# Patient Record
Sex: FEMALE | Race: OTHER | HISPANIC OR LATINO | Employment: OTHER | URBAN - METROPOLITAN AREA
[De-identification: names, ages, dates, MRNs, and addresses within clinical notes are randomized per-mention and may not be internally consistent; named-entity substitution may affect disease eponyms.]

---

## 2018-01-17 NOTE — MISCELLANEOUS
Provider Comments  Provider Comments:   PT NO SHOWED TODAY      Signatures   Electronically signed by : Nettie Jean, ; Jun 9 2016  3:08PM EST                       (Author)    Electronically signed by : Darell Riedel, MD; Jun 24 2016  3:40PM EST                       (Author)    Electronically signed by : PAULETTE Osborne ; Jun 28 2016 10:09AM EST                       (Review)

## 2018-10-29 ENCOUNTER — TRANSCRIBE ORDERS (OUTPATIENT)
Dept: LAB | Facility: CLINIC | Age: 58
End: 2018-10-29

## 2018-10-29 ENCOUNTER — APPOINTMENT (OUTPATIENT)
Dept: LAB | Facility: CLINIC | Age: 58
End: 2018-10-29
Payer: COMMERCIAL

## 2018-10-29 DIAGNOSIS — F43.12 PROLONGED POSTTRAUMATIC STRESS DISORDER: ICD-10-CM

## 2018-10-29 DIAGNOSIS — E13.8 DIABETES MELLITUS OF OTHER TYPE WITH COMPLICATION, UNSPECIFIED WHETHER LONG TERM INSULIN USE: ICD-10-CM

## 2018-10-29 DIAGNOSIS — F25.1 SCHIZOAFFECTIVE DISORDER, DEPRESSIVE TYPE (HCC): ICD-10-CM

## 2018-10-29 DIAGNOSIS — F19.20 COMBINATIONS OF DRUG DEPENDENCE EXCLUDING OPIOID TYPE DRUG, CONTINUOUS (HCC): ICD-10-CM

## 2018-10-29 DIAGNOSIS — E03.2 IATROGENIC HYPOTHYROIDISM: ICD-10-CM

## 2018-10-29 DIAGNOSIS — Z79.899 NEED FOR PROPHYLACTIC CHEMOTHERAPY: Primary | ICD-10-CM

## 2018-10-29 LAB
ALBUMIN SERPL BCP-MCNC: 3.8 G/DL (ref 3.5–5)
ALP SERPL-CCNC: 65 U/L (ref 46–116)
ALT SERPL W P-5'-P-CCNC: 12 U/L (ref 12–78)
ANION GAP SERPL CALCULATED.3IONS-SCNC: 9 MMOL/L (ref 4–13)
AST SERPL W P-5'-P-CCNC: 12 U/L (ref 5–45)
BASOPHILS # BLD AUTO: 0.03 THOUSANDS/ΜL (ref 0–0.1)
BASOPHILS NFR BLD AUTO: 1 % (ref 0–1)
BILIRUB SERPL-MCNC: 0.39 MG/DL (ref 0.2–1)
BUN SERPL-MCNC: 21 MG/DL (ref 5–25)
CALCIUM SERPL-MCNC: 8.9 MG/DL (ref 8.3–10.1)
CHLORIDE SERPL-SCNC: 113 MMOL/L (ref 100–108)
CHOLEST SERPL-MCNC: 140 MG/DL (ref 50–200)
CO2 SERPL-SCNC: 20 MMOL/L (ref 21–32)
CREAT SERPL-MCNC: 1.07 MG/DL (ref 0.6–1.3)
EOSINOPHIL # BLD AUTO: 0.09 THOUSAND/ΜL (ref 0–0.61)
EOSINOPHIL NFR BLD AUTO: 2 % (ref 0–6)
ERYTHROCYTE [DISTWIDTH] IN BLOOD BY AUTOMATED COUNT: 13.9 % (ref 11.6–15.1)
EST. AVERAGE GLUCOSE BLD GHB EST-MCNC: 94 MG/DL
GFR SERPL CREATININE-BSD FRML MDRD: 58 ML/MIN/1.73SQ M
GLUCOSE P FAST SERPL-MCNC: 83 MG/DL (ref 65–99)
HBA1C MFR BLD: 4.9 % (ref 4.2–6.3)
HCT VFR BLD AUTO: 35.9 % (ref 34.8–46.1)
HDLC SERPL-MCNC: 47 MG/DL (ref 40–60)
HGB BLD-MCNC: 11.5 G/DL (ref 11.5–15.4)
IMM GRANULOCYTES # BLD AUTO: 0.01 THOUSAND/UL (ref 0–0.2)
IMM GRANULOCYTES NFR BLD AUTO: 0 % (ref 0–2)
LDLC SERPL CALC-MCNC: 68 MG/DL (ref 0–100)
LYMPHOCYTES # BLD AUTO: 2.5 THOUSANDS/ΜL (ref 0.6–4.47)
LYMPHOCYTES NFR BLD AUTO: 50 % (ref 14–44)
MCH RBC QN AUTO: 37.1 PG (ref 26.8–34.3)
MCHC RBC AUTO-ENTMCNC: 32 G/DL (ref 31.4–37.4)
MCV RBC AUTO: 116 FL (ref 82–98)
MONOCYTES # BLD AUTO: 0.26 THOUSAND/ΜL (ref 0.17–1.22)
MONOCYTES NFR BLD AUTO: 5 % (ref 4–12)
NEUTROPHILS # BLD AUTO: 2.1 THOUSANDS/ΜL (ref 1.85–7.62)
NEUTS SEG NFR BLD AUTO: 42 % (ref 43–75)
NONHDLC SERPL-MCNC: 93 MG/DL
NRBC BLD AUTO-RTO: 0 /100 WBCS
PLATELET # BLD AUTO: 186 THOUSANDS/UL (ref 149–390)
PMV BLD AUTO: 11.2 FL (ref 8.9–12.7)
POTASSIUM SERPL-SCNC: 4 MMOL/L (ref 3.5–5.3)
PROLACTIN SERPL-MCNC: 110.5 NG/ML
PROT SERPL-MCNC: 7.2 G/DL (ref 6.4–8.2)
RBC # BLD AUTO: 3.1 MILLION/UL (ref 3.81–5.12)
SODIUM SERPL-SCNC: 142 MMOL/L (ref 136–145)
TRIGL SERPL-MCNC: 124 MG/DL
TSH SERPL DL<=0.05 MIU/L-ACNC: 5.71 UIU/ML (ref 0.36–3.74)
WBC # BLD AUTO: 4.99 THOUSAND/UL (ref 4.31–10.16)

## 2018-10-29 PROCEDURE — 84443 ASSAY THYROID STIM HORMONE: CPT

## 2018-10-29 PROCEDURE — 80307 DRUG TEST PRSMV CHEM ANLYZR: CPT

## 2018-10-29 PROCEDURE — 83036 HEMOGLOBIN GLYCOSYLATED A1C: CPT

## 2018-10-29 PROCEDURE — 36415 COLL VENOUS BLD VENIPUNCTURE: CPT

## 2018-10-29 PROCEDURE — 80061 LIPID PANEL: CPT

## 2018-10-29 PROCEDURE — 85025 COMPLETE CBC W/AUTO DIFF WBC: CPT

## 2018-10-29 PROCEDURE — 80053 COMPREHEN METABOLIC PANEL: CPT

## 2018-10-29 PROCEDURE — 84146 ASSAY OF PROLACTIN: CPT

## 2018-10-30 LAB
AMPHETAMINES UR QL SCN: NEGATIVE NG/ML
BARBITURATES UR QL SCN: NEGATIVE NG/ML
BENZODIAZ UR QL SCN: NEGATIVE NG/ML
BZE UR QL: NEGATIVE NG/ML
CANNABINOIDS UR QL SCN: NEGATIVE NG/ML
METHADONE UR QL SCN: NEGATIVE NG/ML
OPIATES UR QL: NEGATIVE NG/ML
PCP UR QL: NEGATIVE NG/ML
PROPOXYPH UR QL: NEGATIVE NG/ML

## 2018-12-19 ENCOUNTER — OFFICE VISIT (OUTPATIENT)
Dept: FAMILY MEDICINE CLINIC | Facility: CLINIC | Age: 58
End: 2018-12-19
Payer: COMMERCIAL

## 2018-12-19 VITALS
TEMPERATURE: 96.8 F | WEIGHT: 146.2 LBS | DIASTOLIC BLOOD PRESSURE: 80 MMHG | BODY MASS INDEX: 23.5 KG/M2 | HEART RATE: 68 BPM | RESPIRATION RATE: 12 BRPM | HEIGHT: 66 IN | SYSTOLIC BLOOD PRESSURE: 120 MMHG

## 2018-12-19 DIAGNOSIS — Z76.89 ENCOUNTER TO ESTABLISH CARE: ICD-10-CM

## 2018-12-19 DIAGNOSIS — F32.A DEPRESSION, UNSPECIFIED DEPRESSION TYPE: ICD-10-CM

## 2018-12-19 DIAGNOSIS — E03.9 HYPOTHYROIDISM, UNSPECIFIED TYPE: Primary | ICD-10-CM

## 2018-12-19 DIAGNOSIS — F41.9 ANXIETY: ICD-10-CM

## 2018-12-19 PROBLEM — R07.89 OTHER CHEST PAIN: Status: ACTIVE | Noted: 2018-12-19

## 2018-12-19 PROBLEM — F33.9 RECURRENT MAJOR DEPRESSIVE DISORDER (HCC): Chronic | Status: ACTIVE | Noted: 2018-12-19

## 2018-12-19 PROBLEM — Z00.00 HEALTH CARE MAINTENANCE: Status: ACTIVE | Noted: 2018-12-19

## 2018-12-19 PROCEDURE — 99214 OFFICE O/P EST MOD 30 MIN: CPT | Performed by: FAMILY MEDICINE

## 2018-12-19 PROCEDURE — 3008F BODY MASS INDEX DOCD: CPT | Performed by: FAMILY MEDICINE

## 2018-12-19 PROCEDURE — 1036F TOBACCO NON-USER: CPT | Performed by: FAMILY MEDICINE

## 2018-12-19 RX ORDER — BENZTROPINE MESYLATE 0.5 MG/1
0.5 TABLET ORAL
Refills: 0 | Status: ON HOLD | COMMUNITY
Start: 2018-12-09 | End: 2020-01-22 | Stop reason: SDUPTHER

## 2018-12-19 RX ORDER — PRAZOSIN HYDROCHLORIDE 2 MG/1
4 CAPSULE ORAL
Refills: 0 | COMMUNITY
Start: 2018-12-09

## 2018-12-19 RX ORDER — HYDROCHLOROTHIAZIDE 25 MG/1
1 TABLET ORAL DAILY
COMMUNITY
Start: 2015-08-11 | End: 2020-01-22 | Stop reason: HOSPADM

## 2018-12-19 RX ORDER — MIRTAZAPINE 45 MG/1
45 TABLET, FILM COATED ORAL
Refills: 0 | COMMUNITY
Start: 2018-12-09

## 2018-12-19 RX ORDER — GABAPENTIN 300 MG/1
300 CAPSULE ORAL 2 TIMES DAILY
Refills: 0 | COMMUNITY
Start: 2018-12-09

## 2018-12-19 RX ORDER — RISPERIDONE 3 MG/1
3 TABLET, FILM COATED ORAL
Refills: 0 | Status: ON HOLD | COMMUNITY
Start: 2018-12-09 | End: 2020-01-22 | Stop reason: SDUPTHER

## 2018-12-19 NOTE — ASSESSMENT & PLAN NOTE
New murmur documented in previous note from 3 years ago  No murmur was heard on auscultation today in the office  Echo from 3 years ago showed good ejection fraction of 65% and no valvular disease  No need to repeat echo  Will continue to monitor

## 2018-12-19 NOTE — PROGRESS NOTES
Assessment/Plan:    Recurrent major depressive disorder ([facility] [address] )  Treatment per psychiatrist     Adelina Bazzi recognized heart murmur  New murmur documented in previous note from 3 years ago  No murmur was heard on auscultation today in the office  Echo from 3 years ago showed good ejection fraction of 65% and no valvular disease  No need to repeat echo  Will continue to monitor  Health care maintenance  Patient denied flu shot  A1c from October 2018 is 4 2  Cholesterol 140 and LDL 68 from lipid panel in October 2018  Upon next visit will order mammogram, Pap smear and colonoscopy  Other chest pain  Patient admits intermittent chest pain and shortness of breath  Echo from 2015 shows ejection fraction of 65 and no valvular disease  The symptoms most likely from patient's anxiety  Problem List Items Addressed This Visit     Anxiety    Hypothyroidism - Primary    Relevant Orders    TSH, 3rd generation with Free T4 reflex      Other Visit Diagnoses     Encounter to establish care        Depression, unspecified depression type        Relevant Medications    risperiDONE (RisperDAL) 3 mg tablet    mirtazapine (REMERON) 45 MG tablet            Subjective:      Patient ID: Tyrone Kay is a 66 y o  female  Cher Becerril is a 80-year-old presents to the office for medication refills and to establish care  She has had a headache for the last month  She has tried Tylenol not able with mild relief  She denies any aura, facial numbness or weakness, nausea or vomiting  She also admits occasional shortness of breath and chest pain  She denies fever, chills, appetite change ,  weight loss or  blood in the stool  She has a past medical history of hypothyroidism , hypertension, anxiety and depression  She has not seen a PCP for 3 years  She currently sees a psychiatrist who takes care of her anxiety and depression and prescribes her medications    According to the patient the psychiatrist has also been prescribed during her thyroid and  antihypertensive medications  She has not been on her thyroid medication for last 8 months because she ran out  She has also not taken her hydrochlorothiazide for over a year because she ran out  Today in the office her blood pressure is 120/80  She has not had her flu shot this year  The patient does not remember the last time she had a mammogram, Pap smear, or colonoscopy  She is a poor historian and unreliable  She lived in  [place] and moved to the area about 3 years ago  Previous no in all scripts mentions I murmur  Echo was done in 2015 ejection fraction was good and showed no valvular disease  The following portions of the patient's history were reviewed and updated as appropriate: allergies, current medications, past family history, past medical history, past social history, past surgical history and problem list     Review of Systems   Constitutional: Negative  Negative for activity change, appetite change, chills, fatigue, fever and unexpected weight change  Eyes: Negative  Respiratory: Positive for shortness of breath  Cardiovascular: Positive for chest pain  Negative for palpitations and leg swelling  Gastrointestinal: Negative for abdominal pain, anal bleeding, blood in stool, constipation, diarrhea, nausea and vomiting  Endocrine: Negative for cold intolerance, heat intolerance, polydipsia, polyphagia and polyuria  Genitourinary: Negative for decreased urine volume, difficulty urinating, dysuria, frequency, hematuria, pelvic pain, vaginal bleeding, vaginal discharge and vaginal pain  Musculoskeletal: Negative for arthralgias and joint swelling  Skin: Negative for color change  Neurological: Positive for headaches  Negative for dizziness, weakness and light-headedness  Psychiatric/Behavioral: Negative for decreased concentration, self-injury and suicidal ideas  All other systems reviewed and are negative          Objective:      /80 (BP Location: Left arm, Patient Position: Sitting, Cuff Size: Standard)   Pulse 68   Temp (!) 96 8 °F (36 °C) (Tympanic)   Resp 12   Ht 5' 5 6" (1 666 m)   Wt 66 3 kg (146 lb 3 2 oz)   BMI 23 89 kg/m²          Physical Exam   Constitutional: She is oriented to person, place, and time  She appears well-nourished  No distress  HENT:   Head: Normocephalic and atraumatic  Mouth/Throat: Oropharynx is clear and moist    Eyes: Pupils are equal, round, and reactive to light  Conjunctivae and EOM are normal    Neck: Normal range of motion  Neck supple  No thyromegaly present  Cardiovascular: Normal rate, regular rhythm and normal heart sounds  No murmur heard  No murmur heard   Pulmonary/Chest: Effort normal and breath sounds normal  No respiratory distress  Abdominal: Soft  Bowel sounds are normal  She exhibits no distension  There is no tenderness  Musculoskeletal: Normal range of motion  She exhibits no edema  Lymphadenopathy:     She has no cervical adenopathy  Neurological: She is alert and oriented to person, place, and time  No cranial nerve deficit  Skin: Skin is warm and dry  She is not diaphoretic  Psychiatric: She has a normal mood and affect  Nursing note and vitals reviewed

## 2018-12-19 NOTE — ASSESSMENT & PLAN NOTE
Patient denied flu shot  A1c from October 2018 is 4 2  Cholesterol 140 and LDL 68 from lipid panel in October 2018  Upon next visit will order mammogram, Pap smear and colonoscopy

## 2018-12-19 NOTE — ASSESSMENT & PLAN NOTE
Patient admits intermittent chest pain and shortness of breath  Echo from 2015 shows ejection fraction of 65 and no valvular disease  The symptoms most likely from patient's anxiety

## 2018-12-28 ENCOUNTER — APPOINTMENT (OUTPATIENT)
Dept: LAB | Age: 58
End: 2018-12-28
Payer: COMMERCIAL

## 2018-12-28 DIAGNOSIS — E03.9 HYPOTHYROIDISM, UNSPECIFIED TYPE: ICD-10-CM

## 2018-12-28 LAB — TSH SERPL DL<=0.05 MIU/L-ACNC: 2.24 UIU/ML (ref 0.36–3.74)

## 2018-12-28 PROCEDURE — 36415 COLL VENOUS BLD VENIPUNCTURE: CPT

## 2018-12-28 PROCEDURE — 84443 ASSAY THYROID STIM HORMONE: CPT

## 2019-01-02 ENCOUNTER — OFFICE VISIT (OUTPATIENT)
Dept: FAMILY MEDICINE CLINIC | Facility: CLINIC | Age: 59
End: 2019-01-02

## 2019-01-02 VITALS
SYSTOLIC BLOOD PRESSURE: 120 MMHG | DIASTOLIC BLOOD PRESSURE: 80 MMHG | TEMPERATURE: 97 F | BODY MASS INDEX: 25.33 KG/M2 | HEIGHT: 65 IN | RESPIRATION RATE: 18 BRPM | HEART RATE: 70 BPM | WEIGHT: 152 LBS

## 2019-01-02 DIAGNOSIS — G44.89 OTHER HEADACHE SYNDROME: ICD-10-CM

## 2019-01-02 DIAGNOSIS — Z09 FOLLOW UP: Primary | ICD-10-CM

## 2019-01-02 DIAGNOSIS — Z12.39 SCREENING FOR BREAST CANCER: ICD-10-CM

## 2019-01-02 DIAGNOSIS — Z12.11 SCREENING FOR COLON CANCER: ICD-10-CM

## 2019-01-02 PROCEDURE — 1036F TOBACCO NON-USER: CPT | Performed by: FAMILY MEDICINE

## 2019-01-02 PROCEDURE — 99213 OFFICE O/P EST LOW 20 MIN: CPT | Performed by: FAMILY MEDICINE

## 2019-01-02 PROCEDURE — 3008F BODY MASS INDEX DOCD: CPT | Performed by: FAMILY MEDICINE

## 2019-01-03 PROBLEM — R51.9 HEADACHE: Status: ACTIVE | Noted: 2019-01-03

## 2019-01-03 NOTE — ASSESSMENT & PLAN NOTE
-Patient admits never having a mammogram, colonoscopy or Pap smear  - Referral for mammogram and colonoscopy  -Bring patient back for Pap smear

## 2019-01-03 NOTE — ASSESSMENT & PLAN NOTE
History of hypothyroidism  Patient currently asymptomatic  Recent TSH 2 24  Does not require levothyroxine

## 2019-01-03 NOTE — PROGRESS NOTES
Assessment/Plan:    Health care maintenance  -Patient admits never having a mammogram, colonoscopy or Pap smear  - Referral for mammogram and colonoscopy  -Bring patient back for Pap smear  Hypothyroidism  History of hypothyroidism  Patient currently asymptomatic  Recent TSH 2 24  Does not require levothyroxine  Headache  -Recommended patient start headache diary, drink more water and decrease caffeine and chocolate   -continue supportive treatment  Problem List Items Addressed This Visit     None      Visit Diagnoses     Follow up    -  Primary    Screening for breast cancer        Relevant Orders    Mammo screening bilateral w cad    Screening for colon cancer        Relevant Orders    Ambulatory referral to General Surgery            Subjective:      Patient ID: Gale Pang is a 66 y o  female  Presents to the clinic for follow-up visit and to review lab work  Her TSH level is normal   During the last visit did not resume patient's hypertensive medications since blood pressure was 120/80  Today patient's blood pressure is 120/80 which is the same as last visit  She complains of a headache  She does not know anything specifically that brings about her headache  She finds mild relief with Excedrin  She denies any visual changes, nausea or vomiting, numbness, weakness, tingling or aura  She eats a lot of chocolate and caffeine  The following portions of the patient's history were reviewed and updated as appropriate: allergies, current medications, past family history, past medical history, past social history, past surgical history and problem list     Review of Systems   Constitutional: Negative for chills and fever  Respiratory: Negative for cough and shortness of breath  Cardiovascular: Negative for chest pain and palpitations  Gastrointestinal: Negative for abdominal pain, constipation, diarrhea, nausea and vomiting  Skin: Negative for color change     Neurological: Positive for headaches  Negative for dizziness, syncope, weakness, light-headedness and numbness  Psychiatric/Behavioral: Negative for dysphoric mood  The patient is not nervous/anxious  All other systems reviewed and are negative  Objective:      /80   Pulse 70   Temp (!) 97 °F (36 1 °C)   Resp 18   Ht 5' 5" (1 651 m)   Wt 68 9 kg (152 lb)   BMI 25 29 kg/m²          Physical Exam   Constitutional: She is oriented to person, place, and time  She appears well-nourished  No distress  Cardiovascular: Normal rate, regular rhythm and normal heart sounds  Pulmonary/Chest: Effort normal and breath sounds normal  No respiratory distress  Abdominal: Soft  There is no tenderness  Neurological: She is alert and oriented to person, place, and time  Skin: She is not diaphoretic

## 2019-01-03 NOTE — ASSESSMENT & PLAN NOTE
-Recommended patient start headache diary, drink more water and decrease caffeine and chocolate   -continue supportive treatment

## 2020-01-09 ENCOUNTER — APPOINTMENT (INPATIENT)
Dept: ULTRASOUND IMAGING | Facility: HOSPITAL | Age: 60
DRG: 282 | End: 2020-01-09
Payer: COMMERCIAL

## 2020-01-09 ENCOUNTER — HOSPITAL ENCOUNTER (INPATIENT)
Facility: HOSPITAL | Age: 60
LOS: 5 days | Discharge: HOME | DRG: 282 | End: 2020-01-14
Attending: EMERGENCY MEDICINE | Admitting: INTERNAL MEDICINE
Payer: COMMERCIAL

## 2020-01-09 ENCOUNTER — APPOINTMENT (EMERGENCY)
Dept: RADIOLOGY | Facility: HOSPITAL | Age: 60
DRG: 282 | End: 2020-01-09
Payer: COMMERCIAL

## 2020-01-09 DIAGNOSIS — K80.20 CHOLELITHIASIS: ICD-10-CM

## 2020-01-09 DIAGNOSIS — D64.9 ANEMIA: ICD-10-CM

## 2020-01-09 DIAGNOSIS — R74.01 TRANSAMINITIS: ICD-10-CM

## 2020-01-09 DIAGNOSIS — K85.90 PANCREATITIS: ICD-10-CM

## 2020-01-09 DIAGNOSIS — E87.2 METABOLIC ACIDOSIS: ICD-10-CM

## 2020-01-09 DIAGNOSIS — K59.00 CONSTIPATION: ICD-10-CM

## 2020-01-09 DIAGNOSIS — N17.9 AKI (ACUTE KIDNEY INJURY) (HCC): ICD-10-CM

## 2020-01-09 DIAGNOSIS — E83.42 HYPOMAGNESEMIA: Primary | ICD-10-CM

## 2020-01-09 PROBLEM — E87.6 HYPOKALEMIA: Status: ACTIVE | Noted: 2020-01-09

## 2020-01-09 PROBLEM — E87.29 INCREASED ANION GAP METABOLIC ACIDOSIS: Status: ACTIVE | Noted: 2020-01-09

## 2020-01-09 PROBLEM — E87.0 HYPERNATREMIA: Status: ACTIVE | Noted: 2020-01-09

## 2020-01-09 PROBLEM — D72.829 LEUKOCYTOSIS: Status: ACTIVE | Noted: 2020-01-09

## 2020-01-09 PROBLEM — R07.9 CHEST PAIN: Status: ACTIVE | Noted: 2018-12-19

## 2020-01-09 PROBLEM — F32.A DEPRESSION: Status: ACTIVE | Noted: 2020-01-09

## 2020-01-09 LAB
ALBUMIN SERPL BCP-MCNC: 3.4 G/DL (ref 3.5–5)
ALP SERPL-CCNC: 97 U/L (ref 46–116)
ALT SERPL W P-5'-P-CCNC: 215 U/L (ref 12–78)
ANION GAP SERPL CALCULATED.3IONS-SCNC: 15 MMOL/L (ref 4–13)
ANION GAP SERPL CALCULATED.3IONS-SCNC: 18 MMOL/L (ref 4–13)
AST SERPL W P-5'-P-CCNC: 337 U/L (ref 5–45)
ATRIAL RATE: 77 BPM
BASE EX.OXY STD BLDV CALC-SCNC: 75.7 % (ref 60–80)
BASE EXCESS BLDV CALC-SCNC: -6.4 MMOL/L
BASOPHILS # BLD AUTO: 0.05 THOUSANDS/ΜL (ref 0–0.1)
BASOPHILS NFR BLD AUTO: 0 % (ref 0–1)
BILIRUB SERPL-MCNC: 0.38 MG/DL (ref 0.2–1)
BUN SERPL-MCNC: 32 MG/DL (ref 5–25)
BUN SERPL-MCNC: 36 MG/DL (ref 5–25)
CALCIUM SERPL-MCNC: 7.9 MG/DL (ref 8.3–10.1)
CALCIUM SERPL-MCNC: 8.7 MG/DL (ref 8.3–10.1)
CHLORIDE SERPL-SCNC: 112 MMOL/L (ref 100–108)
CHLORIDE SERPL-SCNC: 113 MMOL/L (ref 100–108)
CO2 SERPL-SCNC: 17 MMOL/L (ref 21–32)
CO2 SERPL-SCNC: 20 MMOL/L (ref 21–32)
CREAT SERPL-MCNC: 1.42 MG/DL (ref 0.6–1.3)
CREAT SERPL-MCNC: 1.81 MG/DL (ref 0.6–1.3)
D DIMER PPP FEU-MCNC: 3.6 UG/ML FEU
EOSINOPHIL # BLD AUTO: 0.04 THOUSAND/ΜL (ref 0–0.61)
EOSINOPHIL NFR BLD AUTO: 0 % (ref 0–6)
ERYTHROCYTE [DISTWIDTH] IN BLOOD BY AUTOMATED COUNT: 19.7 % (ref 11.6–15.1)
GFR SERPL CREATININE-BSD FRML MDRD: 31 ML/MIN/1.73SQ M
GFR SERPL CREATININE-BSD FRML MDRD: 41 ML/MIN/1.73SQ M
GLUCOSE SERPL-MCNC: 101 MG/DL (ref 65–140)
GLUCOSE SERPL-MCNC: 80 MG/DL (ref 65–140)
HCO3 BLDV-SCNC: 18.5 MMOL/L (ref 24–30)
HCT VFR BLD AUTO: 28.4 % (ref 34.8–46.1)
HGB BLD-MCNC: 9.8 G/DL (ref 11.5–15.4)
IMM GRANULOCYTES # BLD AUTO: 0.11 THOUSAND/UL (ref 0–0.2)
IMM GRANULOCYTES NFR BLD AUTO: 1 % (ref 0–2)
LACTATE SERPL-SCNC: 1.2 MMOL/L (ref 0.5–2)
LIPASE SERPL-CCNC: 1174 U/L (ref 73–393)
LYMPHOCYTES # BLD AUTO: 0.98 THOUSANDS/ΜL (ref 0.6–4.47)
LYMPHOCYTES NFR BLD AUTO: 8 % (ref 14–44)
MAGNESIUM SERPL-MCNC: 1.3 MG/DL (ref 1.6–2.6)
MAGNESIUM SERPL-MCNC: 1.7 MG/DL (ref 1.6–2.6)
MCH RBC QN AUTO: 33.3 PG (ref 26.8–34.3)
MCHC RBC AUTO-ENTMCNC: 34.5 G/DL (ref 31.4–37.4)
MCV RBC AUTO: 97 FL (ref 82–98)
MONOCYTES # BLD AUTO: 0.27 THOUSAND/ΜL (ref 0.17–1.22)
MONOCYTES NFR BLD AUTO: 2 % (ref 4–12)
NEUTROPHILS # BLD AUTO: 10.8 THOUSANDS/ΜL (ref 1.85–7.62)
NEUTS SEG NFR BLD AUTO: 89 % (ref 43–75)
NRBC BLD AUTO-RTO: 0 /100 WBCS
O2 CT BLDV-SCNC: 10.2 ML/DL
P AXIS: 66 DEGREES
PCO2 BLDV: 34.2 MM HG (ref 42–50)
PH BLDV: 7.35 [PH] (ref 7.3–7.4)
PLATELET # BLD AUTO: 323 THOUSANDS/UL (ref 149–390)
PMV BLD AUTO: 10.1 FL (ref 8.9–12.7)
PO2 BLDV: 45 MM HG (ref 35–45)
POTASSIUM SERPL-SCNC: 2.3 MMOL/L (ref 3.5–5.3)
POTASSIUM SERPL-SCNC: 2.3 MMOL/L (ref 3.5–5.3)
PR INTERVAL: 136 MS
PROT SERPL-MCNC: 6.4 G/DL (ref 6.4–8.2)
QRS AXIS: 55 DEGREES
QRSD INTERVAL: 76 MS
QT INTERVAL: 362 MS
QTC INTERVAL: 409 MS
RBC # BLD AUTO: 2.94 MILLION/UL (ref 3.81–5.12)
SODIUM SERPL-SCNC: 147 MMOL/L (ref 136–145)
SODIUM SERPL-SCNC: 148 MMOL/L (ref 136–145)
T WAVE AXIS: 84 DEGREES
TRIGL SERPL-MCNC: 51 MG/DL
TROPONIN I SERPL-MCNC: 0.04 NG/ML
TROPONIN I SERPL-MCNC: 0.05 NG/ML
TROPONIN I SERPL-MCNC: 0.05 NG/ML
TROPONIN I SERPL-MCNC: 0.07 NG/ML
VENTRICULAR RATE: 77 BPM
WBC # BLD AUTO: 12.25 THOUSAND/UL (ref 4.31–10.16)

## 2020-01-09 PROCEDURE — 84478 ASSAY OF TRIGLYCERIDES: CPT | Performed by: INTERNAL MEDICINE

## 2020-01-09 PROCEDURE — 84484 ASSAY OF TROPONIN QUANT: CPT | Performed by: EMERGENCY MEDICINE

## 2020-01-09 PROCEDURE — 93005 ELECTROCARDIOGRAM TRACING: CPT

## 2020-01-09 PROCEDURE — 83550 IRON BINDING TEST: CPT | Performed by: INTERNAL MEDICINE

## 2020-01-09 PROCEDURE — 83735 ASSAY OF MAGNESIUM: CPT | Performed by: EMERGENCY MEDICINE

## 2020-01-09 PROCEDURE — 84484 ASSAY OF TROPONIN QUANT: CPT | Performed by: INTERNAL MEDICINE

## 2020-01-09 PROCEDURE — 36415 COLL VENOUS BLD VENIPUNCTURE: CPT | Performed by: EMERGENCY MEDICINE

## 2020-01-09 PROCEDURE — 93010 ELECTROCARDIOGRAM REPORT: CPT | Performed by: INTERNAL MEDICINE

## 2020-01-09 PROCEDURE — 82728 ASSAY OF FERRITIN: CPT | Performed by: INTERNAL MEDICINE

## 2020-01-09 PROCEDURE — 85379 FIBRIN DEGRADATION QUANT: CPT | Performed by: EMERGENCY MEDICINE

## 2020-01-09 PROCEDURE — 80048 BASIC METABOLIC PNL TOTAL CA: CPT | Performed by: INTERNAL MEDICINE

## 2020-01-09 PROCEDURE — 83540 ASSAY OF IRON: CPT | Performed by: INTERNAL MEDICINE

## 2020-01-09 PROCEDURE — 83605 ASSAY OF LACTIC ACID: CPT | Performed by: EMERGENCY MEDICINE

## 2020-01-09 PROCEDURE — 83735 ASSAY OF MAGNESIUM: CPT | Performed by: INTERNAL MEDICINE

## 2020-01-09 PROCEDURE — 82805 BLOOD GASES W/O2 SATURATION: CPT | Performed by: EMERGENCY MEDICINE

## 2020-01-09 PROCEDURE — 83690 ASSAY OF LIPASE: CPT | Performed by: EMERGENCY MEDICINE

## 2020-01-09 PROCEDURE — 99223 1ST HOSP IP/OBS HIGH 75: CPT | Performed by: INTERNAL MEDICINE

## 2020-01-09 PROCEDURE — 3066F NEPHROPATHY DOC TX: CPT | Performed by: NURSE PRACTITIONER

## 2020-01-09 PROCEDURE — 71046 X-RAY EXAM CHEST 2 VIEWS: CPT

## 2020-01-09 PROCEDURE — 96365 THER/PROPH/DIAG IV INF INIT: CPT

## 2020-01-09 PROCEDURE — 76700 US EXAM ABDOM COMPLETE: CPT

## 2020-01-09 PROCEDURE — 80053 COMPREHEN METABOLIC PANEL: CPT | Performed by: EMERGENCY MEDICINE

## 2020-01-09 PROCEDURE — 99284 EMERGENCY DEPT VISIT MOD MDM: CPT | Performed by: EMERGENCY MEDICINE

## 2020-01-09 PROCEDURE — 99285 EMERGENCY DEPT VISIT HI MDM: CPT

## 2020-01-09 PROCEDURE — 85025 COMPLETE CBC W/AUTO DIFF WBC: CPT | Performed by: EMERGENCY MEDICINE

## 2020-01-09 PROCEDURE — 3066F NEPHROPATHY DOC TX: CPT | Performed by: INTERNAL MEDICINE

## 2020-01-09 RX ORDER — MAGNESIUM SULFATE HEPTAHYDRATE 40 MG/ML
2 INJECTION, SOLUTION INTRAVENOUS ONCE
Status: COMPLETED | OUTPATIENT
Start: 2020-01-09 | End: 2020-01-09

## 2020-01-09 RX ORDER — POTASSIUM CHLORIDE 14.9 MG/ML
20 INJECTION INTRAVENOUS
Status: DISPENSED | OUTPATIENT
Start: 2020-01-09 | End: 2020-01-09

## 2020-01-09 RX ORDER — ASPIRIN 81 MG/1
324 TABLET, CHEWABLE ORAL ONCE
Status: COMPLETED | OUTPATIENT
Start: 2020-01-09 | End: 2020-01-09

## 2020-01-09 RX ORDER — POTASSIUM CHLORIDE 20 MEQ/1
40 TABLET, EXTENDED RELEASE ORAL ONCE
Status: COMPLETED | OUTPATIENT
Start: 2020-01-09 | End: 2020-01-09

## 2020-01-09 RX ORDER — RISPERIDONE 1 MG/1
3 TABLET, FILM COATED ORAL
Status: DISCONTINUED | OUTPATIENT
Start: 2020-01-09 | End: 2020-01-14 | Stop reason: HOSPADM

## 2020-01-09 RX ORDER — PRAZOSIN HYDROCHLORIDE 1 MG/1
4 CAPSULE ORAL
Status: DISCONTINUED | OUTPATIENT
Start: 2020-01-09 | End: 2020-01-14 | Stop reason: HOSPADM

## 2020-01-09 RX ORDER — MIRTAZAPINE 15 MG/1
45 TABLET, FILM COATED ORAL
Status: DISCONTINUED | OUTPATIENT
Start: 2020-01-09 | End: 2020-01-14 | Stop reason: HOSPADM

## 2020-01-09 RX ORDER — HYDROXYZINE HYDROCHLORIDE 25 MG/1
50 TABLET, FILM COATED ORAL EVERY 6 HOURS PRN
Status: DISCONTINUED | OUTPATIENT
Start: 2020-01-09 | End: 2020-01-14 | Stop reason: HOSPADM

## 2020-01-09 RX ORDER — ONDANSETRON 2 MG/ML
4 INJECTION INTRAMUSCULAR; INTRAVENOUS EVERY 6 HOURS PRN
Status: DISCONTINUED | OUTPATIENT
Start: 2020-01-09 | End: 2020-01-14 | Stop reason: HOSPADM

## 2020-01-09 RX ORDER — SODIUM CHLORIDE, SODIUM LACTATE, POTASSIUM CHLORIDE, CALCIUM CHLORIDE 600; 310; 30; 20 MG/100ML; MG/100ML; MG/100ML; MG/100ML
1000 INJECTION, SOLUTION INTRAVENOUS ONCE
Status: COMPLETED | OUTPATIENT
Start: 2020-01-09 | End: 2020-01-09

## 2020-01-09 RX ORDER — GABAPENTIN 300 MG/1
300 CAPSULE ORAL 2 TIMES DAILY
Status: DISCONTINUED | OUTPATIENT
Start: 2020-01-09 | End: 2020-01-14 | Stop reason: HOSPADM

## 2020-01-09 RX ORDER — DOCUSATE SODIUM 100 MG/1
100 CAPSULE, LIQUID FILLED ORAL 2 TIMES DAILY
Status: DISCONTINUED | OUTPATIENT
Start: 2020-01-09 | End: 2020-01-11

## 2020-01-09 RX ORDER — HYDRALAZINE HYDROCHLORIDE 20 MG/ML
5 INJECTION INTRAMUSCULAR; INTRAVENOUS EVERY 6 HOURS PRN
Status: DISCONTINUED | OUTPATIENT
Start: 2020-01-09 | End: 2020-01-14 | Stop reason: HOSPADM

## 2020-01-09 RX ORDER — POTASSIUM CHLORIDE 29.8 MG/ML
40 INJECTION INTRAVENOUS ONCE
Status: DISCONTINUED | OUTPATIENT
Start: 2020-01-09 | End: 2020-01-09 | Stop reason: SDUPTHER

## 2020-01-09 RX ORDER — SODIUM CHLORIDE 9 MG/ML
3 INJECTION INTRAVENOUS AS NEEDED
Status: DISCONTINUED | OUTPATIENT
Start: 2020-01-09 | End: 2020-01-14 | Stop reason: HOSPADM

## 2020-01-09 RX ORDER — MAGNESIUM HYDROXIDE/ALUMINUM HYDROXICE/SIMETHICONE 120; 1200; 1200 MG/30ML; MG/30ML; MG/30ML
30 SUSPENSION ORAL ONCE
Status: COMPLETED | OUTPATIENT
Start: 2020-01-09 | End: 2020-01-09

## 2020-01-09 RX ORDER — FAMOTIDINE 20 MG/1
20 TABLET, FILM COATED ORAL ONCE
Status: COMPLETED | OUTPATIENT
Start: 2020-01-09 | End: 2020-01-09

## 2020-01-09 RX ORDER — POTASSIUM CHLORIDE 14.9 MG/ML
20 INJECTION INTRAVENOUS ONCE
Status: COMPLETED | OUTPATIENT
Start: 2020-01-09 | End: 2020-01-09

## 2020-01-09 RX ORDER — SODIUM CHLORIDE, SODIUM LACTATE, POTASSIUM CHLORIDE, CALCIUM CHLORIDE 600; 310; 30; 20 MG/100ML; MG/100ML; MG/100ML; MG/100ML
100 INJECTION, SOLUTION INTRAVENOUS CONTINUOUS
Status: DISCONTINUED | OUTPATIENT
Start: 2020-01-09 | End: 2020-01-10

## 2020-01-09 RX ORDER — HALOPERIDOL 5 MG/ML
5 INJECTION INTRAMUSCULAR DAILY PRN
Status: DISCONTINUED | OUTPATIENT
Start: 2020-01-09 | End: 2020-01-10

## 2020-01-09 RX ORDER — ACETAMINOPHEN 325 MG/1
650 TABLET ORAL EVERY 6 HOURS PRN
Status: DISCONTINUED | OUTPATIENT
Start: 2020-01-09 | End: 2020-01-14 | Stop reason: HOSPADM

## 2020-01-09 RX ADMIN — SODIUM CHLORIDE, SODIUM LACTATE, POTASSIUM CHLORIDE, AND CALCIUM CHLORIDE 150 ML/HR: .6; .31; .03; .02 INJECTION, SOLUTION INTRAVENOUS at 18:18

## 2020-01-09 RX ADMIN — RISPERIDONE 3 MG: 1 TABLET ORAL at 21:49

## 2020-01-09 RX ADMIN — SODIUM CHLORIDE, SODIUM LACTATE, POTASSIUM CHLORIDE, AND CALCIUM CHLORIDE 1000 ML/HR: .6; .31; .03; .02 INJECTION, SOLUTION INTRAVENOUS at 14:44

## 2020-01-09 RX ADMIN — ACETAMINOPHEN 650 MG: 325 TABLET, FILM COATED ORAL at 22:33

## 2020-01-09 RX ADMIN — FAMOTIDINE 20 MG: 20 TABLET, FILM COATED ORAL at 14:06

## 2020-01-09 RX ADMIN — POTASSIUM CHLORIDE 20 MEQ: 14.9 INJECTION, SOLUTION INTRAVENOUS at 19:34

## 2020-01-09 RX ADMIN — DOCUSATE SODIUM 100 MG: 100 CAPSULE, LIQUID FILLED ORAL at 18:18

## 2020-01-09 RX ADMIN — MAGNESIUM SULFATE HEPTAHYDRATE 2 G: 40 INJECTION, SOLUTION INTRAVENOUS at 21:49

## 2020-01-09 RX ADMIN — ASPIRIN 81 MG 324 MG: 81 TABLET ORAL at 13:11

## 2020-01-09 RX ADMIN — GABAPENTIN 300 MG: 300 CAPSULE ORAL at 18:18

## 2020-01-09 RX ADMIN — MIRTAZAPINE 45 MG: 15 TABLET, FILM COATED ORAL at 21:48

## 2020-01-09 RX ADMIN — POTASSIUM CHLORIDE 40 MEQ: 1500 TABLET, EXTENDED RELEASE ORAL at 20:22

## 2020-01-09 RX ADMIN — ALUMINUM HYDROXIDE, MAGNESIUM HYDROXIDE, AND SIMETHICONE 30 ML: 200; 200; 20 SUSPENSION ORAL at 14:07

## 2020-01-09 RX ADMIN — POTASSIUM CHLORIDE 20 MEQ: 200 INJECTION, SOLUTION INTRAVENOUS at 16:56

## 2020-01-09 RX ADMIN — MAGNESIUM SULFATE HEPTAHYDRATE 2 G: 40 INJECTION, SOLUTION INTRAVENOUS at 14:13

## 2020-01-09 RX ADMIN — ONDANSETRON 4 MG: 2 INJECTION INTRAMUSCULAR; INTRAVENOUS at 20:30

## 2020-01-09 RX ADMIN — PRAZOSIN HYDROCHLORIDE 4 MG: 1 CAPSULE ORAL at 21:50

## 2020-01-09 NOTE — ASSESSMENT & PLAN NOTE
· Sodium level noted at 147  · Likely hypertonic hypovolemia due to nausea and vomiting  · Patient received 1 L of Ringer's lactate done in the ED  · Should improve with IV fluid hydration    Plan  · Continue with fluid hydration  · Monitor BMP

## 2020-01-09 NOTE — ED PROVIDER NOTES
History  Chief Complaint   Patient presents with    Chest Pain     pt states started with CP yesterday     HPI  22-year-old female with past medical history significant for hypertension, psychiatric disorder presenting to the emergency department with acute onset of sharp midsternal chest pain radiating from the epigastric region, 10/10, associated with nausea and vomiting since yesterday  Patient states that she has been unable to hold down anything and has had a loss of appetite over past week  Otherwise, denies fever, chills, shortness of breath, urinary symptoms, changes in stool, leg pain or leg swelling  Patient has not tried to take anything for the pain  Has never had symptoms like this before  No sick contacts, no recent travel, no recent changes in medications  Prior to Admission Medications   Prescriptions Last Dose Informant Patient Reported? Taking?   benztropine (COGENTIN) 0 5 mg tablet  Self Yes No   Sig: Take 0 5 mg by mouth daily at bedtime   gabapentin (NEURONTIN) 300 mg capsule  Self Yes No   Sig: Take 300 mg by mouth 2 (two) times a day   hydrochlorothiazide (HYDRODIURIL) 25 mg tablet  Self Yes No   Sig: Take 1 tablet by mouth daily   mirtazapine (REMERON) 45 MG tablet  Self Yes No   Sig: Take 45 mg by mouth daily at bedtime   prazosin (MINIPRESS) 2 mg capsule  Self Yes No   Sig: Take 4 mg by mouth daily at bedtime   risperiDONE (RisperDAL) 3 mg tablet  Self Yes No   Sig: Take 3 mg by mouth daily at bedtime      Facility-Administered Medications: None       Past Medical History:   Diagnosis Date    Disease of thyroid gland     Hypertension     Psychiatric disorder        Past Surgical History:   Procedure Laterality Date    BRAIN SURGERY      patient can not tell any other details  History reviewed  No pertinent family history  I have reviewed and agree with the history as documented      Social History     Tobacco Use    Smoking status: Never Smoker    Smokeless tobacco: Never Used   Substance Use Topics    Alcohol use: No    Drug use: No        Review of Systems   Constitutional: Negative for chills and fever  Respiratory: Negative for apnea, cough, choking, chest tightness, shortness of breath, wheezing and stridor  Cardiovascular: Positive for chest pain  Negative for leg swelling  Gastrointestinal: Positive for abdominal pain, nausea and vomiting  Negative for abdominal distention, constipation, diarrhea and rectal pain  Genitourinary: Negative for dysuria and hematuria  Musculoskeletal: Negative for back pain, gait problem and neck pain  Skin: Negative for color change, pallor, rash and wound  Neurological: Negative for dizziness, tremors, seizures, syncope, facial asymmetry, speech difficulty, weakness, light-headedness, numbness and headaches  Physical Exam  Physical Exam   Constitutional: She is oriented to person, place, and time  She appears well-developed and well-nourished  Non-toxic appearance  She does not appear ill  No distress  HENT:   Head: Normocephalic and atraumatic  Right Ear: External ear normal    Left Ear: External ear normal    Nose: Nose normal    Mouth/Throat: Oropharynx is clear and moist    Eyes: Pupils are equal, round, and reactive to light  Conjunctivae and EOM are normal  Right eye exhibits no discharge  Left eye exhibits no discharge  Neck: Normal range of motion  Neck supple  Cardiovascular: Normal rate, regular rhythm, normal heart sounds and intact distal pulses  Exam reveals no gallop and no friction rub  No murmur heard  Pulmonary/Chest: Effort normal and breath sounds normal  No stridor  No respiratory distress  She has no wheezes  She has no rales  She exhibits no tenderness  Abdominal: Soft  Bowel sounds are normal  She exhibits no distension and no mass  There is tenderness (Epigastric region)  There is no rebound and no guarding  No hernia  Musculoskeletal: Normal range of motion   She exhibits no edema, tenderness or deformity  Neurological: She is alert and oriented to person, place, and time  Skin: Skin is warm and dry  Capillary refill takes less than 2 seconds  She is not diaphoretic  There is pallor  Psychiatric: She has a normal mood and affect  Nursing note and vitals reviewed        Vital Signs  ED Triage Vitals   Temperature Pulse Respirations Blood Pressure SpO2   01/09/20 1217 01/09/20 1217 01/09/20 1217 01/09/20 1217 01/09/20 1217   97 8 °F (36 6 °C) 79 16 120/80 100 %      Temp Source Heart Rate Source Patient Position - Orthostatic VS BP Location FiO2 (%)   01/09/20 1217 01/09/20 1414 01/09/20 1414 01/09/20 1414 --   Oral Monitor Sitting Left arm       Pain Score       01/09/20 1217       7           Vitals:    01/09/20 1217 01/09/20 1414   BP: 120/80 170/77   Pulse: 79 76   Patient Position - Orthostatic VS:  Sitting         Visual Acuity      ED Medications  Medications   sodium chloride (PF) 0 9 % injection 3 mL (has no administration in time range)   magnesium sulfate 2 g/50 mL IVPB (premix) 2 g (2 g Intravenous New Bag 1/9/20 1413)   potassium chloride 20 mEq IVPB (premix) (0 mEq Intravenous Hold 1/9/20 1413)   aspirin chewable tablet 324 mg (324 mg Oral Given 1/9/20 1311)   famotidine (PEPCID) tablet 20 mg (20 mg Oral Given 1/9/20 1406)   aluminum-magnesium hydroxide-simethicone (MYLANTA) 200-200-20 mg/5 mL oral suspension 30 mL (30 mL Oral Given 1/9/20 1407)       Diagnostic Studies  Results Reviewed     Procedure Component Value Units Date/Time    Comprehensive metabolic panel [[id number]]  (Abnormal) Collected:  01/09/20 1311    Lab Status:  Final result Specimen:  Blood from Arm, Right Updated:  01/09/20 1402     Sodium 147 mmol/L      Potassium 2 3 mmol/L      Chloride 112 mmol/L      CO2 17 mmol/L      ANION GAP 18 mmol/L      BUN 36 mg/dL      Creatinine 1 81 mg/dL      Glucose 101 mg/dL      Calcium 8 7 mg/dL       U/L       U/L      Alkaline Phosphatase 97 U/L Total Protein 6 4 g/dL      Albumin 3 4 g/dL      Total Bilirubin 0 38 mg/dL      eGFR 31 ml/min/1 73sq m     Narrative:       National Kidney Disease Foundation guidelines for Chronic Kidney Disease (CKD):     Stage 1 with normal or high GFR (GFR > 90 mL/min/1 73 square meters)    Stage 2 Mild CKD (GFR = 60-89 mL/min/1 73 square meters)    Stage 3A Moderate CKD (GFR = 45-59 mL/min/1 73 square meters)    Stage 3B Moderate CKD (GFR = 30-44 mL/min/1 73 square meters)    Stage 4 Severe CKD (GFR = 15-29 mL/min/1 73 square meters)    Stage 5 End Stage CKD (GFR <15 mL/min/1 73 square meters)  Note: GFR calculation is accurate only with a steady state creatinine    Troponin I [[id number]]  (Normal) Collected:  01/09/20 1311    Lab Status:  Final result Specimen:  Blood from Arm, Right Updated:  01/09/20 1343     Troponin I 0 04 ng/mL     Lipase [[id number]]  (Abnormal) Collected:  01/09/20 1311    Lab Status:  Final result Specimen:  Blood from Arm, Right Updated:  01/09/20 1340     Lipase 1,174 u/L     Magnesium [[id number]]  (Abnormal) Collected:  01/09/20 1311    Lab Status:  Final result Specimen:  Blood from Arm, Right Updated:  01/09/20 1340     Magnesium 1 3 mg/dL     CBC and differential [[id number]]  (Abnormal) Collected:  01/09/20 1311    Lab Status:  Final result Specimen:  Blood from Arm, Right Updated:  01/09/20 1322     WBC 12 25 Thousand/uL      RBC 2 94 Million/uL      Hemoglobin 9 8 g/dL      Hematocrit 28 4 %      MCV 97 fL      MCH 33 3 pg      MCHC 34 5 g/dL      RDW 19 7 %      MPV 10 1 fL      Platelets 048 Thousands/uL      nRBC 0 /100 WBCs      Neutrophils Relative 89 %      Immat GRANS % 1 %      Lymphocytes Relative 8 %      Monocytes Relative 2 %      Eosinophils Relative 0 %      Basophils Relative 0 %      Neutrophils Absolute 10 80 Thousands/µL      Immature Grans Absolute 0 11 Thousand/uL      Lymphocytes Absolute 0 98 Thousands/µL      Monocytes Absolute 0 27 Thousand/µL Eosinophils Absolute 0 04 Thousand/µL      Basophils Absolute 0 05 Thousands/µL     D-dimer, quantitative [[id number]] Collected:  01/09/20 1311    Lab Status: In process Specimen:  Blood from Arm, Right Updated:  01/09/20 1320    Troponin I repeat in 3 hrs [[id number]]     Lab Status:  No result Specimen:  Blood                  X-ray chest 2 views   Final Result by Jimmy Campos MD (01/09 1310)      No acute cardiopulmonary disease  Workstation performed: [id number]         US abdomen complete    (Results Pending)              Procedures  Procedures         ED Course  ED Course as of Jan 09 1438   Thu Jan 09, 2020   1240 Vital signs within normal limits  Physical exam is remarkable for patient who appears mildly distressed and seems to have some element of extrapyramidal symptoms  Blood Pressure: 120/80   1327 Chest x-ray without acute findings  X-ray chest 2 views   1328 EKG shows normal sinus rhythm 77, QRS 76, , no ST elevation, some ST depression noted in leads 2, 3, V4, V5, V6  No ectopy, no terminal R  Minor changes since last EKG taken June 16, 2015, but no evidence of acute STEMI  ECG 12 lead   1329 Patient given 325 mg aspirin with Pepcid and Maalox for the pain  1351 Lipase(!): 1,174   1351 Magnesium repleted  Magnesium(!): 1 3   1429 WBC(!): 12 25   1429 Neutrophils %(!): 89   1429 AST(!): 337   1429 Abdominal ultrasound ordered for abnormal LFTs    ALT(!): 215   1429 Patient noted to have a metabolic acidosis with anion gap  As well as a KI  Additional fluids ordered for patient    CO2(!): 17   1429 Anion Gap(!): 18   1429 Creatinine(!): 1 81   1430 Case discussed with General Medicine for admission  [phone] Upon reassessment, patient is resting comfortably at this time                                    MDM      Disposition  Final diagnoses:   None     ED Disposition     None      Follow-up Information    None         Patient's Medications   Discharge Prescriptions No medications on file     No discharge procedures on file      ED Provider  Electronically Signed by           Mariia Elizondo MD  01/09/20 5331

## 2020-01-09 NOTE — ASSESSMENT & PLAN NOTE
· Hemoglobin noted at 9 8  · Baseline unclear  · On chart review last hemoglobin noted at 11 5 in 10/2018  · Patient has never had a colonoscopy  Plan  · Iron panel  · Will consider FOBT if iron panel reveals iron deficiency anemia    · Transfuse for hemoglobin less than 7   · Monitor CBC

## 2020-01-09 NOTE — ASSESSMENT & PLAN NOTE
· Patient is maintained on mirtazapine and risperidone  · On examination, patient is exhibiting signs of tardive dyskinesia

## 2020-01-09 NOTE — ASSESSMENT & PLAN NOTE
· Anioin gap noted at 18 on admission  · Bicarb reduced at 17  · Metabolic acidosis likely due to starvation ketoacidosis secondary to nausea and vomiting  · Lactic acid noted at 1 2  · Should improve with fluid hydration    · Continue to monitor

## 2020-01-09 NOTE — ASSESSMENT & PLAN NOTE
· Patient presented with the complaints of what she thought was chest pain radiating to the epigastric region with nausea and vomiting  · Reported mild right upper quadrant tenderness on exam   · Patient noted to have elevated AST and ALT  · AST elevated at 337  ALT at 215  · T bili normal at 0 38  · Lipase elevated at 1174  · Right upper upper quadrant ultrasound notable for cholelithiasis  · Pancreatitis likely due to gallstone  Plan  · NPO  · Triglycerides  · LR at 150 mL/hour  · Repeat CMP in the morning  · Will consult general surgery for an elective cholecystectomy

## 2020-01-09 NOTE — H&P
H&P- Darrel aYmil 1960, 64 y o  female MRN: [id number]    Unit/Bed#: S -01 Encounter: [id number]    Primary Care Provider: Kyung Walsh MD   Date and time admitted to hospital: 1/9/2020 12:16 PM        * Acute pancreatitis  Assessment & Plan  · Patient presented with the complaints of what she thought was chest pain radiating to the epigastric region with nausea and vomiting  · Reported mild right upper quadrant tenderness on exam   · Patient noted to have elevated AST and ALT  · AST elevated at 337  ALT at 215  · T bili normal at 0 38  · Lipase elevated at 1174  · Right upper upper quadrant ultrasound notable for cholelithiasis  · Pancreatitis likely due to gallstone  Plan  · NPO  · Triglycerides  · LR at 150 mL/hour  · Repeat CMP in the morning  · Will consult general surgery for an elective cholecystectomy  IRINEO (acute kidney injury) [facility])  Assessment & Plan  Workup:   Present on admission (POA) ; admission creatinine: 1 81(baseline unclear )   On chart review, creatinine from 10/2018 noted at 1 08   Etiology: Likely prerenal secondary to dehydration no setting of nausea and vomiting for 3 days and reduced appetite  Plan:   Continue with fluid hydration   Monitor BMP daily and observe for downward trend of creatinine   Avoid hypoperfusion of the kidneys, minimize nephrotoxins      Depression  Assessment & Plan  · Patient is maintained on mirtazapine and risperidone  · On examination, patient is exhibiting signs of tardive dyskinesia  Leukocytosis  Assessment & Plan  · Likely inflammatory response to pancreatitis  · Patient is afebrile  · Continue to monitor CBC  Cholelithiasis  Assessment & Plan  · Will consult surgery for an elective cholecystectomy  Anemia  Assessment & Plan  · Hemoglobin noted at 9 8  · Baseline unclear  · On chart review last hemoglobin noted at 11 5 in 10/2018  · Patient has never had a colonoscopy  Plan  · Iron panel    · Will consider FOBT if iron panel reveals iron deficiency anemia  · Transfuse for hemoglobin less than 7   · Monitor CBC      Hypomagnesemia  Assessment & Plan  · Magnesium on admission noted at 1 3  · Patient for received 2 g of magnesium sulfate in the ED  · Monitor  Increased anion gap metabolic acidosis  Assessment & Plan  · Anioin gap noted at 18 on admission  · Bicarb reduced at 17  · Metabolic acidosis likely due to starvation ketoacidosis secondary to nausea and vomiting  · Lactic acid noted at 1 2  · Should improve with fluid hydration  · Continue to monitor    Hypernatremia  Assessment & Plan  · Sodium level noted at 147  · Likely hypertonic hypovolemia due to nausea and vomiting  · Patient received 1 L of Ringer's lactate done in the ED  · Should improve with IV fluid hydration  Plan  · Continue with fluid hydration  · Monitor BMP    Hypokalemia  Assessment & Plan  · Potassium noted at 2 3 on admission  · EKG noted sinus rhythm with sinus arrhythmia  · Hypokalemia likely due to nausea vomiting  · Repleted with a total of 40meq KCL  Plan  · Maintain potassium more than 4  · Check potassium at 7pm  · BMP in the a m  Chest pain  Assessment & Plan  · Patient presented to the ED with substernal 10/10 chest pain with radiation to the epigastric region  · Associated nausea and vomiting  · Troponin noted at 0 04 and 0 05   · Aspirin 325 given done in the ED  · EKG demonstrates normal sinus rhythm with sinus arrhythmia  Non specific ST changes  · Chest x-ray normal   · Unlikely to be cardiac  Plan  · Continue to trend troponin  · Telemetry  Hypertension  Assessment & Plan  · Blood pressure noted to be elevated in the 579 systolic  · Continue with home antihypertensive-prazosin  · IV hydralazine for blood pressure greater than 560 systolic        VTE Prophylaxis: Low risk  / sequential compression device   Code Status:  Full code  POLST: POLST form is not discussed and not completed at this time     Anticipated Length of Stay:  Patient will be admitted on an Inpatient basis with an anticipated length of stay of  more than 2 midnights  Justification for Hospital Stay:  Acute pancreatitis    Chief Complaint:   Chest pain    History of Present Illness:    Cece Rizzo is a 64 y o  female past medical history of depression and hypertension who presents with chest pain that started yesterday  Patient states that the pain is sharp, 10/10, substernal in location with radiation to the epigastrium  There was associated nausea and vomiting for 3 days  Patient reports poor appetite  Syncopal episode reported 2 days prior to admission  No head trauma noted  At present, she complains of dizziness  She denies any fever or sweats but endorses chills  She denies having any palpitation, shortness of breath or abdominal pain  Denies diarrhea  Patient is a poor historian       Review of Systems:    Review of Systems   Constitutional: Positive for appetite change and chills  Negative for diaphoresis and fever  Respiratory: Negative for cough, chest tightness, shortness of breath and wheezing  Cardiovascular: Positive for chest pain  Negative for palpitations and leg swelling  Gastrointestinal: Positive for nausea and vomiting  Negative for abdominal distention, abdominal pain and diarrhea  Genitourinary: Negative for difficulty urinating and dysuria  Neurological: Positive for dizziness and weakness  Negative for headaches  All other systems reviewed and are negative  Past Medical and Surgical History:     Past Medical History:   Diagnosis Date    IRINEO (acute kidney injury) ([facility] [address] ) 1/9/2020    Anemia 1/9/2020    Disease of thyroid gland     Hypertension     Psychiatric disorder        Past Surgical History:   Procedure Laterality Date    BRAIN SURGERY      patient can not tell any other details          Meds/Allergies:    Prior to Admission medications    Medication Sig Start Date End Date Taking? Authorizing Provider   benztropine (COGENTIN) 0 5 mg tablet Take 0 5 mg by mouth daily at bedtime 12/9/18   Historical Provider, MD   gabapentin (NEURONTIN) 300 mg capsule Take 300 mg by mouth 2 (two) times a day 12/9/18   Historical Provider, MD   hydrochlorothiazide (HYDRODIURIL) 25 mg tablet Take 1 tablet by mouth daily 8/11/15   Historical Provider, MD   mirtazapine (REMERON) 45 MG tablet Take 45 mg by mouth daily at bedtime 12/9/18   Historical Provider, MD   prazosin (MINIPRESS) 2 mg capsule Take 4 mg by mouth daily at bedtime 12/9/18   Historical Provider, MD   risperiDONE (RisperDAL) 3 mg tablet Take 3 mg by mouth daily at bedtime 12/9/18   Historical Provider, MD     I have reviewed home medications with patient personally  Allergies: No Known Allergies    Social History:     Marital Status: Single   Occupation:  Retired  Patient Pre-hospital Living Situation:  Lives with son and daughter-in-law  Patient Pre-hospital Level of Mobility:  Unrestricted  Patient Pre-hospital Diet Restrictions:   Substance Use History:   Social History     Substance and Sexual Activity   Alcohol Use No     Social History     Tobacco Use   Smoking Status Never Smoker   Smokeless Tobacco Never Used     Social History     Substance and Sexual Activity   Drug Use No       Family History:    non-contributory    Physical Exam:     Vitals:   Blood Pressure: (!) 182/88 (01/09/20 1653)  Pulse: 79 (01/09/20 1653)  Temperature: 97 8 °F (36 6 °C) (01/09/20 1653)  Temp Source: Oral (01/09/20 1653)  Respirations: 18 (01/09/20 1653)  Height: 5' 6" (167 6 cm) (01/09/20 1653)  Weight - Scale: 68 kg (149 lb 14 6 oz) (01/09/20 1653)  SpO2: 100 % (01/09/20 1653)    Physical Exam   Constitutional: She is oriented to person, place, and time  She appears well-developed and well-nourished  She appears distressed  HENT:   Head: Normocephalic and atraumatic  Cardiovascular: Normal rate, regular rhythm and normal heart sounds     No murmur heard  Pulmonary/Chest: Effort normal and breath sounds normal  She has no wheezes  She has no rales  Abdominal: Soft  Bowel sounds are normal  She exhibits no distension  There is tenderness  Musculoskeletal: She exhibits no edema  Neurological: She is alert and oriented to person, place, and time  Skin: Skin is warm and dry  She is not diaphoretic  Psychiatric:   Abnormal body movements  Nursing note and vitals reviewed  Additional Data:     Lab Results: I have personally reviewed pertinent reports  Results from last 7 days   Lab Units 01/09/20  1311   WBC Thousand/uL 12 25*   HEMOGLOBIN g/dL 9 8*   HEMATOCRIT % 28 4*   PLATELETS Thousands/uL 323   NEUTROS PCT % 89*   LYMPHS PCT % 8*   MONOS PCT % 2*   EOS PCT % 0     Results from last 7 days   Lab Units 01/09/20  1311   POTASSIUM mmol/L 2 3*   CHLORIDE mmol/L 112*   CO2 mmol/L 17*   BUN mg/dL 36*   CREATININE mg/dL 1 81*   CALCIUM mg/dL 8 7   ALK PHOS U/L 97   ALT U/L 215*   AST U/L 337*           Imaging: I have personally reviewed pertinent reports  X-ray Chest 2 Views    Result Date: 1/9/2020  Narrative: CHEST INDICATION:   chest pain COMPARISON: Chest radiograph from 6/16/2015  EXAM PERFORMED/VIEWS:  XR CHEST PA & LATERAL WITH DUAL ENERGY SUBTRACTION  FINDINGS: Cardiomediastinal silhouette is normal  Lungs are clear  Benign calcified granuloma in the left lower lobe  No effusion or pneumothorax  Osseous structures are within normal limits for age  Impression: No acute cardiopulmonary disease  Workstation performed: [id number]     Us Abdomen Complete    Result Date: 1/9/2020  Narrative: ABDOMEN ULTRASOUND, COMPLETE INDICATION:   RUQ PAIN, NO FEVER, NO ELEVATED WBC elevated LFTs, epigastric pain  COMPARISON: None TECHNIQUE:   Real-time ultrasound of the abdomen was performed with a curvilinear transducer with both volumetric sweeps and still imaging techniques   FINDINGS: PANCREAS:  Visualized portions of the pancreas are within normal limits  AORTA AND IVC:  Visualized portions are normal for patient age  LIVER: Size:  Within normal range  The liver measures 14 1 cm in the midclavicular line  Contour:  Surface contour is smooth  Parenchyma:  Echogenicity and echotexture are within normal limits  No evidence of suspicious mass  Limited imaging of the main portal vein shows it to be patent and hepatopetal  BILIARY: The gallbladder is normal in caliber  No wall thickening or pericholecystic fluid  Shadowing gallstone(s) identified  No sonographic Starks's sign  No intrahepatic biliary dilatation  CBD measures 4 mm  No choledocholithiasis  KIDNEY: Right kidney measures 12 7 x 5 cm  5 7 x 3 9 x 4 5 cm simple cyst is present in the midpole  Left kidney measures 12 2 x 5 3 cm  Within normal limits  SPLEEN: Measures 11 5 cm  Within normal limits  ASCITES:  None  Impression: 1  Cholelithiasis  2   Simple cyst within the midpole of the right kidney  Workstation performed: [id number]       EKG, Pathology, and Other Studies Reviewed on Admission:   · EKG:  Normal sinus rhythm with sinus arrhythmia  Nonspecific ST changes  Epic / Care Everywhere Records Reviewed: Yes     ** Please Note: This note has been constructed using a voice recognition system   **

## 2020-01-09 NOTE — ASSESSMENT & PLAN NOTE
· Potassium noted at 2 3 on admission  · EKG noted sinus rhythm with sinus arrhythmia  · Hypokalemia likely due to nausea vomiting  · Repleted with a total of 40meq KCL  Plan  · Maintain potassium more than 4  · Check potassium at 7pm  · BMP in the a m

## 2020-01-09 NOTE — ASSESSMENT & PLAN NOTE
Workup:   Present on admission (POA) ; admission creatinine: 1 81(baseline unclear )   On chart review, creatinine from 10/2018 noted at 1 08   Etiology: Likely prerenal secondary to dehydration no setting of nausea and vomiting for 3 days and reduced appetite  Plan:   Continue with fluid hydration     Monitor BMP daily and observe for downward trend of creatinine   Avoid hypoperfusion of the kidneys, minimize nephrotoxins

## 2020-01-09 NOTE — ASSESSMENT & PLAN NOTE
· Magnesium on admission noted at 1 3  · Patient for received 2 g of magnesium sulfate in the ED  · Monitor

## 2020-01-09 NOTE — ASSESSMENT & PLAN NOTE
· Blood pressure noted to be elevated in the 250 systolic  · Continue with home antihypertensive-prazosin  · IV hydralazine for blood pressure greater than 255 systolic

## 2020-01-09 NOTE — ASSESSMENT & PLAN NOTE
· Patient presented to the ED with substernal 10/10 chest pain with radiation to the epigastric region  · Associated nausea and vomiting  · Troponin noted at 0 04 and 0 05   · Aspirin 325 given done in the ED  · EKG demonstrates normal sinus rhythm with sinus arrhythmia  Non specific ST changes  · Chest x-ray normal   · Unlikely to be cardiac  Plan  · Continue to trend troponin  · Telemetry

## 2020-01-10 ENCOUNTER — APPOINTMENT (INPATIENT)
Dept: CT IMAGING | Facility: HOSPITAL | Age: 60
DRG: 282 | End: 2020-01-10
Payer: COMMERCIAL

## 2020-01-10 PROBLEM — N17.9 AKI (ACUTE KIDNEY INJURY) (HCC): Status: RESOLVED | Noted: 2020-01-09 | Resolved: 2020-01-10

## 2020-01-10 PROBLEM — E87.6 HYPOKALEMIA: Status: RESOLVED | Noted: 2020-01-09 | Resolved: 2020-01-10

## 2020-01-10 PROBLEM — R79.89 ELEVATED TROPONIN: Status: ACTIVE | Noted: 2020-01-10

## 2020-01-10 PROBLEM — D72.829 LEUKOCYTOSIS: Status: RESOLVED | Noted: 2020-01-09 | Resolved: 2020-01-10

## 2020-01-10 PROBLEM — R77.8 ELEVATED TROPONIN: Status: ACTIVE | Noted: 2020-01-10

## 2020-01-10 PROBLEM — E83.42 HYPOMAGNESEMIA: Status: RESOLVED | Noted: 2020-01-09 | Resolved: 2020-01-10

## 2020-01-10 LAB
ALBUMIN SERPL BCP-MCNC: 2.9 G/DL (ref 3.5–5)
ALP SERPL-CCNC: 97 U/L (ref 46–116)
ALT SERPL W P-5'-P-CCNC: 205 U/L (ref 12–78)
ANION GAP SERPL CALCULATED.3IONS-SCNC: 13 MMOL/L (ref 4–13)
ANION GAP SERPL CALCULATED.3IONS-SCNC: 14 MMOL/L (ref 4–13)
AST SERPL W P-5'-P-CCNC: 256 U/L (ref 5–45)
BASOPHILS # BLD AUTO: 0.03 THOUSANDS/ΜL (ref 0–0.1)
BASOPHILS NFR BLD AUTO: 0 % (ref 0–1)
BILIRUB SERPL-MCNC: 0.39 MG/DL (ref 0.2–1)
BUN SERPL-MCNC: 22 MG/DL (ref 5–25)
BUN SERPL-MCNC: 26 MG/DL (ref 5–25)
CALCIUM SERPL-MCNC: 7.8 MG/DL (ref 8.3–10.1)
CALCIUM SERPL-MCNC: 7.9 MG/DL (ref 8.3–10.1)
CHLORIDE SERPL-SCNC: 114 MMOL/L (ref 100–108)
CHLORIDE SERPL-SCNC: 115 MMOL/L (ref 100–108)
CO2 SERPL-SCNC: 18 MMOL/L (ref 21–32)
CO2 SERPL-SCNC: 20 MMOL/L (ref 21–32)
CREAT SERPL-MCNC: 1.13 MG/DL (ref 0.6–1.3)
CREAT SERPL-MCNC: 1.3 MG/DL (ref 0.6–1.3)
EOSINOPHIL # BLD AUTO: 0.13 THOUSAND/ΜL (ref 0–0.61)
EOSINOPHIL NFR BLD AUTO: 2 % (ref 0–6)
ERYTHROCYTE [DISTWIDTH] IN BLOOD BY AUTOMATED COUNT: 20.2 % (ref 11.6–15.1)
FERRITIN SERPL-MCNC: 57 NG/ML (ref 8–388)
FOLATE SERPL-MCNC: 4.2 NG/ML (ref 3.1–17.5)
GFR SERPL CREATININE-BSD FRML MDRD: 46 ML/MIN/1.73SQ M
GFR SERPL CREATININE-BSD FRML MDRD: 54 ML/MIN/1.73SQ M
GLUCOSE SERPL-MCNC: 70 MG/DL (ref 65–140)
GLUCOSE SERPL-MCNC: 85 MG/DL (ref 65–140)
HCT VFR BLD AUTO: 27.4 % (ref 34.8–46.1)
HGB BLD-MCNC: 8.7 G/DL (ref 11.5–15.4)
IMM GRANULOCYTES # BLD AUTO: 0.05 THOUSAND/UL (ref 0–0.2)
IMM GRANULOCYTES NFR BLD AUTO: 1 % (ref 0–2)
IRON SATN MFR SERPL: 10 %
IRON SERPL-MCNC: 37 UG/DL (ref 50–170)
LIPASE SERPL-CCNC: 503 U/L (ref 73–393)
LYMPHOCYTES # BLD AUTO: 1.38 THOUSANDS/ΜL (ref 0.6–4.47)
LYMPHOCYTES NFR BLD AUTO: 19 % (ref 14–44)
MAGNESIUM SERPL-MCNC: 2.4 MG/DL (ref 1.6–2.6)
MCH RBC QN AUTO: 32.8 PG (ref 26.8–34.3)
MCHC RBC AUTO-ENTMCNC: 31.8 G/DL (ref 31.4–37.4)
MCV RBC AUTO: 103 FL (ref 82–98)
MONOCYTES # BLD AUTO: 0.21 THOUSAND/ΜL (ref 0.17–1.22)
MONOCYTES NFR BLD AUTO: 3 % (ref 4–12)
NEUTROPHILS # BLD AUTO: 5.42 THOUSANDS/ΜL (ref 1.85–7.62)
NEUTS SEG NFR BLD AUTO: 75 % (ref 43–75)
NRBC BLD AUTO-RTO: 1 /100 WBCS
PHOSPHATE SERPL-MCNC: 1.8 MG/DL (ref 2.7–4.5)
PHOSPHATE SERPL-MCNC: 3.1 MG/DL (ref 2.7–4.5)
PLATELET # BLD AUTO: 306 THOUSANDS/UL (ref 149–390)
PMV BLD AUTO: 9.6 FL (ref 8.9–12.7)
POTASSIUM SERPL-SCNC: 2.9 MMOL/L (ref 3.5–5.3)
POTASSIUM SERPL-SCNC: 3.4 MMOL/L (ref 3.5–5.3)
POTASSIUM SERPL-SCNC: 3.5 MMOL/L (ref 3.5–5.3)
PROT SERPL-MCNC: 5.7 G/DL (ref 6.4–8.2)
RBC # BLD AUTO: 2.65 MILLION/UL (ref 3.81–5.12)
SODIUM SERPL-SCNC: 147 MMOL/L (ref 136–145)
SODIUM SERPL-SCNC: 147 MMOL/L (ref 136–145)
TIBC SERPL-MCNC: 371 UG/DL (ref 250–450)
TROPONIN I SERPL-MCNC: 0.06 NG/ML
VIT B12 SERPL-MCNC: 1300 PG/ML (ref 100–900)
WBC # BLD AUTO: 7.22 THOUSAND/UL (ref 4.31–10.16)

## 2020-01-10 PROCEDURE — 84484 ASSAY OF TROPONIN QUANT: CPT | Performed by: INTERNAL MEDICINE

## 2020-01-10 PROCEDURE — 84100 ASSAY OF PHOSPHORUS: CPT | Performed by: INTERNAL MEDICINE

## 2020-01-10 PROCEDURE — 82607 VITAMIN B-12: CPT | Performed by: INTERNAL MEDICINE

## 2020-01-10 PROCEDURE — 85025 COMPLETE CBC W/AUTO DIFF WBC: CPT | Performed by: INTERNAL MEDICINE

## 2020-01-10 PROCEDURE — 99232 SBSQ HOSP IP/OBS MODERATE 35: CPT | Performed by: INTERNAL MEDICINE

## 2020-01-10 PROCEDURE — 80048 BASIC METABOLIC PNL TOTAL CA: CPT | Performed by: INTERNAL MEDICINE

## 2020-01-10 PROCEDURE — 84132 ASSAY OF SERUM POTASSIUM: CPT | Performed by: INTERNAL MEDICINE

## 2020-01-10 PROCEDURE — 83735 ASSAY OF MAGNESIUM: CPT | Performed by: INTERNAL MEDICINE

## 2020-01-10 PROCEDURE — 80053 COMPREHEN METABOLIC PANEL: CPT | Performed by: INTERNAL MEDICINE

## 2020-01-10 PROCEDURE — 74177 CT ABD & PELVIS W/CONTRAST: CPT

## 2020-01-10 PROCEDURE — 83690 ASSAY OF LIPASE: CPT | Performed by: INTERNAL MEDICINE

## 2020-01-10 PROCEDURE — 82746 ASSAY OF FOLIC ACID SERUM: CPT | Performed by: INTERNAL MEDICINE

## 2020-01-10 RX ORDER — POTASSIUM CHLORIDE 20 MEQ/1
40 TABLET, EXTENDED RELEASE ORAL ONCE
Status: COMPLETED | OUTPATIENT
Start: 2020-01-10 | End: 2020-01-10

## 2020-01-10 RX ORDER — POTASSIUM CHLORIDE 14.9 MG/ML
20 INJECTION INTRAVENOUS ONCE
Status: COMPLETED | OUTPATIENT
Start: 2020-01-10 | End: 2020-01-10

## 2020-01-10 RX ORDER — OXYCODONE HYDROCHLORIDE 5 MG/1
5 TABLET ORAL EVERY 6 HOURS PRN
Status: DISCONTINUED | OUTPATIENT
Start: 2020-01-10 | End: 2020-01-14 | Stop reason: HOSPADM

## 2020-01-10 RX ORDER — OXYCODONE HYDROCHLORIDE 10 MG/1
10 TABLET ORAL EVERY 4 HOURS PRN
Status: DISCONTINUED | OUTPATIENT
Start: 2020-01-10 | End: 2020-01-14 | Stop reason: HOSPADM

## 2020-01-10 RX ORDER — DEXTROSE MONOHYDRATE 50 MG/ML
125 INJECTION, SOLUTION INTRAVENOUS CONTINUOUS
Status: DISCONTINUED | OUTPATIENT
Start: 2020-01-10 | End: 2020-01-11

## 2020-01-10 RX ORDER — HYDROMORPHONE HCL/PF 1 MG/ML
1 SYRINGE (ML) INJECTION EVERY 4 HOURS PRN
Status: DISCONTINUED | OUTPATIENT
Start: 2020-01-10 | End: 2020-01-14 | Stop reason: HOSPADM

## 2020-01-10 RX ADMIN — MIRTAZAPINE 45 MG: 15 TABLET, FILM COATED ORAL at 21:09

## 2020-01-10 RX ADMIN — PRAZOSIN HYDROCHLORIDE 4 MG: 1 CAPSULE ORAL at 21:08

## 2020-01-10 RX ADMIN — POTASSIUM CHLORIDE 20 MEQ: 14.9 INJECTION, SOLUTION INTRAVENOUS at 02:22

## 2020-01-10 RX ADMIN — POTASSIUM PHOSPHATE, MONOBASIC AND POTASSIUM PHOSPHATE, DIBASIC 21 MMOL: 224; 236 INJECTION, SOLUTION, CONCENTRATE INTRAVENOUS at 17:50

## 2020-01-10 RX ADMIN — GABAPENTIN 300 MG: 300 CAPSULE ORAL at 17:52

## 2020-01-10 RX ADMIN — GABAPENTIN 300 MG: 300 CAPSULE ORAL at 08:57

## 2020-01-10 RX ADMIN — DOCUSATE SODIUM 100 MG: 100 CAPSULE, LIQUID FILLED ORAL at 17:52

## 2020-01-10 RX ADMIN — DEXTROSE 100 ML/HR: 5 SOLUTION INTRAVENOUS at 09:02

## 2020-01-10 RX ADMIN — DOCUSATE SODIUM 100 MG: 100 CAPSULE, LIQUID FILLED ORAL at 08:57

## 2020-01-10 RX ADMIN — SODIUM CHLORIDE, SODIUM LACTATE, POTASSIUM CHLORIDE, AND CALCIUM CHLORIDE 150 ML/HR: .6; .31; .03; .02 INJECTION, SOLUTION INTRAVENOUS at 02:22

## 2020-01-10 RX ADMIN — RISPERIDONE 3 MG: 1 TABLET ORAL at 21:09

## 2020-01-10 RX ADMIN — IOHEXOL 100 ML: 350 INJECTION, SOLUTION INTRAVENOUS at 17:22

## 2020-01-10 RX ADMIN — POTASSIUM CHLORIDE 40 MEQ: 1500 TABLET, EXTENDED RELEASE ORAL at 02:22

## 2020-01-10 RX ADMIN — OXYCODONE HYDROCHLORIDE 10 MG: 10 TABLET ORAL at 18:02

## 2020-01-10 NOTE — ASSESSMENT & PLAN NOTE
· Magnesium on admission noted at 1 3-resolved to 2 4 today  · Patient for received 2 g of magnesium sulfate in the ED  · Monitor

## 2020-01-10 NOTE — ASSESSMENT & PLAN NOTE
Patient presented to the ED with substernal 10/10 chest pain with radiation to the epigastric region  Associated nausea and vomiting  · Troponin noted at 0 04 and 0 05   · Aspirin 325 given done in the ED  · EKG demonstrates normal sinus rhythm with sinus arrhythmia  Non specific ST changes  · Chest x-ray normal   · Unlikely to be cardiac  · Troponins peaked at 0 07 and a down trending  Plan  · Continue telemetry?   · GI/surgery workup given that this is likely secondary to pancreatic etiology

## 2020-01-10 NOTE — ASSESSMENT & PLAN NOTE
Cholelithiasis seen on ultrasound without evidence of cholecystitis  · Surgery consulted for possibility of elective cholecystectomy; pending CT abdomen pelvis results  · Continue trending LFTs given transaminitis

## 2020-01-10 NOTE — ASSESSMENT & PLAN NOTE
Hemoglobin noted at 9 8 on admission; currently 11 3  · Baseline unclear    · On chart review last hemoglobin noted at 11 5 in 10/2018  · Patient has never had a colonoscopy    Plan  · Iron panel  · Will consider FOBT if iron panel reveals iron deficiency anemia  · Transfuse for hemoglobin less than 7  · Monitor CBC

## 2020-01-10 NOTE — CONSULTS
Consultation -General Surgery  Kole Singer 64 y o  female MRN: [id number]  Unit/Bed#: S -01 Encounter: [id number]        Inpatient consult to Acute Care Surgery  Consult performed by: Aicha Valerio PA-C  Consult ordered by: Santiago Arrington MD          ASSESSMENT/PLAN:  Problem List     * (Principal) Acute pancreatitis  IRINEO  Hypokalemia  Cholelithiasis    Anxiety    Hypertension    Hypothyroidism    Newly recognized heart murmur    Vitamin D deficiency    Recurrent major depressive disorder ([address] ) (Chronic)   63 yo F with history of HLD, HTN, depression, hypothyroidism, presents to the ED for evaluation of chest pain since yesterday  She c/o N/V for 3 days  Patient found to have acute pancreatitis with IRINEO, leukocytosis and hypokalemia  Lipase is 1174, but patient is having significant pain  US shows gall stones but no evidence of inflammation or choledocholithiasis  T bili is normal at 38 but she does have transaminitis  No acute surgical intervention at this time  She will need to improve medically prior to cholecystectomy  · CT A/P for better assessment  · IVF  · NPO  · Pain control  · Trend lipase  · Care per medical team       Reason for Consult / Principal Problem: pancreatitis in setting of cholelithiasis    HPI: Kole Singer is a 64 y o  F with history of HLD, depression, hypothyroidism, presents to the ED for evaluation of epigastric and RUQ abdominal pain, 10/10 today with N/V for 3 days  Patient found to have acute pancreatitis with IRINEO, leukocytosis and hypokalemia  Lipase 1174  US shows gall stones but no evidence of inflammation or choledocholithiasis  T bili is normal at 38 but she does have transaminitis  US shows, "The gallbladder is normal in caliber  No wall thickening or pericholecystic fluid  Shadowing gallstone(s) identified  No sonographic Starks's sign  No intrahepatic biliary dilatation  CBD measures 4 mm   No choledocholithiasis"    Review of Systems   Constitutional: Positive for activity change and appetite change  Negative for chills, diaphoresis, fever and unexpected weight change  HENT: Negative  Eyes: Negative  Respiratory: Negative  Cardiovascular: Positive for chest pain  Negative for palpitations  Gastrointestinal: Positive for abdominal pain, nausea and vomiting  Negative for abdominal distention, constipation and diarrhea  Endocrine: Negative  Genitourinary: Negative  Musculoskeletal: Negative  Skin: Negative  Neurological: Negative  Hematological: Negative  Historical Information   Past Medical History:   Diagnosis Date    IRINEO (acute kidney injury) ([facility] [address] ) 1/9/2020    Anemia 1/9/2020    Disease of thyroid gland     Hypertension     Psychiatric disorder      Past Surgical History:   Procedure Laterality Date    BRAIN SURGERY      patient can not tell any other details  Social History   Social History     Substance and Sexual Activity   Alcohol Use No     Social History     Substance and Sexual Activity   Drug Use No     Social History     Tobacco Use   Smoking Status Never Smoker   Smokeless Tobacco Never Used     History reviewed  No pertinent family history      Meds/Allergies     Medications Prior to Admission   Medication    benztropine (COGENTIN) 0 5 mg tablet    gabapentin (NEURONTIN) 300 mg capsule    hydrochlorothiazide (HYDRODIURIL) 25 mg tablet    mirtazapine (REMERON) 45 MG tablet    prazosin (MINIPRESS) 2 mg capsule    risperiDONE (RisperDAL) 3 mg tablet     Current Facility-Administered Medications   Medication Dose Route Frequency    acetaminophen (TYLENOL) tablet 650 mg  650 mg Oral Q6H PRN    docusate sodium (COLACE) capsule 100 mg  100 mg Oral BID    gabapentin (NEURONTIN) capsule 300 mg  300 mg Oral BID    haloperidol lactate (HALDOL) injection 5 mg  5 mg Intramuscular Daily PRN    hydrALAZINE (APRESOLINE) injection 5 mg  5 mg Intravenous Q6H PRN    hydrOXYzine HCL (ATARAX) tablet 50 mg  50 mg Oral Q6H PRN    lactated ringers infusion  150 mL/hr Intravenous Continuous    magnesium sulfate 2 g/50 mL IVPB (premix) 2 g  2 g Intravenous Once    mirtazapine (REMERON) tablet 45 mg  45 mg Oral HS    ondansetron (ZOFRAN) injection 4 mg  4 mg Intravenous Q6H PRN    potassium chloride (K-DUR,KLOR-CON) CR tablet 40 mEq  40 mEq Oral Once    potassium chloride 20 mEq IVPB (premix)  20 mEq Intravenous Once    prazosin (MINIPRESS) capsule 4 mg  4 mg Oral HS    risperiDONE (RisperDAL) tablet 3 mg  3 mg Oral HS    sodium chloride (PF) 0 9 % injection 3 mL  3 mL Intravenous PRN       No Known Allergies    Objective     Blood pressure 160/78, pulse 79, temperature 97 8 °F (36 6 °C), temperature source Oral, resp  rate 18, height 5' 6" (1 676 m), weight 68 9 kg (151 lb 14 4 oz), SpO2 100 %  Intake/Output Summary (Last 24 hours) at 1/9/2020 2003  Last data filed at 1/9/2020 1920  Gross per 24 hour   Intake 1050 ml   Output 150 ml   Net 900 ml       PHYSICAL EXAM  General appearance: alert and oriented, in no acute distress  Skin: Skin color, texture, turgor normal  No rashes or lesions  Head: Normocephalic, without obvious abnormality  Heart: regular rate and rhythm, S1, S2 normal, no murmur, click, rub or gallop  Lungs: clear to auscultation bilaterally  Abdomen: rounded and soft, nno BS, tender throughout but mostly RUQ  no rebound or guarding    Neurological: normal without focal findings    Lab Results:   Admission on 01/09/2020   Component Date Value    WBC 01/09/2020 12 25*    RBC 01/09/2020 2 94*    Hemoglobin 01/09/2020 9 8*    Hematocrit 01/09/2020 28 4*    MCV 01/09/2020 97     MCH 01/09/2020 33 3     MCHC 01/09/2020 34 5     RDW 01/09/2020 19 7*    MPV 01/09/2020 10 1     Platelets 56/32/4021 323     nRBC 01/09/2020 0     Neutrophils Relative 01/09/2020 89*    Immat GRANS % 01/09/2020 1     Lymphocytes Relative 01/09/2020 8*    Monocytes Relative 01/09/2020 2*    Eosinophils Relative 01/09/2020 0     Basophils Relative 01/09/2020 0     Neutrophils Absolute 01/09/2020 10 80*    Immature Grans Absolute 01/09/2020 0 11     Lymphocytes Absolute 01/09/2020 0 98     Monocytes Absolute 01/09/2020 0 27     Eosinophils Absolute 01/09/2020 0 04     Basophils Absolute 01/09/2020 0 05     Troponin I 01/09/2020 0 04     Lipase 01/09/2020 1,174*    Sodium 01/09/2020 147*    Potassium 01/09/2020 2 3*    Chloride 01/09/2020 112*    CO2 01/09/2020 17*    ANION GAP 01/09/2020 18*    BUN 01/09/2020 36*    Creatinine 01/09/2020 1 81*    Glucose 01/09/2020 101     Calcium 01/09/2020 8 7     AST 01/09/2020 337*    ALT 01/09/2020 215*    Alkaline Phosphatase 01/09/2020 97     Total Protein 01/09/2020 6 4     Albumin 01/09/2020 3 4*    Total Bilirubin 01/09/2020 0 38     eGFR 01/09/2020 31     D-Dimer, Quant 01/09/2020 3 60*    Magnesium 01/09/2020 1 3*    Troponin I 01/09/2020 0 05*    Ventricular Rate 01/09/2020 77     Atrial Rate 01/09/2020 77     WV Interval 01/09/2020 136     QRSD Interval 01/09/2020 76     QT Interval 01/09/2020 362     QTC Interval 01/09/2020 409     P Axis 01/09/2020 66     QRS Axis 01/09/2020 55     T Wave [place] 01/09/2020 84     LACTIC ACID 01/09/2020 1 2     pH, Sukhwinder 01/09/2020 7 350     pCO2, Sukhwinder 01/09/2020 34 2*    pO2, Sukhwinder 01/09/2020 45 0     HCO3, Sukhwinder 01/09/2020 18 5*    Base Excess, Sukhwinder 01/09/2020 -6 4     O2 Content, Sukhwinder 01/09/2020 10 2     O2 HGB, VENOUS 01/09/2020 75 7     Triglycerides 01/09/2020 51     Troponin I 01/09/2020 0 05*    Sodium 01/09/2020 148*    Potassium 01/09/2020 2 3*    Chloride 01/09/2020 113*    CO2 01/09/2020 20*    ANION GAP 01/09/2020 15*    BUN 01/09/2020 32*    Creatinine 01/09/2020 1 42*    Glucose 01/09/2020 80     Calcium 01/09/2020 7 9*    eGFR 01/09/2020 41     Magnesium 01/09/2020 1 7      Imaging Studies: I have personally reviewed pertinent reports        Counseling / Coordination of Care  Total time spent today  30 minutes  Greater than 50% of total time was spent with the patient and / or family counseling and / or coordination of care

## 2020-01-10 NOTE — ASSESSMENT & PLAN NOTE
· Blood pressure noted to be elevated in the 700 systolic  · Continue with home antihypertensive-prazosin  · IV hydralazine p r n  for blood pressure greater than 019 systolic

## 2020-01-10 NOTE — ASSESSMENT & PLAN NOTE
Patient presented to the ED with substernal 10/10 chest pain with radiation to the epigastric region  Associated nausea and vomiting  · Troponin noted at 0 04 and 0 05   · Aspirin 325 given done in the ED  · EKG demonstrates normal sinus rhythm with sinus arrhythmia    Non specific ST changes  · Chest x-ray normal  · Unlikely to be cardiac; likely secondary to pancreatitis  · Troponins peaked at 0 07   Plan  · Discontinue Tele  · Appreciate surgery input

## 2020-01-10 NOTE — ASSESSMENT & PLAN NOTE
· Sodium level noted at 147; a m  Labs today have not resulted  · Likely hypertonic hypovolemia due to nausea and vomiting    · Patient received 1 L of Ringer's lactate done in the ED  · Currently on D5W 100 mL/hr  · Free water deficit 1 7 L    Plan  · Adjust fluid hydration once a m  labs have resulted  · Monitor CMP

## 2020-01-10 NOTE — PLAN OF CARE
Problem: Potential for Falls  Goal: Patient will remain free of falls  Description  INTERVENTIONS:  - Assess patient frequently for physical needs  -  Identify cognitive and physical deficits and behaviors that affect risk of falls  -  Weir fall precautions as indicated by assessment   - Educate patient/family on patient safety including physical limitations  - Instruct patient to call for assistance with activity based on assessment  - Modify environment to reduce risk of injury  - Consider OT/PT consult to assist with strengthening/mobility  Outcome: Progressing     Problem: Prexisting or High Potential for Compromised Skin Integrity  Goal: Skin integrity is maintained or improved  Description  INTERVENTIONS:  - Identify patients at risk for skin breakdown  - Assess and monitor skin integrity  - Assess and monitor nutrition and hydration status  - Monitor labs   - Assess for incontinence   - Turn and reposition patient  - Assist with mobility/ambulation  - Relieve pressure over bony prominences  - Avoid friction and shearing  - Provide appropriate hygiene as needed including keeping skin clean and dry  - Evaluate need for skin moisturizer/barrier cream  - Collaborate with interdisciplinary team   - Patient/family teaching  - Consider wound care consult   Outcome: Progressing     Problem: Nutrition/Hydration-ADULT  Goal: Nutrient/Hydration intake appropriate for improving, restoring or maintaining nutritional needs  Description  Monitor and assess patient's nutrition/hydration status for malnutrition  Collaborate with interdisciplinary team and initiate plan and interventions as ordered  Monitor patient's weight and dietary intake as ordered or per policy  Utilize nutrition screening tool and intervene as necessary  Determine patient's food preferences and provide high-protein, high-caloric foods as appropriate       INTERVENTIONS:  - Monitor oral intake, urinary output, labs, and treatment plans  - Assess nutrition and hydration status and recommend course of action  - Evaluate amount of meals eaten  - Assist patient with eating if necessary   - Allow adequate time for meals  - Recommend/ encourage appropriate diets, oral nutritional supplements, and vitamin/mineral supplements  - Order, calculate, and assess calorie counts as needed  - Recommend, monitor, and adjust tube feedings and TPN/PPN based on assessed needs  - Assess need for intravenous fluids  - Provide specific nutrition/hydration education as appropriate  - Include patient/family/caregiver in decisions related to nutrition  Outcome: Progressing

## 2020-01-10 NOTE — ASSESSMENT & PLAN NOTE
Patient presented with the complaints of what she thought was chest pain radiating to the epigastric region with nausea and vomiting  Reported mild right upper quadrant tenderness on exam   · Patient noted to have elevated AST and ALT on admission  · AST elevated at 337, 256  · ALT at 215, 205  · T bili normal at 0 38, 0 39  · Lipase elevated at 1174, went down to 503  · Right upper upper quadrant ultrasound notable for cholelithiasis without signs of cholecystitis  · CT of the abdomen showing signs of acute interstitial pancreatitis in addition to signs of moderate colonic stool burden  · TG 51  · A m  labs not yet resulted    Plan  · Advance diet as tolerated; clear liquid diet today and monitor  · Continue fluids to D5W at 100 mL/hr and modify once a m  Labs have resulted  · Initiate bowel regimen with senna and Colace b i d  As well as MiraLax p r n    · Repeat CMP  · General surgery consult appreciated

## 2020-01-10 NOTE — NURSING NOTE
Pt refused to have blood work drawn (Troponin and BMP)  Pt refuses to have blood work drawn until the morning  Pt was educated on the need for the blood work and how often it would be taken  Pt continued to refuse  Will continue to monitor  After speaking with SLIM, patient is now agreeable to having blood work drawn

## 2020-01-10 NOTE — ASSESSMENT & PLAN NOTE
· Blood pressure 151/65  · Continue with home antihypertensive-prazosin and hydrouril  · IV hydralazine p r n  for blood pressure greater than 327 systolic

## 2020-01-10 NOTE — ASSESSMENT & PLAN NOTE
Workup:  · Present on admission (POA) ; admission creatinine: 1 81 (baseline unclear); creatinine 1 13  · On chart review, creatinine from 10/2018 noted at 1 08  · Etiology: Likely prerenal secondary to dehydration no setting of nausea and vomiting for 3 days and reduced appetite    Plan:  · Continue with fluid hydration  · Monitor BMP daily and observe for downward trend of creatinine  · Avoid hypoperfusion of the kidneys, minimize nephrotoxins

## 2020-01-10 NOTE — PROGRESS NOTES
Patient called and stated that this testosterone was supposed to be increased since his levels are still on the low end. The prescription that was sent today was with the same dose as last prescription.     Please advise Progress Note -Surgery PA  Rocky Kerr 64 y o  female MRN: [id number]  Unit/Bed#: S -01 Encounter: [id number]    ASSESSMENT/PLAN:  Problem List     * (Principal) Acute pancreatitis    Anxiety    Overview Signed 12/19/2018  4:35 PM by Cecille Dugan MD     Treatment per psychiatrist         Hypertension    Overview Signed 12/19/2018  4:34 PM by Cecille Dugan MD     Blood pressure in the office today 120/80  Patient has not taken hydrochlorothiazide in over a year  Blood pressure in target range no need to restart  We will continue to monitor  Patient currently on prazosin prescribed by psychiatrist which may be contributing to anti hypertension  Hypothyroidism    Overview Signed 12/19/2018  4:33 PM by Cecille Dugan MD     Patient currently asymptomatic  Last TSH in October was from 5 7  Patient has not been on levothyroxine for 8 months  Will reorder TSH and follow up with patient in 2 weeks  Newly recognized heart murmur    Vitamin D deficiency    Recurrent major depressive disorder (HCC) (Chronic)    Health care maintenance    Chest pain    Headache    Hypokalemia    Hypernatremia    Increased anion gap metabolic acidosis    IRINEO (acute kidney injury) ([facility] [address] )    Hypomagnesemia    Anemia    Cholelithiasis    Leukocytosis    Depression          65 y/o female admitted for acute appendicitis, incidental findings of cholelithiasis  Improved pain, denies RUQ pain at this time  Lipase trending down  No leukocytosis  Elevated LFTs, TBili WNL  - CT a/p pending  - Lipase down to 503 (1714 on admission), continue to trend  - NPO, IVF  - Pain control PRN  - Remainder care per medical team  - Will f/u results of CT scan, anticipating outpatient surgical planning when medically stable        Subjective/Objective     Subjective: 65 y/o female admitted for acute pancreatitis  Lying comfortably in bed at time of exam  Reports that her upper abdominal pain has improved significantly   Denies nausea or vomiting  Requesting diet orders  Denies fevers, chills, shortness of breath, calf pain, headaches, dizziness  Objective/Physical Exam: Blood pressure 140/94, pulse 90, temperature 98 4 °F (36 9 °C), temperature source Oral, resp  rate 20, height 5' 6" (1 676 m), weight 69 3 kg (152 lb 12 5 oz), SpO2 100 %  ,Body mass index is 24 66 kg/m²  General appearance: alert and oriented, in no acute distress  Heart: regular rate and rhythm, S1, S2 normal, no murmur, click, rub or gallop  Lungs: clear to auscultation bilaterally  Abdomen: Soft, nondistended abdomen  Scattered bowel sounds  Tenderness LUQ without rigidity or guarding     Neurological: normal without focal findings and mental status, speech normal, alert and oriented x3      Current Facility-Administered Medications:     acetaminophen (TYLENOL) tablet 650 mg, 650 mg, Oral, Q6H PRN, Sophia Fallon MD, 650 mg at 01/09/20 2233    dextrose 5 % infusion, 100 mL/hr, Intravenous, Continuous, Bin Aguirre DO, Last Rate: 100 mL/hr at 01/10/20 0902, 100 mL/hr at 01/10/20 0902    docusate sodium (COLACE) capsule 100 mg, 100 mg, Oral, BID, Sophia Fallon MD, 100 mg at 01/10/20 0857    gabapentin (NEURONTIN) capsule 300 mg, 300 mg, Oral, BID, Sophia Fallon MD, 300 mg at 01/10/20 0857    hydrALAZINE (APRESOLINE) injection 5 mg, 5 mg, Intravenous, Q6H PRN, Sophia Fallon MD    hydrOXYzine HCL (ATARAX) tablet 50 mg, 50 mg, Oral, Q6H PRN, Sophia Fallon MD    lactated ringers infusion, 100 mL/hr, Intravenous, Continuous, Bin Aguirre DO, Last Rate: 150 mL/hr at 01/10/20 0222, 150 mL/hr at 01/10/20 0222    mirtazapine (REMERON) tablet 45 mg, 45 mg, Oral, HS, Sophia Fallon MD, 45 mg at 01/09/20 2148    ondansetron [facility]) injection 4 mg, 4 mg, Intravenous, Q6H PRN, Sophia Fallon MD, 4 mg at 01/09/20 2030    prazosin (MINIPRESS) capsule 4 mg, 4 mg, Oral, HS, Sophia Fallon MD, 4 mg at 01/09/20 2150    risperiDONE (RisperDAL) tablet 3 mg, 3 mg, Oral, HS, Efren Cranker, MD, 3 mg at 01/09/20 9264    Insert peripheral IV, , , Once **AND** sodium chloride (PF) 0 9 % injection 3 mL, 3 mL, Intravenous, PRN, Efren Cranker, MD      Intake/Output Summary (Last 24 hours) at 1/10/2020 0906  Last data filed at 1/9/2020 1920  Gross per 24 hour   Intake 1050 ml   Output 150 ml   Net 900 ml       Invasive Devices     Peripheral Intravenous Line            Peripheral IV 01/09/20 Right Antecubital less than 1 day                          Lab, Imaging and other studies:  Admission on 01/09/2020   Component Date Value Ref Range Status    WBC 01/09/2020 12 25* 4 31 - 10 16 Thousand/uL Final    RBC 01/09/2020 2 94* 3 81 - 5 12 Million/uL Final    Hemoglobin 01/09/2020 9 8* 11 5 - 15 4 g/dL Final    Hematocrit 01/09/2020 28 4* 34 8 - 46 1 % Final    MCV 01/09/2020 97  82 - 98 fL Final    MCH 01/09/2020 33 3  26 8 - 34 3 pg Final    MCHC 01/09/2020 34 5  31 4 - 37 4 g/dL Final    RDW 01/09/2020 19 7* 11 6 - 15 1 % Final    MPV 01/09/2020 10 1  8 9 - 12 7 fL Final    Platelets 95/01/6108 323  149 - 390 Thousands/uL Final    nRBC 01/09/2020 0  /100 WBCs Final    Neutrophils Relative 01/09/2020 89* 43 - 75 % Final    Immat GRANS % 01/09/2020 1  0 - 2 % Final    Lymphocytes Relative 01/09/2020 8* 14 - 44 % Final    Monocytes Relative 01/09/2020 2* 4 - 12 % Final    Eosinophils Relative 01/09/2020 0  0 - 6 % Final    Basophils Relative 01/09/2020 0  0 - 1 % Final    Neutrophils Absolute 01/09/2020 10 80* 1 85 - 7 62 Thousands/µL Final    Immature Grans Absolute 01/09/2020 0 11  0 00 - 0 20 Thousand/uL Final    Lymphocytes Absolute 01/09/2020 0 98  0 60 - 4 47 Thousands/µL Final    Monocytes Absolute 01/09/2020 0 27  0 17 - 1 22 Thousand/µL Final    Eosinophils Absolute 01/09/2020 0 04  0 00 - 0 61 Thousand/µL Final    Basophils Absolute 01/09/2020 0 05  0 00 - 0 10 Thousands/µL Final    Troponin I 01/09/2020 0 04  <=0 04 ng/mL Final    Lipase 01/09/2020 1,174* 96 - 381 u/L Final    Sodium 01/09/2020 147* 136 - 145 mmol/L Final    Potassium 01/09/2020 2 3* 3 5 - 5 3 mmol/L Final    Chloride 01/09/2020 112* 100 - 108 mmol/L Final    CO2 01/09/2020 17* 21 - 32 mmol/L Final    ANION GAP 01/09/2020 18* 4 - 13 mmol/L Final    BUN 01/09/2020 36* 5 - 25 mg/dL Final    Creatinine 01/09/2020 1 81* 0 60 - 1 30 mg/dL Final    Glucose 01/09/2020 101  65 - 140 mg/dL Final    Calcium 01/09/2020 8 7  8 3 - 10 1 mg/dL Final    AST 01/09/2020 337* 5 - 45 U/L Final    ALT 01/09/2020 215* 12 - 78 U/L Final    Alkaline Phosphatase 01/09/2020 97  46 - 116 U/L Final    Total Protein 01/09/2020 6 4  6 4 - 8 2 g/dL Final    Albumin 01/09/2020 3 4* 3 5 - 5 0 g/dL Final    Total Bilirubin 01/09/2020 0 38  0 20 - 1 00 mg/dL Final    eGFR 01/09/2020 31  ml/min/1 73sq m Final    D-Dimer, Quant 01/09/2020 3 60* <0 50 ug/ml FEU Final    Magnesium 01/09/2020 1 3* 1 6 - 2 6 mg/dL Final    Troponin I 01/09/2020 0 05* <=0 04 ng/mL Final    Ventricular Rate 01/09/2020 77  BPM Final    Atrial Rate 01/09/2020 77  BPM Final    KS Interval 01/09/2020 136  ms Final    QRSD Interval 01/09/2020 76  ms Final    QT Interval 01/09/2020 362  ms Final    QTC Interval 01/09/2020 409  ms Final    P Axis 01/09/2020 66  degrees Final    QRS Axis 01/09/2020 55  degrees Final    T Wave [place] 01/09/2020 84  degrees Final    LACTIC ACID 01/09/2020 1 2  0 5 - 2 0 mmol/L Final    pH, Sukhwinder 01/09/2020 7 350  7 300 - 7 400 Final    pCO2, Sukhwinder 01/09/2020 34 2* 42 0 - 50 0 mm Hg Final    pO2, Sukhwinder 01/09/2020 45 0  35 0 - 45 0 mm Hg Final    HCO3, Sukhwinder 01/09/2020 18 5* 24 - 30 mmol/L Final    Base Excess, Sukhwinder 01/09/2020 -6 4  mmol/L Final    O2 Content, Sukhwinder 01/09/2020 10 2  ml/dL Final    O2 HGB, VENOUS 01/09/2020 75 7  60 0 - 80 0 % Final    Triglycerides 01/09/2020 51  <=150 mg/dL Final    Troponin I 01/09/2020 0 05* <=0 04 ng/mL Final    Sodium 01/09/2020 148* 136 - 145 mmol/L Final    Potassium 01/09/2020 2  3* 3 5 - 5 3 mmol/L Final    Chloride 01/09/2020 113* 100 - 108 mmol/L Final    CO2 01/09/2020 20* 21 - 32 mmol/L Final    ANION GAP 01/09/2020 15* 4 - 13 mmol/L Final    BUN 01/09/2020 32* 5 - 25 mg/dL Final    Creatinine 01/09/2020 1 42* 0 60 - 1 30 mg/dL Final    Glucose 01/09/2020 80  65 - 140 mg/dL Final    Calcium 01/09/2020 7 9* 8 3 - 10 1 mg/dL Final    eGFR 01/09/2020 41  ml/min/1 73sq m Final    Magnesium 01/09/2020 1 7  1 6 - 2 6 mg/dL Final    Iron Saturation 01/09/2020 10  % Final    TIBC 01/09/2020 371  250 - 450 ug/dL Final    Iron 01/09/2020 37* 50 - 170 ug/dL Final    Ferritin 01/09/2020 57  8 - 388 ng/mL Final    Troponin I 01/09/2020 0 07* <=0 04 ng/mL Final    Sodium 01/10/2020 147* 136 - 145 mmol/L Final    Potassium 01/10/2020 2 9* 3 5 - 5 3 mmol/L Final    Chloride 01/10/2020 114* 100 - 108 mmol/L Final    CO2 01/10/2020 20* 21 - 32 mmol/L Final    ANION GAP 01/10/2020 13  4 - 13 mmol/L Final    BUN 01/10/2020 26* 5 - 25 mg/dL Final    Creatinine 01/10/2020 1 30  0 60 - 1 30 mg/dL Final    Glucose 01/10/2020 85  65 - 140 mg/dL Final    Calcium 01/10/2020 7 9* 8 3 - 10 1 mg/dL Final    eGFR 01/10/2020 46  ml/min/1 73sq m Final    Troponin I 01/10/2020 0 06* <=0 04 ng/mL Final    Sodium 01/10/2020 147* 136 - 145 mmol/L Final    Potassium 01/10/2020 3 5  3 5 - 5 3 mmol/L Final    Chloride 01/10/2020 115* 100 - 108 mmol/L Final    CO2 01/10/2020 18* 21 - 32 mmol/L Final    ANION GAP 01/10/2020 14* 4 - 13 mmol/L Final    BUN 01/10/2020 22  5 - 25 mg/dL Final    Creatinine 01/10/2020 1 13  0 60 - 1 30 mg/dL Final    Glucose 01/10/2020 70  65 - 140 mg/dL Final    Calcium 01/10/2020 7 8* 8 3 - 10 1 mg/dL Final    AST 01/10/2020 256* 5 - 45 U/L Final    ALT 01/10/2020 205* 12 - 78 U/L Final    Alkaline Phosphatase 01/10/2020 97  46 - 116 U/L Final    Total Protein 01/10/2020 5 7* 6 4 - 8 2 g/dL Final    Albumin 01/10/2020 2 9* 3 5 - 5 0 g/dL Final    Total Bilirubin 01/10/2020 0 39  0 20 - 1 00 mg/dL Final    eGFR 01/10/2020 54  ml/min/1 73sq m Final    Magnesium 01/10/2020 2 4  1 6 - 2 6 mg/dL Final    WBC 01/10/2020 7 22  4 31 - 10 16 Thousand/uL Final    RBC 01/10/2020 2 65* 3 81 - 5 12 Million/uL Final    Hemoglobin 01/10/2020 8 7* 11 5 - 15 4 g/dL Final    Hematocrit 01/10/2020 27 4* 34 8 - 46 1 % Final    MCV 01/10/2020 103* 82 - 98 fL Final    MCH 01/10/2020 32 8  26 8 - 34 3 pg Final    MCHC 01/10/2020 31 8  31 4 - 37 4 g/dL Final    RDW 01/10/2020 20 2* 11 6 - 15 1 % Final    MPV 01/10/2020 9 6  8 9 - 12 7 fL Final    Platelets 29/82/4548 306  149 - 390 Thousands/uL Final    nRBC 01/10/2020 1  /100 WBCs Final    Neutrophils Relative 01/10/2020 75  43 - 75 % Final    Immat GRANS % 01/10/2020 1  0 - 2 % Final    Lymphocytes Relative 01/10/2020 19  14 - 44 % Final    Monocytes Relative 01/10/2020 3* 4 - 12 % Final    Eosinophils Relative 01/10/2020 2  0 - 6 % Final    Basophils Relative 01/10/2020 0  0 - 1 % Final    Neutrophils Absolute 01/10/2020 5 42  1 85 - 7 62 Thousands/µL Final    Immature Grans Absolute 01/10/2020 0 05  0 00 - 0 20 Thousand/uL Final    Lymphocytes Absolute 01/10/2020 1 38  0 60 - 4 47 Thousands/µL Final    Monocytes Absolute 01/10/2020 0 21  0 17 - 1 22 Thousand/µL Final    Eosinophils Absolute 01/10/2020 0 13  0 00 - 0 61 Thousand/µL Final    Basophils Absolute 01/10/2020 0 03  0 00 - 0 10 Thousands/µL Final    Phosphorus 01/10/2020 1 8* 2 7 - 4 5 mg/dL Final    Lipase 01/10/2020 503* 73 - 393 u/L Final

## 2020-01-10 NOTE — ASSESSMENT & PLAN NOTE
Patient is maintained on mirtazapine and risperidone    · Continue home medications  · Continue to monitor for further signs of tardive dyskinesia

## 2020-01-10 NOTE — ASSESSMENT & PLAN NOTE
· Sodium level noted at 147  · Likely hypertonic hypovolemia due to nausea and vomiting    · Patient received 1 L of Ringer's lactate done in the ED  · Currently received lactated Ringer's at 150 mL/hr  · Free water deficit 1 7 L    Plan  · Change fluid hydration to D5 W 100 mL/hr  · Monitor BMP

## 2020-01-10 NOTE — ASSESSMENT & PLAN NOTE
Hemoglobin noted at 9 8 on admission; 9 8 on 01/10/2020  · Baseline unclear    · On chart review last hemoglobin prior to admission noted at 11 5 in 10/2018  · Patient has never had a colonoscopy  · Iron panel  · Iron 37-low  · Ferritin 57  · Iron saturation 10  · TIBC 371  · Folate 4 2    Plan  · Transfuse for hemoglobin less than 7  · Monitor CBC

## 2020-01-10 NOTE — ASSESSMENT & PLAN NOTE
· Potassium noted at 2 3 on admission; found to be 3 4 yesterday and potassium was repleted  · EKG noted sinus rhythm with sinus arrhythmia  · Hypokalemia likely due to nausea vomiting  Plan  · Maintain potassium more than 4  · Give K-Phos as needed  · BMP in the a m

## 2020-01-10 NOTE — QUICK NOTE
Called by nurse for patient refusing blood draws  Went and spoke with patient and stressed importance of blood work  She was agreeable  We proceed with troponins  Addendum:  Patient's labs came back with potassium 2 9  Will replete with 20 mEq IV, 40 mEq orally  Patient's troponins have flattened out at 0 05--> 0 05-->0 07-->0  06  Will hold on further troponin draws

## 2020-01-10 NOTE — UTILIZATION REVIEW
Initial Clinical Review    Admission: Date/Time/Statement: Inpatient Admission Orders (From admission, onward)     Ordered        01/09/20 1432  Inpatient Admission  Once                   Orders Placed This Encounter   Procedures    Inpatient Admission     Standing Status:   Standing     Number of Occurrences:   1     Order Specific Question:   Admitting Physician     Answer:   Nesha Choudhary [[id number]]     Order Specific Question:   Level of Care     Answer:   Med Surg [[id number]]     Order Specific Question:   Estimated length of stay     Answer:   More than 2 Midnights     Order Specific Question:   Certification     Answer:   I certify that inpatient services are medically necessary for this patient for a duration of greater than two midnights  See H&P and MD Progress Notes for additional information about the patient's course of treatment  ED Arrival Information     Expected Arrival Acuity Means of Arrival Escorted By Service Admission Type    - 1/9/2020 12:05 Urgent Walk-In Family Member General Medicine Urgent    Arrival Complaint    chest pain        Chief Complaint   Patient presents with    Chest Pain     pt states started with CP yesterday     Assessment/Plan: 64year old female to the ED from home with complaints of chest pain radiating from from epigstric region, nausea and vomiting for 1 day  Unable to hold anything down due to vomiting  Admitted to inpatient for acute pancreatitis, cholelithiasis, IRINEO, hypernatremia  SHe is pale, has epigastric tenderness  Found to have elevated LFT, lipase 1174  US shows cholelithiasis normal CBD consider secondary to gallstone pancreatitis with passed stone  Keep NPO, gen surg consult  IV fluids  REpeat CMP  Avoid hypoperfusion of the kidneys  Magnesium on admit was 1 3  REcieved 2gm of mag  Continue to monitor  SHe also has increased anion gap  Gen surg consult 1/9: Patient found to have acute pancreatitis with IRINEO, leukocytosis and hypokalemia    Lipase is [phone], but patient is having significant pain  Pain control  Trend lipase  Check CT A/P     ED Triage Vitals   Temperature Pulse Respirations Blood Pressure SpO2   01/09/20 1217 01/09/20 1217 01/09/20 1217 01/09/20 1217 01/09/20 1217   97 8 °F (36 6 °C) 79 16 120/80 100 %      Temp Source Heart Rate Source Patient Position - Orthostatic VS BP Location FiO2 (%)   01/09/20 1217 01/09/20 1414 01/09/20 1414 01/09/20 1414 --   Oral Monitor Sitting Left arm       Pain Score       01/09/20 1217       7        Wt Readings from Last 1 Encounters:   01/10/20 69 3 kg (152 lb 12 5 oz)     Additional Vital Signs:   Date/Time Temp Pulse Resp BP SpO2 O2 Device   01/10/20 0900 97 8 °F (36 6 °C) 88 18 176/82Abnormal  100 % None (Room air)   01/10/20 0655    140/94     01/10/20 0236    182/88Abnormal      01/10/20 0234 98 4 °F (36 9 °C) 90 20 190/88Abnormal  100 % None (Room air)   01/10/20 0000      None (Room air)   01/09/20 2207 98 4 °F (36 9 °C) 79 18 182/92Abnormal  100 % None (Room air)   01/09/20 2150    142/80     01/09/20 1810    160/78     01/09/20 1653 97 8 °F (36 6 °C) 79 18 182/88Abnormal  100 % None (Room air)   01/09/20 1638  74 16 204/86Abnormal  100 % None (Room air)   01/09/20 1414  76 16 170/77       Pertinent Labs/Diagnostic Test Results:   US abdomen 1/9: Cholelithiasis  2   Simple cyst within the midpole of the right kidney  CXR 1/9:No acute cardiopulmonary disease    Results from last 7 days   Lab Units 01/10/20  0530 01/09/20  1311   WBC Thousand/uL 7 22 12 25*   HEMOGLOBIN g/dL 8 7* 9 8*   HEMATOCRIT % 27 4* 28 4*   PLATELETS Thousands/uL 306 323   NEUTROS ABS Thousands/µL 5 42 10 80*         Results from last 7 days   Lab Units 01/10/20  0530 01/10/20  0032 01/09/20  1744 01/09/20  1311   SODIUM mmol/L 147* 147* 148* 147*   POTASSIUM mmol/L 3 5 2 9* 2 3* 2 3*   CHLORIDE mmol/L 115* 114* 113* 112*   CO2 mmol/L 18* 20* 20* 17*   ANION GAP mmol/L 14* 13 15* 18*   BUN mg/dL 22 26* 32* 36*   CREATININE mg/dL 1 13 1 30 1 42* 1 81*   EGFR ml/min/1 73sq m 54 46 41 31   CALCIUM mg/dL 7 8* 7 9* 7 9* 8 7   MAGNESIUM mg/dL 2 4  --  1 7 1 3*   PHOSPHORUS mg/dL 1 8*  --   --   --      Results from last 7 days   Lab Units 01/10/20  0530 01/09/20  1311   AST U/L 256* 337*   ALT U/L 205* 215*   ALK PHOS U/L 97 97   TOTAL PROTEIN g/dL 5 7* 6 4   ALBUMIN g/dL 2 9* 3 4*   TOTAL BILIRUBIN mg/dL 0 39 0 38         Results from last 7 days   Lab Units 01/10/20  0530 01/10/20  0032 01/09/20  1744 01/09/20  1311   GLUCOSE RANDOM mg/dL 70 85 80 101     Results from last 7 days   Lab Units 01/09/20  1444   PH ROBERT  7 350   PCO2 ROBERT mm Hg 34 2*   PO2 ROBERT mm Hg 45 0   HCO3 ROBERT mmol/L 18 5*   BASE EXC ROBERT mmol/L -6 4   O2 CONTENT ROBERT ml/dL 10 2   O2 HGB, VENOUS % 75 7       Results from last 7 days   Lab Units 01/10/20  0032 01/09/20  2106 01/09/20  1744 01/09/20  1632 01/09/20  1311   TROPONIN I ng/mL 0 06* 0 07* 0 05* 0 05* 0 04     Results from last 7 days   Lab Units 01/09/20  1311   D-DIMER QUANTITATIVE ug/ml FEU 3 60*     Results from last 7 days   Lab Units 01/09/20  1444   LACTIC ACID mmol/L 1 2     Results from last 7 days   Lab Units 01/09/20  1311   FERRITIN ng/mL 57       Results from last 7 days   Lab Units 01/10/20  0530 01/09/20  1311   LIPASE u/L 503* 1,174*       ED Treatment:   Medication Administration from 01/09/2020 1205 to 01/09/2020 1652       Date/Time Order Dose Route Action     01/09/2020 1311 aspirin chewable tablet 324 mg 324 mg Oral Given     01/09/2020 1406 famotidine (PEPCID) tablet 20 mg 20 mg Oral Given     01/09/2020 1407 aluminum-magnesium hydroxide-simethicone (MYLANTA) 200-200-20 mg/5 mL oral suspension 30 mL 30 mL Oral Given     01/09/2020 1413 magnesium sulfate 2 g/50 mL IVPB (premix) 2 g 2 g Intravenous New Bag     01/09/2020 1444 lactated ringers infusion 1,000 mL/hr Intravenous New Bag        Past Medical History:   Diagnosis Date    IRINEO (acute kidney injury) ([facility] [address] ) 1/9/2020  Anemia 1/9/2020    Disease of thyroid gland     Hypertension     Psychiatric disorder      Admitting Diagnosis: Hypomagnesemia [E83 42]  Pancreatitis [Y48 35]  Metabolic acidosis [I25 0]  Chest pain [R07 9]  Transaminitis [R74 0]  IRINEO (acute kidney injury) ([facility] [address] ) [N17 9]  Age/Sex: 64 y o  female  Admission Orders:  Tele  NPO  Scheduled Medications:    Medications:  docusate sodium 100 mg Oral BID   gabapentin 300 mg Oral BID   mirtazapine 45 mg Oral HS   prazosin 4 mg Oral HS   risperiDONE 3 mg Oral HS     Continuous IV Infusions:    dextrose 100 mL/hr Intravenous Continuous   lactated ringers 100 mL/hr Intravenous Continuous     PRN Meds:    acetaminophen 650 mg Oral Q6H PRN   hydrALAZINE 5 mg Intravenous Q6H PRN   hydrOXYzine HCL 50 mg Oral Q6H PRN   ondansetron 4 mg Intravenous Q6H PRN   sodium chloride (PF) 3 mL Intravenous PRN       IP CONSULT TO ACUTE CARE SURGERY    Network Utilization Review Department  [email or web address]  ATTENTION: Please call with any questions or concerns to [phone] and carefully listen to the prompts so that you are directed to the right person  All voicemails are confidential   Ynaelice Crystal all requests for admission clinical reviews, approved or denied determinations and any other requests to dedicated fax number below belonging to the campus where the patient is receiving treatment   List of dedicated fax numbers for the Facilities:  FACILITY NAME UR FAX NUMBER   ADMISSION DENIALS (Administrative/Medical Necessity) [phone]   [address]  (Maternity/NICU/Pediatrics) [phone]   Ashvin Needs [phone]   Miroslava Coker [phone]   Santana Panda [phone]   [address] [phone]   [facility] [address] [phone]   [address] [phone]

## 2020-01-10 NOTE — ASSESSMENT & PLAN NOTE
Patient presented with the complaints of what she thought was chest pain radiating to the epigastric region with nausea and vomiting   Reported mild right upper quadrant tenderness on exam   · Patient noted to have elevated AST and ALT  · AST elevated at 337, now 256  · ALT at 215, now 205  · T bili normal at 0 38, now 0 39  · Lipase elevated at 1174, now 503  · Right upper upper quadrant ultrasound notable for cholelithiasis without signs of cholecystitis  · TG 51    Plan  · NPO with sips; progress diet as tolerated  · Changed fluids to D5W at 100 mL/hr  · Repeat CMP  · General surgery consult appreciated  · Await results of CT abdominal pelvis for better assessment

## 2020-01-10 NOTE — ASSESSMENT & PLAN NOTE
· Anioin gap noted at 18 on admission; now 17  · Bicarb 22  · Metabolic acidosis likely due to starvation ketoacidosis secondary to nausea and vomiting  · Lactic acid noted at 1 2  · Should improve with fluid hydration  · Continue to monitor    Plan:  · IV fluids with D5W

## 2020-01-10 NOTE — PLAN OF CARE
Problem: Potential for Falls  Goal: Patient will remain free of falls  Description  INTERVENTIONS:  - Assess patient frequently for physical needs  -  Identify cognitive and physical deficits and behaviors that affect risk of falls  -  Rochelle fall precautions as indicated by assessment   - Educate patient/family on patient safety including physical limitations  - Instruct patient to call for assistance with activity based on assessment  - Modify environment to reduce risk of injury  - Consider OT/PT consult to assist with strengthening/mobility  1/9/2020 2044 by Mendez Dueñas RN  Outcome: Progressing  1/9/2020 2041 by Mendez Dueñas RN  Outcome: Progressing     Problem: Prexisting or High Potential for Compromised Skin Integrity  Goal: Skin integrity is maintained or improved  Description  INTERVENTIONS:  - Identify patients at risk for skin breakdown  - Assess and monitor skin integrity  - Assess and monitor nutrition and hydration status  - Monitor labs   - Assess for incontinence   - Turn and reposition patient  - Assist with mobility/ambulation  - Relieve pressure over bony prominences  - Avoid friction and shearing  - Provide appropriate hygiene as needed including keeping skin clean and dry  - Evaluate need for skin moisturizer/barrier cream  - Collaborate with interdisciplinary team   - Patient/family teaching  - Consider wound care consult   1/9/2020 2044 by Mendez Dueñas RN  Outcome: Progressing  1/9/2020 2041 by Mendez Dueñas RN  Outcome: Progressing     Problem: Nutrition/Hydration-ADULT  Goal: Nutrient/Hydration intake appropriate for improving, restoring or maintaining nutritional needs  Description  Monitor and assess patient's nutrition/hydration status for malnutrition  Collaborate with interdisciplinary team and initiate plan and interventions as ordered  Monitor patient's weight and dietary intake as ordered or per policy  Utilize nutrition screening tool and intervene as necessary   Determine patient's food preferences and provide high-protein, high-caloric foods as appropriate       INTERVENTIONS:  - Monitor oral intake, urinary output, labs, and treatment plans  - Assess nutrition and hydration status and recommend course of action  - Evaluate amount of meals eaten  - Assist patient with eating if necessary   - Allow adequate time for meals  - Recommend/ encourage appropriate diets, oral nutritional supplements, and vitamin/mineral supplements  - Order, calculate, and assess calorie counts as needed  - Recommend, monitor, and adjust tube feedings and TPN/PPN based on assessed needs  - Assess need for intravenous fluids  - Provide specific nutrition/hydration education as appropriate  - Include patient/family/caregiver in decisions related to nutrition  1/9/2020 2044 by Talia Hoyt RN  Outcome: Progressing  1/9/2020 2041 by Talia Hoyt RN  Outcome: Progressing

## 2020-01-10 NOTE — PROGRESS NOTES
Progress Note - Adam Stein 1960, 64 y o  female MRN: [id number]    Unit/Bed#: S -01 Encounter: [id number]    Primary Care Provider: Russel Singer MD   Date and time admitted to hospital: 1/9/2020 12:16 PM      * Acute pancreatitis  Assessment & Plan  Patient presented with the complaints of what she thought was chest pain radiating to the epigastric region with nausea and vomiting  Reported mild right upper quadrant tenderness on exam   · Patient noted to have elevated AST and ALT  · AST elevated at 337, now 256  · ALT at 215, now 205  · T bili normal at 0 38, now 0 39  · Lipase elevated at 1174, now 503  · Right upper upper quadrant ultrasound notable for cholelithiasis without signs of cholecystitis  · TG 51    Plan  · NPO with sips; progress diet as tolerated  · Changed fluids to D5W at 100 mL/hr  · Repeat CMP  · General surgery consult appreciated  · Await results of CT abdominal pelvis for better assessment    Hypernatremia  Assessment & Plan  · Sodium level noted at 147  · Likely hypertonic hypovolemia due to nausea and vomiting  · Patient received 1 L of Ringer's lactate done in the ED  · Currently received lactated Ringer's at 150 mL/hr  · Free water deficit 1 7 L    Plan  · Change fluid hydration to D5 W 100 mL/hr  · Monitor BMP    Chest pain  Assessment & Plan  Patient presented to the ED with substernal 10/10 chest pain with radiation to the epigastric region  Associated nausea and vomiting  · Troponin noted at 0 04 and 0 05   · Aspirin 325 given done in the ED  · EKG demonstrates normal sinus rhythm with sinus arrhythmia    Non specific ST changes  · Chest x-ray normal   · Unlikely to be cardiac  · Troponins peaked at 0 07 and a down trending  Plan  · Discontinue Tele  · GI/surgery workup given that this is likely secondary to pancreatic etiology    Hypertension  Assessment & Plan  · Blood pressure noted to be elevated in the 748 systolic  · Continue with home antihypertensive-prazosin  · IV hydralazine p r n  for blood pressure greater than 677 systolic    Anemia  Assessment & Plan  Hemoglobin noted at 9 8 on admission; currently 11 3  · Baseline unclear  · On chart review last hemoglobin noted at 11 5 in 10/2018  · Patient has never had a colonoscopy    Plan  · Iron panel  · Will consider FOBT if iron panel reveals iron deficiency anemia  · Transfuse for hemoglobin less than 7  · Monitor CBC    Elevated troponin  Assessment & Plan  Patient presented complaining of chest pain that was rated as 10/10 and described as sharp located primarily in the epigastrium  Troponins were trended and peaked at 0 07 and began down trending    Depression  Assessment & Plan  Patient is maintained on mirtazapine and risperidone  · On examination, patient is exhibiting signs of tardive dyskinesia  · Continue home medications  · Continue to monitor for further signs of tardive dyskinesia    Cholelithiasis  Assessment & Plan  Cholelithiasis seen on ultrasound without evidence of cholecystitis  · Surgery consulted for possibility of elective cholecystectomy; pending CT abdomen pelvis results  · Continue trending LFTs given transaminitis    Increased anion gap metabolic acidosis  Assessment & Plan  · Anioin gap noted at 18 on admission; now 17  · Bicarb 22  · Metabolic acidosis likely due to starvation ketoacidosis secondary to nausea and vomiting  · Lactic acid noted at 1 2  · Should improve with fluid hydration  · Continue to monitor    Plan:  · IV fluids with D5W    Leukocytosisresolved as of 1/10/2020  Assessment & Plan  · Likely inflammatory response to pancreatitis  · Patient is afebrile  · Continue to monitor CBC    Hypomagnesemiaresolved as of 1/10/2020  Assessment & Plan  · Magnesium on admission noted at 1 3-resolved to 2 4 today  · Patient for received 2 g of magnesium sulfate in the ED  · Monitor      IRINEO (acute kidney injury) (HCC)resolved as of 1/10/2020  Assessment & Plan  Workup:  · Present on admission (POA) ; admission creatinine: 1 81 (baseline unclear); creatinine 1 13  · On chart review, creatinine from 10/2018 noted at 1 08  · Etiology: Likely prerenal secondary to dehydration no setting of nausea and vomiting for 3 days and reduced appetite    Plan:  · Continue with fluid hydration  · Monitor BMP daily and observe for downward trend of creatinine  · Avoid hypoperfusion of the kidneys, minimize nephrotoxins    Hypokalemiaresolved as of 1/10/2020  Assessment & Plan  · Potassium noted at 2 3 on admission  · EKG noted sinus rhythm with sinus arrhythmia  · Hypokalemia likely due to nausea vomiting  · Repleted with a total of 40meq KCL  Plan  · Maintain potassium more than 4  · Give K-Phos  · ReCheck potassium at 6pm  · BMP in the a m  VTE Pharmacologic Prophylaxis:   Pharmacologic: Low risk for VTE  Mechanical VTE Prophylaxis in Place: Yes    Discussions with Specialists or Other Care Team Provider:  Surgery    Education and Discussions with Family / Patient:  Patient    Current Length of Stay: 1 day(s)    Current Patient Status: Inpatient     Discharge Plan / Estimated Discharge Date:  24-48 hours pending evaluation by surgery    Code Status: Level 1 - Full Code      Subjective: Today, the patient states she has reduced abdominal pain/chest pain  She continues to endorse mild discomfort in the epigastric region but states that her nausea and vomiting has completely stopped  She denies any changes in her bowel habits, and states that she has not had a bowel movement during her stay in the hospital since yesterday when she was admitted  We discussed with the patient our plan to await the CT of the abdomen and pelvis results      Objective:     Vitals:   Temp (24hrs), Av 1 °F (36 7 °C), Min:97 8 °F (36 6 °C), Max:98 4 °F (36 9 °C)    Temp:  [97 8 °F (36 6 °C)-98 4 °F (36 9 °C)] 97 8 °F (36 6 °C)  HR:  [74-90] 88  Resp:  [16-20] 18  BP: (140-204)/(78-94) 174/90  SpO2:  [100 %] 100 %  Body mass index is 24 66 kg/m²  Input and Output Summary (last 24 hours): Intake/Output Summary (Last 24 hours) at 1/10/2020 1443  Last data filed at 1/10/2020 0902  Gross per 24 hour   Intake 1290 ml   Output 850 ml   Net 440 ml       Physical Exam:     Physical Exam   Constitutional: She is oriented to person, place, and time  She appears well-developed and well-nourished  Non-toxic appearance  No distress  She is not intubated  HENT:   Head: Normocephalic and atraumatic  Nose: Nose normal    Eyes: Pupils are equal, round, and reactive to light  Conjunctivae, EOM and lids are normal  No scleral icterus  Neck: Phonation normal  Neck supple  No thyroid mass and no thyromegaly present  Cardiovascular: Normal rate, regular rhythm, S1 normal, S2 normal, normal heart sounds, intact distal pulses and normal pulses  Exam reveals no gallop, no distant heart sounds, no friction rub and no decreased pulses  No murmur heard  No systolic murmur is present  No diastolic murmur is present  Pulmonary/Chest: Effort normal and breath sounds normal  No accessory muscle usage  She is not intubated  No respiratory distress  She has no decreased breath sounds  She has no wheezes  She has no rhonchi  She has no rales  She exhibits no mass and no tenderness  Abdominal: Normal appearance  She exhibits no distension and no mass  Bowel sounds are decreased  There is tenderness in the epigastric area  Lymphadenopathy:     She has no cervical adenopathy  Neurological: She is alert and oriented to person, place, and time  Skin: Skin is warm, dry and intact  She is not diaphoretic  Psychiatric: She has a normal mood and affect  Her speech is normal and behavior is normal  Judgment and thought content normal  Cognition and memory are normal    Nursing note and vitals reviewed      Additional Data:     Labs:    Results from last 7 days   Lab Units 01/10/20  0530   WBC Thousand/uL 7  22   HEMOGLOBIN g/dL 8 7*   HEMATOCRIT % 27 4*   PLATELETS Thousands/uL 306   NEUTROS PCT % 75   LYMPHS PCT % 19   MONOS PCT % 3*   EOS PCT % 2     Results from last 7 days   Lab Units 01/10/20  0530   POTASSIUM mmol/L 3 5   CHLORIDE mmol/L 115*   CO2 mmol/L 18*   BUN mg/dL 22   CREATININE mg/dL 1 13   CALCIUM mg/dL 7 8*   ALK PHOS U/L 97   ALT U/L 205*   AST U/L 256*           * I Have Reviewed All Lab Data Listed Above  * Additional Pertinent Lab Tests Reviewed: [address] Admission Reviewed    Imaging:    Imaging Reports Reviewed Today Include: US Abd, CXR, Awaiting CT abd pelvis  Imaging Personally Reviewed by Myself Includes:  None    Recent Cultures (last 7 days):           Last 24 Hours Medication List:     Current Facility-Administered Medications:  acetaminophen 650 mg Oral Q6H PRN Josselyn Langston MD    dextrose 125 mL/hr Intravenous Continuous Jigna Ziegler MD Last Rate: 100 mL/hr (01/10/20 0902)   docusate sodium 100 mg Oral BID Josselyn Langston MD    gabapentin 300 mg Oral BID Josselyn Langston MD    hydrALAZINE 5 mg Intravenous Q6H PRN Josselyn Langston MD    hydrOXYzine HCL 50 mg Oral Q6H PRN Josselyn Langston MD    iohexol 50 mL Oral 90 min pre-procedure Pérez Cervantes DO    mirtazapine 45 mg Oral HS Josselyn Langston MD    ondansetron 4 mg Intravenous Q6H PRN Josselyn Langston MD    potassium phosphate 21 mmol Intravenous Once Jigna Ziegler MD    prazosin 4 mg Oral HS Josselyn Langston MD    risperiDONE 3 mg Oral HS Josselyn Langston MD    sodium chloride (PF) 3 mL Intravenous PRN Josselyn Langston MD         Today, Patient Was Seen By: Ricardo Burgess DO    ** Please Note: This note has been constructed using a voice recognition system   **

## 2020-01-10 NOTE — ASSESSMENT & PLAN NOTE
Patient presented complaining of chest pain that was rated as 10/10 and described as sharp located primarily in the epigastrium      Troponins were trended and peaked at 0 07 and began down trending

## 2020-01-10 NOTE — ASSESSMENT & PLAN NOTE
Patient is maintained on mirtazapine and risperidone    · On examination, patient is exhibiting signs of tardive dyskinesia  · Continue home medications  · Continue to monitor for further signs of tardive dyskinesia

## 2020-01-11 PROBLEM — R77.8 ELEVATED TROPONIN: Status: RESOLVED | Noted: 2020-01-10 | Resolved: 2020-01-11

## 2020-01-11 PROBLEM — R79.89 ELEVATED TROPONIN: Status: RESOLVED | Noted: 2020-01-10 | Resolved: 2020-01-11

## 2020-01-11 LAB
ALBUMIN SERPL BCP-MCNC: 2.7 G/DL (ref 3.5–5)
ALP SERPL-CCNC: 102 U/L (ref 46–116)
ALT SERPL W P-5'-P-CCNC: 137 U/L (ref 12–78)
ANION GAP SERPL CALCULATED.3IONS-SCNC: 11 MMOL/L (ref 4–13)
AST SERPL W P-5'-P-CCNC: 95 U/L (ref 5–45)
BILIRUB SERPL-MCNC: 0.45 MG/DL (ref 0.2–1)
BUN SERPL-MCNC: 7 MG/DL (ref 5–25)
CALCIUM SERPL-MCNC: 7.3 MG/DL (ref 8.3–10.1)
CHLORIDE SERPL-SCNC: 108 MMOL/L (ref 100–108)
CO2 SERPL-SCNC: 20 MMOL/L (ref 21–32)
CREAT SERPL-MCNC: 0.87 MG/DL (ref 0.6–1.3)
GFR SERPL CREATININE-BSD FRML MDRD: 74 ML/MIN/1.73SQ M
GLUCOSE SERPL-MCNC: 99 MG/DL (ref 65–140)
MAGNESIUM SERPL-MCNC: 1.5 MG/DL (ref 1.6–2.6)
PHOSPHATE SERPL-MCNC: 1.3 MG/DL (ref 2.7–4.5)
PHOSPHATE SERPL-MCNC: 1.4 MG/DL (ref 2.7–4.5)
POTASSIUM SERPL-SCNC: 3.1 MMOL/L (ref 3.5–5.3)
PROT SERPL-MCNC: 5.8 G/DL (ref 6.4–8.2)
SODIUM SERPL-SCNC: 139 MMOL/L (ref 136–145)

## 2020-01-11 PROCEDURE — 99232 SBSQ HOSP IP/OBS MODERATE 35: CPT | Performed by: INTERNAL MEDICINE

## 2020-01-11 PROCEDURE — 83735 ASSAY OF MAGNESIUM: CPT | Performed by: INTERNAL MEDICINE

## 2020-01-11 PROCEDURE — 84100 ASSAY OF PHOSPHORUS: CPT | Performed by: INTERNAL MEDICINE

## 2020-01-11 PROCEDURE — 80053 COMPREHEN METABOLIC PANEL: CPT | Performed by: INTERNAL MEDICINE

## 2020-01-11 PROCEDURE — 84100 ASSAY OF PHOSPHORUS: CPT | Performed by: PSYCHIATRY & NEUROLOGY

## 2020-01-11 RX ORDER — DOCUSATE SODIUM 100 MG/1
100 CAPSULE, LIQUID FILLED ORAL 2 TIMES DAILY
Status: DISCONTINUED | OUTPATIENT
Start: 2020-01-11 | End: 2020-01-14 | Stop reason: HOSPADM

## 2020-01-11 RX ORDER — POLYETHYLENE GLYCOL 3350 17 G/17G
17 POWDER, FOR SOLUTION ORAL DAILY PRN
Status: DISCONTINUED | OUTPATIENT
Start: 2020-01-11 | End: 2020-01-14

## 2020-01-11 RX ORDER — POTASSIUM CHLORIDE 20 MEQ/1
40 TABLET, EXTENDED RELEASE ORAL ONCE
Status: COMPLETED | OUTPATIENT
Start: 2020-01-11 | End: 2020-01-11

## 2020-01-11 RX ORDER — SODIUM CHLORIDE, SODIUM LACTATE, POTASSIUM CHLORIDE, CALCIUM CHLORIDE 600; 310; 30; 20 MG/100ML; MG/100ML; MG/100ML; MG/100ML
100 INJECTION, SOLUTION INTRAVENOUS CONTINUOUS
Status: DISCONTINUED | OUTPATIENT
Start: 2020-01-11 | End: 2020-01-13

## 2020-01-11 RX ORDER — SENNOSIDES 8.8 MG/5ML
8.8 LIQUID ORAL 2 TIMES DAILY
Status: DISCONTINUED | OUTPATIENT
Start: 2020-01-11 | End: 2020-01-14 | Stop reason: HOSPADM

## 2020-01-11 RX ADMIN — DOCUSATE SODIUM 100 MG: 100 CAPSULE, LIQUID FILLED ORAL at 17:27

## 2020-01-11 RX ADMIN — POTASSIUM CHLORIDE 40 MEQ: 1500 TABLET, EXTENDED RELEASE ORAL at 11:59

## 2020-01-11 RX ADMIN — POTASSIUM PHOSPHATE, MONOBASIC AND POTASSIUM PHOSPHATE, DIBASIC 21 MMOL: 224; 236 INJECTION, SOLUTION, CONCENTRATE INTRAVENOUS at 13:19

## 2020-01-11 RX ADMIN — SODIUM CHLORIDE, SODIUM LACTATE, POTASSIUM CHLORIDE, AND CALCIUM CHLORIDE 150 ML/HR: .6; .31; .03; .02 INJECTION, SOLUTION INTRAVENOUS at 18:17

## 2020-01-11 RX ADMIN — DOCUSATE SODIUM 100 MG: 100 CAPSULE, LIQUID FILLED ORAL at 09:48

## 2020-01-11 RX ADMIN — GABAPENTIN 300 MG: 300 CAPSULE ORAL at 17:27

## 2020-01-11 RX ADMIN — SENNOSIDES A AND B 8.8 MG: 415.36 LIQUID ORAL at 17:27

## 2020-01-11 RX ADMIN — SENNOSIDES A AND B 8.8 MG: 415.36 LIQUID ORAL at 11:59

## 2020-01-11 RX ADMIN — DEXTROSE 125 ML/HR: 5 SOLUTION INTRAVENOUS at 06:04

## 2020-01-11 RX ADMIN — POTASSIUM CHLORIDE 40 MEQ: 1500 TABLET, EXTENDED RELEASE ORAL at 07:36

## 2020-01-11 RX ADMIN — RISPERIDONE 3 MG: 1 TABLET ORAL at 21:04

## 2020-01-11 RX ADMIN — OXYCODONE HYDROCHLORIDE 10 MG: 10 TABLET ORAL at 16:31

## 2020-01-11 RX ADMIN — PRAZOSIN HYDROCHLORIDE 4 MG: 1 CAPSULE ORAL at 21:08

## 2020-01-11 RX ADMIN — MIRTAZAPINE 45 MG: 15 TABLET, FILM COATED ORAL at 21:04

## 2020-01-11 RX ADMIN — GABAPENTIN 300 MG: 300 CAPSULE ORAL at 09:48
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
## 2020-01-11 NOTE — ASSESSMENT & PLAN NOTE
· Sodium level noted at 147; a m  Labs today have not resulted  · Likely hypertonic hypovolemia due to nausea and vomiting  · Resolved with IV fluid hydration

## 2020-01-11 NOTE — DISCHARGE SUMMARY
Discharge- Cece Rizzo 1960, 64 y o  female MRN: [id number]    Unit/Bed#: S -01 Encounter: [id number]    Primary Care Provider: Eduardo Burden MD   Date and time admitted to hospital: 1/9/2020 12:16 PM   * Acute pancreatitis  Assessment & Plan  Patient presented with the complaints of what she thought was chest pain radiating to the epigastric region with nausea and vomiting  Reported mild right upper quadrant tenderness on exam    · Patient noted to have elevated AST and ALT on admission  · AST elevated at 337, 256, 32, 11 1/13/2020    · ALT at 215, 205, 79, 52 1/13/2020- transaminitis resolved  · T bili normal at 0 38, 0 39, 0 42  · Lipase elevated at 1174 on admission, went down to 503  · Right upper upper quadrant ultrasound notable for cholelithiasis without signs of cholecystitis  · CT of the abdomen showing signs of acute interstitial pancreatitis in addition to signs of moderate colonic stool burden  · Based on history, physical exam findings, labs and imaging findings, patient has pancreatitis, most likely 2/2 gallstones  Further evidenced by significantly elevated LFTs  Seen by surgery who recommended outpatient evaluation for elective laparoscopic cholecystectomy  TG nml 51- hypertriglyceridemia not a cause  Plan  · Patient tolerating oral diet  Discharge with low fat diet  · Continue bowel regimen with senna and Colace b i d  As well as MiraLax p r n  Anemia  Assessment & Plan  Hemoglobin noted at 9 8 on admission; 9 8 on 01/10/2020; 7 on 1/13/2020, 7 3 on 1/14/2020  · On chart review last hemoglobin prior to admission noted at 11 5 in 10/2018  · Patient has never had a colonoscopy  · Etiology:  · Unlikely megaloblastic (folate and B12 normal)  · 2/2 GI loss (patient admits to bloody bowel movements, and has never had colonoscopy) and possibly MARCIA (iron panel shows low iron 27; however ferritin is normal) vs hemodilution since patient has been receiving IVF     · Patient is s/p 400mg venofer  · Iron panel  · Iron 37-low  · Ferritin 57  · Iron saturation 10  · TIBC 371    PLAN:  -recommend colonoscopy outpatient  -325 mg iron daily    Hypertension  Assessment & Plan  · Continue to monitor blood pressure  · Continue medications; at discharge, d/c with home antihypertensive-prazosin and hydrouril    Cholelithiasis  Assessment & Plan  Cholelithiasis seen on ultrasound without evidence of cholecystitis  · Plan on doing laparoscopic cholecystectomy as outpatient    Depression  Assessment & Plan  Patient is maintained on mirtazapine and risperidone  · Continue home medications      Discharging Resident Physician: Carmina Jackson DO  Attending: Jevon Harrell MD  PCP: Melissa Li MD  Admission Date: 1/9/2020  Discharge Date: 01/14/20    Disposition:     Home    Reason for Admission: pancreatitis    Consultations During Hospital Stay:  · General surgery    Procedures Performed:     · No procedures performed    Significant Findings / Test Results:     · Chest x-ray showed note acute cardiopulmonary disease  · Ultrasound of the abdomen showed presence of cholelithiasis without any presence of cholecystitis  · CT of the abdomen pelvis with contrast showed fat infiltration within pancreaticoduodenal groove consistent with acute interstitial pancreatitis  · Additional findings were moderate colonic stool burden    Incidental Findings:   · Stable finding of mild right renal malrotation and mild dilation of the extrarenal pelvis likely on the basis of UPJ obstruction      Test Results Pending at Discharge (will require follow up):   · none     Outpatient Tests Requested:  · colonoscopy    Complications:  none    Hospital Course:       Linnea Ramos is a 64 y o  female patient with a past medical history of depression and hypertension who originally presented to the hospital on 1/9/2020 due to chest pain  1 day h/o chest pain, sharp, substernal and 10/10, with associated nausea and vomiting   Patient had cardiac workup which was unremarkable  Found to have acute pancreatitis with elevated lipase, with transaminitis  Triglycerides were unremarkable  Patient found to have pancreatitis 2/2 cholelithiasis  Surgery was consulted, and they will follow her outpatient for elective cholecystectomy and they did not find surgical intervention appropriate at the time  Phosphorus levels was determined to be low as well as potassium levels, both of which were repleted appropriately  Patient was able to advance her diet slowly, and lipase in AST and nail ALT resolved to normal levels  She also developed mild anemia, which was 9 on admission, then went down to 7  This was suspected to be delusional as patient was receiving IV fluids, however iron panel was suggestive of mild iron deficiency anemia  Patient was given 400 mg total of Venofer, and hemoglobin on day of discharge was 7 3  Patient also admits that she does have some blood in her bowel movements and has never had a colonoscopy  Patient was recommended to have colonoscopy outpatient, and start taking oral iron supplements daily  Patient discharged in stable condition  Condition at Discharge: stable     Discharge Day Visit / Exam:     Subjective:  Patient feels well  Denies any complaints  Denies fever, chills, chest pain, shortness of breath, nausea, vomiting, diarrhea, constipation  Denies abdominal pain  Vitals: Blood Pressure: 146/67 (01/14/20 0700)  Pulse: 98 (01/14/20 0700)  Temperature: 99 4 °F (37 4 °C) (01/14/20 0700)  Temp Source: Oral (01/14/20 0700)  Respirations: 18 (01/14/20 0700)  Height: 5' 6" (167 6 cm) (01/09/20 1653)  Weight - Scale: 69 kg (152 lb 1 9 oz) (01/14/20 0600)  SpO2: 98 % (01/14/20 0700)  Exam:   Physical Exam   Constitutional: She appears well-developed and well-nourished  No distress  HENT:   Head: Normocephalic and atraumatic     Right Ear: External ear normal    Left Ear: External ear normal    Nose: Nose normal  Eyes: Conjunctivae and EOM are normal  Right eye exhibits no discharge  Left eye exhibits no discharge  No scleral icterus  Neck: Normal range of motion  Neck supple  No tracheal deviation present  Cardiovascular: Normal rate, regular rhythm, normal heart sounds and intact distal pulses  No murmur heard  Pulmonary/Chest: Effort normal and breath sounds normal  No stridor  No respiratory distress  She has no wheezes  She has no rales  Abdominal: Soft  Bowel sounds are normal  There is no tenderness  There is no rebound and no guarding  Musculoskeletal: She exhibits no edema  Neurological: She is alert  Skin: Skin is warm  Capillary refill takes less than 2 seconds  No rash noted  She is not diaphoretic  There is pallor  Psychiatric: She has a normal mood and affect  Her behavior is normal    Nursing note and vitals reviewed  Discussion with Family:  Discussed plan with patient and daughter    Discharge instructions/Information to patient and family:   See after visit summary for information provided to patient and family  Provisions for Follow-Up Care:  See after visit summary for information related to follow-up care and any pertinent home health orders  Planned Readmission:  None     Discharge Medications:  See after visit summary for reconciled discharge medications provided to patient and family        ** Please Note: This note has been constructed using a voice recognition system **

## 2020-01-11 NOTE — PROGRESS NOTES
Progress Note - Caesar Valenzuela 1960, 64 y o  female MRN: [id number]    Unit/Bed#: S -01 Encounter: [id number]    Primary Care Provider: Ad Parnell MD   Date and time admitted to hospital: 1/9/2020 12:16 PM    * Acute pancreatitis  Assessment & Plan  Patient presented with the complaints of what she thought was chest pain radiating to the epigastric region with nausea and vomiting  Reported mild right upper quadrant tenderness on exam   · Patient noted to have elevated AST and ALT on admission  · AST elevated at 337, 256  · ALT at 215, 205  · T bili normal at 0 38, 0 39  · Lipase elevated at 1174, went down to 503  · Right upper upper quadrant ultrasound notable for cholelithiasis without signs of cholecystitis  · CT of the abdomen showing signs of acute interstitial pancreatitis in addition to signs of moderate colonic stool burden  · TG 51  · A m  labs not yet resulted    Plan  · Advance diet as tolerated; clear liquid diet today and monitor  · Continue fluids to D5W at 100 mL/hr and modify once a m  Labs have resulted  · Initiate bowel regimen with senna and Colace b i d  As well as MiraLax p r n  · Repeat CMP  · General surgery consult appreciated    Hypernatremia  Assessment & Plan  · Sodium level noted at 147; a m  Labs today have not resulted  · Likely hypertonic hypovolemia due to nausea and vomiting  · Patient received 1 L of Ringer's lactate done in the ED  · Currently on D5W 100 mL/hr  · Free water deficit 1 7 L    Plan  · Adjust fluid hydration once a m  labs have resulted  · Monitor CMP    Chest pain  Assessment & Plan  Patient presented to the ED with substernal 10/10 chest pain with radiation to the epigastric region  Associated nausea and vomiting  · Troponin noted at 0 04 and 0 05   · Aspirin 325 given done in the ED  · EKG demonstrates normal sinus rhythm with sinus arrhythmia    Non specific ST changes  · Chest x-ray normal  · Unlikely to be cardiac; likely secondary to pancreatitis  · Troponins peaked at 0 07   Plan  · Discontinue Tele  · Appreciate surgery input    Hypertension  Assessment & Plan  · Blood pressure 151/65  · Continue with home antihypertensive-prazosin and hydrouril  · IV hydralazine p r n  for blood pressure greater than 592 systolic    Anemia  Assessment & Plan  Hemoglobin noted at 9 8 on admission; 9 8 on 01/10/2020  · Baseline unclear  · On chart review last hemoglobin prior to admission noted at 11 5 in 10/2018  · Patient has never had a colonoscopy  · Iron panel  · Iron 37-low  · Ferritin 57  · Iron saturation 10  · TIBC 371  · Folate 4 2    Plan  · Transfuse for hemoglobin less than 7  · Monitor CBC    Depression  Assessment & Plan  Patient is maintained on mirtazapine and risperidone  · Continue home medications  · Continue to monitor for further signs of tardive dyskinesia    Cholelithiasis  Assessment & Plan  Cholelithiasis seen on ultrasound without evidence of cholecystitis  · Surgery consulted for possibility of elective cholecystectomy; pending CT abdomen pelvis results  · Continue trending LFTs given transaminitis    Increased anion gap metabolic acidosis  Assessment & Plan  · Anioin gap noted at 18 on admission; now 17  · Bicarb 22  · Metabolic acidosis likely due to starvation ketoacidosis secondary to nausea and vomiting  · Lactic acid noted at 1 2  · Should improve with fluid hydration  · Continue to monitor  · A m  Labs for 1/11/2020 have not resulted    Plan:  · IV fluids with D5W    Hypokalemia  Assessment & Plan  · Potassium noted at 2 3 on admission; found to be 3 4 yesterday and potassium was repleted  · EKG noted sinus rhythm with sinus arrhythmia  · Hypokalemia likely due to nausea vomiting  Plan  · Maintain potassium more than 4  · Give K-Phos as needed  · BMP in the a m      Elevated troponinresolved as of 1/11/2020  Assessment & Plan  Patient presented complaining of chest pain that was rated as 10/10 and described as sharp located primarily in the epigastrium  Troponins were trended and peaked at 0 07 and began down trending    Leukocytosisresolved as of 1/10/2020  Assessment & Plan  · Likely inflammatory response to pancreatitis  · Patient is afebrile  · Continue to monitor CBC    Hypomagnesemiaresolved as of 1/10/2020  Assessment & Plan  · Magnesium on admission noted at 1 3-resolved to 2 4 today  · Patient for received 2 g of magnesium sulfate in the ED  · Monitor  IRINEO (acute kidney injury) (HCC)resolved as of 1/10/2020  Assessment & Plan  Workup:  · Present on admission (POA) ; admission creatinine: 1 81 (baseline unclear); creatinine 1 13  · On chart review, creatinine from 10/2018 noted at 1 08  · Etiology: Likely prerenal secondary to dehydration no setting of nausea and vomiting for 3 days and reduced appetite    Plan:  · Continue with fluid hydration  · Monitor BMP daily and observe for downward trend of creatinine  · Avoid hypoperfusion of the kidneys, minimize nephrotoxins        VTE Pharmacologic Prophylaxis:   Pharmacologic: Low risk  Mechanical VTE Prophylaxis in Place: Yes    Discussions with Specialists or Other Care Team Provider:  Surgery    Education and Discussions with Family / Patient:  Patient    Current Length of Stay: 2 day(s)    Current Patient Status: Inpatient     Discharge Plan / Estimated Discharge Date:  Likely in 24-48 hours once cleared by surgery    Code Status: Level 1 - Full Code      Subjective: Today the, the patient continues to complain of mild epigastric and mid abdominal pain that she states has improved since presentation  She denies any further episodes of nausea, vomiting, diarrhea  She also states that she is ready to advance her diet  We discussed with the patient the importance of gradually advancing the diet and not rushing into the normal diet after not eating for an extended period of time    We also shared with the patient our concern that given her low phosphate levels, there was a risk of refeeding syndrome if he were to restart her on a full diet without 1st rib plating her phosphates  Patient was understanding of our plan and was agreeable  Objective:     Vitals:   Temp (24hrs), Av 6 °F (37 °C), Min:97 8 °F (36 6 °C), Max:99 9 °F (37 7 °C)    Temp:  [97 8 °F (36 6 °C)-99 9 °F (37 7 °C)] 99 9 °F (37 7 °C)  HR:  [86-93] 86  Resp:  [18-20] 20  BP: (151-174)/(65-94) 151/65  SpO2:  [93 %-99 %] 98 %  Body mass index is 23 86 kg/m²  Input and Output Summary (last 24 hours): Intake/Output Summary (Last 24 hours) at 2020 1053  Last data filed at 2020 0604  Gross per 24 hour   Intake 1000 ml   Output    Net 1000 ml       Physical Exam:     Physical Exam   Constitutional: She is oriented to person, place, and time  She appears well-developed and well-nourished  Non-toxic appearance  No distress  She is not intubated  HENT:   Head: Normocephalic and atraumatic  Nose: Nose normal    Eyes: Pupils are equal, round, and reactive to light  Conjunctivae, EOM and lids are normal  No scleral icterus  Neck: Phonation normal  Neck supple  No thyroid mass and no thyromegaly present  Cardiovascular: Normal rate, regular rhythm, S1 normal, S2 normal, normal heart sounds, intact distal pulses and normal pulses  Exam reveals no gallop, no distant heart sounds, no friction rub and no decreased pulses  No murmur heard  No systolic murmur is present  No diastolic murmur is present  Pulmonary/Chest: Effort normal and breath sounds normal  No accessory muscle usage  She is not intubated  No respiratory distress  She has no decreased breath sounds  She has no wheezes  She has no rhonchi  She has no rales  She exhibits no mass and no tenderness  Abdominal: Soft  Normal appearance  She exhibits no distension and no mass  Bowel sounds are decreased  There is tenderness in the epigastric area and periumbilical area     Lymphadenopathy:     She has no cervical adenopathy  Neurological: She is alert and oriented to person, place, and time  Skin: Skin is warm, dry and intact  She is not diaphoretic  Psychiatric: She has a normal mood and affect  Her speech is normal and behavior is normal  Judgment and thought content normal  Cognition and memory are normal    Nursing note and vitals reviewed  Additional Data:     Labs:    Results from last 7 days   Lab Units 01/10/20  0530   WBC Thousand/uL 7 22   HEMOGLOBIN g/dL 8 7*   HEMATOCRIT % 27 4*   PLATELETS Thousands/uL 306   NEUTROS PCT % 75   LYMPHS PCT % 19   MONOS PCT % 3*   EOS PCT % 2     Results from last 7 days   Lab Units 01/10/20  2120 01/10/20  0530   POTASSIUM mmol/L 3 4* 3 5   CHLORIDE mmol/L  --  115*   CO2 mmol/L  --  18*   BUN mg/dL  --  22   CREATININE mg/dL  --  1 13   CALCIUM mg/dL  --  7 8*   ALK PHOS U/L  --  97   ALT U/L  --  205*   AST U/L  --  256*           * I Have Reviewed All Lab Data Listed Above    * Additional Pertinent Lab Tests Reviewed: No New Labs Available For Today    Imaging:    Imaging Reports Reviewed Today Include:  CT of the abdomen and pelvis  Imaging Personally Reviewed by Myself Includes:  None    Recent Cultures (last 7 days):           Last 24 Hours Medication List:     Current Facility-Administered Medications:  acetaminophen 650 mg Oral Q6H PRN Keven Cleveland MD    dextrose 125 mL/hr Intravenous Continuous Edwige Stone MD Last Rate: 125 mL/hr (01/11/20 0604)   docusate sodium 100 mg Oral BID Bin Aguirre DO    gabapentin 300 mg Oral BID Keven Cleveland MD    hydrALAZINE 5 mg Intravenous Q6H PRN Keven Cleveland MD    HYDROmorphone 1 mg Intravenous Q4H PRN Edwige Stone MD    hydrOXYzine HCL 50 mg Oral Q6H PRN Keven Cleveland MD    iohexol 50 mL Oral 90 min pre-procedure Pérez Cervantes DO    mirtazapine 45 mg Oral HS Keven Cleveland MD    ondansetron 4 mg Intravenous Q6H PRN Keven Cleveland MD    oxyCODONE 10 mg Oral Q4H PRN Edwige Stone MD    oxyCODONE 5 mg Oral Q6H PRN Bear Benson MD    polyethylene glycol 17 g Oral Daily PRN Bin Aguirre DO    prazosin 4 mg Oral HS Efren Cranker, MD    risperiDONE 3 mg Oral HS Efren Cranker, MD    senna 8 8 mg Oral BID Bin Aguirre DO    sodium chloride (PF) 3 mL Intravenous PRN Efren Cranker, MD         Today, Patient Was Seen By: Bisi Washington DO    ** Please Note: This note has been constructed using a voice recognition system   **

## 2020-01-11 NOTE — PROGRESS NOTES
Patient is resting comfortably  No signs of distress  Call bell is within reach  Will continue to monitor

## 2020-01-11 NOTE — ASSESSMENT & PLAN NOTE
Patient presented with the complaints of what she thought was chest pain radiating to the epigastric region with nausea and vomiting  Reported mild right upper quadrant tenderness on exam   · Patient noted to have elevated AST and ALT on admission  · AST elevated at 337, 256, 32   · ALT at 215, 205, 79  · T bili normal at 0 38, 0 39, 0 42  · Lipase elevated at 1174, went down to 503  · Right upper upper quadrant ultrasound notable for cholelithiasis without signs of cholecystitis  · CT of the abdomen showing signs of acute interstitial pancreatitis in addition to signs of moderate colonic stool burden  · TG 51    Plan  · Advance diet as tolerated; surgical diet, liquid toast and crackers  · Continue RL hydration  · Continue bowel regimen with senna and Colace b i d  As well as MiraLax p r n    · Repeat CMP  · General surgery consult appreciated
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
## 2020-01-11 NOTE — ASSESSMENT & PLAN NOTE
Patient presented to the ED with substernal 10/10 chest pain with radiation to the epigastric region  Associated nausea and vomiting  · Troponin noted at 0 04 and 0 05   · Aspirin 325 given done in the ED  · EKG demonstrates normal sinus rhythm with sinus arrhythmia    Non specific ST changes  · Chest x-ray normal  · Unlikely to be cardiac; likely secondary to pancreatitis  · Troponins peaked at 0 07

## 2020-01-11 NOTE — PROGRESS NOTES
Progress Note -Surgery YO Valencia Kingsley 64 y o  female MRN: [id number]  Unit/Bed#: S -01 Encounter: [id number]    ASSESSMENT/PLAN:  Problem List     * (Principal) Acute pancreatitis    Anxiety    Hypertension    Hypothyroidism    Newly recognized heart murmur    Vitamin D deficiency    Recurrent major depressive disorder (HCC) (Chronic)    Hypokalemia    Hypernatremia    Anemia    Cholelithiasis    Depression  Hypophosphatemia  Hypomagnesemia   63 yo F with gall stone pancreatitis  CT from yesterday shows "fat infiltration within pancreaticoduodenal groove consistent with acute pancreatitis  Cholelithiasis without acute cholecystitis " She is NPO and continues to have 9/10 pain  No N/V  Tmax 99 9  LFTs improving and T bili continues to be normal  · IVF  · Continue NPO  · Medical care per primary team  · Will plan on doing laparoscopic cholecystectomy as outpatient  VTE Pharmacologic Prophylaxis: Sequential compression device (Venodyne)     Subjective/Objective     Subjective:    RUQ/epigastric pain 9/10  No N/V    Objective/Physical Exam: Blood pressure 151/65, pulse 86, temperature 99 9 °F (37 7 °C), temperature source Oral, resp  rate 20, height 5' 6" (1 676 m), weight 67 kg (147 lb 12 8 oz), SpO2 98 %  ,Body mass index is 23 86 kg/m²  General appearance: alert and oriented, in no acute distress  Heart: regular rate and rhythm, S1, S2 normal, no murmur, click, rub or gallop  Lungs: clear to auscultation bilaterally  Abdomen: flat and soft, +BS, tender epigastric and RUQ  No rebound or guarding      Current Facility-Administered Medications:     acetaminophen (TYLENOL) tablet 650 mg, 650 mg, Oral, Q6H PRN, Tacho Syed MD, 650 mg at 01/09/20 2233    docusate sodium (COLACE) capsule 100 mg, 100 mg, Oral, BID, Bin Aguirre DO    gabapentin (NEURONTIN) capsule 300 mg, 300 mg, Oral, BID, Tacho Syed MD, 300 mg at 01/11/20 0948    hydrALAZINE (APRESOLINE) injection 5 mg, 5 mg, Intravenous, Q6H PRN, Campbell Wagner MD    HYDROmorphone (DILAUDID) injection 1 mg, 1 mg, Intravenous, Q4H PRN, Anuj Herrera MD    hydrOXYzine HCL (ATARAX) tablet 50 mg, 50 mg, Oral, Q6H PRN, Campbell Wagner MD    iohexol (OMNIPAQUE) 240 MG/ML solution 50 mL, 50 mL, Oral, 90 min pre-procedure, Pérez Cervantes DO    mirtazapine (REMERON) tablet 45 mg, 45 mg, Oral, HS, Campbell Wagner MD, 45 mg at 01/10/20 2109    ondansetron [facility] injection 4 mg, 4 mg, Intravenous, Q6H PRN, Campbell Wagner MD, 4 mg at 01/09/20 2030    oxyCODONE (ROXICODONE) immediate release tablet 10 mg, 10 mg, Oral, Q4H PRN, Anuj Herrera MD, 10 mg at 01/10/20 1802    oxyCODONE (ROXICODONE) IR tablet 5 mg, 5 mg, Oral, Q6H PRN, Anuj Herrera MD    polyethylene glycol (MIRALAX) packet 17 g, 17 g, Oral, Daily PRN, Bin Aguirre DO    potassium phosphate 21 mmol in sodium chloride 0 9 % 250 mL infusion, 21 mmol, Intravenous, Once, Bin Aguirre DO    prazosin (MINIPRESS) capsule 4 mg, 4 mg, Oral, HS, Campbell Wagner MD, 4 mg at 01/10/20 2108    risperiDONE (RisperDAL) tablet 3 mg, 3 mg, Oral, HS, Campbell Wagner MD, 3 mg at 01/10/20 2109    senna 8 8 mg/5 mL oral syrup 8 8 mg, 8 8 mg, Oral, BID, Bin Aguirre DO, 8 8 mg at 01/11/20 1159    Insert peripheral IV, , , Once **AND** sodium chloride (PF) 0 9 % injection 3 mL, 3 mL, Intravenous, PRN, Campbell Wagner MD    Lab, Imaging and other studies:   Sodium 01/11/2020 139  136 - 145 mmol/L Final    Potassium 01/11/2020 3 1* 3 5 - 5 3 mmol/L Final    Chloride 01/11/2020 108  100 - 108 mmol/L Final    CO2 01/11/2020 20* 21 - 32 mmol/L Final    ANION GAP 01/11/2020 11  4 - 13 mmol/L Final    BUN 01/11/2020 7  5 - 25 mg/dL Final    Creatinine 01/11/2020 0 87  0 60 - 1 30 mg/dL Final    Glucose 01/11/2020 99  65 - 140 mg/dL Final    Calcium 01/11/2020 7 3* 8 3 - 10 1 mg/dL Final    AST 01/11/2020 95* 5 - 45 U/L Final    ALT 01/11/2020 137* 12 - 78 U/L Final    Alkaline Phosphatase 01/11/2020 102 46 - 116 U/L Final    Total Protein 01/11/2020 5 8* 6 4 - 8 2 g/dL Final    Albumin 01/11/2020 2 7* 3 5 - 5 0 g/dL Final    Total Bilirubin 01/11/2020 0 45  0 20 - 1 00 mg/dL Final    eGFR 01/11/2020 74  ml/min/1 73sq m Final    Magnesium 01/11/2020 1 5* 1 6 - 2 6 mg/dL Final    Phosphorus 01/11/2020 1 3* 2 7 - 4 5 mg/dL Final

## 2020-01-12 LAB
ALBUMIN SERPL BCP-MCNC: 2.2 G/DL (ref 3.5–5)
ALP SERPL-CCNC: 88 U/L (ref 46–116)
ALT SERPL W P-5'-P-CCNC: 79 U/L (ref 12–78)
ANION GAP SERPL CALCULATED.3IONS-SCNC: 10 MMOL/L (ref 4–13)
AST SERPL W P-5'-P-CCNC: 32 U/L (ref 5–45)
BILIRUB SERPL-MCNC: 0.42 MG/DL (ref 0.2–1)
BUN SERPL-MCNC: 8 MG/DL (ref 5–25)
CALCIUM SERPL-MCNC: 7 MG/DL (ref 8.3–10.1)
CHLORIDE SERPL-SCNC: 113 MMOL/L (ref 100–108)
CO2 SERPL-SCNC: 20 MMOL/L (ref 21–32)
CREAT SERPL-MCNC: 0.7 MG/DL (ref 0.6–1.3)
ERYTHROCYTE [DISTWIDTH] IN BLOOD BY AUTOMATED COUNT: 20.5 % (ref 11.6–15.1)
GFR SERPL CREATININE-BSD FRML MDRD: 96 ML/MIN/1.73SQ M
GLUCOSE SERPL-MCNC: 83 MG/DL (ref 65–140)
HCT VFR BLD AUTO: 23 % (ref 34.8–46.1)
HGB BLD-MCNC: 7.4 G/DL (ref 11.5–15.4)
MAGNESIUM SERPL-MCNC: 1.5 MG/DL (ref 1.6–2.6)
MCH RBC QN AUTO: 33 PG (ref 26.8–34.3)
MCHC RBC AUTO-ENTMCNC: 32.2 G/DL (ref 31.4–37.4)
MCV RBC AUTO: 103 FL (ref 82–98)
PHOSPHATE SERPL-MCNC: 1.6 MG/DL (ref 2.7–4.5)
PLATELET # BLD AUTO: 285 THOUSANDS/UL (ref 149–390)
PMV BLD AUTO: 10 FL (ref 8.9–12.7)
POTASSIUM SERPL-SCNC: 4 MMOL/L (ref 3.5–5.3)
PROT SERPL-MCNC: 5.2 G/DL (ref 6.4–8.2)
RBC # BLD AUTO: 2.24 MILLION/UL (ref 3.81–5.12)
SODIUM SERPL-SCNC: 143 MMOL/L (ref 136–145)
WBC # BLD AUTO: 4.78 THOUSAND/UL (ref 4.31–10.16)

## 2020-01-12 PROCEDURE — 80053 COMPREHEN METABOLIC PANEL: CPT | Performed by: INTERNAL MEDICINE

## 2020-01-12 PROCEDURE — 99232 SBSQ HOSP IP/OBS MODERATE 35: CPT | Performed by: INTERNAL MEDICINE

## 2020-01-12 PROCEDURE — 84100 ASSAY OF PHOSPHORUS: CPT | Performed by: INTERNAL MEDICINE

## 2020-01-12 PROCEDURE — 83735 ASSAY OF MAGNESIUM: CPT | Performed by: INTERNAL MEDICINE

## 2020-01-12 PROCEDURE — 85027 COMPLETE CBC AUTOMATED: CPT | Performed by: INTERNAL MEDICINE

## 2020-01-12 RX ORDER — MAGNESIUM SULFATE HEPTAHYDRATE 40 MG/ML
2 INJECTION, SOLUTION INTRAVENOUS ONCE
Status: COMPLETED | OUTPATIENT
Start: 2020-01-12 | End: 2020-01-12

## 2020-01-12 RX ADMIN — RISPERIDONE 3 MG: 1 TABLET ORAL at 21:13

## 2020-01-12 RX ADMIN — POTASSIUM PHOSPHATE, MONOBASIC AND POTASSIUM PHOSPHATE, DIBASIC 21 MMOL: 224; 236 INJECTION, SOLUTION, CONCENTRATE INTRAVENOUS at 17:13

## 2020-01-12 RX ADMIN — PRAZOSIN HYDROCHLORIDE 4 MG: 1 CAPSULE ORAL at 21:15

## 2020-01-12 RX ADMIN — ACETAMINOPHEN 650 MG: 325 TABLET, FILM COATED ORAL at 23:29

## 2020-01-12 RX ADMIN — GABAPENTIN 300 MG: 300 CAPSULE ORAL at 13:57

## 2020-01-12 RX ADMIN — DOCUSATE SODIUM 100 MG: 100 CAPSULE, LIQUID FILLED ORAL at 17:12

## 2020-01-12 RX ADMIN — SENNOSIDES A AND B 8.8 MG: 415.36 LIQUID ORAL at 17:15

## 2020-01-12 RX ADMIN — GABAPENTIN 300 MG: 300 CAPSULE ORAL at 17:12

## 2020-01-12 RX ADMIN — MAGNESIUM SULFATE HEPTAHYDRATE 2 G: 40 INJECTION, SOLUTION INTRAVENOUS at 13:51

## 2020-01-12 RX ADMIN — DOCUSATE SODIUM 100 MG: 100 CAPSULE, LIQUID FILLED ORAL at 13:57

## 2020-01-12 RX ADMIN — MIRTAZAPINE 45 MG: 15 TABLET, FILM COATED ORAL at 21:13

## 2020-01-12 NOTE — ASSESSMENT & PLAN NOTE
Cholelithiasis seen on ultrasound without evidence of cholecystitis  · Appreciate surgery input:  Plan on doing laparoscopic cholecystectomy as outpatient  · Continue trending LFTs given transaminitis

## 2020-01-12 NOTE — PROGRESS NOTES
Progress Note -Surgery YO Renner 64 y o  female MRN: [id number]  Unit/Bed#: S -01 Encounter: [id number]    ASSESSMENT/PLAN:  Problem List     * (Principal) Acute pancreatitis    Anxiety    Hypertension    Hypothyroidism    Newly recognized heart murmur    Vitamin D deficiency    Recurrent major depressive disorder (HCC) (Chronic)    Hypokalemia    Hypernatremia    Cholelithiasis    Depression   63 yo F with gall stone pancreatitis  She is on clears  pain  No N/V  Tmax 99 7  Labs pending  Patient had refused IV and labs earlier but is now agreeable  · IVF  · Clears diet  · Medical care per primary team  · Will plan on doing laparoscopic cholecystectomy as outpatient     VTE Pharmacologic Prophylaxis: Sequential compression device (Venodyne)     Subjective/Objective     Subjective:   No pain     Objective/Physical Exam: Blood pressure 152/72, pulse 96, temperature 99 7 °F (37 6 °C), temperature source Oral, resp  rate 18, height 5' 6" (1 676 m), weight 64 2 kg (141 lb 8 6 oz), SpO2 99 %  ,Body mass index is 22 84 kg/m²  General appearance: alert and oriented, in no acute distress  Heart: regular rate and rhythm, S1, S2 normal, no murmur, click, rub or gallop  Lungs: clear to auscultation bilaterally  Abdomen: flat and soft, min BS   Tender RUQ        Current Facility-Administered Medications:     acetaminophen (TYLENOL) tablet 650 mg, 650 mg, Oral, Q6H PRN, Cecilia Manzanares MD, 650 mg at 01/09/20 2233    docusate sodium (COLACE) capsule 100 mg, 100 mg, Oral, BID, Bin Aguirre DO, 100 mg at 01/11/20 1727    gabapentin (NEURONTIN) capsule 300 mg, 300 mg, Oral, BID, Cecilia Manzanares MD, 300 mg at 01/11/20 1727    hydrALAZINE (APRESOLINE) injection 5 mg, 5 mg, Intravenous, Q6H PRN, Cecilia Manzanares MD    HYDROmorphone (DILAUDID) injection 1 mg, 1 mg, Intravenous, Q4H PRN, Osvaldo Orona MD    hydrOXYzine HCL (ATARAX) tablet 50 mg, 50 mg, Oral, Q6H PRN, Cecilia Manzanares MD    iohexol (OMNIPAQUE) 240 MG/ML solution 50 mL, 50 mL, Oral, 90 min pre-procedure, Pérez Cervantes DO    lactated ringers infusion, 150 mL/hr, Intravenous, Continuous, Gume Thompson MD, Last Rate: 150 mL/hr at 01/11/20 1817, 150 mL/hr at 01/11/20 1817    mirtazapine (REMERON) tablet 45 mg, 45 mg, Oral, HS, Mohini Watkins MD, 45 mg at 01/11/20 2104    ondansetron (ZOFRAN) injection 4 mg, 4 mg, Intravenous, Q6H PRN, Mohini Watkins MD, 4 mg at 01/09/20 2030    oxyCODONE (ROXICODONE) immediate release tablet 10 mg, 10 mg, Oral, Q4H PRN, Gume Thompson MD, 10 mg at 01/11/20 1631    oxyCODONE (ROXICODONE) IR tablet 5 mg, 5 mg, Oral, Q6H PRN, Gume Thompson MD    polyethylene glycol (MIRALAX) packet 17 g, 17 g, Oral, Daily PRN, Bin Aguirre DO    prazosin (MINIPRESS) capsule 4 mg, 4 mg, Oral, HS, Mohini Watkins MD, 4 mg at 01/11/20 2108    risperiDONE (RisperDAL) tablet 3 mg, 3 mg, Oral, HS, Mohini Watkins MD, 3 mg at 01/11/20 2104    senna 8 8 mg/5 mL oral syrup 8 8 mg, 8 8 mg, Oral, BID, Bin Aguirre DO, 8 8 mg at 01/11/20 1727    Insert peripheral IV, , , Once **AND** sodium chloride (PF) 0 9 % injection 3 mL, 3 mL, Intravenous, PRN, Mohini Watkins MD

## 2020-01-12 NOTE — NURSING NOTE
Pt removed IV access twice today  Pt refusing new IV  Pt was educated multiple times by nursing staff on importance of IV acess  SLIM spoke to pt at bedside  Pt still refusing  Will continue to monitor

## 2020-01-12 NOTE — PROGRESS NOTES
Progress Note - Chemo Watkins 1960, 64 y o  female MRN: [id number]    Unit/Bed#: S -01 Encounter: [id number]    Primary Care Provider: Kimo Corey MD   Date and time admitted to hospital: 1/9/2020 12:16 PM        * Acute pancreatitis  Assessment & Plan  Patient presented with the complaints of what she thought was chest pain radiating to the epigastric region with nausea and vomiting  Reported mild right upper quadrant tenderness on exam   · Patient noted to have elevated AST and ALT on admission  · AST elevated at 337, 256, 32   · ALT at 215, 205, 79  · T bili normal at 0 38, 0 39, 0 42  · Lipase elevated at 1174, went down to 503  · Right upper upper quadrant ultrasound notable for cholelithiasis without signs of cholecystitis  · CT of the abdomen showing signs of acute interstitial pancreatitis in addition to signs of moderate colonic stool burden  · TG 51    Plan  · Advance diet as tolerated; surgical diet, liquid toast and crackers  · Continue RL hydration  · Continue bowel regimen with senna and Colace b i d  As well as MiraLax p r n  · Repeat CMP  · General surgery consult appreciated    Depression  Assessment & Plan  Patient is maintained on mirtazapine and risperidone  · Continue home medications  · Continue to monitor for further signs of tardive dyskinesia    Cholelithiasis  Assessment & Plan  Cholelithiasis seen on ultrasound without evidence of cholecystitis  · Appreciate surgery input:  Plan on doing laparoscopic cholecystectomy as outpatient  · Continue trending LFTs given transaminitis    Anemia  Assessment & Plan  Hemoglobin noted at 9 8 on admission; 9 8 on 01/10/2020  · Baseline unclear  · On chart review last hemoglobin prior to admission noted at 11 5 in 10/2018  · Patient has never had a colonoscopy  · Iron panel  · Iron 37-low  · Ferritin 57  · Iron saturation 10  · TIBC 371  · Folate 4 2  · Hemoglobin currently at 7 4  Likely due to hemodilution     Plan  · Transfuse for hemoglobin less than 7  · Monitor CBC    Increased anion gap metabolic acidosis  Assessment & Plan  · Anioin gap noted at 18 on admission; now 10  · Bicarb 22 on admission, now 20  · Metabolic acidosis likely due to starvation ketoacidosis secondary to nausea and vomiting  · Lactic acid noted at 1 2  · Continue to monitor    Plan:  · Continue with IV fluid hydration  Hypernatremia  Assessment & Plan  · Sodium level noted at 147; a m  Labs today have not resulted  · Likely hypertonic hypovolemia due to nausea and vomiting  · Resolved with IV fluid hydration  Hypokalemia  Assessment & Plan  · Potassium noted at 2 3 on admission;   · Potassium currently at 4 0   · EKG noted sinus rhythm with sinus arrhythmia  · Hypokalemia likely due to nausea vomiting  Plan  · Maintain potassium more than 4  · Give K-Phos as needed  · BMP in the a m  Chest pain  Assessment & Plan  Patient presented to the ED with substernal 10/10 chest pain with radiation to the epigastric region  Associated nausea and vomiting  · Troponin noted at 0 04 and 0 05   · Aspirin 325 given done in the ED  · EKG demonstrates normal sinus rhythm with sinus arrhythmia  Non specific ST changes  · Chest x-ray normal  · Unlikely to be cardiac; likely secondary to pancreatitis  · Troponins peaked at 0 07     Hypertension  Assessment & Plan  · Continue to monitor blood pressure  · Continue with home antihypertensive-prazosin and hydrouril  · IV hydralazine p r n  for blood pressure greater than 835 systolic          VTE Pharmacologic Prophylaxis:   Pharmacologic: Low risk  Mechanical VTE Prophylaxis in Place: No    Discussions with Specialists or Other Care Team Provider:  Surgical team     Education and Discussions with Family / Patient:  Discussed with patient  Attending did speak with the daughter in law who is the POA  Current Length of Stay: 3 day(s)    Current Patient Status: Inpatient     Discharge Plan / Estimated Discharge Date:   To be determined  Code Status: Level 1 - Full Code      Subjective:   Patient seen and examined  Was initially reluctant to medical treatment and at one point wanted to leave AMA  Patient was however convinced to stay and continue with medical treatment  She reports no new complaints today  Objective:     Vitals:   Temp (24hrs), Av 1 °F (37 3 °C), Min:98 4 °F (36 9 °C), Max:99 7 °F (37 6 °C)    Temp:  [98 4 °F (36 9 °C)-99 7 °F (37 6 °C)] 99 7 °F (37 6 °C)  HR:  [81-96] 96  Resp:  [18] 18  BP: (152-174)/(72-81) 152/72  SpO2:  [99 %] 99 %  Body mass index is 22 84 kg/m²  Input and Output Summary (last 24 hours):     No intake or output data in the 24 hours ending 20 1304    Physical Exam:     Physical Exam   Constitutional: She is oriented to person, place, and time  She appears well-developed and well-nourished  No distress  HENT:   Head: Normocephalic and atraumatic  Eyes: Pupils are equal, round, and reactive to light  EOM are normal    Cardiovascular: Normal rate, regular rhythm and normal heart sounds  No murmur heard  Pulmonary/Chest: Effort normal and breath sounds normal  She has no wheezes  She has no rales  Abdominal: Soft  Bowel sounds are normal  She exhibits no distension  There is no tenderness  Neurological: She is alert and oriented to person, place, and time  Skin: Skin is warm and dry  She is not diaphoretic  Psychiatric: She has a normal mood and affect  Nursing note and vitals reviewed      Additional Data:     Labs:    Results from last 7 days   Lab Units 20  0927 01/10/20  0530   WBC Thousand/uL 4 78 7 22   HEMOGLOBIN g/dL 7 4* 8 7*   HEMATOCRIT % 23 0* 27 4*   PLATELETS Thousands/uL 285 306   NEUTROS PCT %  --  75   LYMPHS PCT %  --  19   MONOS PCT %  --  3*   EOS PCT %  --  2     Results from last 7 days   Lab Units 20  0927   POTASSIUM mmol/L 4 0   CHLORIDE mmol/L 113*   CO2 mmol/L 20*   BUN mg/dL 8   CREATININE mg/dL 0 70   CALCIUM mg/dL 7 0* ALK PHOS U/L 88   ALT U/L 79*   AST U/L 32           * I Have Reviewed All Lab Data Listed Above  * Additional Pertinent Lab Tests Reviewed: All Labs For Current Hospital Admission Reviewed    Imaging:    Imaging Reports Reviewed Today Include:  CT scan abdomen  Imaging Personally Reviewed by Myself Includes:  None  Recent Cultures (last 7 days):           Last 24 Hours Medication List:     Current Facility-Administered Medications:  acetaminophen 650 mg Oral Q6H PRN Sallye Olszewski, MD    docusate sodium 100 mg Oral BID Bin Aguirre,     gabapentin 300 mg Oral BID Sallye Olszewski, MD    hydrALAZINE 5 mg Intravenous Q6H PRN Sallye Olszewski, MD    HYDROmorphone 1 mg Intravenous Q4H PRN Harper Bojorquez MD    hydrOXYzine HCL 50 mg Oral Q6H PRN Sallye Olszewski, MD    iohexol 50 mL Oral 90 min pre-procedure Pérez Cervantes DO    lactated ringers 150 mL/hr Intravenous Continuous Harper Bojorquez MD Last Rate: 150 mL/hr (01/11/20 1817)   magnesium sulfate 2 g Intravenous Once Sallye Olszewski, MD    mirtazapine 45 mg Oral HS Sallye Olszewski, MD    ondansetron 4 mg Intravenous Q6H PRN Sallye Olszewski, MD    oxyCODONE 10 mg Oral Q4H PRN Harper Bojorquez MD    oxyCODONE 5 mg Oral Q6H PRN Harper Bojorquez MD    polyethylene glycol 17 g Oral Daily PRN Bin Aguirre DO    potassium phosphate 21 mmol Intravenous Once Sallye Olszewski, MD    prazosin 4 mg Oral HS Sallye Olszewski, MD    risperiDONE 3 mg Oral HS Sallye Olszewski, MD    senna 8 8 mg Oral BID Bin Aguirre DO    sodium chloride (PF) 3 mL Intravenous PRN Sallye Olszewski, MD         Today, Patient Was Seen By: Catarina Rossi MD    ** Please Note: This note has been constructed using a voice recognition system   **

## 2020-01-12 NOTE — PLAN OF CARE
Problem: Potential for Falls  Goal: Patient will remain free of falls  Description  INTERVENTIONS:  - Assess patient frequently for physical needs  -  Identify cognitive and physical deficits and behaviors that affect risk of falls  -  [place] fall precautions as indicated by assessment   - Educate patient/family on patient safety including physical limitations  - Instruct patient to call for assistance with activity based on assessment  - Modify environment to reduce risk of injury  - Consider OT/PT consult to assist with strengthening/mobility  Outcome: Progressing     Problem: Prexisting or High Potential for Compromised Skin Integrity  Goal: Skin integrity is maintained or improved  Description  INTERVENTIONS:  - Identify patients at risk for skin breakdown  - Assess and monitor skin integrity  - Assess and monitor nutrition and hydration status  - Monitor labs   - Assess for incontinence   - Turn and reposition patient  - Assist with mobility/ambulation  - Relieve pressure over bony prominences  - Avoid friction and shearing  - Provide appropriate hygiene as needed including keeping skin clean and dry  - Evaluate need for skin moisturizer/barrier cream  - Collaborate with interdisciplinary team   - Patient/family teaching  - Consider wound care consult   Outcome: Progressing     Problem: Nutrition/Hydration-ADULT  Goal: Nutrient/Hydration intake appropriate for improving, restoring or maintaining nutritional needs  Description  Monitor and assess patient's nutrition/hydration status for malnutrition  Collaborate with interdisciplinary team and initiate plan and interventions as ordered  Monitor patient's weight and dietary intake as ordered or per policy  Utilize nutrition screening tool and intervene as necessary  Determine patient's food preferences and provide high-protein, high-caloric foods as appropriate       INTERVENTIONS:  - Monitor oral intake, urinary output, labs, and treatment plans  - Assess nutrition and hydration status and recommend course of action  - Evaluate amount of meals eaten  - Assist patient with eating if necessary   - Allow adequate time for meals  - Recommend/ encourage appropriate diets, oral nutritional supplements, and vitamin/mineral supplements  - Order, calculate, and assess calorie counts as needed  - Recommend, monitor, and adjust tube feedings and TPN/PPN based on assessed needs  - Assess need for intravenous fluids  - Provide specific nutrition/hydration education as appropriate  - Include patient/family/caregiver in decisions related to nutrition  Outcome: Progressing

## 2020-01-12 NOTE — ASSESSMENT & PLAN NOTE
· Anioin gap noted at 18 on admission; now 10  · Bicarb 22 on admission, now 20  · Metabolic acidosis likely due to starvation ketoacidosis secondary to nausea and vomiting  · Lactic acid noted at 1 2  · Continue to monitor    Plan:  · Continue with IV fluid hydration

## 2020-01-12 NOTE — ASSESSMENT & PLAN NOTE
· Continue to monitor blood pressure  · Continue with home antihypertensive-prazosin and hydrouril  · IV hydralazine p r n  for blood pressure greater than 133 systolic

## 2020-01-12 NOTE — ASSESSMENT & PLAN NOTE
· Potassium noted at 2 3 on admission;   · Potassium currently at 4 0   · EKG noted sinus rhythm with sinus arrhythmia  · Hypokalemia likely due to nausea vomiting  Plan  · Maintain potassium more than 4  · Give K-Phos as needed  · BMP in the a m

## 2020-01-12 NOTE — ASSESSMENT & PLAN NOTE
Hemoglobin noted at 9 8 on admission; 9 8 on 01/10/2020  · Baseline unclear  · On chart review last hemoglobin prior to admission noted at 11 5 in 10/2018  · Patient has never had a colonoscopy  · Iron panel  · Iron 37-low  · Ferritin 57  · Iron saturation 10  · TIBC 371  · Folate 4 2  · Hemoglobin currently at 7 4  Likely due to hemodilution     Plan  · Transfuse for hemoglobin less than 7  · Monitor CBC

## 2020-01-13 LAB
ALBUMIN SERPL BCP-MCNC: 2 G/DL (ref 3.5–5)
ALP SERPL-CCNC: 79 U/L (ref 46–116)
ALT SERPL W P-5'-P-CCNC: 52 U/L (ref 12–78)
ANION GAP SERPL CALCULATED.3IONS-SCNC: 8 MMOL/L (ref 4–13)
AST SERPL W P-5'-P-CCNC: 11 U/L (ref 5–45)
BASOPHILS # BLD AUTO: 0.01 THOUSANDS/ΜL (ref 0–0.1)
BASOPHILS NFR BLD AUTO: 0 % (ref 0–1)
BILIRUB SERPL-MCNC: 0.34 MG/DL (ref 0.2–1)
BUN SERPL-MCNC: 6 MG/DL (ref 5–25)
CALCIUM SERPL-MCNC: 6.8 MG/DL (ref 8.3–10.1)
CHLORIDE SERPL-SCNC: 115 MMOL/L (ref 100–108)
CO2 SERPL-SCNC: 21 MMOL/L (ref 21–32)
CREAT SERPL-MCNC: 0.67 MG/DL (ref 0.6–1.3)
EOSINOPHIL # BLD AUTO: 0.11 THOUSAND/ΜL (ref 0–0.61)
EOSINOPHIL NFR BLD AUTO: 3 % (ref 0–6)
ERYTHROCYTE [DISTWIDTH] IN BLOOD BY AUTOMATED COUNT: 20.7 % (ref 11.6–15.1)
GFR SERPL CREATININE-BSD FRML MDRD: 98 ML/MIN/1.73SQ M
GLUCOSE SERPL-MCNC: 90 MG/DL (ref 65–140)
HCT VFR BLD AUTO: 22.5 % (ref 34.8–46.1)
HGB BLD-MCNC: 7 G/DL (ref 11.5–15.4)
IMM GRANULOCYTES # BLD AUTO: 0.03 THOUSAND/UL (ref 0–0.2)
IMM GRANULOCYTES NFR BLD AUTO: 1 % (ref 0–2)
LYMPHOCYTES # BLD AUTO: 1.45 THOUSANDS/ΜL (ref 0.6–4.47)
LYMPHOCYTES NFR BLD AUTO: 37 % (ref 14–44)
MAGNESIUM SERPL-MCNC: 1.7 MG/DL (ref 1.6–2.6)
MCH RBC QN AUTO: 32.3 PG (ref 26.8–34.3)
MCHC RBC AUTO-ENTMCNC: 31.1 G/DL (ref 31.4–37.4)
MCV RBC AUTO: 104 FL (ref 82–98)
MONOCYTES # BLD AUTO: 0.3 THOUSAND/ΜL (ref 0.17–1.22)
MONOCYTES NFR BLD AUTO: 8 % (ref 4–12)
NEUTROPHILS # BLD AUTO: 2.01 THOUSANDS/ΜL (ref 1.85–7.62)
NEUTS SEG NFR BLD AUTO: 51 % (ref 43–75)
NRBC BLD AUTO-RTO: 0 /100 WBCS
PHOSPHATE SERPL-MCNC: 2 MG/DL (ref 2.7–4.5)
PLATELET # BLD AUTO: 260 THOUSANDS/UL (ref 149–390)
PMV BLD AUTO: 9.9 FL (ref 8.9–12.7)
POTASSIUM SERPL-SCNC: 3.9 MMOL/L (ref 3.5–5.3)
PROT SERPL-MCNC: 4.9 G/DL (ref 6.4–8.2)
RBC # BLD AUTO: 2.17 MILLION/UL (ref 3.81–5.12)
SODIUM SERPL-SCNC: 144 MMOL/L (ref 136–145)
WBC # BLD AUTO: 3.91 THOUSAND/UL (ref 4.31–10.16)

## 2020-01-13 PROCEDURE — 83735 ASSAY OF MAGNESIUM: CPT | Performed by: INTERNAL MEDICINE

## 2020-01-13 PROCEDURE — 85025 COMPLETE CBC W/AUTO DIFF WBC: CPT | Performed by: INTERNAL MEDICINE

## 2020-01-13 PROCEDURE — 84100 ASSAY OF PHOSPHORUS: CPT | Performed by: INTERNAL MEDICINE

## 2020-01-13 PROCEDURE — 80053 COMPREHEN METABOLIC PANEL: CPT | Performed by: INTERNAL MEDICINE

## 2020-01-13 PROCEDURE — 99232 SBSQ HOSP IP/OBS MODERATE 35: CPT | Performed by: INTERNAL MEDICINE

## 2020-01-13 RX ADMIN — GABAPENTIN 300 MG: 300 CAPSULE ORAL at 09:25

## 2020-01-13 RX ADMIN — DOCUSATE SODIUM 100 MG: 100 CAPSULE, LIQUID FILLED ORAL at 09:25

## 2020-01-13 RX ADMIN — POTASSIUM PHOSPHATE, MONOBASIC AND POTASSIUM PHOSPHATE, DIBASIC 15 MMOL: 224; 236 INJECTION, SOLUTION, CONCENTRATE INTRAVENOUS at 17:58

## 2020-01-13 RX ADMIN — DOCUSATE SODIUM 100 MG: 100 CAPSULE, LIQUID FILLED ORAL at 17:58

## 2020-01-13 RX ADMIN — GABAPENTIN 300 MG: 300 CAPSULE ORAL at 17:58

## 2020-01-13 RX ADMIN — POLYETHYLENE GLYCOL 3350 17 G: 17 POWDER, FOR SOLUTION ORAL at 18:02

## 2020-01-13 RX ADMIN — PRAZOSIN HYDROCHLORIDE 4 MG: 1 CAPSULE ORAL at 22:42

## 2020-01-13 RX ADMIN — IRON SUCROSE 200 MG: 20 INJECTION, SOLUTION INTRAVENOUS at 14:40

## 2020-01-13 RX ADMIN — OXYCODONE HYDROCHLORIDE 10 MG: 10 TABLET ORAL at 21:02

## 2020-01-13 RX ADMIN — RISPERIDONE 3 MG: 1 TABLET ORAL at 22:41

## 2020-01-13 RX ADMIN — MIRTAZAPINE 45 MG: 15 TABLET, FILM COATED ORAL at 22:41

## 2020-01-13 RX ADMIN — SENNOSIDES A AND B 8.8 MG: 415.36 LIQUID ORAL at 09:25

## 2020-01-13 RX ADMIN — SENNOSIDES A AND B 8.8 MG: 415.36 LIQUID ORAL at 17:58

## 2020-01-13 NOTE — ASSESSMENT & PLAN NOTE
Hemoglobin noted at 9 8 on admission; 9 8 on 01/10/2020; 7 on 1/13/2020  · Baseline unclear  · On chart review last hemoglobin prior to admission noted at 11 5 in 10/2018  · Patient has never had a colonoscopy  · Etiology:  · Unlikely megaloblastic (folate and B12 normal)  · 2/2 GI loss (patient admits to bloody bowel movements, and has never had colonoscopy) and possibly MARCIA (iron panel shows low iron 27; however ferritin is normal) vs hemodilution since patient has been receiving IVF     · Iron panel  · Iron 37-low  · Ferritin 57  · Iron saturation 10  · TIBC 371  Plan  · Monitor CBC; consider transfuse if hemoglobin <7  · Will administer venofer 200mg once daily x 3 days  · FOBT

## 2020-01-13 NOTE — PLAN OF CARE
Problem: Potential for Falls  Goal: Patient will remain free of falls  Description  INTERVENTIONS:  - Assess patient frequently for physical needs  -  Identify cognitive and physical deficits and behaviors that affect risk of falls  -  [place] fall precautions as indicated by assessment   - Educate patient/family on patient safety including physical limitations  - Instruct patient to call for assistance with activity based on assessment  - Modify environment to reduce risk of injury  - Consider OT/PT consult to assist with strengthening/mobility  Outcome: Progressing     Problem: Prexisting or High Potential for Compromised Skin Integrity  Goal: Skin integrity is maintained or improved  Description  INTERVENTIONS:  - Identify patients at risk for skin breakdown  - Assess and monitor skin integrity  - Assess and monitor nutrition and hydration status  - Monitor labs   - Assess for incontinence   - Turn and reposition patient  - Assist with mobility/ambulation  - Relieve pressure over bony prominences  - Avoid friction and shearing  - Provide appropriate hygiene as needed including keeping skin clean and dry  - Evaluate need for skin moisturizer/barrier cream  - Collaborate with interdisciplinary team   - Patient/family teaching  - Consider wound care consult   Outcome: Progressing     Problem: Nutrition/Hydration-ADULT  Goal: Nutrient/Hydration intake appropriate for improving, restoring or maintaining nutritional needs  Description  Monitor and assess patient's nutrition/hydration status for malnutrition  Collaborate with interdisciplinary team and initiate plan and interventions as ordered  Monitor patient's weight and dietary intake as ordered or per policy  Utilize nutrition screening tool and intervene as necessary  Determine patient's food preferences and provide high-protein, high-caloric foods as appropriate       INTERVENTIONS:  - Monitor oral intake, urinary output, labs, and treatment plans  - Assess nutrition and hydration status and recommend course of action  - Evaluate amount of meals eaten  - Assist patient with eating if necessary   - Allow adequate time for meals  - Recommend/ encourage appropriate diets, oral nutritional supplements, and vitamin/mineral supplements  - Order, calculate, and assess calorie counts as needed  - Recommend, monitor, and adjust tube feedings and TPN/PPN based on assessed needs  - Assess need for intravenous fluids  - Provide specific nutrition/hydration education as appropriate  - Include patient/family/caregiver in decisions related to nutrition  Outcome: Progressing     Problem: PAIN - ADULT  Goal: Verbalizes/displays adequate comfort level or baseline comfort level  Description  Interventions:  - Encourage patient to monitor pain and request assistance  - Assess pain using appropriate pain scale  - Administer analgesics based on type and severity of pain and evaluate response  - Implement non-pharmacological measures as appropriate and evaluate response  - Consider cultural and social influences on pain and pain management  - Notify physician/advanced practitioner if interventions unsuccessful or patient reports new pain  Outcome: Progressing

## 2020-01-13 NOTE — ASSESSMENT & PLAN NOTE
Patient presented to the ED with substernal 10/10 chest pain with radiation to the epigastric region  Associated nausea and vomiting  Most likely 2/2 to pancreatitis given findings  · Troponin noted at 0 04 and 0 05; peaked at 0 07  · Aspirin 325 given done in the ED  · EKG demonstrates normal sinus rhythm with sinus arrhythmia    Non specific ST changes  · Chest x-ray normal  · Unlikely to be cardiac- labs, EKG and imaging not-concerning for MI

## 2020-01-13 NOTE — PROGRESS NOTES
Progress Note - Apolonia Petecarmelina 1960, 64 y o  female MRN: [id number]    Unit/Bed#: S -01 Encounter: [id number]    Primary Care Provider: Magalys Beckham MD   Date and time admitted to hospital: 1/9/2020 12:16 PM        * Acute pancreatitis  Assessment & Plan  Patient presented with the complaints of what she thought was chest pain radiating to the epigastric region with nausea and vomiting  Reported mild right upper quadrant tenderness on exam    · Patient noted to have elevated AST and ALT on admission  · AST elevated at 337, 256, 32, 11 1/13/2020    · ALT at 215, 205, 79, 52 1/13/2020- transaminitis resolved  · T bili normal at 0 38, 0 39, 0 42  · Lipase elevated at 1174 on admission, went down to 503  · Right upper upper quadrant ultrasound notable for cholelithiasis without signs of cholecystitis  · CT of the abdomen showing signs of acute interstitial pancreatitis in addition to signs of moderate colonic stool burden  · Based on history, physical exam findings, labs and imaging findings, patient has pancreatitis, most likely 2/2 gallstones  Further evidenced by significantly elevated LFTs  Seen by surgery who recommended outpatient evaluation for elective laparoscopic cholecystectomy  TG nml 51- hypertriglyceridemia not a cause  Plan  · Patient tolerating oral diet  Can d/c IVF  · Continue bowel regimen with senna and Colace b i d  As well as MiraLax p r n  Anemia  Assessment & Plan  Hemoglobin noted at 9 8 on admission; 9 8 on 01/10/2020; 7 on 1/13/2020  · Baseline unclear  · On chart review last hemoglobin prior to admission noted at 11 5 in 10/2018  · Patient has never had a colonoscopy  · Etiology:  · Unlikely megaloblastic (folate and B12 normal)  · 2/2 GI loss (patient admits to bloody bowel movements, and has never had colonoscopy) and possibly MARCIA (iron panel shows low iron 27; however ferritin is normal) vs hemodilution since patient has been receiving IVF     · Iron panel  · Iron 37-low  · Ferritin 57  · Iron saturation 10  · TIBC 371  Plan  · Monitor CBC; consider transfuse if hemoglobin <7  · Will administer venofer 200mg once daily x 3 days  · FOBT    Hypertension  Assessment & Plan  · Continue to monitor blood pressure  · Continue medications; at discharge, d/c with home antihypertensive-prazosin and hydrouril    Hypokalemia  Assessment & Plan  · Potassium noted at 2 3 on admission; Potassium currently at 3 9 on 1/13/2020  · EKG noted sinus rhythm with sinus arrhythmia  · Hypokalemia likely due to nausea vomiting; resolved      Cholelithiasis  Assessment & Plan  Cholelithiasis seen on ultrasound without evidence of cholecystitis  · Appreciate surgery input:  Plan on doing laparoscopic cholecystectomy as outpatient    Depression  Assessment & Plan  Patient is maintained on mirtazapine and risperidone  · Continue home medications    Hypernatremia  Assessment & Plan  · POA: Likely hypertonic hypovolemia due to nausea and vomiting  · Resolved with IV fluid hydration  Today on 1/13/2020: 144    Chest pain  Assessment & Plan  Patient presented to the ED with substernal 10/10 chest pain with radiation to the epigastric region  Associated nausea and vomiting  Most likely 2/2 to pancreatitis given findings  · Troponin noted at 0 04 and 0 05; peaked at 0 07  · Aspirin 325 given done in the ED  · EKG demonstrates normal sinus rhythm with sinus arrhythmia    Non specific ST changes  · Chest x-ray normal  · Unlikely to be cardiac- labs, EKG and imaging not-concerning for MI      VTE Pharmacologic Prophylaxis:   Pharmacologic: anemia, possibly 2/2 GI bleed  Mechanical VTE Prophylaxis in Place: No    Discussions with Specialists or Other Care Team Provider: Discussed with attending provider, Dr Zeb Bourne, and nursing    Education and Discussions with Family / Patient: Discussed plan with patient and family present in the room    Current Length of Stay: 4 day(s)    Current Patient Status: Inpatient     Discharge Plan / Estimated Discharge Date:  24-48 hours    Code Status: Level 1 - Full Code      Subjective:   Patient reports feeling well  She ate breakfast this morning which she tolerates well  Denies fevers, chills, abdominal pain, nausea, vomiting, diarrhea, constipation  Patient does admit that she sometimes has bloody bowel movements  She has never had a colonoscopy  Objective:     Vitals:   Temp (24hrs), Av °F (37 2 °C), Min:97 9 °F (36 6 °C), Max:101 1 °F (38 4 °C)    Temp:  [97 9 °F (36 6 °C)-101 1 °F (38 4 °C)] 98 2 °F (36 8 °C)  HR:  [87-92] 87  Resp:  [18] 18  BP: (106-162)/(67-86) 147/86  SpO2:  [95 %-99 %] 95 %  Body mass index is 24 13 kg/m²  Input and Output Summary (last 24 hours):     No intake or output data in the 24 hours ending 20 1511    Physical Exam:     Physical Exam   Constitutional: She appears well-developed and well-nourished  She does not appear ill  No distress  HENT:   Head: Normocephalic and atraumatic  Right Ear: External ear normal    Left Ear: External ear normal    Nose: Nose normal    Eyes: Conjunctivae and EOM are normal  Right eye exhibits no discharge  Left eye exhibits no discharge  No scleral icterus  Neck: Normal range of motion  Neck supple  No tracheal deviation present  Cardiovascular: Normal rate, regular rhythm, normal heart sounds and intact distal pulses  No murmur heard  Pulmonary/Chest: Effort normal and breath sounds normal  No stridor  No respiratory distress  She has no wheezes  She has no rales  She exhibits no tenderness  Abdominal: Soft  Bowel sounds are normal  She exhibits no distension  There is no tenderness  There is no rebound and no guarding  Musculoskeletal: She exhibits no edema  Neurological: She is alert  Skin: Skin is warm  Capillary refill takes less than 2 seconds  No rash noted  She is not diaphoretic  There is pallor  Psychiatric: She has a normal mood and affect   Her behavior is normal    Nursing note and vitals reviewed  Additional Data:     Labs:    Results from last 7 days   Lab Units 01/13/20  0550   WBC Thousand/uL 3 91*   HEMOGLOBIN g/dL 7 0*   HEMATOCRIT % 22 5*   PLATELETS Thousands/uL 260   NEUTROS PCT % 51   LYMPHS PCT % 37   MONOS PCT % 8   EOS PCT % 3     Results from last 7 days   Lab Units 01/13/20  0550   POTASSIUM mmol/L 3 9   CHLORIDE mmol/L 115*   CO2 mmol/L 21   BUN mg/dL 6   CREATININE mg/dL 0 67   CALCIUM mg/dL 6 8*   ALK PHOS U/L 79   ALT U/L 52   AST U/L 11           * I Have Reviewed All Lab Data Listed Above  * Additional Pertinent Lab Tests Reviewed:  Cinth[facility] Admission Reviewed    Imaging:    Imaging Reports Reviewed Today Include: CT abdomen pelvis with contrast, ultrasound abdomen, chest x-ray  Imaging Personally Reviewed by Myself Includes:  (same as above)    Recent Cultures (last 7 days):           Last 24 Hours Medication List:     Current Facility-Administered Medications:  acetaminophen 650 mg Oral Q6H PRN Josselyn Langston MD    docusate sodium 100 mg Oral BID Bin Aguirre DO    gabapentin 300 mg Oral BID Josselyn Langston MD    hydrALAZINE 5 mg Intravenous Q6H PRN Josselyn Langston MD    HYDROmorphone 1 mg Intravenous Q4H PRN Jigna Ziegler MD    hydrOXYzine HCL 50 mg Oral Q6H PRN Josselyn Langston MD    iohexol 50 mL Oral 90 min pre-procedure Pérez Cervantes DO    iron sucrose 200 mg Intravenous Daily Jigna Ziegler MD Last Rate: 200 mg (01/13/20 1440)   lactated ringers 100 mL/hr Intravenous Continuous Josselyn Langston MD Last Rate: Stopped (01/13/20 1003)   mirtazapine 45 mg Oral HS Josselyn Langston MD    ondansetron 4 mg Intravenous Q6H PRN Josselyn Langston MD    oxyCODONE 10 mg Oral Q4H PRN Jigna Ziegler MD    oxyCODONE 5 mg Oral Q6H PRN Jigna Ziegler MD    polyethylene glycol 17 g Oral Daily PRN Bin Aguirre DO    potassium phosphate 15 mmol Intravenous Once Jigna Ziegler MD    prazosin 4 mg Oral HS Josselyn Langston MD    risperiDONE 3 mg Oral HS Ruth Ann Braswell MD    senna 8 8 mg Oral BID Bin Aguirre DO    sodium chloride (PF) 3 mL Intravenous PRN Ruth Ann Braswell MD         Today, Patient Was Seen By: Karolina Mcbride DO    ** Please Note: This note has been constructed using a voice recognition system   **

## 2020-01-13 NOTE — ASSESSMENT & PLAN NOTE
· POA: Likely hypertonic hypovolemia due to nausea and vomiting  · Resolved with IV fluid hydration   Today on 1/13/2020: 144

## 2020-01-13 NOTE — ASSESSMENT & PLAN NOTE
Cholelithiasis seen on ultrasound without evidence of cholecystitis  · Appreciate surgery input:  Plan on doing laparoscopic cholecystectomy as outpatient

## 2020-01-13 NOTE — SOCIAL WORK
LOS: 4 DAYS  PATIENT IS NOT A BUNDLE  PATIENT IS NOT A READMISSION  CM met with patient at bedside, patient alert and oriented  Patient accompanied by her son, sister and niece  Patient's family provided most history though patient appeared engaged in CM assessment  Patient resides with her son and daughter in law in a 1st floor home in [place]  There are 4 KACIE and patient is able to navigate her home without concern  Patient denies the use of DME and receives assistance with some ADLs including showering, medication management, meals and doctor's appointments  Patient's son and his spouse provide all assistance necessary  Patient denies history of VNA/SNF  Patient utilizes [facility] on [address], has Rx plan and is able to afford all copays  Patient has history of depression and schizophrenia and was in [facility] treatment in [place] 6 years ago  Patient is currently seeing a psychiatrist (twice/month) and therapist (once/month) at [facility]  Patient does not have history of substance use  Patient does not have POA/AD and is not interested in information at this time-patient's son helps assist patient in medical matters  Patient receives [employer] and her family provides all transport and will do so upon discharge  Patient's son expressed interest in Waiver Program to help pay for him/his spouse to provide care for his mother  CM agreed to place AAA contact information on AVS as well as place non-skilled caregiver information on Sticker Chart for provision upon discharge  Patient and family deny any discharge needs  As patient accompanied by multiple family members, CM unable to directly address concerns raised to Edin over the weekend re: daughter's concerns about the home situation and referral to AAA  CM did ask patient if she had any questions or concerns about discharge home and she stated she did not  No additional CM needs identified at this time      CM reviewed discharge planning process including the following: identifying caregivers at home, preference for d/c planning needs, availability of treatment team to discuss questions or concerns patient and/or family may have regarding diagnosis, plan of care, old or new medications and discharge planning   CM will continue to follow for care coordination and update assessment as appropriate

## 2020-01-13 NOTE — UTILIZATION REVIEW
Notification of Inpatient Admission/Inpatient Authorization Request   This is a Notification of Inpatient Admission for [place]  Be advised that this patient was admitted to our facility under Inpatient Status  Contact Erna Guzman at [phone] for additional admission information  [facility] DEPT  DEDICATED [phone]  Patient Name:   Denver Darnell   YOB: 1960       [address]   [address]:   Alicia Johnston  Tax ID: [id number]  NPI: [id number] Attending Provider/NPI: Sorin Oviedo [[id number]]   PAULETTE Kelly  [facility] Practioner ID- [id number]  Primary Office:  [address]  Phone 1: [phone]  Fax: [phone]   Place of Service Code: 24     Place of Service Name:  [address] [place] [address]   Start Date: 1/9/20 1432     Discharge Date & Time: No discharge date for patient encounter  Type of Admission: Inpatient Status Discharge Disposition   (if discharged): Final discharge disposition not confirmed   Patient Diagnoses: Hypomagnesemia [E83 42]  Pancreatitis [U83 49]  Metabolic acidosis [C10 7]  Chest pain [R07 9]  Transaminitis [R74 0]  IRINEO (acute kidney injury) ([facility] ) [N17 9]     Orders: Admission Orders (From admission, onward)     Ordered        01/09/20 1432  Inpatient Admission  Once                    Assigned Utilization Review Contact: Erna Guzman  Utilization   Network Utilization Review Department  Phone: [phone]; Fax [phone]  Email: [email or web address]   ATTENTION PAYERS: Please call the assigned Utilization  directly with any questions or concerns ALL voicemails in the department are confidential  Send all requests for admission clinical reviews, approved or denied determinations and any other requests to dedicated fax number belonging to the campus where the patient is receiving treatment  Initial Clinical Review    Admission: Date/Time/Statement:   Inpatient Admission Orders (From admission, onward)     Ordered        01/09/20 1432  Inpatient Admission  Once                   Orders Placed This Encounter   Procedures    Inpatient Admission     Standing Status:   Standing     Number of Occurrences:   1     Order Specific Question:   Admitting Physician     Answer:   Patrick Alva [[id number]]     Order Specific Question:   Level of Care     Answer:   Med Surg [16]     Order Specific Question:   Estimated length of stay     Answer:   More than 2 Midnights     Order Specific Question:   Certification     Answer:   I certify that inpatient services are medically necessary for this patient for a duration of greater than two midnights  See H&P and MD Progress Notes for additional information about the patient's course of treatment  ED Arrival Information     Expected Arrival Acuity Means of Arrival Escorted By Service Admission Type    - 1/9/2020 12:05 Urgent Walk-In Family Member General Medicine Urgent    Arrival Complaint    chest pain        Chief Complaint   Patient presents with    Chest Pain     pt states started with CP yesterday     Assessment/Plan: 64year old female to the ED from home with complaints of chest pain radiating from from epigstric region, nausea and vomiting for 1 day  Unable to hold anything down due to vomiting  Admitted to inpatient for acute pancreatitis, cholelithiasis, IRINEO, hypernatremia  SHe is pale, has epigastric tenderness  Found to have elevated LFT, lipase 1174  US shows cholelithiasis normal CBD consider secondary to gallstone pancreatitis with passed stone  Keep NPO, gen surg consult  IV fluids  REpeat CMP  Avoid hypoperfusion of the kidneys  Magnesium on admit was 1 3  REcieved 2gm of mag  Continue to monitor  SHe also has increased anion gap    Gen surg consult 1/9: Patient found to have acute pancreatitis with IRINEO, leukocytosis and hypokalemia  Lipase is 1174, but patient is having significant pain  Pain control  Trend lipase  Check CT A/P     ED Triage Vitals   Temperature Pulse Respirations Blood Pressure SpO2   01/09/20 1217 01/09/20 1217 01/09/20 1217 01/09/20 1217 01/09/20 1217   97 8 °F (36 6 °C) 79 16 120/80 100 %      Temp Source Heart Rate Source Patient Position - Orthostatic VS BP Location FiO2 (%)   01/09/20 1217 01/09/20 1414 01/09/20 1414 01/09/20 1414 --   Oral Monitor Sitting Left arm       Pain Score       01/09/20 1217       7          Wt Readings from Last 1 Encounters:   01/13/20 67 8 kg (149 lb 7 6 oz)     Additional Vital Signs:   Date/Time Temp Pulse Resp BP SpO2 O2 Device   01/10/20 0900 97 8 °F (36 6 °C) 88 18 176/82Abnormal  100 % None (Room air)   01/10/20 0655    140/94     01/10/20 0236    182/88Abnormal      01/10/20 0234 98 4 °F (36 9 °C) 90 20 190/88Abnormal  100 % None (Room air)   01/10/20 0000      None (Room air)   01/09/20 2207 98 4 °F (36 9 °C) 79 18 182/92Abnormal  100 % None (Room air)   01/09/20 2150    142/80     01/09/20 1810    160/78     01/09/20 1653 97 8 °F (36 6 °C) 79 18 182/88Abnormal  100 % None (Room air)   01/09/20 1638  74 16 204/86Abnormal  100 % None (Room air)   01/09/20 1414  76 16 170/77       Pertinent Labs/Diagnostic Test Results:   US abdomen 1/9: Cholelithiasis  2   Simple cyst within the midpole of the right kidney  CXR 1/9:No acute cardiopulmonary disease    Results from last 7 days   Lab Units 01/13/20  0550 01/12/20  0927 01/10/20  0530 01/09/20  1311   WBC Thousand/uL 3 91* 4 78 7 22 12 25*   HEMOGLOBIN g/dL 7 0* 7 4* 8 7* 9 8*   HEMATOCRIT % 22 5* 23 0* 27 4* 28 4*   PLATELETS Thousands/uL 260 285 306 323   NEUTROS ABS Thousands/µL 2 01  --  5 42 10 80*         Results from last 7 days   Lab Units 01/13/20  0550 01/12/20  0927 01/11/20  2031 01/11/20  1013 01/10/20  2120 01/10/20  0530  01/10/20  0032 01/09/20  1744 01/09/20  1311   SODIUM mmol/L 144 143  --  139  --  147*  --  147* 148* 147*   POTASSIUM mmol/L 3 9 4 0  --  3 1* 3 4* 3 5  --  2 9* 2 3* 2 3*   CHLORIDE mmol/L 115* 113*  --  108  --  115*  --  114* 113* 112*   CO2 mmol/L 21 20*  --  20*  --  18*  --  20* 20* 17*   ANION GAP mmol/L 8 10  --  11  --  14*  --  13 15* 18*   BUN mg/dL 6 8  --  7  --  22  --  26* 32* 36*   CREATININE mg/dL 0 67 0 70  --  0 87  --  1 13  --  1 30 1 42* 1 81*   EGFR ml/min/1 73sq m 98 96  --  74  --  54  --  46 41 31   CALCIUM mg/dL 6 8* 7 0*  --  7 3*  --  7 8*  --  7 9* 7 9* 8 7   MAGNESIUM mg/dL  --  1 5*  --  1 5*  --  2 4  --   --  1 7 1 3*   PHOSPHORUS mg/dL 2 0* 1 6* 1 4* 1 3* 3 1 1 8*   < >  --   --   --     < > = values in this interval not displayed       Results from last 7 days   Lab Units 01/13/20  0550 01/12/20  0927 01/11/20  1013 01/10/20  0530 01/09/20  1311   AST U/L 11 32 95* 256* 337*   ALT U/L 52 79* 137* 205* 215*   ALK PHOS U/L 79 88 102 97 97   TOTAL PROTEIN g/dL 4 9* 5 2* 5 8* 5 7* 6 4   ALBUMIN g/dL 2 0* 2 2* 2 7* 2 9* 3 4*   TOTAL BILIRUBIN mg/dL 0 34 0 42 0 45 0 39 0 38         Results from last 7 days   Lab Units 01/13/20  0550 01/12/20  0927 01/11/20  1013 01/10/20  0530 01/10/20  0032 01/09/20  1744 01/09/20  1311   GLUCOSE RANDOM mg/dL 90 83 99 70 85 80 101     Results from last 7 days   Lab Units 01/09/20  1444   PH ROBERT  7 350   PCO2 ROBERT mm Hg 34 2*   PO2 ROBERT mm Hg 45 0   HCO3 ROBERT mmol/L 18 5*   BASE EXC ROBERT mmol/L -6 4   O2 CONTENT ROBERT ml/dL 10 2   O2 HGB, VENOUS % 75 7       Results from last 7 days   Lab Units 01/10/20  0032 01/09/20  2106 01/09/20  1744 01/09/20  1632 01/09/20  1311   TROPONIN I ng/mL 0 06* 0 07* 0 05* 0 05* 0 04     Results from last 7 days   Lab Units 01/09/20  1311   D-DIMER QUANTITATIVE ug/ml FEU 3 60*     Results from last 7 days   Lab Units 01/09/20  1444   LACTIC ACID mmol/L 1 2     Results from last 7 days   Lab Units 01/09/20  1311   FERRITIN ng/mL 57       Results from last 7 days   Lab Units 01/10/20  0530 01/09/20  1311   LIPASE u/L 503* 1,174*       ED Treatment:   Medication Administration from 01/09/2020 1205 to 01/09/2020 1652       Date/Time Order Dose Route Action     01/09/2020 1311 aspirin chewable tablet 324 mg 324 mg Oral Given     01/09/2020 1406 famotidine (PEPCID) tablet 20 mg 20 mg Oral Given     01/09/2020 1407 aluminum-magnesium hydroxide-simethicone (MYLANTA) 200-200-20 mg/5 mL oral suspension 30 mL 30 mL Oral Given     01/09/2020 1413 magnesium sulfate 2 g/50 mL IVPB (premix) 2 g 2 g Intravenous New Bag     01/09/2020 1444 lactated ringers infusion 1,000 mL/hr Intravenous New Bag        Past Medical History:   Diagnosis Date    [facility] ) 1/9/2020    Anemia 1/9/2020    Disease of thyroid gland     Hypertension     Psychiatric disorder      Admitting Diagnosis: Hypomagnesemia [E83 42]  Pancreatitis [O02 81]  Metabolic acidosis [D73 0]  Chest pain [R07 9]  Transaminitis [R74 0]  IRINEO (acute kidney injury) ([facility] ) [N17 9]  Age/Sex: 64 y o  female  Admission Orders:  Tele  NPO  Scheduled Medications:    Medications:  docusate sodium 100 mg Oral BID   gabapentin 300 mg Oral BID   iohexol 50 mL Oral 90 min pre-procedure   mirtazapine 45 mg Oral HS   prazosin 4 mg Oral HS   risperiDONE 3 mg Oral HS   senna 8 8 mg Oral BID     Continuous IV Infusions:    lactated ringers 100 mL/hr Intravenous Continuous     PRN Meds:    acetaminophen 650 mg Oral Q6H PRN   hydrALAZINE 5 mg Intravenous Q6H PRN   HYDROmorphone 1 mg Intravenous Q4H PRN   hydrOXYzine HCL 50 mg Oral Q6H PRN   ondansetron 4 mg Intravenous Q6H PRN   oxyCODONE 10 mg Oral Q4H PRN   oxyCODONE 5 mg Oral Q6H PRN   polyethylene glycol 17 g Oral Daily PRN   sodium chloride (PF) 3 mL Intravenous PRN       IP CONSULT TO ACUTE CARE SURGERY    Network Utilization Review Department  [email or web address]  ATTENTION: Please call with any questions or concerns to [phone] and carefully listen to the prompts so that you are directed to the right person  All voicemails are confidential   Chuy Rhoades all requests for admission clinical reviews, approved or denied determinations and any other requests to dedicated fax number below belonging to the campus where the patient is receiving treatment   List of dedicated fax numbers for the Facilities:  [address] DENIALS (Administrative/Medical Necessity) [phone]   [address]  [facility] (Maternity/NICU/Pediatrics) [phone]   Ale Boyer [phone]   Ally Flanagan [phone]   Radha Cadet [phone]   Raimundo Irene [phone]   [address] [place] [address] 15[address] [facility] [phone]   [facility]  [phone]   [address] [facility] [address]  [address] [facility] [address] [facility] [phone]

## 2020-01-13 NOTE — ASSESSMENT & PLAN NOTE
Patient presented with the complaints of what she thought was chest pain radiating to the epigastric region with nausea and vomiting  Reported mild right upper quadrant tenderness on exam    · Patient noted to have elevated AST and ALT on admission  · AST elevated at 337, 256, 32, 11 1/13/2020    · ALT at 215, 205, 79, 52 1/13/2020- transaminitis resolved  · T bili normal at 0 38, 0 39, 0 42  · Lipase elevated at 1174 on admission, went down to 503  · Right upper upper quadrant ultrasound notable for cholelithiasis without signs of cholecystitis  · CT of the abdomen showing signs of acute interstitial pancreatitis in addition to signs of moderate colonic stool burden  · Based on history, physical exam findings, labs and imaging findings, patient has pancreatitis, most likely 2/2 gallstones  Further evidenced by significantly elevated LFTs  Seen by surgery who recommended outpatient evaluation for elective laparoscopic cholecystectomy  TG nml 51- hypertriglyceridemia not a cause  Plan  · Patient tolerating oral diet  Can d/c IVF  · Continue bowel regimen with senna and Colace b i d  As well as MiraLax p r n

## 2020-01-13 NOTE — ASSESSMENT & PLAN NOTE
· Continue to monitor blood pressure  · Continue medications; at discharge, d/c with home antihypertensive-prazosin and hydrouril

## 2020-01-13 NOTE — PROGRESS NOTES
Progress Note - General Surgery   Luis Alberto Leary 64 y o  female MRN: [id number]  Unit/Bed#: S -01 Encounter: [id number]    Assessment/Plan  Luis Alberto Leary is a 64 y o  female     Gallstone Pancreatitis  On fulls/toast/crackers diet, tolerated     -advance diet as tolerated, will advance to low fat diet  Patient denies any nausea, vomiting, or abdominal pain   -serial abdominal exams  -lipase trending down, not repeated today, 503 yesterday   -temperature of 101 9 yesterday evening, received tylenol, afebrile at this time  Continue to monitor temperature,   -will need eventual outpatient laparoscopic cholecystectomy  No plan for acute surgical intervention this admission      Subjective/Objective     Subjective: No events overnight  Resting comfortably in room  Patient uncooperative this AM during examination pretending to be asleep  Reluctant to answer questions requiring multiple prompts  States she has no abdominal pain  No nausea or vomiting  Refusing to answer if she has had bowel function     Addendum - revisited patient later in morning  Patient much more pleasant and cooperative  No new complaints, tolerating diet  Having bowel function  Objective:     Blood pressure 159/73, pulse 87, temperature 97 9 °F (36 6 °C), temperature source Oral, resp  rate 18, height 5' 6" (1 676 m), weight 67 8 kg (149 lb 7 6 oz), SpO2 99 %  ,Body mass index is 24 13 kg/m²      No intake or output data in the 24 hours ending 01/13/20 0812    Invasive Devices     Peripheral Intravenous Line            Peripheral IV 01/12/20 Distal;Left;Ventral (anterior) Forearm less than 1 day                Physical Exam: /73 (BP Location: Right arm)   Pulse 87   Temp 97 9 °F (36 6 °C) (Oral)   Resp 18   Ht 5' 6" (1 676 m)   Wt 67 8 kg (149 lb 7 6 oz)   SpO2 99%   BMI 24 13 kg/m²   General appearance: alert and oriented, in no acute distress  Lungs: clear to auscultation bilaterally  Heart: regular rate and rhythm, S1, S2 normal, no murmur, click, rub or gallop  Abdomen: refusing abdominal exam, will try again later   Addendum - abdomen soft, non-distended, active bowel sounds, non-tender to palpation  Extremities: extremities normal, warm and well-perfused; no cyanosis, clubbing, or edema    Lab, Imaging and other studies:I have personally reviewed pertinent lab results       VTE Pharmacologic Prophylaxis: Sequential compression device (Venodyne)   VTE Mechanical Prophylaxis: sequential compression device    Recent Results (from the past 36 hour(s))   Phosphorus    Collection Time: 01/11/20  8:31 PM   Result Value Ref Range    Phosphorus 1 4 (L) 2 7 - 4 5 mg/dL   CBC    Collection Time: 01/12/20  9:27 AM   Result Value Ref Range    WBC 4 78 4 31 - 10 16 Thousand/uL    RBC 2 24 (L) 3 81 - 5 12 Million/uL    Hemoglobin 7 4 (L) 11 5 - 15 4 g/dL    Hematocrit 23 0 (L) 34 8 - 46 1 %     (H) 82 - 98 fL    MCH 33 0 26 8 - 34 3 pg    MCHC 32 2 31 4 - 37 4 g/dL    RDW 20 5 (H) 11 6 - 15 1 %    Platelets 308 087 - 571 Thousands/uL    MPV 10 0 8 9 - 12 7 fL   Comprehensive metabolic panel    Collection Time: 01/12/20  9:27 AM   Result Value Ref Range    Sodium 143 136 - 145 mmol/L    Potassium 4 0 3 5 - 5 3 mmol/L    Chloride 113 (H) 100 - 108 mmol/L    CO2 20 (L) 21 - 32 mmol/L    ANION GAP 10 4 - 13 mmol/L    BUN 8 5 - 25 mg/dL    Creatinine 0 70 0 60 - 1 30 mg/dL    Glucose 83 65 - 140 mg/dL    Calcium 7 0 (L) 8 3 - 10 1 mg/dL    AST 32 5 - 45 U/L    ALT 79 (H) 12 - 78 U/L    Alkaline Phosphatase 88 46 - 116 U/L    Total Protein 5 2 (L) 6 4 - 8 2 g/dL    Albumin 2 2 (L) 3 5 - 5 0 g/dL    Total Bilirubin 0 42 0 20 - 1 00 mg/dL    eGFR 96 ml/min/1 73sq m   Magnesium    Collection Time: 01/12/20  9:27 AM   Result Value Ref Range    Magnesium 1 5 (L) 1 6 - 2 6 mg/dL   Phosphorus    Collection Time: 01/12/20  9:27 AM   Result Value Ref Range    Phosphorus 1 6 (L) 2 7 - 4 5 mg/dL   Comprehensive metabolic panel    Collection Time: 01/13/20  5:50 AM

## 2020-01-13 NOTE — ASSESSMENT & PLAN NOTE
· Potassium noted at 2 3 on admission; Potassium currently at 3 9 on 1/13/2020  · EKG noted sinus rhythm with sinus arrhythmia    · Hypokalemia likely due to nausea vomiting; resolved

## 2020-01-14 VITALS
TEMPERATURE: 99.4 F | HEIGHT: 66 IN | OXYGEN SATURATION: 98 % | SYSTOLIC BLOOD PRESSURE: 146 MMHG | WEIGHT: 152.12 LBS | DIASTOLIC BLOOD PRESSURE: 67 MMHG | BODY MASS INDEX: 24.45 KG/M2 | HEART RATE: 98 BPM | RESPIRATION RATE: 18 BRPM

## 2020-01-14 PROBLEM — E87.2 INCREASED ANION GAP METABOLIC ACIDOSIS: Status: RESOLVED | Noted: 2020-01-09 | Resolved: 2020-01-14

## 2020-01-14 PROBLEM — E87.6 HYPOKALEMIA: Status: RESOLVED | Noted: 2020-01-09 | Resolved: 2020-01-14

## 2020-01-14 PROBLEM — E87.29 INCREASED ANION GAP METABOLIC ACIDOSIS: Status: RESOLVED | Noted: 2020-01-09 | Resolved: 2020-01-14

## 2020-01-14 PROBLEM — K85.90 ACUTE PANCREATITIS: Status: RESOLVED | Noted: 2020-01-09 | Resolved: 2020-01-14

## 2020-01-14 PROBLEM — R07.9 CHEST PAIN: Status: RESOLVED | Noted: 2018-12-19 | Resolved: 2020-01-14

## 2020-01-14 PROBLEM — E87.0 HYPERNATREMIA: Status: RESOLVED | Noted: 2020-01-09 | Resolved: 2020-01-14

## 2020-01-14 LAB
BASOPHILS # BLD AUTO: 0.01 THOUSANDS/ΜL (ref 0–0.1)
BASOPHILS NFR BLD AUTO: 0 % (ref 0–1)
EOSINOPHIL # BLD AUTO: 0.14 THOUSAND/ΜL (ref 0–0.61)
EOSINOPHIL NFR BLD AUTO: 3 % (ref 0–6)
ERYTHROCYTE [DISTWIDTH] IN BLOOD BY AUTOMATED COUNT: 21 % (ref 11.6–15.1)
HCT VFR BLD AUTO: 23.2 % (ref 34.8–46.1)
HGB BLD-MCNC: 7.3 G/DL (ref 11.5–15.4)
IMM GRANULOCYTES # BLD AUTO: 0.03 THOUSAND/UL (ref 0–0.2)
IMM GRANULOCYTES NFR BLD AUTO: 1 % (ref 0–2)
LYMPHOCYTES # BLD AUTO: 1.41 THOUSANDS/ΜL (ref 0.6–4.47)
LYMPHOCYTES NFR BLD AUTO: 29 % (ref 14–44)
MAGNESIUM SERPL-MCNC: 1.3 MG/DL (ref 1.6–2.6)
MCH RBC QN AUTO: 32.9 PG (ref 26.8–34.3)
MCHC RBC AUTO-ENTMCNC: 31.5 G/DL (ref 31.4–37.4)
MCV RBC AUTO: 105 FL (ref 82–98)
MONOCYTES # BLD AUTO: 0.37 THOUSAND/ΜL (ref 0.17–1.22)
MONOCYTES NFR BLD AUTO: 8 % (ref 4–12)
NEUTROPHILS # BLD AUTO: 2.99 THOUSANDS/ΜL (ref 1.85–7.62)
NEUTS SEG NFR BLD AUTO: 59 % (ref 43–75)
NRBC BLD AUTO-RTO: 0 /100 WBCS
PHOSPHATE SERPL-MCNC: 2 MG/DL (ref 2.7–4.5)
PLATELET # BLD AUTO: 252 THOUSANDS/UL (ref 149–390)
PMV BLD AUTO: 10.8 FL (ref 8.9–12.7)
RBC # BLD AUTO: 2.22 MILLION/UL (ref 3.81–5.12)
WBC # BLD AUTO: 4.95 THOUSAND/UL (ref 4.31–10.16)

## 2020-01-14 PROCEDURE — 99239 HOSP IP/OBS DSCHRG MGMT >30: CPT | Performed by: INTERNAL MEDICINE

## 2020-01-14 PROCEDURE — 84100 ASSAY OF PHOSPHORUS: CPT | Performed by: FAMILY MEDICINE

## 2020-01-14 PROCEDURE — 83735 ASSAY OF MAGNESIUM: CPT | Performed by: INTERNAL MEDICINE

## 2020-01-14 PROCEDURE — 85025 COMPLETE CBC W/AUTO DIFF WBC: CPT | Performed by: FAMILY MEDICINE

## 2020-01-14 RX ORDER — MAGNESIUM SULFATE HEPTAHYDRATE 40 MG/ML
2 INJECTION, SOLUTION INTRAVENOUS ONCE
Status: COMPLETED | OUTPATIENT
Start: 2020-01-14 | End: 2020-01-14

## 2020-01-14 RX ORDER — FERROUS SULFATE TAB EC 324 MG (65 MG FE EQUIVALENT) 324 (65 FE) MG
324 TABLET DELAYED RESPONSE ORAL
Qty: 30 TABLET | Refills: 0 | Status: SHIPPED | OUTPATIENT
Start: 2020-01-14 | End: 2020-01-14 | Stop reason: SDUPTHER

## 2020-01-14 RX ORDER — FERROUS SULFATE TAB EC 324 MG (65 MG FE EQUIVALENT) 324 (65 FE) MG
324 TABLET DELAYED RESPONSE ORAL EVERY OTHER DAY
Qty: 30 TABLET | Refills: 0 | Status: SHIPPED | OUTPATIENT
Start: 2020-01-14 | End: 2020-01-14 | Stop reason: SDUPTHER

## 2020-01-14 RX ORDER — POLYETHYLENE GLYCOL 3350 17 G/17G
17 POWDER, FOR SOLUTION ORAL DAILY PRN
Qty: 119 G | Refills: 0 | Status: SHIPPED | OUTPATIENT
Start: 2020-01-14 | End: 2020-01-14

## 2020-01-14 RX ORDER — FERROUS SULFATE TAB EC 324 MG (65 MG FE EQUIVALENT) 324 (65 FE) MG
324 TABLET DELAYED RESPONSE ORAL
Qty: 30 TABLET | Refills: 0 | Status: SHIPPED | OUTPATIENT
Start: 2020-01-14 | End: 2020-02-13

## 2020-01-14 RX ORDER — POLYETHYLENE GLYCOL 3350 17 G/17G
17 POWDER, FOR SOLUTION ORAL DAILY PRN
Status: DISCONTINUED | OUTPATIENT
Start: 2020-01-14 | End: 2020-01-14 | Stop reason: HOSPADM

## 2020-01-14 RX ORDER — POLYETHYLENE GLYCOL 3350 17 G/17G
17 POWDER, FOR SOLUTION ORAL DAILY PRN
Qty: 119 G | Refills: 0 | Status: SHIPPED | OUTPATIENT
Start: 2020-01-14 | End: 2020-01-27

## 2020-01-14 RX ADMIN — IRON SUCROSE 200 MG: 20 INJECTION, SOLUTION INTRAVENOUS at 11:37

## 2020-01-14 RX ADMIN — SENNOSIDES A AND B 8.8 MG: 415.36 LIQUID ORAL at 09:24

## 2020-01-14 RX ADMIN — OXYCODONE HYDROCHLORIDE 10 MG: 10 TABLET ORAL at 02:49

## 2020-01-14 RX ADMIN — MAGNESIUM SULFATE HEPTAHYDRATE 2 G: 40 INJECTION, SOLUTION INTRAVENOUS at 09:23

## 2020-01-14 RX ADMIN — GABAPENTIN 300 MG: 300 CAPSULE ORAL at 09:23

## 2020-01-14 RX ADMIN — DOCUSATE SODIUM 100 MG: 100 CAPSULE, LIQUID FILLED ORAL at 09:23

## 2020-01-14 RX ADMIN — POLYETHYLENE GLYCOL 3350 17 G: 17 POWDER, FOR SOLUTION ORAL at 09:30

## 2020-01-14 NOTE — DISCHARGE INSTRUCTIONS
-Diet and activity as tolerated  Recommend a low fat diet  -May shower  -May drive when feeling   -Please call Dr Wang Gleason for a follow-up office visit for 2 weeks  [phone]   [facility] [address], [place], [address]  [address] between [employer] and [facility]         Pancreatitis   WHAT YOU NEED TO KNOW:   Pancreatitis is inflammation of your pancreas  The pancreas is an organ that makes insulin  It also makes enzymes (digestive juices) that help your body digest food  Pancreatitis may be an acute (short-term) problem that happens only once  It may become a chronic (long-term) problem that comes and goes over time  DISCHARGE INSTRUCTIONS:   Seek care immediately if:   · You have severe pain in your abdomen and you are vomiting  Contact your healthcare provider if:   · You have a fever  · You continue to lose weight without trying  · Your skin or the whites of your eyes turn yellow  · You have questions or concerns about your condition or care  Medicines: You may need any of the following:  · Antibiotics  treat a bacterial infection  · Prescription pain medicine  may be given  Ask your healthcare provider how to take this medicine safely  Some prescription pain medicines contain acetaminophen  Do not take other medicines that contain acetaminophen without talking to your healthcare provider  Too much acetaminophen may cause liver damage  Prescription pain medicine may cause constipation  Ask your healthcare provider how to prevent or treat constipation  · Take your medicine as directed  Contact your healthcare provider if you think your medicine is not helping or if you have side effects  Tell him or her if you are allergic to any medicine  Keep a list of the medicines, vitamins, and herbs you take  Include the amounts, and when and why you take them  Bring the list or the pill bottles to follow-up visits   Carry your medicine list with you in case of an emergency  Self-care:   · Rest  when you feel it is needed  Slowly start to do more each day  Return to your usual activities as directed  · Do not drink any alcohol  If you need help to stop drinking, contact the following organization:   ¨ Alcoholics Anonymous  Web Address: http://www Big Contacts/      · Ask your healthcare provider or dietitian about the best foods to eat  You may need to eat foods that are low in fat if you have chronic pancreatitis  Follow up with your healthcare provider as directed:  Write down your questions so you remember to ask them during your visits  © 2017 [address] [employer] Information is for End User's use only and may not be sold, redistributed or otherwise used for commercial purposes  All illustrations and images included in CareNotes® are the copyrighted property of A D A M , Inc  or Rodney Alarcon  The above information is an  only  It is not intended as medical advice for individual conditions or treatments  Talk to your doctor, nurse or pharmacist before following any medical regimen to see if it is safe and effective for you

## 2020-01-14 NOTE — ASSESSMENT & PLAN NOTE
Patient presented with the complaints of what she thought was chest pain radiating to the epigastric region with nausea and vomiting  Reported mild right upper quadrant tenderness on exam    · Patient noted to have elevated AST and ALT on admission  · AST elevated at 337, 256, 32, 11 1/13/2020    · ALT at 215, 205, 79, 52 1/13/2020- transaminitis resolved  · T bili normal at 0 38, 0 39, 0 42  · Lipase elevated at 1174 on admission, went down to 503  · Right upper upper quadrant ultrasound notable for cholelithiasis without signs of cholecystitis  · CT of the abdomen showing signs of acute interstitial pancreatitis in addition to signs of moderate colonic stool burden  · Based on history, physical exam findings, labs and imaging findings, patient has pancreatitis, most likely 2/2 gallstones  Further evidenced by significantly elevated LFTs  Seen by surgery who recommended outpatient evaluation for elective laparoscopic cholecystectomy  TG nml 51- hypertriglyceridemia not a cause  Plan  · Patient tolerating oral diet  Discharge with low fat diet  · Continue bowel regimen with senna and Colace b i d  As well as MiraLax p r n

## 2020-01-14 NOTE — DISCHARGE INSTR - AVS FIRST PAGE
Dear Iain Luis,     It was our pleasure to care for you here at [facility], [facility]  It is our hope that we were always able to exceed the expected standards for your care during your stay  You were hospitalized due to pancreatitis  You were cared for on the second floor by Eliana Gardiner DO under the service of Dr Sam Lopez and Reji Bartholomew MD with the [facility] who covers for your primary care physician (PCP), Faith Carpenter MD, while you were hospitalized  If you have any questions or concerns related to this hospitalization, you may contact us at [phone]  For follow up as well as any medication refills, we recommend that you follow up with your primary care physician  A registered nurse will reach out to you by phone within a few days after your discharge to answer any additional questions that you may have after going home  However, at this time we provide for you here, the most important instructions / recommendations at discharge:     · Notable Medication Adjustments -   · Start taking iron 325mg daily  · Can take mirilax for constipation; would however strongly recommend fiber supplement such as metamucil to help with constipation  · Testing Required after Discharge -   · Colonoscopy- you can either follow up with GI, or you can have it done through Dr George Judge office  · Important follow up information -   · Return to the emergency department for lightheadedness, fevers, worsening abdominal pain, nausea, vomiting  · Other Instructions -   · Diet and activity as tolerated  Recommend a low fat diet  · May shower  · May drive when feeling  · Please call Dr Zoraida Neff for a follow-up office visit for 2 weeks  [phone]   [facility] [address], [facility], [address]   Off [address] between [facility]   · Please review this entire after visit summary as additional general instructions including medication list, appointments, activity, diet, any pertinent wound care, and other additional recommendations from your care team that may be provided for you        Sincerely,     Harry Guajardo, DO

## 2020-01-14 NOTE — UTILIZATION REVIEW
Continued Stay Review    Date: 1/14/2020                        Current Patient Class: inpatient  Current Level of Care: med surg    HPI:59 y o  female initially admitted on 1/9/2020 inpatient due to acute pancreatitis likely 2/2 gallstone pancreatitis  Assessment/Plan:  Patient with pancreatitis  On 1/14/2020, pain controlled with oral analgesia  Diet is advanced and on longer on IVF  Magnesium and phosphorous remain low  Replete today with magnesium 2 gms IV  IV iron continues for persistently low H&H of 7 3/23  2  Pertinent Labs/Diagnostic Results:   1/10/2020 CT abdomen - Fat infiltration within the pancreaticoduodenal groove consistent with acute interstitial pancreatitis  Cholelithiasis without findings of acute cholecystitis  Moderate colonic stool burden  Stable findings of mild right renal malrotation and marked dilation of the extrarenal pelvis likely on the basis of UPJ obstruction    1/9/2020 US abdomen -  Cholelithiasis  2   Simple cyst within the midpole of the right kidney    Results from last 7 days   Lab Units 01/14/20  0549 01/13/20  0550 01/12/20  0927 01/10/20  0530 01/09/20  1311   WBC Thousand/uL 4 95 3 91* 4 78 7 22 12 25*   HEMOGLOBIN g/dL 7 3* 7 0* 7 4* 8 7* 9 8*   HEMATOCRIT % 23 2* 22 5* 23 0* 27 4* 28 4*   PLATELETS Thousands/uL 252 260 285 306 323   NEUTROS ABS Thousands/µL 2 99 2 01  --  5 42 10 80*     Results from last 7 days   Lab Units 01/14/20  0549 01/13/20  1313 01/13/20  0550 01/12/20  0927 01/11/20  2031 01/11/20  1013 01/10/20  2120 01/10/20  0530  01/10/20  0032   SODIUM mmol/L  --   --  144 143  --  139  --  147*  --  147*   POTASSIUM mmol/L  --   --  3 9 4 0  --  3 1* 3 4* 3 5  --  2 9*   CHLORIDE mmol/L  --   --  115* 113*  --  108  --  115*  --  114*   CO2 mmol/L  --   --  21 20*  --  20*  --  18*  --  20*   ANION GAP mmol/L  --   --  8 10  --  11  --  14*  --  13   BUN mg/dL  --   --  6 8  --  7  --  22  --  26*   CREATININE mg/dL  --   --  0 67 0 70  -- 0 87  --  1 13  --  1 30   EGFR ml/min/1 73sq m  --   --  98 96  --  74  --  54  --  46   CALCIUM mg/dL  --   --  6 8* 7 0*  --  7 3*  --  7 8*  --  7 9*   MAGNESIUM mg/dL 1 3* 1 7  --  1 5*  --  1 5*  --  2 4  --   --    PHOSPHORUS mg/dL 2 0*  --  2 0* 1 6* 1 4* 1 3* 3 1 1 8*   < >  --     < > = values in this interval not displayed  Results from last 7 days   Lab Units 01/13/20  0550 01/12/20  0927 01/11/20  1013 01/10/20  0530 01/09/20  1311   AST U/L 11 32 95* 256* 337*   ALT U/L 52 79* 137* 205* 215*   ALK PHOS U/L 79 88 102 97 97   TOTAL PROTEIN g/dL 4 9* 5 2* 5 8* 5 7* 6 4   ALBUMIN g/dL 2 0* 2 2* 2 7* 2 9* 3 4*   TOTAL BILIRUBIN mg/dL 0 34 0 42 0 45 0 39 0 38     Results from last 7 days   Lab Units 01/13/20  0550 01/12/20  0927 01/11/20  1013 01/10/20  0530 01/10/20  0032 01/09/20  1744 01/09/20  1311   GLUCOSE RANDOM mg/dL 90 83 99 70 85 80 101     Results from last 7 days   Lab Units 01/09/20  1444   PH ROBERT  7 350   PCO2 ROBERT mm Hg 34 2*   PO2 ROBERT mm Hg 45 0   HCO3 ROBERT mmol/L 18 5*   BASE EXC ROBERT mmol/L -6 4   O2 CONTENT ROBERT ml/dL 10 2   O2 HGB, VENOUS % 75 7     Results from last 7 days   Lab Units 01/10/20  0032 01/09/20  2106 01/09/20  1744 01/09/20  1632 01/09/20  1311   TROPONIN I ng/mL 0 06* 0 07* 0 05* 0 05* 0 04     Results from last 7 days   Lab Units 01/09/20  1311   D-DIMER QUANTITATIVE ug/ml FEU 3 60*     Results from last 7 days   Lab Units 01/09/20  1444   LACTIC ACID mmol/L 1 2     Results from last 7 days   Lab Units 01/09/20  1311   FERRITIN ng/mL 57     Results from last 7 days   Lab Units 01/10/20  0530 01/09/20  1311   LIPASE u/L 503* 1,174*     Vital Signs:   01/14/20 0700  99 4 °F (37 4 °C)  98  18  146/67  96  98 %  None (Room air)  Lying   01/13/20 2221  99 8 °F (37 7 °C)  91  18  154/84  114  95 %  None (Room air)        01/13 0701  01/14 0700   P  O  840   Total Intake(mL/kg) 840 (12 2)   Net +840       Medications:   Scheduled Medications:  Medications:  docusate sodium 100 mg Oral BID   gabapentin 300 mg Oral BID   iohexol 50 mL Oral 90 min pre-procedure   iron sucrose 200 mg Intravenous Daily   mirtazapine 45 mg Oral HS   prazosin 4 mg Oral HS   risperiDONE 3 mg Oral HS   senna 8 8 mg Oral BID   magnesium sulfate 2 g/50 mL IVPB (premix) 2 g   Dose: 2 g  Freq: Once Route: IV  Start: 01/14/20 0745 End: 01/14/20 1123    Continuous IV Infusions: none     PRN Meds:  acetaminophen 650 mg Oral Q6H PRN   hydrALAZINE 5 mg Intravenous Q6H PRN   HYDROmorphone 1 mg Intravenous Q4H PRN   hydrOXYzine HCL 50 mg Oral Q6H PRN   ondansetron 4 mg Intravenous Q6H PRN   oxyCODONE - used x 1  10 mg Oral Q4H PRN   oxyCODONE 5 mg Oral Q6H PRN   polyethylene glycol - used x 1  17 g Oral Daily PRN   sodium chloride (PF) 3 mL Intravenous PRN       Discharge Plan: to be determined  Network Utilization Review Department  [email or web address]  ATTENTION: Please call with any questions or concerns to [phone] and carefully listen to the prompts so that you are directed to the right person  All voicemails are confidential   Veena Carvajal all requests for admission clinical reviews, approved or denied determinations and any other requests to dedicated fax number below belonging to the campus where the patient is receiving treatment   List of dedicated fax numbers for the Facilities:  [address] DENIALS (Administrative/Medical Necessity) [phone]   [address] (Maternity/NICU/Pediatrics) [phone]   Mary Harris [phone]   Angela Elaine [phone]   Chivo Nones [phone]   Dion Byrd [phone]   [facility] 1525  [phone]   [facility]  [phone]   [address] [facility] [address] [facility]  [address] [facility] 1000 [address] [facility] [phone]

## 2020-01-14 NOTE — SOCIAL WORK
CM informed by Aurelio Gallardo at [facility] that two Rxs were sent down, both are over the counter (iron and Miralax)  Aurelio Gallardo reports that they are unable to deliver to bedside but that family can  in person  CM met with patient and her sister at bedside, they called patient's son to confirm what he'd prefer  Patient's son reports his spouse will arrive at [facility] around 3 pm and would rather  the medications at patient's home pharmacy  CM asked SLIM to escribe to [facility] on [address] and made Aurelio Gallardo at [facility] aware  Patient's sister is visiting from [place] and reports she'll remain with patient until she gets settled as she's in the transition of maybe moving from her son's to her niece's home  Patient is in need of PCP as well as a gynecologist as per sister; [employer] information placed on chart  No other CM needs identified at this time

## 2020-01-14 NOTE — ASSESSMENT & PLAN NOTE
Cholelithiasis seen on ultrasound without evidence of cholecystitis  · Plan on doing laparoscopic cholecystectomy as outpatient

## 2020-01-14 NOTE — PROGRESS NOTES
Patient ordered potassium phosphate, however patient needs new IV site and is a difficult stick  Because of this patient has not received all of her potassium phosphate dose  SLIM YO Rosas notified via [employer]

## 2020-01-14 NOTE — ASSESSMENT & PLAN NOTE
Hemoglobin noted at 9 8 on admission; 9 8 on 01/10/2020; 7 on 1/13/2020, 7 3 on 1/14/2020  · On chart review last hemoglobin prior to admission noted at 11 5 in 10/2018  · Patient has never had a colonoscopy  · Etiology:  · Unlikely megaloblastic (folate and B12 normal)  · 2/2 GI loss (patient admits to bloody bowel movements, and has never had colonoscopy) and possibly MARCIA (iron panel shows low iron 27; however ferritin is normal) vs hemodilution since patient has been receiving IVF     · Patient is s/p 400mg venofer  · Iron panel  · Iron 37-low  · Ferritin 57  · Iron saturation 10  · TIBC 371    PLAN:  -recommend colonoscopy outpatient  -325 mg iron daily

## 2020-01-15 ENCOUNTER — OFFICE VISIT (OUTPATIENT)
Dept: GASTROENTEROLOGY | Facility: CLINIC | Age: 60
End: 2020-01-15
Payer: COMMERCIAL

## 2020-01-15 ENCOUNTER — TRANSITIONAL CARE MANAGEMENT (OUTPATIENT)
Dept: FAMILY MEDICINE CLINIC | Facility: CLINIC | Age: 60
End: 2020-01-15

## 2020-01-15 VITALS
BODY MASS INDEX: 24.48 KG/M2 | TEMPERATURE: 98.9 F | HEIGHT: 67 IN | HEART RATE: 87 BPM | DIASTOLIC BLOOD PRESSURE: 90 MMHG | WEIGHT: 156 LBS | SYSTOLIC BLOOD PRESSURE: 144 MMHG

## 2020-01-15 DIAGNOSIS — K85.90 ACUTE PANCREATITIS WITHOUT INFECTION OR NECROSIS, UNSPECIFIED PANCREATITIS TYPE: Primary | ICD-10-CM

## 2020-01-15 DIAGNOSIS — D50.0 IRON DEFICIENCY ANEMIA DUE TO CHRONIC BLOOD LOSS: ICD-10-CM

## 2020-01-15 PROCEDURE — 99244 OFF/OP CNSLTJ NEW/EST MOD 40: CPT | Performed by: INTERNAL MEDICINE

## 2020-01-15 NOTE — PATIENT INSTRUCTIONS
EGD/colonoscopy scheduled 1/28/20 with Dr Onesimo Marie at [facility] instructions given to pt during [address]

## 2020-01-15 NOTE — PROGRESS NOTES
[facility] - Outpatient Consultation  Deidre Packer 64 y o  female MRN: [id number]  Encounter: [id number]    ASSESSMENT AND PLAN:    eDidre Packer is a 64 y o  old female with PMH: HTN, Hypothyroidism,  who presents for anemia and pancreatitis  #Normocytic Anemia   #Fe deficiency anemia  Patient with normocytic anemia, with possible iron deficiency anemia in setting of known hematochezia, but no known melena  Patient to undergo diagnostic colonoscopy and upper endoscopy  Patient currently not diabetic and not on any blood thinners, antiplatelet agents  Patient has never had any previous colon cancer screening (invasive or non-invasive)  Has tolerated conscious sedation well previously  Patient has a wide differential for cause of anemia, given hematochezia likely a lower GI bleed source, which could be diverticulosis but not excluding malignancy  Plan:  -continue ferrous sulfate 324mg qdaily  -EGD/colonoscopy ordered today  -repeat CBC    #Recent Acute Pancreatitis Suspected 2/2 Gallstones  Patient presenting with an episode of acute pancreatitis with elevated transaminitis which improved during hospital stay  Patient was discharged to the hospital yesterday and still having some residual abdominal pain but feeling better overall  Given suspected 1st episode of pancreatitis and likely in the setting of gallstones will not do extensive workup at this time, however patient should be evaluated for cholecystectomy  Plan:  -continue low fat diet  -f/u surgery outpatient for elective cholecystectomy evaluation on 1/20/20    ______________________________________________________________________    HPI:   Patient reffered by Farrukh Smith MD      Patient she presented to Emergency Department on 01/09/2020 the setting of acute onset mid-sternal chest pain in the epigastric region  Patient had associated nausea vomiting  She was able to keep much of her appetite the week prior  Otherwise denies any shortness of breath, fever, chills, dysuria  Patient was found to have elevated AST ALT, normal bilirubin, lipase up to 1200  Patient underwent right upper quadrant ultrasound which showed cholelithiasis with no evidence of choledocholithiasis  Patient had normal triglyceride levels  Patient also noted to have anemia with hemoglobin down to 9 5, during admission patient with gradual decrease in hemoglobin down to 7 3  No active evidence of GI bleeding during hospitalization, no endoscopy was performed  Previous hemoglobin was noted to be 11 5 in October 2018  Patient was discharged to the hospital yesterday, states that since that time she has been feeling better overall  States she is able to tolerate her low-fat diet  No associated nausea or vomiting  Still having some slight epigastric abdominal pain but better than previously  Patient with no family history of pancreatitis or pancreatic cancer  GI Family history:  None     Last EGD: Never  Last colonoscopy: Never    REVIEW OF SYSTEMS:    CONSTITUTIONAL: Denies any fever, chills, rigors, and weight loss  HEENT: No earache or tinnitus  Denies hearing loss or visual disturbances  CARDIOVASCULAR: No chest pain or palpitations  RESPIRATORY: Denies any cough, hemoptysis, shortness of breath or dyspnea on exertion  GASTROINTESTINAL: As noted in the History of Present Illness  GENITOURINARY: No problems with urination  Denies any hematuria or dysuria  NEUROLOGIC: No dizziness or vertigo, denies headaches  MUSCULOSKELETAL: Denies any muscle or joint pain  SKIN: Denies skin rashes or itching  ENDOCRINE: Denies excessive thirst  Denies intolerance to heat or cold  PSYCHOSOCIAL: Denies depression or anxiety  Denies any recent memory loss       Historical Information   Past Medical History:   Diagnosis Date    IRINEO (acute kidney injury) ([facility] [address] ) 1/9/2020    Anemia 1/9/2020    Disease of thyroid gland     Hypertension  Psychiatric disorder      Past Surgical History:   Procedure Laterality Date    BRAIN SURGERY      patient can not tell any other details  Social History   Social History     Substance and Sexual Activity   Alcohol Use No     Social History     Substance and Sexual Activity   Drug Use No     Social History     Tobacco Use   Smoking Status Never Smoker   Smokeless Tobacco Never Used     History reviewed  No pertinent family history  Meds/Allergies       Current Outpatient Medications:     gabapentin (NEURONTIN) 300 mg capsule    mirtazapine (REMERON) 45 MG tablet    polyethylene glycol (MIRALAX) 17 g packet    prazosin (MINIPRESS) 2 mg capsule    benztropine (COGENTIN) 0 5 mg tablet    ferrous sulfate 324 (65 Fe) mg    hydrochlorothiazide (HYDRODIURIL) 25 mg tablet    risperiDONE (RisperDAL) 3 mg tablet  No current facility-administered medications for this visit  No Known Allergies    Objective     Blood pressure 144/90, pulse 87, temperature 98 9 °F (37 2 °C), temperature source Tympanic, height 5' 7" (1 702 m), weight 70 8 kg (156 lb)  Body mass index is 24 43 kg/m²  PHYSICAL EXAM:      General Appearance:   Middle-aged female who is alert, cooperative, no distress   HEENT:   Normocephalic, atraumatic, anicteric   Neck:  Supple, symmetrical, trachea midline   Lungs:   Clear to auscultation bilaterally; no rales, rhonchi or wheezing; respirations unlabored    Heart:   Regular rate and rhythm; no murmur, rub, or gallop  Abdomen:   Mildly tender epigastric pain  Genitalia:   Deferred    Rectal:   Deferred    Extremities:  No cyanosis, clubbing or edema    Pulses:  2+ and symmetric    Skin:  No jaundice, rashes, or lesions    Lymph nodes:  No palpable cervical lymphadenopathy      Lab Results:   No visits with results within 1 Day(s) from this visit  Latest known visit with results is:   No results displayed because visit has over 200 results            Radiology Results:   X-ray Chest 2 Views    Result Date: 1/9/2020  Narrative: CHEST INDICATION:   chest pain COMPARISON: Chest radiograph from 6/16/2015  EXAM PERFORMED/VIEWS:  XR CHEST PA & LATERAL WITH DUAL ENERGY SUBTRACTION  FINDINGS: Cardiomediastinal silhouette is normal  Lungs are clear  Benign calcified granuloma in the left lower lobe  No effusion or pneumothorax  Osseous structures are within normal limits for age  Impression: No acute cardiopulmonary disease  Workstation performed: [id number]     Us Abdomen Complete    Result Date: 1/9/2020  Narrative: ABDOMEN ULTRASOUND, COMPLETE INDICATION:   RUQ PAIN, NO FEVER, NO ELEVATED WBC elevated LFTs, epigastric pain  COMPARISON: None TECHNIQUE:   Real-time ultrasound of the abdomen was performed with a curvilinear transducer with both volumetric sweeps and still imaging techniques  FINDINGS: PANCREAS:  Visualized portions of the pancreas are within normal limits  AORTA AND IVC:  Visualized portions are normal for patient age  LIVER: Size:  Within normal range  The liver measures 14 1 cm in the midclavicular line  Contour:  Surface contour is smooth  Parenchyma:  Echogenicity and echotexture are within normal limits  No evidence of suspicious mass  Limited imaging of the main portal vein shows it to be patent and hepatopetal  BILIARY: The gallbladder is normal in caliber  No wall thickening or pericholecystic fluid  Shadowing gallstone(s) identified  No sonographic Starks's sign  No intrahepatic biliary dilatation  CBD measures 4 mm  No choledocholithiasis  KIDNEY: Right kidney measures 12 7 x 5 cm  5 7 x 3 9 x 4 5 cm simple cyst is present in the midpole  Left kidney measures 12 2 x 5 3 cm  Within normal limits  SPLEEN: Measures 11 5 cm  Within normal limits  ASCITES:  None  Impression: 1  Cholelithiasis  2   Simple cyst within the midpole of the right kidney   Workstation performed: [id number]     Ct Abdomen Pelvis W Contrast    Result Date: 1/10/2020  Narrative: CT ABDOMEN AND PELVIS WITH IV CONTRAST INDICATION:   Nausea/vomiting  COMPARISON:  CT 6/16/2015 TECHNIQUE:  CT examination of the abdomen and pelvis was performed  Axial, sagittal, and coronal 2D reformatted images were created from the source data and submitted for interpretation  Radiation dose length product (DLP) for this visit:  480 mGy-cm   This examination, like all CT scans performed in the [facility], was performed utilizing techniques to minimize radiation dose exposure, including the use of iterative reconstruction and automated exposure control  IV Contrast:  100 mL of iohexol (OMNIPAQUE) Enteric Contrast:  Enteric contrast was administered  FINDINGS: ABDOMEN LOWER CHEST:  No clinically significant abnormality identified in the visualized lower chest  LIVER/BILIARY TREE:  Unremarkable  GALLBLADDER:  Cholelithiasis  SPLEEN:  Unremarkable  PANCREAS: Fat infiltration within the pancreaticoduodenal groove consistent with acute interstitial pancreatitis  ADRENAL GLANDS:  Unremarkable  KIDNEYS/URETERS:  Stable findings of mild right renal malrotation and marked dilation of the extrarenal pelvis likely on the basis of UPJ obstruction  No suspicious renal lesion  Stable small left renal upper pole cyst  STOMACH AND BOWEL:  No acute inflammatory changes  No disproportionate dilation of the small bowel  Moderate colonic stool burden  APPENDIX:  A normal appendix was visualized  ABDOMINOPELVIC CAVITY:  No ascites or free intraperitoneal air  No lymphadenopathy  VESSELS:  Unremarkable for patient's age  PELVIS REPRODUCTIVE ORGANS:  Unremarkable for patient's age  URINARY BLADDER:  Unremarkable  ABDOMINAL WALL/INGUINAL REGIONS:  Unremarkable  OSSEOUS STRUCTURES:  No acute fracture or destructive osseous lesion  Impression: Fat infiltration within the pancreaticoduodenal groove consistent with acute interstitial pancreatitis  Cholelithiasis without findings of acute cholecystitis   Moderate colonic stool burden  Stable findings of mild right renal malrotation and marked dilation of the extrarenal pelvis likely on the basis of UPJ obstruction    Workstation performed: [id number]       ---------------------------------------------------  Note Electronically Signed By:    MD Aj [address] 75 Gastroenterology Fellow PGY-4  PI #: [id number]

## 2020-01-16 ENCOUNTER — APPOINTMENT (INPATIENT)
Dept: CT IMAGING | Facility: HOSPITAL | Age: 60
DRG: 197 | End: 2020-01-16
Payer: COMMERCIAL

## 2020-01-16 ENCOUNTER — TELEPHONE (OUTPATIENT)
Dept: FAMILY MEDICINE CLINIC | Facility: CLINIC | Age: 60
End: 2020-01-16

## 2020-01-16 ENCOUNTER — OFFICE VISIT (OUTPATIENT)
Dept: FAMILY MEDICINE CLINIC | Facility: CLINIC | Age: 60
End: 2020-01-16

## 2020-01-16 ENCOUNTER — APPOINTMENT (OUTPATIENT)
Dept: LAB | Facility: CLINIC | Age: 60
DRG: 197 | End: 2020-01-16
Payer: COMMERCIAL

## 2020-01-16 ENCOUNTER — HOSPITAL ENCOUNTER (INPATIENT)
Facility: HOSPITAL | Age: 60
LOS: 6 days | Discharge: HOME | DRG: 197 | End: 2020-01-22
Attending: EMERGENCY MEDICINE | Admitting: HOSPITALIST
Payer: COMMERCIAL

## 2020-01-16 ENCOUNTER — TRANSCRIBE ORDERS (OUTPATIENT)
Dept: LAB | Facility: CLINIC | Age: 60
End: 2020-01-16

## 2020-01-16 ENCOUNTER — HOSPITAL ENCOUNTER (OUTPATIENT)
Dept: ULTRASOUND IMAGING | Facility: HOSPITAL | Age: 60
Discharge: HOME | DRG: 197 | End: 2020-01-16
Payer: COMMERCIAL

## 2020-01-16 VITALS
DIASTOLIC BLOOD PRESSURE: 72 MMHG | TEMPERATURE: 98.1 F | WEIGHT: 162.4 LBS | RESPIRATION RATE: 18 BRPM | SYSTOLIC BLOOD PRESSURE: 132 MMHG | BODY MASS INDEX: 25.49 KG/M2 | HEIGHT: 67 IN | HEART RATE: 88 BPM

## 2020-01-16 DIAGNOSIS — K59.00 CONSTIPATION: ICD-10-CM

## 2020-01-16 DIAGNOSIS — E03.8 OTHER SPECIFIED HYPOTHYROIDISM: ICD-10-CM

## 2020-01-16 DIAGNOSIS — D64.9 ANEMIA: Primary | ICD-10-CM

## 2020-01-16 DIAGNOSIS — F33.9 RECURRENT MAJOR DEPRESSIVE DISORDER (HCC): Chronic | ICD-10-CM

## 2020-01-16 DIAGNOSIS — I10 ESSENTIAL HYPERTENSION: ICD-10-CM

## 2020-01-16 DIAGNOSIS — R60.0 BILATERAL EDEMA OF LOWER EXTREMITY: ICD-10-CM

## 2020-01-16 DIAGNOSIS — E03.9 HYPOTHYROIDISM: ICD-10-CM

## 2020-01-16 DIAGNOSIS — D50.8 OTHER IRON DEFICIENCY ANEMIA: ICD-10-CM

## 2020-01-16 DIAGNOSIS — K27.9 PEPTIC ULCER DISEASE: ICD-10-CM

## 2020-01-16 DIAGNOSIS — K59.09 OTHER CONSTIPATION: ICD-10-CM

## 2020-01-16 DIAGNOSIS — K85.80 OTHER ACUTE PANCREATITIS, UNSPECIFIED COMPLICATION STATUS: Primary | ICD-10-CM

## 2020-01-16 DIAGNOSIS — I82.412 ACUTE DEEP VEIN THROMBOSIS (DVT) OF FEMORAL VEIN OF LEFT LOWER EXTREMITY (HCC): ICD-10-CM

## 2020-01-16 DIAGNOSIS — K85.10 PANCREATITIS, GALLSTONE: ICD-10-CM

## 2020-01-16 PROBLEM — I82.409 ACUTE DEEP VEIN THROMBOSIS (DVT) OF LOWER EXTREMITY (HCC): Status: ACTIVE | Noted: 2020-01-16

## 2020-01-16 PROBLEM — R10.2 SUPRAPUBIC ABDOMINAL PAIN: Status: ACTIVE | Noted: 2020-01-16

## 2020-01-16 LAB
ABO GROUP BLD: NORMAL
ALBUMIN SERPL BCP-MCNC: 2.3 G/DL (ref 3.5–5)
ALP SERPL-CCNC: 99 U/L (ref 46–116)
ALT SERPL W P-5'-P-CCNC: 39 U/L (ref 12–78)
ANION GAP SERPL CALCULATED.3IONS-SCNC: 8 MMOL/L (ref 4–13)
APTT PPP: 30 SECONDS (ref 23–37)
AST SERPL W P-5'-P-CCNC: 40 U/L (ref 5–45)
BASOPHILS # BLD AUTO: 0.01 THOUSANDS/ΜL (ref 0–0.1)
BASOPHILS NFR BLD AUTO: 0 % (ref 0–1)
BILIRUB SERPL-MCNC: 0.16 MG/DL (ref 0.2–1)
BLD GP AB SCN SERPL QL: NEGATIVE
BUN SERPL-MCNC: 10 MG/DL (ref 5–25)
CALCIUM SERPL-MCNC: 8.9 MG/DL (ref 8.3–10.1)
CHLORIDE SERPL-SCNC: 109 MMOL/L (ref 100–108)
CO2 SERPL-SCNC: 26 MMOL/L (ref 21–32)
CREAT SERPL-MCNC: 0.7 MG/DL (ref 0.6–1.3)
EOSINOPHIL # BLD AUTO: 0.11 THOUSAND/ΜL (ref 0–0.61)
EOSINOPHIL NFR BLD AUTO: 2 % (ref 0–6)
ERYTHROCYTE [DISTWIDTH] IN BLOOD BY AUTOMATED COUNT: 20.5 % (ref 11.6–15.1)
GFR SERPL CREATININE-BSD FRML MDRD: 96 ML/MIN/1.73SQ M
GLUCOSE SERPL-MCNC: 97 MG/DL (ref 65–140)
HCT VFR BLD AUTO: 23.6 % (ref 34.8–46.1)
HGB BLD-MCNC: 7.1 G/DL (ref 11.5–15.4)
IMM GRANULOCYTES # BLD AUTO: 0.03 THOUSAND/UL (ref 0–0.2)
IMM GRANULOCYTES NFR BLD AUTO: 1 % (ref 0–2)
INR PPP: 0.96 (ref 0.84–1.19)
LYMPHOCYTES # BLD AUTO: 1.19 THOUSANDS/ΜL (ref 0.6–4.47)
LYMPHOCYTES NFR BLD AUTO: 23 % (ref 14–44)
MCH RBC QN AUTO: 33.2 PG (ref 26.8–34.3)
MCHC RBC AUTO-ENTMCNC: 30.1 G/DL (ref 31.4–37.4)
MCV RBC AUTO: 110 FL (ref 82–98)
MONOCYTES # BLD AUTO: 0.35 THOUSAND/ΜL (ref 0.17–1.22)
MONOCYTES NFR BLD AUTO: 7 % (ref 4–12)
NEUTROPHILS # BLD AUTO: 3.57 THOUSANDS/ΜL (ref 1.85–7.62)
NEUTS SEG NFR BLD AUTO: 67 % (ref 43–75)
NRBC BLD AUTO-RTO: 0 /100 WBCS
NT-PROBNP SERPL-MCNC: 451 PG/ML
PLATELET # BLD AUTO: 256 THOUSANDS/UL (ref 149–390)
PMV BLD AUTO: 10.8 FL (ref 8.9–12.7)
POTASSIUM SERPL-SCNC: 4.1 MMOL/L (ref 3.5–5.3)
PROT SERPL-MCNC: 6 G/DL (ref 6.4–8.2)
PROTHROMBIN TIME: 12.2 SECONDS (ref 11.6–14.5)
RBC # BLD AUTO: 2.14 MILLION/UL (ref 3.81–5.12)
RH BLD: POSITIVE
SODIUM SERPL-SCNC: 143 MMOL/L (ref 136–145)
SPECIMEN EXPIRATION DATE: NORMAL
TSH SERPL DL<=0.05 MIU/L-ACNC: 6.84 UIU/ML (ref 0.36–3.74)
WBC # BLD AUTO: 5.26 THOUSAND/UL (ref 4.31–10.16)

## 2020-01-16 PROCEDURE — 86920 COMPATIBILITY TEST SPIN: CPT

## 2020-01-16 PROCEDURE — 86901 BLOOD TYPING SEROLOGIC RH(D): CPT | Performed by: EMERGENCY MEDICINE

## 2020-01-16 PROCEDURE — 86850 RBC ANTIBODY SCREEN: CPT | Performed by: EMERGENCY MEDICINE

## 2020-01-16 PROCEDURE — 99285 EMERGENCY DEPT VISIT HI MDM: CPT | Performed by: EMERGENCY MEDICINE

## 2020-01-16 PROCEDURE — 83880 ASSAY OF NATRIURETIC PEPTIDE: CPT

## 2020-01-16 PROCEDURE — 99214 OFFICE O/P EST MOD 30 MIN: CPT | Performed by: FAMILY MEDICINE

## 2020-01-16 PROCEDURE — 86900 BLOOD TYPING SEROLOGIC ABO: CPT | Performed by: EMERGENCY MEDICINE

## 2020-01-16 PROCEDURE — 85025 COMPLETE CBC W/AUTO DIFF WBC: CPT | Performed by: EMERGENCY MEDICINE

## 2020-01-16 PROCEDURE — 99223 1ST HOSP IP/OBS HIGH 75: CPT | Performed by: HOSPITALIST

## 2020-01-16 PROCEDURE — 1111F DSCHRG MED/CURRENT MED MERGE: CPT | Performed by: FAMILY MEDICINE

## 2020-01-16 PROCEDURE — 85730 THROMBOPLASTIN TIME PARTIAL: CPT | Performed by: EMERGENCY MEDICINE

## 2020-01-16 PROCEDURE — 36415 COLL VENOUS BLD VENIPUNCTURE: CPT

## 2020-01-16 PROCEDURE — 93970 EXTREMITY STUDY: CPT | Performed by: SURGERY

## 2020-01-16 PROCEDURE — 71275 CT ANGIOGRAPHY CHEST: CPT

## 2020-01-16 PROCEDURE — 93970 EXTREMITY STUDY: CPT

## 2020-01-16 PROCEDURE — 84443 ASSAY THYROID STIM HORMONE: CPT

## 2020-01-16 PROCEDURE — 99284 EMERGENCY DEPT VISIT MOD MDM: CPT

## 2020-01-16 PROCEDURE — 80053 COMPREHEN METABOLIC PANEL: CPT

## 2020-01-16 PROCEDURE — 85610 PROTHROMBIN TIME: CPT | Performed by: EMERGENCY MEDICINE

## 2020-01-16 RX ORDER — ACETAMINOPHEN 325 MG/1
650 TABLET ORAL EVERY 6 HOURS PRN
Status: DISCONTINUED | OUTPATIENT
Start: 2020-01-16 | End: 2020-01-22 | Stop reason: HOSPADM

## 2020-01-16 RX ORDER — HEPARIN SODIUM 10000 [USP'U]/100ML
3-30 INJECTION, SOLUTION INTRAVENOUS
Status: DISCONTINUED | OUTPATIENT
Start: 2020-01-16 | End: 2020-01-22

## 2020-01-16 RX ORDER — GABAPENTIN 300 MG/1
300 CAPSULE ORAL 2 TIMES DAILY
Status: DISCONTINUED | OUTPATIENT
Start: 2020-01-16 | End: 2020-01-22 | Stop reason: HOSPADM

## 2020-01-16 RX ORDER — FERROUS SULFATE 325(65) MG
325 TABLET ORAL EVERY OTHER DAY
Status: DISCONTINUED | OUTPATIENT
Start: 2020-01-16 | End: 2020-01-18

## 2020-01-16 RX ORDER — HEPARIN SODIUM 1000 [USP'U]/ML
5600 INJECTION, SOLUTION INTRAVENOUS; SUBCUTANEOUS ONCE
Status: COMPLETED | OUTPATIENT
Start: 2020-01-16 | End: 2020-01-16

## 2020-01-16 RX ORDER — PRAZOSIN HYDROCHLORIDE 1 MG/1
4 CAPSULE ORAL
Status: DISCONTINUED | OUTPATIENT
Start: 2020-01-16 | End: 2020-01-22 | Stop reason: HOSPADM

## 2020-01-16 RX ORDER — RISPERIDONE 1 MG/1
3 TABLET, FILM COATED ORAL
Status: DISCONTINUED | OUTPATIENT
Start: 2020-01-16 | End: 2020-01-22 | Stop reason: HOSPADM

## 2020-01-16 RX ORDER — BENZTROPINE MESYLATE 1 MG/1
0.5 TABLET ORAL
Status: DISCONTINUED | OUTPATIENT
Start: 2020-01-16 | End: 2020-01-22 | Stop reason: HOSPADM

## 2020-01-16 RX ORDER — HYDRALAZINE HYDROCHLORIDE 20 MG/ML
5 INJECTION INTRAMUSCULAR; INTRAVENOUS EVERY 6 HOURS PRN
Status: DISCONTINUED | OUTPATIENT
Start: 2020-01-16 | End: 2020-01-22

## 2020-01-16 RX ORDER — MIRTAZAPINE 15 MG/1
45 TABLET, FILM COATED ORAL
Status: DISCONTINUED | OUTPATIENT
Start: 2020-01-16 | End: 2020-01-22 | Stop reason: HOSPADM

## 2020-01-16 RX ORDER — DOCUSATE SODIUM 100 MG/1
100 CAPSULE, LIQUID FILLED ORAL 2 TIMES DAILY
Status: DISCONTINUED | OUTPATIENT
Start: 2020-01-16 | End: 2020-01-22 | Stop reason: HOSPADM

## 2020-01-16 RX ORDER — HEPARIN SODIUM 1000 [USP'U]/ML
2800 INJECTION, SOLUTION INTRAVENOUS; SUBCUTANEOUS AS NEEDED
Status: DISCONTINUED | OUTPATIENT
Start: 2020-01-16 | End: 2020-01-22

## 2020-01-16 RX ORDER — HEPARIN SODIUM 1000 [USP'U]/ML
5600 INJECTION, SOLUTION INTRAVENOUS; SUBCUTANEOUS AS NEEDED
Status: DISCONTINUED | OUTPATIENT
Start: 2020-01-16 | End: 2020-01-22

## 2020-01-16 RX ADMIN — PRAZOSIN HYDROCHLORIDE 4 MG: 1 CAPSULE ORAL at 22:21

## 2020-01-16 RX ADMIN — HEPARIN SODIUM 18 UNITS/KG/HR: 10000 INJECTION, SOLUTION INTRAVENOUS at 18:56

## 2020-01-16 RX ADMIN — MIRTAZAPINE 45 MG: 15 TABLET, FILM COATED ORAL at 21:26

## 2020-01-16 RX ADMIN — HEPARIN SODIUM 5600 UNITS: 1000 INJECTION INTRAVENOUS; SUBCUTANEOUS at 18:56

## 2020-01-16 RX ADMIN — IOHEXOL 85 ML: 350 INJECTION, SOLUTION INTRAVENOUS at 20:17

## 2020-01-16 RX ADMIN — BENZTROPINE MESYLATE 0.5 MG: 1 TABLET ORAL at 22:21

## 2020-01-16 RX ADMIN — DOCUSATE SODIUM 100 MG: 100 CAPSULE, LIQUID FILLED ORAL at 21:27

## 2020-01-16 RX ADMIN — FERROUS SULFATE TAB 325 MG (65 MG ELEMENTAL FE) 325 MG: 325 (65 FE) TAB at 21:27

## 2020-01-16 RX ADMIN — GABAPENTIN 300 MG: 300 CAPSULE ORAL at 21:26

## 2020-01-16 RX ADMIN — RISPERIDONE 3 MG: 1 TABLET ORAL at 21:26

## 2020-01-16 NOTE — ASSESSMENT & PLAN NOTE
Improving  S/p venofer infusion during  hospitalization   Iron deficiency anemia likely due to hematochezia  No signs of acute  Blood loss  Continue Ferrous sulfate 325 mg daily,  Encouraged to take with orange juice for adequate absorption  Scheduled EGD and Colonoscopy for 01/28  Will repeat CBC in 1 week  RTC precautions reviewed

## 2020-01-16 NOTE — PROGRESS NOTES
Assessment/Plan:       Problem List Items Addressed This Visit        Digestive    Acute pancreatitis - Primary     2/2  Gallstone  pancreatitis  Still c/o abdominal pain  Diffuse tenderness on exam today,  Tenderness worse on LLQ likely due to constipation  Pt  Scheduled to follow-up with surgery on Monday 01/20 to possibly schedule cholecystectomy  CMP ordered                Endocrine    Hypothyroidism     Reported Hx of Hypothyroidism  Last TSH 12/18 was 2 24, off medications  TSH ordered         Relevant Orders    TSH, 3rd generation       Other    Anemia     Improving  S/p venofer infusion during  hospitalization   Iron deficiency anemia likely due to hematochezia  No signs of acute  Blood loss  Continue Ferrous sulfate 325 mg daily,  Encouraged to take with orange juice for adequate absorption  Scheduled EGD and Colonoscopy for 01/28  Will repeat CBC in 1 week  RTC precautions reviewed            Constipation     Continue bowel regimen  Iron tabs may worsen constipation  Pt encouraged to aim for at least 1 BM daily         Bilateral edema of lower extremity      Bilateral  Lower extremity edema worse on the left >>R   Tenderness to palpation left calf   Edema likely due to anemia vs DVT vs protein malnutrition    Stat VAS doppler ordered to R/o DVT   Echo, CMP and BNP ordered          Relevant Orders    NT-BNP PRO    Echo complete with contrast if indicated    VAS lower limb venous duplex study, complete bilateral      Other Visit Diagnoses     Pancreatitis, gallstone        Relevant Orders    Comprehensive metabolic panel            Subjective:      Patient ID: Iain Luis is a 64 y o  female  HPI  Patient here for TCM visit  She has a Hx of HTN, Hypothyroidism, Depression    She was admitted from 01/9 through the 14th for MARCIA from hematochezia , discharged on FeSO4 after receiving venofer infusion and acute Pancreatitis 2/2 to suspected gallstone Pancreatitis and pt is required to f/u with surgery for elective Cholecystectomy  ( Appt scheduled for 01/20/20)  Discharge Hgb is 7 3  Pt did have follow up with GI yesterday and has EGD and Colonoscopy ordered for 01/28/2020    Today pt reports that abdominal pain still persists and its in unchanged after the hospital admission and pt has a follow up with surgery on Monday to possibly schedule Cholecystectomy  Sister and dtr at the visit today reports pt's feet appear swollen, onset in the last 2 days after hospital visit  Reports no BM since hospital discharge  Of note, family present at the visit reports Pt was living with her son and they do not know her medical history very well  Dtr does report a bag of medications were given her by her brother so uncertain which medications she was actually taking  The following portions of the patient's history were reviewed and updated as appropriate: allergies, current medications, past family history, past medical history, past social history, past surgical history and problem list     Review of Systems   Constitutional: Negative for appetite change, chills and fever  Respiratory: Positive for cough and shortness of breath  Gastrointestinal: Positive for abdominal pain and nausea  Negative for blood in stool and vomiting  Genitourinary: Negative for dysuria and hematuria  Objective:      /72 (BP Location: Left arm, Patient Position: Sitting, Cuff Size: Large)   Pulse 88   Temp 98 1 °F (36 7 °C) (Tympanic)   Resp 18   Ht 5' 7" (1 702 m)   Wt 73 7 kg (162 lb 6 4 oz)   BMI 25 44 kg/m²          Physical Exam   Constitutional:   Ill appearing   HENT:   Head: Normocephalic and atraumatic  Neck: Normal range of motion  Cardiovascular: Normal rate, regular rhythm and normal heart sounds  Pulmonary/Chest: Effort normal and breath sounds normal    Abdominal: Soft  Bowel sounds are normal  Distention: Diffuse tenderness  There is no rebound and no guarding     Musculoskeletal: She exhibits edema (B/l  LE L>>R, Left calf tender to palpation, tenderness on calf to dorsiflexion of left foot)  Skin: Skin is warm and dry  Vitals reviewed

## 2020-01-16 NOTE — ED PROVIDER NOTES
History  Chief Complaint   Patient presents with    Evaluation of Abnormal Diagnostic Test     Pt referred to ED for further evaluation of U/S on b/l legs  History provided by:  Patient, relative and medical records   used: No    Evaluation of Abnormal Diagnostic Test   37-year-old female recently admitted in the hospital for gallstone pancreatitis, found to have an iron deficiency anemia but had also been complaining of some blood in her stools for to the ED after having an outpatient duplex today positive for popliteal DVT in the left based on notes  Patient had begun having swelling in the left leg and some pain in the calf while she was admitted which worsened at home  Was discharged about a week ago  States she has not had a bowel movement in a week and feels bloated  Based on discharge summary, her anemia was felt to be related to iron deficiency and exacerbated by dilution with IV fluids  Last hemoglobin 7 3 two days ago  Will repeat today  On rectal exam there is no stool  There were a few small red specks which were not guaiac positive  -- DVT report : "acute occlusive deep vein thrombosis from the mid to  distal femoral vein, popliteal vein and one of the posterior tibial veins in the  proximal calf"    Prior to Admission Medications   Prescriptions Last Dose Informant Patient Reported?  Taking?   benztropine (COGENTIN) 0 5 mg tablet  Family Member Yes No   Sig: Take 0 5 mg by mouth daily at bedtime   ferrous sulfate 324 (65 Fe) mg  Family Member No No   Sig: Take 1 tablet (324 mg total) by mouth daily before breakfast   gabapentin (NEURONTIN) 300 mg capsule  Family Member Yes No   Sig: Take 300 mg by mouth 2 (two) times a day   hydrochlorothiazide (HYDRODIURIL) 25 mg tablet  Family Member Yes No   Sig: Take 1 tablet by mouth daily   mirtazapine (REMERON) 45 MG tablet  Family Member Yes No   Sig: Take 45 mg by mouth daily at bedtime   polyethylene glycol (MIRALAX) 17 g packet  Family Member No No   Sig: Take 17 g by mouth daily as needed (constipation) for up to 7 days   prazosin (MINIPRESS) 2 mg capsule  Family Member Yes No   Sig: Take 4 mg by mouth daily at bedtime   risperiDONE (RisperDAL) 3 mg tablet  Family Member Yes No   Sig: Take 3 mg by mouth daily at bedtime      Facility-Administered Medications: None       Past Medical History:   Diagnosis Date    IRINEO (acute kidney injury) ([facility] [address] ) 1/9/2020    Anemia 1/9/2020    Disease of thyroid gland     Hypertension     Psychiatric disorder        Past Surgical History:   Procedure Laterality Date    BRAIN SURGERY      patient can not tell any other details  History reviewed  No pertinent family history  I have reviewed and agree with the history as documented  Social History     Tobacco Use    Smoking status: Never Smoker    Smokeless tobacco: Never Used   Substance Use Topics    Alcohol use: No    Drug use: No        Review of Systems   Constitutional: Positive for fatigue  Negative for activity change and appetite change  Respiratory: Positive for shortness of breath  Negative for chest tightness  Cardiovascular: Positive for leg swelling  Negative for chest pain  Gastrointestinal: Negative for abdominal pain, nausea and vomiting  Musculoskeletal: Negative for back pain and neck pain  Neurological: Negative for dizziness and weakness  All other systems reviewed and are negative  Physical Exam  Physical Exam   Constitutional: She is oriented to person, place, and time  She appears well-developed and well-nourished  No distress  HENT:   Head: Normocephalic  Mouth/Throat: Oropharynx is clear and moist    Neck: Normal range of motion  Neck supple  Cardiovascular: Normal rate and regular rhythm  Pulmonary/Chest: Effort normal and breath sounds normal    Abdominal: Soft  She exhibits no distension  There is no tenderness  Genitourinary:   Genitourinary Comments: No stool in rectum    Few guaiac negative red specks  Musculoskeletal: Normal range of motion  Some nonpitting edema and tenderness of the left calf  Also has some tenderness of thigh  Neurological: She is alert and oriented to person, place, and time  Skin: Skin is warm and dry  Psychiatric: She has a normal mood and affect  Her behavior is normal    Nursing note and vitals reviewed        Vital Signs  ED Triage Vitals [01/16/20 1707]   Temperature Pulse Respirations Blood Pressure SpO2   98 8 °F (37 1 °C) 84 14 150/74 100 %      Temp Source Heart Rate Source Patient Position - Orthostatic VS BP Location FiO2 (%)   Oral -- -- -- --      Pain Score       No Pain           Vitals:    01/16/20 1707   BP: 150/74   Pulse: 84         Visual Acuity      ED Medications  Medications   heparin (porcine) injection 5,600 Units (has no administration in time range)   heparin (porcine) 25,000 units in 250 mL infusion (premix) (has no administration in time range)   heparin (porcine) injection 5,600 Units (has no administration in time range)   heparin (porcine) injection 2,800 Units (has no administration in time range)       Diagnostic Studies  Results Reviewed     Procedure Component Value Units Date/Time    Protime-INR [[id number]]  (Normal) Collected:  01/16/20 1749    Lab Status:  Final result Specimen:  Blood from Arm, Right Updated:  01/16/20 1811     Protime 12 2 seconds      INR 0 96    APTT [[id number]]  (Normal) Collected:  01/16/20 1749    Lab Status:  Final result Specimen:  Blood from Arm, Right Updated:  01/16/20 1811     PTT 30 seconds     CBC and differential [[id number]]  (Abnormal) Collected:  01/16/20 1749    Lab Status:  Final result Specimen:  Blood from Arm, Right Updated:  01/16/20 1754     WBC 5 26 Thousand/uL      RBC 2 14 Million/uL      Hemoglobin 7 1 g/dL      Hematocrit 23 6 %       fL      MCH 33 2 pg      MCHC 30 1 g/dL      RDW 20 5 %      MPV 10 8 fL      Platelets 386 Thousands/uL      nRBC 0 /100 WBCs Neutrophils Relative 67 %      Immat GRANS % 1 %      Lymphocytes Relative 23 %      Monocytes Relative 7 %      Eosinophils Relative 2 %      Basophils Relative 0 %      Neutrophils Absolute 3 57 Thousands/µL      Immature Grans Absolute 0 03 Thousand/uL      Lymphocytes Absolute 1 19 Thousands/µL      Monocytes Absolute 0 35 Thousand/µL      Eosinophils Absolute 0 11 Thousand/µL      Basophils Absolute 0 01 Thousands/µL                  No orders to display              Procedures  Procedures         ED Course  ED Course as of Jan 16 1843   Thu Jan 16, 2020   1828 Macrocytic  Not consistent with iron deficiency  MCV(!): 110   1828 Decreased from 2 days ago  Hemoglobin(!): 7 1   1838 Patient consented for blood  MDM  Number of Diagnoses or Management Options  Acute deep vein thrombosis (DVT) of femoral vein of left lower extremity ([facility] [address] ): new and requires workup  Anemia: established and worsening  Constipation: new and requires workup  Diagnosis management comments: 24-year-old female recently released from the hospital after having a gallstone pancreatitis, also having some hematochezia and found to have blood loss anemia presented back to the hospital after having an outpatient duplex noting a left lower extremity DVT, occlusive from the distal femoral vein into the posterior tibial vein  Repeat lab work today shows decreasing hemoglobin to 7 1  Patient does appear pale  Reports some shortness of breath  Has not had a bowel movement in about 7 days  No vomiting  No stool in rectum on exam   Discussed with the patient and family member risks/benefits of anticoagulation  Given the occlusive above the knee DVT and that she has not had any rectal bleeding in the last week is appropriate for anticoagulation with IV heparin which can be stopped if bleeding does occur    Patient will be admitted for close monitoring as well as upper and lower endoscopy which had previously been planned for outpatient  She was consented for blood transfusion if needed  Amount and/or Complexity of Data Reviewed  Clinical lab tests: ordered and reviewed  Obtain history from someone other than the patient: yes  Discuss the patient with other providers: yes    Patient Progress  Patient progress: stable        Disposition  Final diagnoses:   Anemia   Acute deep vein thrombosis (DVT) of femoral vein of left lower extremity ([facility] [address] )   Constipation     Time reflects when diagnosis was documented in both MDM as applicable and the Disposition within this note     Time User Action Codes Description Comment    1/16/2020  6:32 PM Danielle Castillo Add [D64 9] Anemia     1/16/2020  6:32 PM Kunal López Add [I82 412] Acute deep vein thrombosis (DVT) of femoral vein of left lower extremity ([facility] [address] )     1/16/2020  6:32 PM Danielle Castillo Add [K59 00] Constipation       ED Disposition     ED Disposition Condition Date/Time Comment    Admit Stable Thu Jan 16, 2020  6:32 PM Case was discussed with Dr Primo Da Silva and the patient's admission status was agreed to be Admission Status: inpatient status to the service of Dr Primo Da Silva   Follow-up Information    None         Patient's Medications   Discharge Prescriptions    No medications on file     No discharge procedures on file      ED Provider  Electronically Signed by           Obi Mccoy MD  01/16/20 6191

## 2020-01-16 NOTE — ASSESSMENT & PLAN NOTE
2/2  Gallstone  pancreatitis  Still c/o abdominal pain  Diffuse tenderness on exam today,  Tenderness worse on LLQ likely due to constipation  Pt  Scheduled to follow-up with surgery on Monday 01/20 to possibly schedule cholecystectomy  CMP ordered

## 2020-01-16 NOTE — ASSESSMENT & PLAN NOTE
Bilateral  Lower extremity edema worse on the left >>R   Tenderness to palpation left calf   Edema likely due to anemia vs DVT vs protein malnutrition    Stat VAS doppler ordered to R/o DVT   Echo, CMP and BNP ordered

## 2020-01-16 NOTE — LETTER
[facility]  [address] [place] [address]  Dept: [phone]    January 22, 2020     Patient: Judith Moya   YOB: 1960   Date of Visit: 1/16/2020       To Whom it May Concern:    Judith Moya is under my professional care  She was seen in the hospital from 1/16/2020   to 01/22/20  Patient's sister Abigail Carrion) was with the patient in the hospital during this time    If you have any questions or concerns, please don't hesitate to call           Sincerely,          Galileo Zuniga MD

## 2020-01-16 NOTE — ASSESSMENT & PLAN NOTE
Continue bowel regimen  Iron tabs may worsen constipation  Pt encouraged to aim for at least 1 BM daily

## 2020-01-17 ENCOUNTER — TRANSCRIBE ORDERS (OUTPATIENT)
Dept: GASTROENTEROLOGY | Facility: CLINIC | Age: 60
End: 2020-01-17

## 2020-01-17 LAB
ANION GAP SERPL CALCULATED.3IONS-SCNC: 7 MMOL/L (ref 4–13)
APTT PPP: 201 SECONDS (ref 23–37)
APTT PPP: 66 SECONDS (ref 23–37)
APTT PPP: 78 SECONDS (ref 23–37)
APTT PPP: 93 SECONDS (ref 23–37)
BASOPHILS # BLD AUTO: 0.03 THOUSANDS/ΜL (ref 0–0.1)
BASOPHILS NFR BLD AUTO: 1 % (ref 0–1)
BUN SERPL-MCNC: 10 MG/DL (ref 5–25)
CALCIUM SERPL-MCNC: 8.5 MG/DL (ref 8.3–10.1)
CHLORIDE SERPL-SCNC: 111 MMOL/L (ref 100–108)
CO2 SERPL-SCNC: 24 MMOL/L (ref 21–32)
CREAT SERPL-MCNC: 0.72 MG/DL (ref 0.6–1.3)
EOSINOPHIL # BLD AUTO: 0.15 THOUSAND/ΜL (ref 0–0.61)
EOSINOPHIL NFR BLD AUTO: 4 % (ref 0–6)
ERYTHROCYTE [DISTWIDTH] IN BLOOD BY AUTOMATED COUNT: 20.3 % (ref 11.6–15.1)
GFR SERPL CREATININE-BSD FRML MDRD: 93 ML/MIN/1.73SQ M
GLUCOSE SERPL-MCNC: 93 MG/DL (ref 65–140)
HCT VFR BLD AUTO: 21.9 % (ref 34.8–46.1)
HCT VFR BLD AUTO: 22.2 % (ref 34.8–46.1)
HCT VFR BLD AUTO: 24 % (ref 34.8–46.1)
HCT VFR BLD AUTO: 26.4 % (ref 34.8–46.1)
HGB BLD-MCNC: 6.7 G/DL (ref 11.5–15.4)
HGB BLD-MCNC: 6.8 G/DL (ref 11.5–15.4)
HGB BLD-MCNC: 7.1 G/DL (ref 11.5–15.4)
HGB BLD-MCNC: 8.2 G/DL (ref 11.5–15.4)
IMM GRANULOCYTES # BLD AUTO: 0.02 THOUSAND/UL (ref 0–0.2)
IMM GRANULOCYTES NFR BLD AUTO: 1 % (ref 0–2)
INR PPP: 0.99 (ref 0.84–1.19)
LIPASE SERPL-CCNC: 689 U/L (ref 73–393)
LYMPHOCYTES # BLD AUTO: 1.32 THOUSANDS/ΜL (ref 0.6–4.47)
LYMPHOCYTES NFR BLD AUTO: 32 % (ref 14–44)
MAGNESIUM SERPL-MCNC: 1.6 MG/DL (ref 1.6–2.6)
MCH RBC QN AUTO: 33.5 PG (ref 26.8–34.3)
MCHC RBC AUTO-ENTMCNC: 31.1 G/DL (ref 31.4–37.4)
MCV RBC AUTO: 108 FL (ref 82–98)
MONOCYTES # BLD AUTO: 0.36 THOUSAND/ΜL (ref 0.17–1.22)
MONOCYTES NFR BLD AUTO: 9 % (ref 4–12)
NEUTROPHILS # BLD AUTO: 2.25 THOUSANDS/ΜL (ref 1.85–7.62)
NEUTS SEG NFR BLD AUTO: 53 % (ref 43–75)
NRBC BLD AUTO-RTO: 0 /100 WBCS
PHOSPHATE SERPL-MCNC: 4.2 MG/DL (ref 2.7–4.5)
PLATELET # BLD AUTO: 215 THOUSANDS/UL (ref 149–390)
PMV BLD AUTO: 10.5 FL (ref 8.9–12.7)
POTASSIUM SERPL-SCNC: 3.9 MMOL/L (ref 3.5–5.3)
PROTHROMBIN TIME: 12.5 SECONDS (ref 11.6–14.5)
RBC # BLD AUTO: 2.03 MILLION/UL (ref 3.81–5.12)
SODIUM SERPL-SCNC: 142 MMOL/L (ref 136–145)
T4 FREE SERPL-MCNC: 0.64 NG/DL (ref 0.76–1.46)
TSH SERPL DL<=0.05 MIU/L-ACNC: 10.77 UIU/ML (ref 0.36–3.74)
WBC # BLD AUTO: 4.13 THOUSAND/UL (ref 4.31–10.16)

## 2020-01-17 PROCEDURE — 99232 SBSQ HOSP IP/OBS MODERATE 35: CPT | Performed by: INTERNAL MEDICINE

## 2020-01-17 PROCEDURE — 85018 HEMOGLOBIN: CPT | Performed by: INTERNAL MEDICINE

## 2020-01-17 PROCEDURE — 97163 PT EVAL HIGH COMPLEX 45 MIN: CPT

## 2020-01-17 PROCEDURE — 85730 THROMBOPLASTIN TIME PARTIAL: CPT | Performed by: INTERNAL MEDICINE

## 2020-01-17 PROCEDURE — 84439 ASSAY OF FREE THYROXINE: CPT | Performed by: FAMILY MEDICINE

## 2020-01-17 PROCEDURE — 84443 ASSAY THYROID STIM HORMONE: CPT | Performed by: FAMILY MEDICINE

## 2020-01-17 PROCEDURE — 97116 GAIT TRAINING THERAPY: CPT

## 2020-01-17 PROCEDURE — 85014 HEMATOCRIT: CPT | Performed by: INTERNAL MEDICINE

## 2020-01-17 PROCEDURE — 0DB68ZX EXCISION OF STOMACH, VIA NATURAL OR ARTIFICIAL OPENING ENDOSCOPIC, DIAGNOSTIC: ICD-10-PCS | Performed by: INTERNAL MEDICINE

## 2020-01-17 PROCEDURE — 80048 BASIC METABOLIC PNL TOTAL CA: CPT | Performed by: INTERNAL MEDICINE

## 2020-01-17 PROCEDURE — 99255 IP/OBS CONSLTJ NEW/EST HI 80: CPT | Performed by: INTERNAL MEDICINE

## 2020-01-17 PROCEDURE — 84100 ASSAY OF PHOSPHORUS: CPT | Performed by: INTERNAL MEDICINE

## 2020-01-17 PROCEDURE — 83735 ASSAY OF MAGNESIUM: CPT | Performed by: INTERNAL MEDICINE

## 2020-01-17 PROCEDURE — 83690 ASSAY OF LIPASE: CPT | Performed by: PHYSICIAN ASSISTANT

## 2020-01-17 PROCEDURE — P9016 RBC LEUKOCYTES REDUCED: HCPCS

## 2020-01-17 PROCEDURE — 85025 COMPLETE CBC W/AUTO DIFF WBC: CPT | Performed by: INTERNAL MEDICINE

## 2020-01-17 PROCEDURE — 0DJD8ZZ INSPECTION OF LOWER INTESTINAL TRACT, VIA NATURAL OR ARTIFICIAL OPENING ENDOSCOPIC: ICD-10-PCS | Performed by: INTERNAL MEDICINE

## 2020-01-17 PROCEDURE — C9113 INJ PANTOPRAZOLE SODIUM, VIA: HCPCS | Performed by: PHYSICIAN ASSISTANT

## 2020-01-17 PROCEDURE — 85610 PROTHROMBIN TIME: CPT | Performed by: FAMILY MEDICINE

## 2020-01-17 PROCEDURE — 30233N1 TRANSFUSION OF NONAUTOLOGOUS RED BLOOD CELLS INTO PERIPHERAL VEIN, PERCUTANEOUS APPROACH: ICD-10-PCS | Performed by: INTERNAL MEDICINE

## 2020-01-17 PROCEDURE — 3E0G8GC INTRODUCTION OF OTHER THERAPEUTIC SUBSTANCE INTO UPPER GI, VIA NATURAL OR ARTIFICIAL OPENING ENDOSCOPIC: ICD-10-PCS | Performed by: INTERNAL MEDICINE

## 2020-01-17 RX ORDER — SUCRALFATE ORAL 1 G/10ML
1000 SUSPENSION ORAL EVERY 6 HOURS SCHEDULED
Status: DISCONTINUED | OUTPATIENT
Start: 2020-01-17 | End: 2020-01-22 | Stop reason: HOSPADM

## 2020-01-17 RX ORDER — MAGNESIUM CARB/ALUMINUM HYDROX 105-160MG
296 TABLET,CHEWABLE ORAL ONCE
Status: DISCONTINUED | OUTPATIENT
Start: 2020-01-19 | End: 2020-01-17

## 2020-01-17 RX ORDER — PANTOPRAZOLE SODIUM 40 MG/1
40 INJECTION, POWDER, FOR SOLUTION INTRAVENOUS EVERY 12 HOURS SCHEDULED
Status: DISCONTINUED | OUTPATIENT
Start: 2020-01-17 | End: 2020-01-21

## 2020-01-17 RX ORDER — MAGNESIUM CARB/ALUMINUM HYDROX 105-160MG
296 TABLET,CHEWABLE ORAL ONCE
Status: COMPLETED | OUTPATIENT
Start: 2020-01-18 | End: 2020-01-18

## 2020-01-17 RX ORDER — GUAIFENESIN 600 MG
600 TABLET, EXTENDED RELEASE 12 HR ORAL EVERY 12 HOURS SCHEDULED
Status: DISCONTINUED | OUTPATIENT
Start: 2020-01-17 | End: 2020-01-22 | Stop reason: HOSPADM

## 2020-01-17 RX ORDER — POLYETHYLENE GLYCOL 3350 17 G/17G
17 POWDER, FOR SOLUTION ORAL DAILY
Status: DISCONTINUED | OUTPATIENT
Start: 2020-01-17 | End: 2020-01-22 | Stop reason: HOSPADM

## 2020-01-17 RX ORDER — BENZONATATE 100 MG/1
100 CAPSULE ORAL 3 TIMES DAILY PRN
Status: DISCONTINUED | OUTPATIENT
Start: 2020-01-17 | End: 2020-01-22 | Stop reason: HOSPADM

## 2020-01-17 RX ADMIN — MIRTAZAPINE 45 MG: 15 TABLET, FILM COATED ORAL at 21:39

## 2020-01-17 RX ADMIN — SUCRALFATE 1000 MG: 1 SUSPENSION ORAL at 17:24

## 2020-01-17 RX ADMIN — BISACODYL 10 MG: 5 TABLET, COATED ORAL at 18:11

## 2020-01-17 RX ADMIN — GUAIFENESIN 600 MG: 600 TABLET, EXTENDED RELEASE ORAL at 10:15

## 2020-01-17 RX ADMIN — DOCUSATE SODIUM 100 MG: 100 CAPSULE, LIQUID FILLED ORAL at 09:17

## 2020-01-17 RX ADMIN — SUCRALFATE 1000 MG: 1 SUSPENSION ORAL at 13:46

## 2020-01-17 RX ADMIN — PANTOPRAZOLE SODIUM 40 MG: 40 INJECTION, POWDER, FOR SOLUTION INTRAVENOUS at 20:05

## 2020-01-17 RX ADMIN — SUCRALFATE 1000 MG: 1 SUSPENSION ORAL at 23:16

## 2020-01-17 RX ADMIN — RISPERIDONE 3 MG: 1 TABLET ORAL at 21:38

## 2020-01-17 RX ADMIN — DOCUSATE SODIUM 100 MG: 100 CAPSULE, LIQUID FILLED ORAL at 17:24

## 2020-01-17 RX ADMIN — POLYETHYLENE GLYCOL 3350 17 G: 17 POWDER, FOR SOLUTION ORAL at 11:57

## 2020-01-17 RX ADMIN — HEPARIN SODIUM 13 UNITS/KG/HR: 10000 INJECTION, SOLUTION INTRAVENOUS at 18:14

## 2020-01-17 RX ADMIN — PANTOPRAZOLE SODIUM 40 MG: 40 INJECTION, POWDER, FOR SOLUTION INTRAVENOUS at 13:46

## 2020-01-17 RX ADMIN — PRAZOSIN HYDROCHLORIDE 4 MG: 1 CAPSULE ORAL at 21:39

## 2020-01-17 RX ADMIN — BENZTROPINE MESYLATE 0.5 MG: 1 TABLET ORAL at 21:41

## 2020-01-17 RX ADMIN — GUAIFENESIN 600 MG: 600 TABLET, EXTENDED RELEASE ORAL at 20:05

## 2020-01-17 RX ADMIN — GABAPENTIN 300 MG: 300 CAPSULE ORAL at 09:17

## 2020-01-17 RX ADMIN — GABAPENTIN 300 MG: 300 CAPSULE ORAL at 17:24

## 2020-01-17 NOTE — ASSESSMENT & PLAN NOTE
· Patient has not had a bowel movement in 10 days  Multifactorial; on iron supplementaion, has had poor appetite and been relatively sedentary  Rectal exam done on admission, no apparent stool in rectal vault   Abdomen non-tender on exam   · Patient initiated on bowel regimen; continue to follow with GI

## 2020-01-17 NOTE — ASSESSMENT & PLAN NOTE
· Blood pressure today noted in the 146 systolic  · IV hydralazine for systolic blood pressure more than 180  · Continue home prazosin  · Monitor blood pressure

## 2020-01-17 NOTE — ASSESSMENT & PLAN NOTE
· TSH noted at 6 8, which is elevated  Will get thyroid levels, as hypothyroid can contribute to constipation  · Patient is not on levothyroxine

## 2020-01-17 NOTE — ASSESSMENT & PLAN NOTE
· IV hydralazine for systolic blood pressure more than 180  · Continue home prazosin  · Monitor blood pressure

## 2020-01-17 NOTE — ASSESSMENT & PLAN NOTE
· Patient presents to the ED after she was noted to have asymmetrical bilateral lower leg swelling, more on the left than on the right with associated calf tenderness during of his with a primary care provider  · Of note is patient was recently hospitalized for 6 days before being discharged 2 days prior to presentation  · At the time patient was not started on VTE prophylaxis as she was deemed to be a low risk as per the VTE screening  · Doppler ultrasound demonstrated acute occlusive deep vein thrombosis from the mid to  · distal femoral vein, popliteal vein and one of the posterior tibial veins in the  · proximal calf  Plan  · Heparin drip  · Maintain INR between 40 and 60  · Monitor for bleeding  · CT angio as patient is complaining of shortness of breath, cough nonproductive cough and palpitation    Rule out PE

## 2020-01-17 NOTE — ASSESSMENT & PLAN NOTE
· She reports no bowel movement since the 7th of this month  · Bowel regimen in place  · Monitor for symptoms

## 2020-01-17 NOTE — CONSULTS
[facility]  Gideon Essex 64 y o  female MRN: [id number]  Encounter: [id number]          ASSESSMENT AND PLAN:      1  Asymptomatic anemia, intermittent BRBPR:  Patient presented with a symptomatic anemia, reporting intermittent BRBPR after straining to have bowel movements over the past several years  Hemoglobin found to be 6 8 and state her during this admission, with baseline hemoglobin generally 9-11 g/L  Patient also complains of intermittent nausea and vomiting and dyspepsia, for which she does not take any antacid medication  It is certainly possible that her BRBPR could be due to lower GI source such as hemorrhoids (as it occurs after straining) vs  diverticular bleeding vs  AVM vs  less likely colitis or mass  Dyspepsia may be 2/2 GERD vs  Gastritis vs  Esophagitis vs  PUD vs  Constipation  - please resuscitate with PRBC transfusion now for Hgb 6 8; transfuse for Hgb <7  - maintain 2 large bore IVs  - maintain active T&S  - trend H&H Q8H  - start 40mg IV protonix BID (escalate to protonix gtt if c/f brisk GIB or UGIB)  - carafate 1gm PO QID  - close hemodynamic monitoring; please notify GI team if any hemodynamic instability  - avoid NSAIDs  - CLD and ADAT to non-ulcerogenic diet today; CLD starting 1/18 with plan for 2 day prep (given constipation, moderate stool burden on last abd imaging, no BM x 7 days) for EGD/colonoscopy on 1/20    2  Constipation:  Patient reports indefinite period of on going constipation of unclear etiology  Does not take any medication for this, and often strains to have a bowel movement  CT A/P from 1/10/2020 showed moderate stool burden  Denies BM x 7 days  - encourage ambulation as tolerated  - miralax 17gm PO 1-2x/day; titrate to effect  - colace BID, senna PRN  - colonoscopy for further evaluation as above    3  Recent pancreatitis: Felt to be 2/2 gallstones  Has cholelithiasis on CT A/P from 1/10/2020    Recommended to see surgery for consultation regarding CCY  - recheck lipase today given pt's report of N/V   - trend LFTs daily    Recommendations relayed to JAMAR, Dr Sparkle Mims and Dr Vicky Chen, primary team   ______________________________________________________________________    HPI:  William Deal is a 64 y o  female with PMH of hypothyroidism, hypertension, anemia, with recent hospitalization for gallstone pancreatitis, presents to the hospital for left lower extremity swelling found to have acute DVT on outpatient ultrasound  Gastroenterology was consulted for patient's asymptomatic anemia, in the setting of patient's anticoagulation requirement for acute DVT  Pt states that over the past several years, she has had intermittent BRBPR after straining to have bowel movements, however states that she never had this evaluated further  Pt states that her stool has always been brown, and denies any melena  Pt states that she may have vomited blood in May 2019, but cannot provide further detail about this and denies any further hematemesis since that time  Pt reports intermittent lower abdominal pain as well as intermittent dyspepsia, but denies taking medication for these symptoms  Pt denies ETOH or tobacco use  Pt has never had colonoscopy or endoscopy before  Pt denies routine NSAID use  Pt denies family hx of GI cancer  Pt reports occasional esophageal discomfort when eating dry or hard foods, but denies regular dysphagia or odynophagia  Pt was scheduled to have outpatient EGD/colonoscopy, but has since been admitted to the hospital for DVT  REVIEW OF SYSTEMS:    CONSTITUTIONAL: Denies any fever, chills, rigors, and weight loss  HEENT: No earache or tinnitus  Denies hearing loss or visual disturbances  CARDIOVASCULAR: No chest pain or palpitations  RESPIRATORY: Denies any cough, hemoptysis, shortness of breath or dyspnea on exertion  GASTROINTESTINAL: As noted in the History of Present Illness  GENITOURINARY: No problems with urination  Denies any hematuria or dysuria  NEUROLOGIC: No dizziness or vertigo, denies headaches  MUSCULOSKELETAL: +LLE edema, pain  +RLE pain  SKIN: Denies skin rashes or itching  ENDOCRINE: Denies excessive thirst  Denies intolerance to heat or cold  PSYCHOSOCIAL: Denies depression or anxiety  Denies any recent memory loss  Historical Information   Past Medical History:   Diagnosis Date    [facility]O [facility]acute [facility]) ([facility] [address] ) 1/9/2020    Anemia 1/9/2020    Disease of thyroid gland     Hypertension     Psychiatric disorder      Past Surgical History:   Procedure Laterality Date    BRAIN SURGERY      patient can not tell any other details  Social History   Social History     Substance and Sexual Activity   Alcohol Use No     Social History     Substance and Sexual Activity   Drug Use No     Social History     Tobacco Use   Smoking Status Never Smoker   Smokeless Tobacco Never Used     History reviewed  No pertinent family history      Meds/Allergies       Current Facility-Administered Medications:     acetaminophen (TYLENOL) tablet 650 mg, 650 mg, Oral, Q6H PRN    benzonatate (TESSALON PERLES) capsule 100 mg, 100 mg, Oral, TID PRN    benztropine (COGENTIN) tablet 0 5 mg, 0 5 mg, Oral, HS, 0 5 mg at 01/16/20 2221    docusate sodium (COLACE) capsule 100 mg, 100 mg, Oral, BID, 100 mg at 01/17/20 0917    ferrous sulfate tablet 325 mg, 325 mg, Oral, Every Other Day, 325 mg at 01/16/20 2127    gabapentin (NEURONTIN) capsule 300 mg, 300 mg, Oral, BID, 300 mg at 01/17/20 0917    guaiFENesin (MUCINEX) 12 hr tablet 600 mg, 600 mg, Oral, Q12H [address], 600 mg at 01/17/20 1015    heparin (porcine) 25,000 units in 250 mL infusion (premix), 3-30 Units/kg/hr (Order-Specific), Intravenous, Titrated, 13 Units/kg/hr at 01/17/20 0919    heparin (porcine) injection 2,800 Units, 2,800 Units, Intravenous, PRN    heparin (porcine) injection 5,600 Units, 5,600 Units, Intravenous, PRN    hydrALAZINE (APRESOLINE) injection 5 mg, 5 mg, Intravenous, Q6H PRN    mirtazapine (REMERON) tablet 45 mg, 45 mg, Oral, HS, 45 mg at 01/16/20 2126    polyethylene glycol (MIRALAX) packet 17 g, 17 g, Oral, Daily, 17 g at 01/17/20 1157    prazosin (MINIPRESS) capsule 4 mg, 4 mg, Oral, HS, 4 mg at 01/16/20 2221    risperiDONE (RisperDAL) tablet 3 mg, 3 mg, Oral, HS, 3 mg at 01/16/20 2126    No Known Allergies        Objective     Blood pressure 160/82, pulse 82, temperature 98 8 °F (37 1 °C), temperature source Oral, resp  rate 19, height 5' 7" (1 702 m), weight 69 8 kg (153 lb 12 8 oz), SpO2 97 %  Body mass index is 24 09 kg/m²      PHYSICAL EXAM:      General Appearance:   Alert, cooperative, no distress   HEENT:   Normocephalic, atraumatic, anicteric   Neck:  Supple, symmetrical, trachea midline   Lungs:   Clear to auscultation bilaterally; respirations even and unlabored   Heart:   Regular rate and rhythm; +S1/+S2   Abdomen:   Soft, non-tender, non-distended; normal bowel sounds; no masses, no organomegaly    Genitalia:   Deferred    Rectal:   Deferred    Extremities:  1+ B/L LE edema (L>R) without warmth or erythema    Pulses:  2+ and symmetric    Skin:  No jaundice, rashes, or lesions          Lab Results:   Admission on 01/16/2020   Component Date Value    Protime 01/16/2020 12 2     INR 01/16/2020 0 96     PTT 01/16/2020 30     WBC 01/16/2020 5 26     RBC 01/16/2020 2 14*    Hemoglobin 01/16/2020 7 1*    Hematocrit 01/16/2020 23 6*    MCV 01/16/2020 110*    MCH 01/16/2020 33 2     MCHC 01/16/2020 30 1*    RDW 01/16/2020 20 5*    MPV 01/16/2020 10 8     Platelets 31/84/1661 256     nRBC 01/16/2020 0     Neutrophils Relative 01/16/2020 67     Immat GRANS % 01/16/2020 1     Lymphocytes Relative 01/16/2020 23     Monocytes Relative 01/16/2020 7     Eosinophils Relative 01/16/2020 2     Basophils Relative 01/16/2020 0     Neutrophils Absolute 01/16/2020 3 57     Immature Grans Absolute 01/16/2020 0 03     Lymphocytes Absolute 01/16/2020 1 19     Monocytes Absolute 01/16/2020 0 35     Eosinophils Absolute 01/16/2020 0 11     Basophils Absolute 01/16/2020 0 01     ABO Grouping 01/16/2020 O     Rh Factor 01/16/2020 Positive     Antibody Screen 01/16/2020 Negative     Specimen Expiration Date 01/16/2020 80222587     PTT 01/17/2020 201*    Hemoglobin 01/17/2020 7 1*    Hematocrit 01/17/2020 24 0*    Sodium 01/17/2020 142     Potassium 01/17/2020 3 9     Chloride 01/17/2020 111*    CO2 01/17/2020 24     ANION GAP 01/17/2020 7     BUN 01/17/2020 10     Creatinine 01/17/2020 0 72     Glucose 01/17/2020 93     Calcium 01/17/2020 8 5     eGFR 01/17/2020 93     WBC 01/17/2020 4 13*    RBC 01/17/2020 2 03*    Hemoglobin 01/17/2020 6 8*    Hematocrit 01/17/2020 21 9*    MCV 01/17/2020 108*    MCH 01/17/2020 33 5     MCHC 01/17/2020 31 1*    RDW 01/17/2020 20 3*    MPV 01/17/2020 10 5     Platelets 86/73/1390 215     nRBC 01/17/2020 0     Neutrophils Relative 01/17/2020 53     Immat GRANS % 01/17/2020 1     Lymphocytes Relative 01/17/2020 32     Monocytes Relative 01/17/2020 9     Eosinophils Relative 01/17/2020 4     Basophils Relative 01/17/2020 1     Neutrophils Absolute 01/17/2020 2 25     Immature Grans Absolute 01/17/2020 0 02     Lymphocytes Absolute 01/17/2020 1 32     Monocytes Absolute 01/17/2020 0 36     Eosinophils Absolute 01/17/2020 0 15     Basophils Absolute 01/17/2020 0 03     Phosphorus 01/17/2020 4 2     Magnesium 01/17/2020 1 6     PTT 01/17/2020 93*    Hemoglobin 01/17/2020 6 7*    Hematocrit 01/17/2020 22 2*    Protime 01/17/2020 12 5     INR 01/17/2020 0 99     Unit Product Code 01/17/2020 I5019I92     Unit Number 01/17/2020 [id number]     Unit ABO 01/17/2020 O     Unit RH 01/17/2020 POS     Crossmatch 01/17/2020 Compatible     Unit Dispense Status 01/17/2020 Issued          Radiology Results:   X-ray Chest 2 Views    Result Date: 1/9/2020  Narrative: CHEST INDICATION:   chest pain COMPARISON: Chest radiograph from 6/16/2015  EXAM PERFORMED/VIEWS:  XR CHEST PA & LATERAL WITH DUAL ENERGY SUBTRACTION  FINDINGS: Cardiomediastinal silhouette is normal  Lungs are clear  Benign calcified granuloma in the left lower lobe  No effusion or pneumothorax  Osseous structures are within normal limits for age  Impression: No acute cardiopulmonary disease  Workstation performed: [id number]     Us Abdomen Complete    Result Date: 1/9/2020  Narrative: ABDOMEN ULTRASOUND, COMPLETE INDICATION:   RUQ PAIN, NO FEVER, NO ELEVATED WBC elevated LFTs, epigastric pain  COMPARISON: None TECHNIQUE:   Real-time ultrasound of the abdomen was performed with a curvilinear transducer with both volumetric sweeps and still imaging techniques  FINDINGS: PANCREAS:  Visualized portions of the pancreas are within normal limits  AORTA AND IVC:  Visualized portions are normal for patient age  LIVER: Size:  Within normal range  The liver measures 14 1 cm in the midclavicular line  Contour:  Surface contour is smooth  Parenchyma:  Echogenicity and echotexture are within normal limits  No evidence of suspicious mass  Limited imaging of the main portal vein shows it to be patent and hepatopetal  BILIARY: The gallbladder is normal in caliber  No wall thickening or pericholecystic fluid  Shadowing gallstone(s) identified  No sonographic Starks's sign  No intrahepatic biliary dilatation  CBD measures 4 mm  No choledocholithiasis  KIDNEY: Right kidney measures 12 7 x 5 cm  5 7 x 3 9 x 4 5 cm simple cyst is present in the midpole  Left kidney measures 12 2 x 5 3 cm  Within normal limits  SPLEEN: Measures 11 5 cm  Within normal limits  ASCITES:  None  Impression: 1  Cholelithiasis  2   Simple cyst within the midpole of the right kidney   Workstation performed: [id number]     Cta Chest Pe Study    Result Date: 1/16/2020  Narrative: CTA - CHEST WITH IV CONTRAST - PULMONARY ANGIOGRAM INDICATION:   Shortness of breath DVT  COMPARISON: None  TECHNIQUE: CTA examination of the chest was performed using angiographic technique according to a protocol specifically tailored to evaluate for pulmonary embolism  Axial, sagittal, and coronal 2D reformatted images were created from the source data and  submitted for interpretation  In addition, coronal 3D MIP postprocessing was performed on the acquisition scanner  Radiation dose length product (DLP) for this visit:  317 mGy-cm   This examination, like all CT scans performed in the [facility], was performed utilizing techniques to minimize radiation dose exposure, including the use of iterative reconstruction and automated exposure control  IV Contrast:  85 mL of iohexol (OMNIPAQUE)  FINDINGS: PULMONARY ARTERIAL TREE:  There are small linear filling defects in the several left lower lobe segmental arteries, and in a right lower lobe subsegmental artery, suggestive of chronic emboli  No evidence of large acute embolus  The RV/LV ratio is 0 84, which indicates no right heart strain  LUNGS:  Calcified granuloma at the posterior left lung base  There is no tracheal or endobronchial lesion  PLEURA:  Unremarkable  HEART/GREAT VESSELS:  Normal heart size  Coronary artery calcifications noted  Normal caliber thoracic aorta with mild atherosclerotic calcifications  MEDIASTINUM AND ALDA:  Unremarkable  CHEST WALL AND LOWER NECK:   Unremarkable  VISUALIZED STRUCTURES IN THE UPPER ABDOMEN:  There are gallstones, with no pericholecystic inflammatory changes identified  OSSEOUS STRUCTURES:  No acute fracture or destructive osseous lesion  Impression: Few small nonocclusive chronic emboli bilaterally  No evidence of acute embolus  Measured RV/LV ratio is within normal limits at less than 0 9     I personally discussed this study with Gertha Rinne on 1/16/2020 at 8:45 PM  Workstation performed: [id number] Vas Lower Limb Venous Duplex Study, Complete Bilateral    Result Date: 1/16/2020  Narrative:  [facility] REPORT CLINICAL: Indications: Bilateral Localized edema [R60 0]  Patient presents with bilateral lower extremity edema in the left leg more than the right since being released from the hospital on 1/9/20  Operative History: Brain Surgery Risk Factors The patient has history of HTN and CKD  She has no history of Diabetes, HLD, DVT or Recent Trauma  FINDINGS:  Segment     Right            Left                    Impression       Impression          CFV         Normal (Patent)                      FV Mid                       Thrombosed (acute)  FV Dist                      Thrombosed (acute)  Popliteal                    Thrombosed (acute)  PostTibial                   Thrombosed (acute)     CONCLUSION: Impression: RIGHT LOWER LIMB: No evidence of acute or chronic deep vein thrombosis  No evidence of superficial thrombophlebitis noted  Doppler evaluation shows a normal response to augmentation maneuvers  Popliteal, posterior tibial and anterior tibial arterial Doppler waveforms are triphasic  LEFT LOWER LIMB: Abnormal There is evidence of acute occlusive deep vein thrombosis from the mid to distal femoral vein, popliteal vein and one of the posterior tibial veins in the proximal calf  No evidence of superficial thrombophlebitis noted  Doppler evaluation shows a normal response to augmentation maneuvers  Popliteal, posterior tibial and anterior tibial arterial Doppler waveforms are triphasic  Technical findings were given to Dr Day Cohen at time of exam  Patient was advised to go to the Emergency Department  SIGNATURE: Electronically Signed by: Toyin Bennett MD, RPVI on 2020-01-16 08:38:16 PM    Ct Abdomen Pelvis W Contrast    Result Date: 1/10/2020  Narrative: CT ABDOMEN AND PELVIS WITH IV CONTRAST INDICATION:   Nausea/vomiting   COMPARISON:  CT 6/16/2015 TECHNIQUE:  CT examination of the abdomen and pelvis was performed  Axial, sagittal, and coronal 2D reformatted images were created from the source data and submitted for interpretation  Radiation dose length product (DLP) for this visit:  480 mGy-cm   This examination, like all CT scans performed in the [facility], was performed utilizing techniques to minimize radiation dose exposure, including the use of iterative reconstruction and automated exposure control  IV Contrast:  100 mL of iohexol (OMNIPAQUE) Enteric Contrast:  Enteric contrast was administered  FINDINGS: ABDOMEN LOWER CHEST:  No clinically significant abnormality identified in the visualized lower chest  LIVER/BILIARY TREE:  Unremarkable  GALLBLADDER:  Cholelithiasis  SPLEEN:  Unremarkable  PANCREAS: Fat infiltration within the pancreaticoduodenal groove consistent with acute interstitial pancreatitis  ADRENAL GLANDS:  Unremarkable  KIDNEYS/URETERS:  Stable findings of mild right renal malrotation and marked dilation of the extrarenal pelvis likely on the basis of UPJ obstruction  No suspicious renal lesion  Stable small left renal upper pole cyst  STOMACH AND BOWEL:  No acute inflammatory changes  No disproportionate dilation of the small bowel  Moderate colonic stool burden  APPENDIX:  A normal appendix was visualized  ABDOMINOPELVIC CAVITY:  No ascites or free intraperitoneal air  No lymphadenopathy  VESSELS:  Unremarkable for patient's age  PELVIS REPRODUCTIVE ORGANS:  Unremarkable for patient's age  URINARY BLADDER:  Unremarkable  ABDOMINAL WALL/INGUINAL REGIONS:  Unremarkable  OSSEOUS STRUCTURES:  No acute fracture or destructive osseous lesion  Impression: Fat infiltration within the pancreaticoduodenal groove consistent with acute interstitial pancreatitis  Cholelithiasis without findings of acute cholecystitis  Moderate colonic stool burden   Stable findings of mild right renal malrotation and marked dilation of the extrarenal pelvis likely on the basis of UPJ obstruction  Workstation performed: [id number]       The patient was seen and examined by Dr Kavitha Garcia, all key medical decisions were made with Dr Kavitha Garcia  Thank you for allowing us to participate in the care of this pleasant patient  We will follow up with you closely

## 2020-01-17 NOTE — ASSESSMENT & PLAN NOTE
· Hemoglobin at 7 1, POA  · Hemoglobin today 6 8   · Likely dilutional as hemoglobin has been stable since last admission, but cannot r/o slow GI bleed  · was consented for RBC, and type and cross matched  Spoke with GI, will transfuse 1U RBC, repeat H+H 2-3 hours post transfusion  Repeat CBC in the morning  · Suspected MARCIA vs GI loss as patient reports h/o bloody bowel movements  · She has not had a bowel movement in the last 10 days  · GI consult for GI bleed eval  EGD/colonoscopy to be performed 1/20  · Following Diet recommendations as per GI  · Diet 1/17: nonulceronogenic  · Diet 1/18 and 1/19: clear liquids, with bowel prep  · Diet 1/19: NPO at midnight  · Continue with iron supplementation  · Transfuse for hemoglobin level less than 7

## 2020-01-17 NOTE — ASSESSMENT & PLAN NOTE
· TSH noted at 6 8  · Patient continues to be asymptomatic  · Patient is not on levothyroxine  · Continue to monitor for now  · Will require TSH repeat in an outpatient setting

## 2020-01-17 NOTE — PLAN OF CARE
Problem: NEUROSENSORY - ADULT  Goal: Achieves maximal functionality and self care  Description  INTERVENTIONS  - Monitor swallowing and airway patency with patient fatigue and changes in neurological status  - Encourage and assist patient to increase activity and self care     - Encourage visually impaired, hearing impaired and aphasic patients to use assistive/communication devices  Outcome: Progressing     Problem: CARDIOVASCULAR - ADULT  Goal: Maintains optimal cardiac output and hemodynamic stability  Description  INTERVENTIONS:  - Monitor I/O, vital signs and rhythm  - Monitor for S/S and trends of decreased cardiac output  - Administer and titrate ordered vasoactive medications to optimize hemodynamic stability  - Assess quality of pulses, skin color and temperature  - Assess for signs of decreased coronary artery perfusion  - Instruct patient to report change in severity of symptoms  Outcome: Progressing  Goal: Absence of cardiac dysrhythmias or at baseline rhythm  Description  INTERVENTIONS:  - Continuous cardiac monitoring, vital signs, obtain 12 lead EKG if ordered  - Administer antiarrhythmic and heart rate control medications as ordered  - Monitor electrolytes and administer replacement therapy as ordered  Outcome: Progressing     Problem: GASTROINTESTINAL - ADULT  Goal: Minimal or absence of nausea and/or vomiting  Description  INTERVENTIONS:  - Administer IV fluids if ordered to ensure adequate hydration  - Maintain NPO status until nausea and vomiting are resolved  - Nasogastric tube if ordered  - Administer ordered antiemetic medications as needed  - Provide nonpharmacologic comfort measures as appropriate  - Advance diet as tolerated, if ordered  - Consider nutrition services referral to assist patient with adequate nutrition and appropriate food choices  Outcome: Progressing  Goal: Maintains or returns to baseline bowel function  Description  INTERVENTIONS:  - Assess bowel function  - Encourage oral fluids to ensure adequate hydration  - Administer IV fluids if ordered to ensure adequate hydration  - Administer ordered medications as needed  - Encourage mobilization and activity  - Consider nutritional services referral to assist patient with adequate nutrition and appropriate food choices  Outcome: Progressing  Goal: Maintains adequate nutritional intake  Description  INTERVENTIONS:  - Monitor percentage of each meal consumed  - Identify factors contributing to decreased intake, treat as appropriate  - Assist with meals as needed  - Monitor I&O, weight, and lab values if indicated  - Obtain nutrition services referral as needed  Outcome: Progressing  Goal: Establish and maintain optimal ostomy function  Description  INTERVENTIONS:  - Assess bowel function  - Encourage oral fluids to ensure adequate hydration  - Administer IV fluids if ordered to ensure adequate hydration   - Administer ordered medications as needed  - Encourage mobilization and activity  - Nutrition services referral to assist patient with appropriate food choices  - Assess stoma site  - Consider wound care consult   Outcome: Progressing     Problem: GENITOURINARY - ADULT  Goal: Maintains or returns to baseline urinary function  Description  INTERVENTIONS:  - Assess urinary function  - Encourage oral fluids to ensure adequate hydration if ordered  - Administer IV fluids as ordered to ensure adequate hydration  - Administer ordered medications as needed  - Offer frequent toileting  - Follow urinary retention protocol if ordered  Outcome: Progressing  Goal: Absence of urinary retention  Description  INTERVENTIONS:  - Assess patients ability to void and empty bladder  - Monitor I/O  - Bladder scan as needed  - Discuss with physician/AP medications to alleviate retention as needed  - Discuss catheterization for long term situations as appropriate  Outcome: Progressing  Goal: Urinary catheter remains patent  Description  INTERVENTIONS:  - Assess patency of urinary catheter  - If patient has a chronic camacho, consider changing catheter if non-functioning  - Follow guidelines for intermittent irrigation of non-functioning urinary catheter  Outcome: Progressing     Problem: METABOLIC, FLUID AND ELECTROLYTES - ADULT  Goal: Electrolytes maintained within normal limits  Description  INTERVENTIONS:  - Monitor labs and assess patient for signs and symptoms of electrolyte imbalances  - Administer electrolyte replacement as ordered  - Monitor response to electrolyte replacements, including repeat lab results as appropriate  - Instruct patient on fluid and nutrition as appropriate  Outcome: Progressing  Goal: Fluid balance maintained  Description  INTERVENTIONS:  - Monitor labs   - Monitor I/O and WT  - Instruct patient on fluid and nutrition as appropriate  - Assess for signs & symptoms of volume excess or deficit  Outcome: Progressing     Problem: SKIN/TISSUE INTEGRITY - ADULT  Goal: Skin integrity remains intact  Description  INTERVENTIONS  - Identify patients at risk for skin breakdown  - Assess and monitor skin integrity  - Assess and monitor nutrition and hydration status  - Monitor labs (i e  albumin)  - Assess for incontinence   - Turn and reposition patient  - Assist with mobility/ambulation  - Relieve pressure over bony prominences  - Avoid friction and shearing  - Provide appropriate hygiene as needed including keeping skin clean and dry  - Evaluate need for skin moisturizer/barrier cream  - Collaborate with interdisciplinary team (i e  Nutrition, Rehabilitation, etc )   - Patient/family teaching  Outcome: Progressing  Goal: Oral mucous membranes remain intact  Description  INTERVENTIONS  - Assess oral mucosa and hygiene practices  - Implement preventative oral hygiene regimen  - Implement oral medicated treatments as ordered  - Initiate Nutrition services referral as needed  Outcome: Progressing     Problem: HEMATOLOGIC - ADULT  Goal: Maintains hematologic stability  Description  INTERVENTIONS  - Assess for signs and symptoms of bleeding or hemorrhage  - Monitor labs  - Administer supportive blood products/factors as ordered and appropriate  Outcome: Progressing     Problem: PAIN - ADULT  Goal: Verbalizes/displays adequate comfort level or baseline comfort level  Description  Interventions:  - Encourage patient to monitor pain and request assistance  - Assess pain using appropriate pain scale  - Administer analgesics based on type and severity of pain and evaluate response  - Implement non-pharmacological measures as appropriate and evaluate response  - Consider cultural and social influences on pain and pain management  - Notify physician/advanced practitioner if interventions unsuccessful or patient reports new pain  Outcome: Progressing     Problem: INFECTION - ADULT  Goal: Absence or prevention of progression during hospitalization  Description  INTERVENTIONS:  - Assess and monitor for signs and symptoms of infection  - Monitor lab/diagnostic results  - Monitor all insertion sites, i e  indwelling lines, tubes, and drains  - Monitor endotracheal if appropriate and nasal secretions for changes in amount and color  - [place] appropriate cooling/warming therapies per order  - Administer medications as ordered  - Instruct and encourage patient and family to use good hand hygiene technique  - Identify and instruct in appropriate isolation precautions for identified infection/condition  Outcome: Progressing  Goal: Absence of fever/infection during neutropenic period  Description  INTERVENTIONS:  - Monitor WBC    Outcome: Progressing     Problem: SAFETY ADULT  Goal: Patient will remain free of falls  Description  INTERVENTIONS:  - Assess patient frequently for physical needs  -  Identify cognitive and physical deficits and behaviors that affect risk of falls    -  [place] fall precautions as indicated by assessment   - Educate patient/family on patient safety including physical limitations  - Instruct patient to call for assistance with activity based on assessment  - Modify environment to reduce risk of injury  - Consider OT/PT consult to assist with strengthening/mobility  Outcome: Progressing  Goal: Maintain or return to baseline ADL function  Description  INTERVENTIONS:  -  Assess patient's ability to carry out ADLs; assess patient's baseline for ADL function and identify physical deficits which impact ability to perform ADLs (bathing, care of mouth/teeth, toileting, grooming, dressing, etc )  - Assess/evaluate cause of self-care deficits   - Assess range of motion  - Assess patient's mobility; develop plan if impaired  - Assess patient's need for assistive devices and provide as appropriate  - Encourage maximum independence but intervene and supervise when necessary  - Involve family in performance of ADLs  - Assess for home care needs following discharge   - Consider OT consult to assist with ADL evaluation and planning for discharge  - Provide patient education as appropriate  Outcome: Progressing  Goal: Maintain or return mobility status to optimal level  Description  INTERVENTIONS:  - Assess patient's baseline mobility status (ambulation, transfers, stairs, etc )    - Identify cognitive and physical deficits and behaviors that affect mobility  - Identify mobility aids required to assist with transfers and/or ambulation (gait belt, sit-to-stand, lift, walker, cane, etc )  - [place] fall precautions as indicated by assessment  - Record patient progress and toleration of activity level on Mobility SBAR; progress patient to next Phase/Stage  - Instruct patient to call for assistance with activity based on assessment  - Consider rehabilitation consult to assist with strengthening/weightbearing, etc   Outcome: Progressing     Problem: DISCHARGE PLANNING  Goal: Discharge to home or other facility with appropriate resources  Description  INTERVENTIONS:  - Identify barriers to discharge w/patient and caregiver  - Arrange for needed discharge resources and transportation as appropriate  - Identify discharge learning needs (meds, wound care, etc )  - Arrange for interpretive services to assist at discharge as needed  - Refer to Case Management Department for coordinating discharge planning if the patient needs post-hospital services based on physician/advanced practitioner order or complex needs related to functional status, cognitive ability, or social support system  Outcome: Progressing

## 2020-01-17 NOTE — PHYSICAL THERAPY NOTE
PHYSICAL THERAPY TREATMENT NOTE    Patient Name: Rocky APONTE Date: 1/17/2020 01/17/20 1438   Pain Assessment   Pain Assessment No/denies pain   Restrictions/Precautions   Other Precautions Multiple lines; Fall Risk   General   Chart Reviewed Yes   Family/Caregiver Present No   Cognition   Arousal/Participation Cooperative   Attention Attends with cues to redirect   Orientation Level Oriented to person;Oriented to place; Other (Comment)  (pt was identified w/ full name, brith date)   Following Commands Follows one step commands with increased time or repetition   Subjective   Subjective pt agreed to participate in PT intervention  Bed Mobility   Additional Comments gait speed (w/ single point cane): 0 37 m/s   Transfers   Sit to Stand 5  Supervision   Additional items Increased time required   Stand to Sit 5  Supervision   Additional items Increased time required   Ambulation/Elevation   Gait pattern [place] flexed   Gait Assistance 5  Supervision   Additional items Verbal cues  (for cane placement, posture)   Assistive Device SPC   Distance 120, 100 feet w/ seated rest break x 2 minutes  (additional not possible due to fatigue)   Balance   Static Sitting Fair +   Static Standing Fair   Ambulatory Fair -  (w/ cane)   Activity Tolerance   Activity Tolerance Patient limited by fatigue   Nurse Made Aware spoke to Kathryn Saeed CM   Assessment   Prognosis Fair   Problem List Decreased strength;Decreased endurance; Impaired balance;Decreased mobility; Decreased coordination;Decreased safety awareness  (edema of extremities)   Assessment Therapist introduced cane use w/ ambulation to improve gait stability  Pt was noted to have improvement w/ use of cane w/ improved gait speed, increased ambulation distance and decreased level of assist needed to maintain safety  Pt continues to be at risk for falls   continued inpatient PT tx is indicated to reduce fall risk  Goals   Patient Goals go home   STG Expiration Date 01/27/20   Short Term Goal #1 pt will: Increase bilateral LE strength 1/2 grade to facilitate independent mobility, Perform all bed mobility tasks independently to decrease fall risk factors, Perform all transfers independently to improve independence, Ambulate 300 ft  with least restrictive assistive device independently w/o LOB to expedite safe return home, Navigate 4 stairs independently with unilateral handrail to facilitate return to previous living environment, Increase ambulatory balance 1 grade to decrease risk for falls, Complete exercise program independently, Tolerate 3 hr OOB to faciliate upright tolerance, Improve gait speed to 0 41 m/s to increase independence and Improve Barthel Index score to 90 or greater to facilitate independence   PT Treatment Day 1   Plan   Treatment/Interventions Functional transfer training;LE strengthening/ROM; Elevations; Therapeutic exercise; Endurance training;Patient/family training;Equipment eval/education; Bed mobility;Gait training   Progress Progressing toward goals   PT Frequency 5x/wk   Recommendation   Recommendation Home PT   Equipment Recommended Cane     Skilled inpatient PT recommended while in hospital to progress pt toward treatment goals      Brian Buckley, PT

## 2020-01-17 NOTE — PROGRESS NOTES
Progress Note - Gideon Essex 1960, 64 y o  female MRN: [id number]    Unit/Bed#: S -01 Encounter: [id number]    Primary Care Provider: Roger Blanca MD   Date and time admitted to hospital: 1/16/2020  5:05 PM        * Acute deep vein thrombosis (DVT) of lower extremity ([address] )  Assessment & Plan  · Patient presents to the ED after she was noted to have asymmetrical bilateral lower leg swelling, more on the left than on the right with associated calf tenderness   · Likely 2/2 prolonged immobilization; in hospital for a week, then was sedentary at home  Non-smoker, not on hormones, no h/o DVT  At the time patient was not started on VTE prophylaxis as she was deemed to be a low risk as per the VTE screening  · Doppler ultrasound demonstrated acute occlusive deep vein thrombosis from the mid to distal femoral vein, popliteal vein and one of the posterior tibial veins in the proximal calf  · CT PE not remarkalbe for acute PE    Plan  · Heparin drip  · Maintain INR between 40 and 60  · Monitor for bleeding  Anemia  Assessment & Plan  · Hemoglobin at 7 1, POA  · Hemoglobin today 6 8   · Likely dilutional as hemoglobin has been stable since last admission, but cannot r/o slow GI bleed  · was consented for RBC, and type and cross matched  Spoke with GI, will transfuse 1U RBC, repeat H+H 2-3 hours post transfusion  Repeat CBC in the morning  · Suspected MARCIA vs GI loss as patient reports h/o bloody bowel movements  · She has not had a bowel movement in the last 10 days  · GI consult for GI bleed eval  EGD/colonoscopy to be performed 1/20  · Following Diet recommendations as per GI  · Diet 1/17: nonulceronogenic  · Diet 1/18 and 1/19: clear liquids, with bowel prep  · Diet 1/19: NPO at midnight  · Continue with iron supplementation  · Transfuse for hemoglobin level less than 7  Hypertension  Assessment & Plan  · IV hydralazine for systolic blood pressure more than 180    · Continue home prazosin  · Monitor blood pressure  Cholelithiasis  Assessment & Plan  · Patient noted to have cholelithiasis on last admission  · She is asymptomatic for this at the moment; LFTs wnl  · Was supposed to follow-up with surgery for an elective cholecystectomy on   · Consider consulting surgery to have surgery done while inpatient      Depression  Assessment & Plan  Continue with home medication  Constipation  Assessment & Plan  · Patient has not had a bowel movement in 10 days  Multifactorial; on iron supplementaion, has had poor appetite and been relatively sedentary  Rectal exam done on admission, no apparent stool in rectal vault  Abdomen non-tender on exam   · Patient initiated on bowel regimen; continue to follow with GI      Hypothyroidism  Assessment & Plan  · TSH noted at 6 8, which is elevated  Will get thyroid levels, as hypothyroid can contribute to constipation  · Patient is not on levothyroxine  VTE Pharmacologic Prophylaxis:   Pharmacologic: Heparin Drip  Mechanical VTE Prophylaxis in Place: No    Discussions with Specialists or Other Care Team Provider:  Discussed plan with attending, , nursing, GI    Education and Discussions with Family / Patient:  Discussed plan with patient and daughter    Current Length of Stay: 1 day(s)    Current Patient Status: Inpatient     Discharge Plan / Estimated Discharge Date:  TBD    Code Status: Level 1 - Full Code      Subjective:   Patient reports no acute events overnight  Still she still has some swelling of the left and right leg, however this has gone down since yesterday  Denies fevers, chills, chest pain, shortness of breath, nausea, diarrhea  Patient does admit to constipation, she has not had a bowel movement in about 10 days  Admits to only mild abdominal tenderness  Patient did have episodes of vomiting at home, but none here      Objective:     Vitals:   Temp (24hrs), Av 6 °F (37 °C), Min:98 1 °F (36 7 °C), Max:99 1 °F (37 3 °C)    Temp:  [98 1 °F (36 7 °C)-99 1 °F (37 3 °C)] 98 8 °F (37 1 °C)  HR:  [78-88] 82  Resp:  [14-19] 19  BP: (132-222)/(68-94) 160/82  SpO2:  [97 %-100 %] 97 %  Body mass index is 24 09 kg/m²  Input and Output Summary (last 24 hours):     No intake or output data in the 24 hours ending 01/17/20 1147    Physical Exam:     Physical Exam   Constitutional: She appears well-developed and well-nourished  No distress  Appears pale  HENT:   Head: Normocephalic and atraumatic  Right Ear: External ear normal    Left Ear: External ear normal    Nose: Nose normal    Eyes: Conjunctivae are normal  Right eye exhibits no discharge  Left eye exhibits no discharge  No scleral icterus  Neck: Normal range of motion  Neck supple  Cardiovascular: Normal rate, regular rhythm, normal heart sounds and intact distal pulses  No murmur heard  Pulmonary/Chest: Effort normal and breath sounds normal  No stridor  No respiratory distress  She has no wheezes  She has no rales  Abdominal: Soft  Bowel sounds are normal  There is tenderness (mild generalized tenderness to palpation )  There is no rebound and no guarding  Musculoskeletal: She exhibits edema (2+ of the LLE, primarily in ankles and lower half of the calf  1+pitting edema in the R ahsan  )  She exhibits no tenderness  Neurological: She is alert  Skin: Skin is warm  Capillary refill takes less than 2 seconds  No rash noted  She is not diaphoretic  There is pallor  Psychiatric: She has a normal mood and affect  Her behavior is normal    Nursing note and vitals reviewed          Additional Data:     Labs:    Results from last 7 days   Lab Units 01/17/20  0911 01/17/20  0629   WBC Thousand/uL  --  4 13*   HEMOGLOBIN g/dL 6 7* 6 8*   HEMATOCRIT % 22 2* 21 9*   PLATELETS Thousands/uL  --  215   NEUTROS PCT %  --  53   LYMPHS PCT %  --  32   MONOS PCT %  --  9   EOS PCT %  --  4     Results from last 7 days   Lab Units 01/17/20  0629 01/16/20  1554 POTASSIUM mmol/L 3 9 4 1   CHLORIDE mmol/L 111* 109*   CO2 mmol/L 24 26   BUN mg/dL 10 10   CREATININE mg/dL 0 72 0 70   CALCIUM mg/dL 8 5 8 9   ALK PHOS U/L  --  99   ALT U/L  --  39   AST U/L  --  40     Results from last 7 days   Lab Units 01/17/20  0911   INR  0 99       * I Have Reviewed All Lab Data Listed Above  * Additional Pertinent Lab Tests Reviewed: [address] Admission Reviewed    Imaging:    Imaging Reports Reviewed Today Include: CT chest PE studey, VAS lower limb venous duplex study  Imaging Personally Reviewed by Myself Includes:  none    Recent Cultures (last 7 days):           Last 24 Hours Medication List:     Current Facility-Administered Medications:  acetaminophen 650 mg Oral Q6H PRN Jaylan Marquez MD    benzonatate 100 mg Oral TID PRN Harry Guajardo DO    benztropine 0 5 mg Oral HS Jaylan Marquez MD    docusate sodium 100 mg Oral BID Jaylan Marquez MD    ferrous sulfate 325 mg Oral Every Other Day Jaylan Marquez MD    gabapentin 300 mg Oral BID Jaylan Marquez MD    guaiFENesin 600 mg Oral Q12H [address] Harry Guajardo DO    heparin (porcine) 3-30 Units/kg/hr (Order-Specific) Intravenous Titrated Jaylan Marquez MD Last Rate: 13 Units/kg/hr (01/17/20 0919)   heparin (porcine) 2,800 Units Intravenous PRN Jaylan Marquez MD    heparin (porcine) 5,600 Units Intravenous PRN Jaylan Marquez MD    hydrALAZINE 5 mg Intravenous Q6H PRN Jaylan Marquez MD    mirtazapine 45 mg Oral HS Jaylan Marquez MD    polyethylene glycol 17 g Oral Daily Harry Guajardo DO    prazosin 4 mg Oral HS Jaylan Marquez MD    risperiDONE 3 mg Oral HS Jaylan Marquez MD         Today, Patient Was Seen By: Harry Guajardo DO    ** Please Note: This note has been constructed using a voice recognition system   **

## 2020-01-17 NOTE — UTILIZATION REVIEW
Initial Clinical Review    Admission: Date/Time/Statement: Inpatient Admission Orders (From admission, onward)     Ordered        01/16/20 1833  Inpatient Admission (expected length of stay for this patient Order details is greater than two midnights)  Once                   Orders Placed This Encounter   Procedures    Inpatient Admission (expected length of stay for this patient Order details is greater than two midnights)     Standing Status:   Standing     Number of Occurrences:   1     Order Specific Question:   Admitting Physician     Answer:   Shaneka Alamo [[id number]]     Order Specific Question:   Level of Care     Answer:   Med Surg [16]     Order Specific Question:   Estimated length of stay     Answer:   More than 2 Midnights     Order Specific Question:   Certification     Answer:   I certify that inpatient services are medically necessary for this patient for a duration of greater than two midnights  See H&P and MD Progress Notes for additional information about the patient's course of treatment  ED Arrival Information     Expected Arrival Acuity Means of Arrival Escorted By Service Admission Type    - 1/16/2020 16:58 Urgent Wheelchair Family Member Hospitalist Urgent    Arrival Complaint    Abnormal findings on diagnostic imaging of limbs [R93 6] (POSS BLOOD CLOTS)        Chief Complaint   Patient presents with    Evaluation of Abnormal Diagnostic Test     Pt referred to ED for further evaluation of U/S on b/l legs  Assessment/Plan: 65 yo female to ED on PCP advise admitted as Inpatient due to DVT  Patient with worsening bilateral lower leg edema Left>right with calf tenderness  Patient reported to PCP who noted patient with deep vein thrombosis on Doppler ultrasound  Symptoms include left lower leg pain with shortness of breath, non productive cough and palpitation  Reports last BM, she had bright red blood in stool 10 days prior   Patient had been hospitalized for 6 days only DC 2 days prior to presentation  IN ED: patient begun with IV anticoagulation  Cont Heparin drip, monitor INR & rule out PE  MD exam: left lower leg tenderness extending to mid thigh with edema   Inpatient labs with HGB+7 1- consult gastroenterology, NPO, trend H&H Q 8hr, transfuse for HGB<7          ED Triage Vitals   Temperature Pulse Respirations Blood Pressure SpO2   01/16/20 1707 01/16/20 1707 01/16/20 1707 01/16/20 1707 01/16/20 1707   98 8 °F (37 1 °C) 84 14 150/74 100 %      Temp Source Heart Rate Source Patient Position - Orthostatic VS BP Location FiO2 (%)   01/16/20 1707 01/16/20 1905 01/16/20 1905 01/16/20 1905 --   Oral Monitor Lying Left arm       Pain Score       01/16/20 1707       No Pain        Wt Readings from Last 1 Encounters:   01/16/20 69 8 kg (153 lb 12 8 oz)     Additional Vital Signs:   Date/Time  Temp  Pulse  Resp  BP  SpO2  O2 Device  Patient Position - Orthostatic VS   01/17/20 1126  98 8 °F (37 1 °C)  82  19  160/82  --  --  Lying   01/17/20 1109  98 3 °F (36 8 °C)  88  18  156/80  --  --  --   01/17/20 0658  98 3 °F (36 8 °C)  78  19  142/68  97 %  --  Lying   01/16/20 2227  99 1 °F (37 3 °C)  84  17  162/80  99 %  None (Room air)  Lying   01/16/20 1916  99 1 °F (37 3 °C)  84  17  184/94Abnormal   100 %  None (Room air)  Sitting   01/16/20 1905  --  86  16  222/84Abnormal   100 %  None (Room air)  Lying   01/16/20 1707  98 8 °F (37 1 °C)  84  14  150/74  100 %  None (Room air)  --      Weights (last 14 days)     Date/Time  Weight  Weight Method  Height   01/16/20 1916  69 8 kg (153 lb 12 8 oz)  Standing scale  5' 7" (1 702 m)   01/16/20 1707  71 kg (156 lb 8 4 oz)  Bed scale  --       Pertinent Labs/Diagnostic Test Results:   Results from last 7 days   Lab Units 01/17/20  0911 01/17/20  0629 01/17/20  0047 01/16/20  1749 01/14/20  0549   WBC Thousand/uL  --  4 13*  --  5 26 4 95   HEMOGLOBIN g/dL 6 7* 6 8* 7 1* 7 1* 7 3*   HEMATOCRIT % 22 2* 21 9* 24 0* 23 6* 23 2*   PLATELETS Thousands/uL  --  215  -- 256 252   NEUTROS ABS Thousands/µL  --  2 25  --  3 57 2 99         Results from last 7 days   Lab Units 01/17/20  0629 01/16/20  1554 01/14/20  0549 01/13/20  1313 01/13/20  0550 01/12/20  0927 01/11/20  2031 01/11/20  1013   SODIUM mmol/L 142 143  --   --  144 143  --  139   POTASSIUM mmol/L 3 9 4 1  --   --  3 9 4 0  --  3 1*   CHLORIDE mmol/L 111* 109*  --   --  115* 113*  --  108   CO2 mmol/L 24 26  --   --  21 20*  --  20*   ANION GAP mmol/L 7 8  --   --  8 10  --  11   BUN mg/dL 10 10  --   --  6 8  --  7   CREATININE mg/dL 0 72 0 70  --   --  0 67 0 70  --  0 87   EGFR ml/min/1 73sq m 93 96  --   --  98 96  --  74   CALCIUM mg/dL 8 5 8 9  --   --  6 8* 7 0*  --  7 3*   MAGNESIUM mg/dL 1 6  --  1 3* 1 7  --  1 5*  --  1 5*   PHOSPHORUS mg/dL 4 2  --  2 0*  --  2 0* 1 6* 1 4* 1 3*     Results from last 7 days   Lab Units 01/16/20  1554 01/13/20  0550 01/12/20  0927 01/11/20  1013   AST U/L 40 11 32 95*   ALT U/L 39 52 79* 137*   ALK PHOS U/L 99 79 88 102   TOTAL PROTEIN g/dL 6 0* 4 9* 5 2* 5 8*   ALBUMIN g/dL 2 3* 2 0* 2 2* 2 7*   TOTAL BILIRUBIN mg/dL 0 16* 0 34 0 42 0 45         Results from last 7 days   Lab Units 01/17/20  0629 01/16/20  1554 01/13/20  0550 01/12/20  0927 01/11/20  1013   GLUCOSE RANDOM mg/dL 93 97 90 83 99               Results from last 7 days   Lab Units 01/17/20  0911 01/17/20  0828 01/17/20  0047 01/16/20  1749   PROTIME seconds 12 5  --   --  12 2   INR  0 99  --   --  0 96   PTT seconds  --  93* 201* 30     Results from last 7 days   Lab Units 01/16/20  1554   TSH 3RD GENERATON uIU/mL 6 842*       Results from last 7 days   Lab Units 01/16/20  1554   NT-PRO BNP pg/mL 451*       1/16/2020  Vas Lower Limb Venous Duplex Study, Complete Bilateral     FINDINGS:  Segment     Right            Left                    Impression       Impression          CFV         Normal (Patent)                      FV Mid                       Thrombosed (acute)  FV Dist                      Thrombosed (acute)  Popliteal                    Thrombosed (acute)  PostTibial                   Thrombosed (acute)     CONCLUSION: RIGHT LOWER LIMB: No evidence of acute or chronic deep vein thrombosis  No evidence of superficial thrombophlebitis noted  Doppler evaluation shows a normal response to augmentation maneuvers  Popliteal, posterior tibial and anterior tibial arterial Doppler waveforms are triphasic  LEFT LOWER LIMB: Abnormal There is evidence of acute occlusive deep vein thrombosis from the mid to distal femoral vein, popliteal vein and one of the posterior tibial veins in the proximal calf  No evidence of superficial thrombophlebitis noted  Doppler evaluation shows a normal response to augmentation maneuvers  Popliteal, posterior tibial and anterior tibial arterial Doppler waveforms are triphasic    1/16/2020 CTA chest w contrast:  Few small nonocclusive chronic emboli bilaterally   No evidence of acute embolus  Measured RV/LV ratio is within normal limits at less than 0 9      ED Treatment:   Medication Administration from 01/16/2020 1658 to 01/16/2020 1915       Date/Time Order Dose Route Action     01/16/2020 1856 heparin (porcine) injection 5,600 Units 5,600 Units Intravenous Given     01/16/2020 1856 heparin (porcine) 25,000 units in 250 mL infusion (premix) 18 Units/kg/hr Intravenous New Bag        Past Medical History:   Diagnosis Date    IRINEO (acute kidney injury) ([facility] ) 1/9/2020    Anemia 1/9/2020    Disease of thyroid gland     Hypertension     Psychiatric disorder      Present on Admission:   Bilateral edema of lower extremity   Constipation   Depression   Cholelithiasis   Anemia   Headache   Hypothyroidism   Hypertension    Admitting Diagnosis: Anemia [D64 9]  Constipation [K59 00]  Abnormal findings on diagnostic imaging of limbs [R93 6]  Acute deep vein thrombosis (DVT) of femoral vein of left lower extremity ([facility] ) [I82 412]  Age/Sex: 64 y o  female  Admission Orders:  IP CONSULT TO GASTROENTEROLOGY  Diet non ulcerogenic lo fat  HGB&HCT Q 8hr  Activity as tolerated    Scheduled Medications:    Medications:  benztropine 0 5 mg Oral HS   docusate sodium 100 mg Oral BID   ferrous sulfate 325 mg Oral Every Other Day   gabapentin 300 mg Oral BID   guaiFENesin 600 mg Oral Q12H ADALBERTO   mirtazapine 45 mg Oral HS   polyethylene glycol 17 g Oral Daily   prazosin 4 mg Oral HS   risperiDONE 3 mg Oral HS     Continuous IV Infusions:    heparin (porcine) 3-30 Units/kg/hr (Order-Specific) Intravenous Titrated     PRN Meds:    acetaminophen 650 mg Oral Q6H PRN   benzonatate 100 mg Oral TID PRN   heparin (porcine) 2,800 Units Intravenous PRN   heparin (porcine) 5,600 Units Intravenous PRN   hydrALAZINE 5 mg Intravenous Q6H PRN     Network Utilization Review Department  [email or web address]  ATTENTION: Please call with any questions or concerns to [phone] and carefully listen to the prompts so that you are directed to the right person  All voicemails are confidential   Mallory Yeyo all requests for admission clinical reviews, approved or denied determinations and any other requests to dedicated fax number below belonging to the campus where the patient is receiving treatment   List of dedicated fax numbers for the Facilities:  [address] [place] [address] DENIALS (Administrative/Medical Necessity) [phone]   [address] [place] [address] (Maternity/NICU/Pediatrics) [phone]   Jennifer Meza [phone]   Anila Gould [phone]   [facility] [phone]   Adam Lynn [phone]   [address] [facility] [address] [facility] [phone]   [facility]  [phone]   [address] [facility]  [address] [facility] [address] [facility] [phone]

## 2020-01-17 NOTE — PHYSICAL THERAPY NOTE
PHYSICAL THERAPY EVALUATION NOTE    Patient Name: Gisela Pantoja  SBRBB'[id number] Date: 1/17/2020  AGE:   64 y o  Mrn:   [id number]  ADMIT DX:  Anemia [D64 9]  Constipation [K59 00]  Abnormal findings on diagnostic imaging of limbs [R93 6]  Acute deep vein thrombosis (DVT) of femoral vein of left lower extremity (HCC) [I82 412]    Past Medical History:   Diagnosis Date    IRINEO (acute [facility]) ([facility] ) 1/9/2020    Anemia 1/9/2020    Disease of thyroid gland     Hypertension     Psychiatric disorder      Length Of Stay: 1  PHYSICAL THERAPY EVALUATION :   01/17/20 1428   Pain Assessment   Pain Assessment No/denies pain   Home Living   Type of [address] [place] [address] One level; Other (Comment)  (4 KACIE)   Additional Comments lives w/ son and daughter in law  needs assist w/ ADLs  ambulates w/o device  no DME  2 falls in last 6 months  Prior Function   Comments pt seen sitting in chair  agreed to PT eval  denied pain or dizziness  c/o feeling tired and weak  pt wants to go home  Restrictions/Precautions   Other Precautions Multiple lines; Fall Risk   General   Additional Pertinent History 1/17/20 at 00:47, PTT was 201 (critical high)  1/17/20 at 9:11, hemoglobin was 6 7 (received unit of blood prior to session)  Family/Caregiver Present No   Cognition   Arousal/Participation Cooperative   Orientation Level Oriented to person;Oriented to place; Other (Comment)  (pt was identified w/ full name, brith date)   Following Commands Follows one step commands with increased time or repetition   Comments room air resting pulse ox 96% and 79 BPM  2+ edema L UE and L LE    RUE Assessment   RUE Assessment WFL  (4-/5)   LUE Assessment   LUE Assessment WFL  (3+/5)   RLE Assessment   RLE Assessment WFL  (4-/5)   LLE Assessment   LLE Assessment WFL  (3+/5)   Coordination   Movements are Fluid and Coordinated 0   Coordination and Movement Description impaired coordination all extremities  Light Touch   RLE Light Touch Grossly intact   LLE Light Touch Grossly intact   Bed Mobility   Additional Comments gait speed (w/o assistive device): 0 28 m/s (< 0 4 m/s household ambulator)  Transfers   Sit to Stand 5  Supervision   Additional items Increased time required   Stand to Sit 5  Supervision   Additional items Increased time required   Additional Comments pt was unable to utilize steps  completed standing hip flexion 4x bilaterally w/ single railing and supervision  Ambulation/Elevation   Gait pattern Decreased foot clearance; Short stride; Excessively slow; Foward flexed   Gait Assistance 4  Minimal assist   Additional items Assist x 1;Verbal cues; Tactile cues  (for posture, full step length, breathing technique)   Assistive Device None   Distance 60, 80 feet w/ standing rest break x 1 minute  (additional not possible due to fatigue)   Stair Management Assistance   (see additional comments above)   Balance   Static Sitting Fair +   Static Standing Fair -   Ambulatory Poor +   Activity Tolerance   Activity Tolerance Patient limited by fatigue   Nurse Made Aware spoke to [facility]   Assessment   Prognosis Fair   Problem List Decreased strength;Decreased endurance; Impaired balance;Decreased mobility; Decreased coordination;Decreased safety awareness  (edema of extremities)   Assessment Pt was noted to have a deep vein thrombosis on Doppler ultrasound  Pt was seen today by the primary care provider who noted that the pt had bilateral lower leg edema more on the right leg than the right, with calf tenderness  Dx: DVTs L LE, anemia, and HTN  order placed for PT eval and tx, w/ activity order of up w/ A and activity as tolerated  pt presents w/ comorbidities of HTN, anemia, and brain surgery and personal factors of living in 2 story house, positive fall history, inability to perform IADLs and inability to perform ADLs   pt presents w/ weakness, decreased ROM, decreased endurance, impaired balance, gait deviations, impaired coordination, decreased safety awareness, fall risk and LE edema  these impairments are evident in findings from physical examination (weakness, impaired coordination and edema of extremities), mobility assessment (need for standby to min assist w/ all phases of mobility when usually mobilizing independently, tolerance to only 80 feet of ambulation and need for cueing for mobility technique), and Barthel Index: 65/100  pt needed input for task focus and mobility technique/safety  pt is at risk for falls due to physical and safety awareness deficits  pt's clinical presentation is unstable/unpredictable (evident in anemia, critical high PTT, need for assist w/ all phases of mobility when usually mobilizing independently, tolerance to only 80 feet of ambulation and need for input for mobility technique/safety)  pt needs inpatient PT tx to improve mobility deficits  discharge recommendation is for home PT to reduce fall risk and maximize level of functional independence  Goals   Patient Goals go home   STG Expiration Date 01/27/20   Short Term Goal #1 pt will:  Increase bilateral LE strength 1/2 grade to facilitate independent mobility, Perform all bed mobility tasks independently to decrease fall risk factors, Perform all transfers independently to improve independence, Ambulate 300 ft  with least restrictive assistive device independently w/o LOB to expedite safe return home, Navigate 4 stairs independently with unilateral handrail to facilitate return to previous living environment, Increase ambulatory balance 1 grade to decrease risk for falls, Complete exercise program independently, Tolerate 3 hr OOB to faciliate upright tolerance, Improve gait speed to 0 41 m/s to increase independence and Improve Barthel Index score to 90 or greater to facilitate independence   Plan   Treatment/Interventions Functional transfer training;LE strengthening/ROM; Elevations; Therapeutic exercise; Endurance training;Patient/family training;Equipment eval/education; Bed mobility;Gait training   PT Frequency 5x/wk   Recommendation   Recommendation Home PT   Barthel Index   Feeding 10   Bathing 0   Grooming Score 5   Dressing Score 5   Bladder Score 10   Bowels Score 10   Toilet Use Score 5   Transfers (Bed/Chair) Score 10   Mobility (Level Surface) Score 10   Stairs Score 0   Barthel Index Score 65     Skilled PT recommended while in hospital and upon DC to progress pt toward treatment goals       Tk Chandler, PT

## 2020-01-17 NOTE — PLAN OF CARE
Problem: PHYSICAL THERAPY ADULT  Goal: Performs mobility at highest level of function for planned discharge setting  See evaluation for individualized goals  Description  Treatment/Interventions: Functional transfer training, LE strengthening/ROM, Elevations, Therapeutic exercise, Endurance training, Patient/family training, Equipment eval/education, Bed mobility, Gait training          See flowsheet documentation for full assessment, interventions and recommendations  1/17/2020 1549 by Fahad Martinez PT  Outcome: Progressing  Note:   Prognosis: Fair  Problem List: Decreased strength, Decreased endurance, Impaired balance, Decreased mobility, Decreased coordination, Decreased safety awareness(edema of extremities)  Assessment: Therapist introduced cane use w/ ambulation to improve gait stability  Pt was noted to have improvement w/ use of cane w/ improved gait speed, increased ambulation distance and decreased level of assist needed to maintain safety  Pt continues to be at risk for falls  continued inpatient PT tx is indicated to reduce fall risk  Recommendation: Home PT          See flowsheet documentation for full assessment  1/17/2020 1543 by Fahad Martinez PT  Outcome: Progressing  Note:   Prognosis: Fair  Problem List: Decreased strength, Decreased endurance, Impaired balance, Decreased mobility, Decreased coordination, Decreased safety awareness(edema of extremities)  Assessment: Pt was noted to have a deep vein thrombosis on Doppler ultrasound  Pt was seen today by the primary care provider who noted that the pt had bilateral lower leg edema more on the right leg than the right, with calf tenderness  Dx: DVTs L LE, anemia, and HTN  order placed for PT eval and tx, w/ activity order of up w/ A and activity as tolerated   pt presents w/ comorbidities of HTN, anemia, and brain surgery and personal factors of living in 2 story house, positive fall history, inability to perform IADLs and inability to perform ADLs  pt presents w/ weakness, decreased ROM, decreased endurance, impaired balance, gait deviations, impaired coordination, decreased safety awareness, fall risk and LE edema  these impairments are evident in findings from physical examination (weakness, impaired coordination and edema of extremities), mobility assessment (need for standby to min assist w/ all phases of mobility when usually mobilizing independently, tolerance to only 80 feet of ambulation and need for cueing for mobility technique), and Barthel Index: 65/100  pt needed input for task focus and mobility technique/safety  pt is at risk for falls due to physical and safety awareness deficits  pt's clinical presentation is unstable/unpredictable (evident in anemia, critical high PTT, need for assist w/ all phases of mobility when usually mobilizing independently, tolerance to only 80 feet of ambulation and need for input for mobility technique/safety)  pt needs inpatient PT tx to improve mobility deficits  discharge recommendation is for home PT to reduce fall risk and maximize level of functional independence  Recommendation: Home PT          See flowsheet documentation for full assessment

## 2020-01-17 NOTE — ASSESSMENT & PLAN NOTE
· Patient noted to be normocytic normochromic on the last admission  · Hemoglobin currently at 7 1  · Patient was referred to GI for evaluation of GI bleed  · Patient reports that she had blood in his stool during last hospitalization  No bowel movement since then  · Remote history of upper GI bleed in May  · Will place patient on NPO  · GI consult for GI bleed eval  EGD/colonoscopy  · Will trend H&H Q 8 while patient is on heparin drip  · Continue with iron supplementation  · Transfuse for hemoglobin level less than 7   · Type and cross match

## 2020-01-17 NOTE — UTILIZATION REVIEW
Notification of Inpatient Admission/Inpatient Authorization Request   This is a Notification of Inpatient Admission for [facility]  Be advised that this patient was admitted to our facility under Inpatient Status  Contact Jolly Aguilar at [phone] for additional admission information  [facility] DE[facility]  DEDICATED [phone]  Patient Name:   Darrel Lobo   YOB: 1960       [address]   [address]:   Nathanael Gonzales  Tax ID: [id number]  NPI: [id number] Attending Provider/NPI: Lucinda Umana, [address]a [[id number]]   Attending Physician:  PAULETTE Ronquillo  Specialty- Hospitalist,   [facility] ID- [id number]  [address] [place] [address]  Phone 1: [phone]  Fax: [phone]   Place of Service Code: 24     Place of Service Name:  [address] [place] [address]   Start Date: 1/16/20 1833     Discharge Date & Time: No discharge date for patient encounter  Type of Admission: Inpatient Status Discharge Disposition   (if discharged): Home/Self Care   Patient Diagnoses: Anemia [D64 9]  Constipation [K59 00]  Abnormal findings on diagnostic imaging of limbs [R93 6]  Acute deep vein thrombosis (DVT) of femoral vein of left lower extremity ([facility] [address] ) [I82 412]     Orders: Admission Orders (From admission, onward)     Ordered        01/16/20 1833  Inpatient Admission (expected length of stay for this patient Order details is greater than two midnights)  Once                    Assigned Utilization Review Contact: Jolly Aguilar  Utilization   Network Utilization Review Department  Phone: [phone]; Fax [phone]  Email: [email or web address]   ATTENTION PAYERS: Please call the assigned Utilization  directly with any questions or concerns ALL voicemails in the department are confidential  Send all requests for admission clinical reviews, approved or denied determinations and any other requests to dedicated fax number belonging to the campus where the patient is receiving treatment

## 2020-01-17 NOTE — PLAN OF CARE
Problem: PHYSICAL THERAPY ADULT  Goal: Performs mobility at highest level of function for planned discharge setting  See evaluation for individualized goals  Description  Treatment/Interventions: Functional transfer training, LE strengthening/ROM, Elevations, Therapeutic exercise, Endurance training, Patient/family training, Equipment eval/education, Bed mobility, Gait training          See flowsheet documentation for full assessment, interventions and recommendations  Outcome: Progressing  Note:   Prognosis: Fair  Problem List: Decreased strength, Decreased endurance, Impaired balance, Decreased mobility, Decreased coordination, Decreased safety awareness(edema of extremities)  Assessment: Pt was noted to have a deep vein thrombosis on Doppler ultrasound  Pt was seen today by the primary care provider who noted that the pt had bilateral lower leg edema more on the right leg than the right, with calf tenderness  Dx: DVTs L LE, anemia, and HTN  order placed for PT eval and tx, w/ activity order of up w/ A and activity as tolerated  pt presents w/ comorbidities of HTN, anemia, and brain surgery and personal factors of living in 2 story house, positive fall history, inability to perform IADLs and inability to perform ADLs  pt presents w/ weakness, decreased ROM, decreased endurance, impaired balance, gait deviations, impaired coordination, decreased safety awareness, fall risk and LE edema  these impairments are evident in findings from physical examination (weakness, impaired coordination and edema of extremities), mobility assessment (need for standby to min assist w/ all phases of mobility when usually mobilizing independently, tolerance to only 80 feet of ambulation and need for cueing for mobility technique), and Barthel Index: 65/100  pt needed input for task focus and mobility technique/safety  pt is at risk for falls due to physical and safety awareness deficits   pt's clinical presentation is unstable/unpredictable (evident in anemia, critical high PTT, need for assist w/ all phases of mobility when usually mobilizing independently, tolerance to only 80 feet of ambulation and need for input for mobility technique/safety)  pt needs inpatient PT tx to improve mobility deficits  discharge recommendation is for home PT to reduce fall risk and maximize level of functional independence  Recommendation: Home PT          See flowsheet documentation for full assessment

## 2020-01-17 NOTE — ASSESSMENT & PLAN NOTE
· Suprapubic abdominal pain noted on examination  · Denies any urinary symptoms  · However reports to going once day  · Bladder scan to rule out urine retention

## 2020-01-17 NOTE — QUICK NOTE
Patient did have a bowel movement, which I was able to see  It was foul smelling and red in color, without obvious blood

## 2020-01-17 NOTE — ASSESSMENT & PLAN NOTE
· Patient presents to the ED after she was noted to have asymmetrical bilateral lower leg swelling, more on the left than on the right with associated calf tenderness   · Likely 2/2 prolonged immobilization; in hospital for a week, then was sedentary at home  Non-smoker, not on hormones, no h/o DVT  At the time patient was not started on VTE prophylaxis as she was deemed to be a low risk as per the VTE screening  · Doppler ultrasound demonstrated acute occlusive deep vein thrombosis from the mid to distal femoral vein, popliteal vein and one of the posterior tibial veins in the proximal calf  · CT PE not remarkalbe for acute PE    Plan  · Heparin drip  · Maintain INR between 40 and 60  · Monitor for bleeding

## 2020-01-17 NOTE — ASSESSMENT & PLAN NOTE
· Patient noted to have cholecystolithiasis on last admission  · She is asymptomatic for this    · Was supposed to follow-up with surgery for an elective cholecystectomy on 1/20  · LFTs normal

## 2020-01-17 NOTE — ASSESSMENT & PLAN NOTE
· Patient noted to have cholelithiasis on last admission    · She is asymptomatic for this at the moment; LFTs wnl  · Was supposed to follow-up with surgery for an elective cholecystectomy on 1/20  · Consider consulting surgery to have surgery done while inpatient

## 2020-01-17 NOTE — H&P
H&P- Willima Rack 1960, 64 y o  female MRN: [id number]    Unit/Bed#: S -01 Encounter: [id number]    Primary Care Provider: Joey Matos MD   Date and time admitted to hospital: 1/16/2020  5:05 PM        * Acute deep vein thrombosis (DVT) of lower extremity ([address] )  Assessment & Plan  · Patient presents to the ED after she was noted to have asymmetrical bilateral lower leg swelling, more on the left than on the right with associated calf tenderness during of his with a primary care provider  · Of note is patient was recently hospitalized for 6 days before being discharged 2 days prior to presentation  · At the time patient was not started on VTE prophylaxis as she was deemed to be a low risk as per the VTE screening  · Doppler ultrasound demonstrated acute occlusive deep vein thrombosis from the mid to  · distal femoral vein, popliteal vein and one of the posterior tibial veins in the  · proximal calf  Plan  · Heparin drip  · Maintain INR between 40 and 60  · Monitor for bleeding  · CT angio as patient is complaining of shortness of breath, cough nonproductive cough and palpitation  Rule out PE  Hypertension  Assessment & Plan  · Blood pressure today noted in the 599 systolic  · IV hydralazine for systolic blood pressure more than 180  · Continue home prazosin  · Monitor blood pressure  Hypothyroidism  Assessment & Plan  · TSH noted at 6 8  · Patient continues to be asymptomatic  · Patient is not on levothyroxine  · Continue to monitor for now  · Will require TSH repeat in an outpatient setting  Suprapubic abdominal pain  Assessment & Plan  · Suprapubic abdominal pain noted on examination  · Denies any urinary symptoms  · However reports to going once day  · Bladder scan to rule out urine retention  Bilateral edema of lower extremity  Assessment & Plan  · Bilateral lower extremity edema noted on examination  · Likely due to low albumin level    · Worse on the left than on the right due to a DVT  Constipation  Assessment & Plan  · She reports no bowel movement since the 7th of this month  · Bowel regimen in place  · Monitor for symptoms  Depression  Assessment & Plan  Continue with home medication  Cholelithiasis  Assessment & Plan  · Patient noted to have cholecystolithiasis on last admission  · She is asymptomatic for this  · Was supposed to follow-up with surgery for an elective cholecystectomy on 1/20  · LFTs normal     Anemia  Assessment & Plan  · Patient noted to be normocytic normochromic on the last admission  · Hemoglobin currently at 7 1  · Patient was referred to GI for evaluation of GI bleed  · Patient reports that she had blood in his stool during last hospitalization  No bowel movement since then  · Remote history of upper GI bleed in May  · Will place patient on NPO  · GI consult for GI bleed eval  EGD/colonoscopy  · Will trend H&H Q 8 while patient is on heparin drip  · Continue with iron supplementation  · Transfuse for hemoglobin level less than 7   · Type and cross match  Headache  Assessment & Plan  · Patient reports that she has a 5/10 headache  · Tylenol as needed  VTE Prophylaxis: Heparin Drip  / sequential compression device   Code Status:  Full code  POLST: POLST form is not discussed and not completed at this time  Anticipated Length of Stay:  Patient will be admitted on an Inpatient basis with an anticipated length of stay of  more than 2 midnights  Justification for Hospital Stay:  DVT  Chief Complaint:   Left lower extremity pain  History of Present Illness:    Renee Light is a 64 y o  female past medical history of depression, hypothyroidism and hypertension who presents to the ED after patient was noted to have a deep vein thrombosis on Doppler ultrasound    Patient was seen today by the primary care provider who noted that the patient had bilateral lower leg edema more on the right leg than the right, with calf tenderness  Of note is that the patient was recently hospitalized for 6 days for acute pancreatitis and metabolic derangement, and was discharged about 2 days ago  At the time patient was not on any VT prophylaxis as she was noted to be low risk for VTE based on the VTE screening  She now presents with left lower leg pain that was associated bed shortness of breath, nonproductive cough and palpitation  Patient denies any lightheadedness, palpitation, nausea, vomiting or abdominal pain  Patient does report that she has not had a bowel movement since the 7th of this month  At the time patient reports that she had bright red blood in her stool  She denies any other episodes of rectal bleeding  She hours at admits to vomiting blood in May  Patient was noted to be anemic in on last admission and was scheduled to follow-up with gastroenterology for an outpatient EGD and colonoscopy  Review of Systems:    Review of Systems   Constitutional: Negative for appetite change, chills, diaphoresis and fever  Respiratory: Positive for cough and shortness of breath  Negative for chest tightness and wheezing  Cardiovascular: Positive for palpitations and leg swelling  Negative for chest pain  Gastrointestinal: Positive for constipation  Negative for abdominal distention, abdominal pain, nausea and vomiting  Genitourinary: Positive for decreased urine volume  Negative for difficulty urinating and dysuria  Musculoskeletal: Positive for myalgias  Negative for back pain  Neurological: Positive for headaches  Negative for tremors, weakness, light-headedness and numbness  All other systems reviewed and are negative        Past Medical and Surgical History:     Past Medical History:   Diagnosis Date    IRINEO (acute kidney injury) ([facility] [address] ) 1/9/2020    Anemia 1/9/2020    Disease of thyroid gland     Hypertension     Psychiatric disorder        Past Surgical History:   Procedure Laterality Date [place] BRAIN SURGERY      patient can not tell any other details  Meds/Allergies:    Prior to Admission medications    Medication Sig Start Date End Date Taking? Authorizing Provider   benztropine (COGENTIN) 0 5 mg tablet Take 0 5 mg by mouth daily at bedtime 12/9/18   Historical Provider, MD   ferrous sulfate 324 (65 Fe) mg Take 1 tablet (324 mg total) by mouth daily before breakfast 1/14/20 2/13/20  Harry Guajardo DO   gabapentin (NEURONTIN) 300 mg capsule Take 300 mg by mouth 2 (two) times a day 12/9/18   Historical Provider, MD   hydrochlorothiazide (HYDRODIURIL) 25 mg tablet Take 1 tablet by mouth daily 8/11/15   Historical Provider, MD   mirtazapine (REMERON) 45 MG tablet Take 45 mg by mouth daily at bedtime 12/9/18   Historical Provider, MD   polyethylene glycol (MIRALAX) 17 g packet Take 17 g by mouth daily as needed (constipation) for up to 7 days 1/14/20 1/21/20  Harry Guajardo DO   prazosin (MINIPRESS) 2 mg capsule Take 4 mg by mouth daily at bedtime 12/9/18   Historical Provider, MD   risperiDONE (RisperDAL) 3 mg tablet Take 3 mg by mouth daily at bedtime 12/9/18   Historical Provider, MD     I have reviewed home medications with patient personally  Allergies: No Known Allergies    Social History:     Marital Status: Single   Occupation:  Retired  Patient Pre-hospital Living Situation:  Lives with son and daughter-in-law  Patient Pre-hospital Level of Mobility:  Unrestricted    Patient Pre-hospital Diet Restrictions:   Substance Use History:   Social History     Substance and Sexual Activity   Alcohol Use No     Social History     Tobacco Use   Smoking Status Never Smoker   Smokeless Tobacco Never Used     Social History     Substance and Sexual Activity   Drug Use No       Family History:    non-contributory    Physical Exam:     Vitals:   Blood Pressure: (!) 184/94 (01/16/20 1916)  Pulse: 84 (01/16/20 1916)  Temperature: 99 1 °F (37 3 °C) (01/16/20 1916)  Temp Source: Oral (01/16/20 1916)  Respirations: 17 (01/16/20 1916)  Height: 5' 7" (170 2 cm) (01/16/20 1916)  Weight - Scale: 69 8 kg (153 lb 12 8 oz) (01/16/20 1916)  SpO2: 100 % (01/16/20 1916)    Physical Exam   Constitutional: She is oriented to person, place, and time  She appears well-developed and well-nourished  No distress  HENT:   Head: Normocephalic and atraumatic  Eyes: Pupils are equal, round, and reactive to light  EOM are normal    Cardiovascular: Normal rate, regular rhythm, normal heart sounds and intact distal pulses  No murmur heard  Pulmonary/Chest: Effort normal and breath sounds normal  She has no wheezes  She has no rales  Abdominal: Soft  Bowel sounds are normal  She exhibits no distension  There is tenderness (Suprapubic)  Musculoskeletal: She exhibits edema and tenderness (Left lower extremity extending to the mid thigh )  Neurological: She is alert and oriented to person, place, and time  Skin: Skin is warm  Capillary refill takes less than 2 seconds  She is not diaphoretic  No erythema  Psychiatric: She has a normal mood and affect  Nursing note and vitals reviewed  Additional Data:     Lab Results: I have personally reviewed pertinent reports  Results from last 7 days   Lab Units 01/16/20  1749   WBC Thousand/uL 5 26   HEMOGLOBIN g/dL 7 1*   HEMATOCRIT % 23 6*   PLATELETS Thousands/uL 256   NEUTROS PCT % 67   LYMPHS PCT % 23   MONOS PCT % 7   EOS PCT % 2     Results from last 7 days   Lab Units 01/16/20  1554   POTASSIUM mmol/L 4 1   CHLORIDE mmol/L 109*   CO2 mmol/L 26   BUN mg/dL 10   CREATININE mg/dL 0 70   CALCIUM mg/dL 8 9   ALK PHOS U/L 99   ALT U/L 39   AST U/L 40     Results from last 7 days   Lab Units 01/16/20  1749   INR  0 96       Imaging: I have personally reviewed pertinent reports  X-ray Chest 2 Views    Result Date: 1/9/2020  Narrative: CHEST INDICATION:   chest pain COMPARISON: Chest radiograph from 6/16/2015   EXAM PERFORMED/VIEWS:  XR CHEST PA & LATERAL WITH DUAL ENERGY SUBTRACTION  FINDINGS: Cardiomediastinal silhouette is normal  Lungs are clear  Benign calcified granuloma in the left lower lobe  No effusion or pneumothorax  Osseous structures are within normal limits for age  Impression: No acute cardiopulmonary disease  Workstation performed: [id number]     Us Abdomen Complete    Result Date: 1/9/2020  Narrative: ABDOMEN ULTRASOUND, COMPLETE INDICATION:   RUQ PAIN, NO FEVER, NO ELEVATED WBC elevated LFTs, epigastric pain  COMPARISON: None TECHNIQUE:   Real-time ultrasound of the abdomen was performed with a curvilinear transducer with both volumetric sweeps and still imaging techniques  FINDINGS: PANCREAS:  Visualized portions of the pancreas are within normal limits  AORTA AND IVC:  Visualized portions are normal for patient age  LIVER: Size:  Within normal range  The liver measures 14 1 cm in the midclavicular line  Contour:  Surface contour is smooth  Parenchyma:  Echogenicity and echotexture are within normal limits  No evidence of suspicious mass  Limited imaging of the main portal vein shows it to be patent and hepatopetal  BILIARY: The gallbladder is normal in caliber  No wall thickening or pericholecystic fluid  Shadowing gallstone(s) identified  No sonographic Starks's sign  No intrahepatic biliary dilatation  CBD measures 4 mm  No choledocholithiasis  KIDNEY: Right kidney measures 12 7 x 5 cm  5 7 x 3 9 x 4 5 cm simple cyst is present in the midpole  Left kidney measures 12 2 x 5 3 cm  Within normal limits  SPLEEN: Measures 11 5 cm  Within normal limits  ASCITES:  None  Impression: 1  Cholelithiasis  2   Simple cyst within the midpole of the right kidney  Workstation performed: [id number]     Vas Lower Limb Venous Duplex Study, Complete Bilateral    Result Date: 1/16/2020  Narrative:  [facility] REPORT [facility]INICAL: Indications: Bilateral Localized edema [R60 0]   Patient presents with bilateral lower extremity edema in the left leg more than the right since being released from the hospital on 1/9/20  Operative History: Brain Surgery Risk Factors The patient has history of HTN and CKD  She has no history of Diabetes, HLD, DVT or Recent Trauma  FINDINGS:  Segment     Right            Left                    Impression       Impression          CFV         Normal (Patent)                      FV Mid                       Thrombosed (acute)  FV Dist                      Thrombosed (acute)  Popliteal                    Thrombosed (acute)  PostTibial                   Thrombosed (acute)     CONCLUSION: RIGHT LOWER LIMB: No evidence of acute or chronic deep vein thrombosis  No evidence of superficial thrombophlebitis noted  Doppler evaluation shows a normal response to augmentation maneuvers  Popliteal, posterior tibial and anterior tibial arterial Doppler waveforms are triphasic  LEFT LOWER LIMB: Abnormal There is evidence of acute occlusive deep vein thrombosis from the mid to distal femoral vein, popliteal vein and one of the posterior tibial veins in the proximal calf  No evidence of superficial thrombophlebitis noted  Doppler evaluation shows a normal response to augmentation maneuvers  Popliteal, posterior tibial and anterior tibial arterial Doppler waveforms are triphasic  Technical findings were given to Dr Claudia Santillan at time of exam  Patient was advised to go to the Emergency Department  Ct Abdomen Pelvis W Contrast    Result Date: 1/10/2020  Narrative: CT ABDOMEN AND PELVIS WITH IV CONTRAST INDICATION:   Nausea/vomiting  COMPARISON:  CT 6/16/2015 TECHNIQUE:  CT examination of the abdomen and pelvis was performed  Axial, sagittal, and coronal 2D reformatted images were created from the source data and submitted for interpretation  Radiation dose length product (DLP) for this visit:  480 mGy-cm     This examination, like all CT scans performed in the [place], was performed utilizing techniques to minimize radiation dose exposure, including the use of iterative reconstruction and automated exposure control  IV Contrast:  100 mL of iohexol (OMNIPAQUE) Enteric Contrast:  Enteric contrast was administered  FINDINGS: ABDOMEN LOWER CHEST:  No clinically significant abnormality identified in the visualized lower chest  LIVER/BILIARY TREE:  Unremarkable  GALLBLADDER:  Cholelithiasis  SPLEEN:  Unremarkable  PANCREAS: Fat infiltration within the pancreaticoduodenal groove consistent with acute interstitial pancreatitis  ADRENAL GLANDS:  Unremarkable  KIDNEYS/URETERS:  Stable findings of mild right renal malrotation and marked dilation of the extrarenal pelvis likely on the basis of UPJ obstruction  No suspicious renal lesion  Stable small left renal upper pole cyst  STOMACH AND BOWEL:  No acute inflammatory changes  No disproportionate dilation of the small bowel  Moderate colonic stool burden  APPENDIX:  A normal appendix was visualized  ABDOMINOPELVIC CAVITY:  No ascites or free intraperitoneal air  No lymphadenopathy  VESSELS:  Unremarkable for patient's age  PELVIS REPRODUCTIVE ORGANS:  Unremarkable for patient's age  URINARY BLADDER:  Unremarkable  ABDOMINAL WALL/INGUINAL REGIONS:  Unremarkable  OSSEOUS STRUCTURES:  No acute fracture or destructive osseous lesion  Impression: Fat infiltration within the pancreaticoduodenal groove consistent with acute interstitial pancreatitis  Cholelithiasis without findings of acute cholecystitis  Moderate colonic stool burden  Stable findings of mild right renal malrotation and marked dilation of the extrarenal pelvis likely on the basis of UPJ obstruction  Workstation performed: [id number]       EKG, Pathology, and Other Studies Reviewed on Admission:   · EKG:  None  Epic / Care Everywhere Records Reviewed: Yes     ** Please Note: This note has been constructed using a voice recognition system   **

## 2020-01-17 NOTE — PLAN OF CARE
Problem: NEUROSENSORY - ADULT  Goal: Achieves maximal functionality and self care  Description  INTERVENTIONS  - Monitor swallowing and airway patency with patient fatigue and changes in neurological status  - Encourage and assist patient to increase activity and self care     - Encourage visually impaired, hearing impaired and aphasic patients to use assistive/communication devices  Outcome: Progressing     Problem: CARDIOVASCULAR - ADULT  Goal: Maintains optimal cardiac output and hemodynamic stability  Description  INTERVENTIONS:  - Monitor I/O, vital signs and rhythm  - Monitor for S/S and trends of decreased cardiac output  - Administer and titrate ordered vasoactive medications to optimize hemodynamic stability  - Assess quality of pulses, skin color and temperature  - Assess for signs of decreased coronary artery perfusion  - Instruct patient to report change in severity of symptoms  Outcome: Progressing  Goal: Absence of cardiac dysrhythmias or at baseline rhythm  Description  INTERVENTIONS:  - Continuous cardiac monitoring, vital signs, obtain 12 lead EKG if ordered  - Administer antiarrhythmic and heart rate control medications as ordered  - Monitor electrolytes and administer replacement therapy as ordered  Outcome: Progressing     Problem: GASTROINTESTINAL - ADULT  Goal: Minimal or absence of nausea and/or vomiting  Description  INTERVENTIONS:  - Administer IV fluids if ordered to ensure adequate hydration  - Maintain NPO status until nausea and vomiting are resolved  - Nasogastric tube if ordered  - Administer ordered antiemetic medications as needed  - Provide nonpharmacologic comfort measures as appropriate  - Advance diet as tolerated, if ordered  - Consider nutrition services referral to assist patient with adequate nutrition and appropriate food choices  Outcome: Progressing  Goal: Maintains or returns to baseline bowel function  Description  INTERVENTIONS:  - Assess bowel function  - Encourage oral fluids to ensure adequate hydration  - Administer IV fluids if ordered to ensure adequate hydration  - Administer ordered medications as needed  - Encourage mobilization and activity  - Consider nutritional services referral to assist patient with adequate nutrition and appropriate food choices  Outcome: Progressing  Goal: Maintains adequate nutritional intake  Description  INTERVENTIONS:  - Monitor percentage of each meal consumed  - Identify factors contributing to decreased intake, treat as appropriate  - Assist with meals as needed  - Monitor I&O, weight, and lab values if indicated  - Obtain nutrition services referral as needed  Outcome: Progressing  Goal: Establish and maintain optimal ostomy function  Description  INTERVENTIONS:  - Assess bowel function  - Encourage oral fluids to ensure adequate hydration  - Administer IV fluids if ordered to ensure adequate hydration   - Administer ordered medications as needed  - Encourage mobilization and activity  - Nutrition services referral to assist patient with appropriate food choices  - Assess stoma site  - Consider wound care consult   Outcome: Progressing     Problem: GENITOURINARY - ADULT  Goal: Maintains or returns to baseline urinary function  Description  INTERVENTIONS:  - Assess urinary function  - Encourage oral fluids to ensure adequate hydration if ordered  - Administer IV fluids as ordered to ensure adequate hydration  - Administer ordered medications as needed  - Offer frequent toileting  - Follow urinary retention protocol if ordered  Outcome: Progressing  Goal: Absence of urinary retention  Description  INTERVENTIONS:  - Assess patients ability to void and empty bladder  - Monitor I/O  - Bladder scan as needed  - Discuss with physician/AP medications to alleviate retention as needed  - Discuss catheterization for long term situations as appropriate  Outcome: Progressing  Goal: Urinary catheter remains patent  Description  INTERVENTIONS:  - Assess patency of urinary catheter  - If patient has a chronic camacho, consider changing catheter if non-functioning  - Follow guidelines for intermittent irrigation of non-functioning urinary catheter  Outcome: Progressing     Problem: METABOLIC, FLUID AND ELECTROLYTES - ADULT  Goal: Electrolytes maintained within normal limits  Description  INTERVENTIONS:  - Monitor labs and assess patient for signs and symptoms of electrolyte imbalances  - Administer electrolyte replacement as ordered  - Monitor response to electrolyte replacements, including repeat lab results as appropriate  - Instruct patient on fluid and nutrition as appropriate  Outcome: Progressing  Goal: Fluid balance maintained  Description  INTERVENTIONS:  - Monitor labs   - Monitor I/O and WT  - Instruct patient on fluid and nutrition as appropriate  - Assess for signs & symptoms of volume excess or deficit  Outcome: Progressing     Problem: SKIN/TISSUE INTEGRITY - ADULT  Goal: Skin integrity remains intact  Description  INTERVENTIONS  - Identify patients at risk for skin breakdown  - Assess and monitor skin integrity  - Assess and monitor nutrition and hydration status  - Monitor labs (i e  albumin)  - Assess for incontinence   - Turn and reposition patient  - Assist with mobility/ambulation  - Relieve pressure over bony prominences  - Avoid friction and shearing  - Provide appropriate hygiene as needed including keeping skin clean and dry  - Evaluate need for skin moisturizer/barrier cream  - Collaborate with interdisciplinary team (i e  Nutrition, Rehabilitation, etc )   - Patient/family teaching  Outcome: Progressing  Goal: Oral mucous membranes remain intact  Description  INTERVENTIONS  - Assess oral mucosa and hygiene practices  - Implement preventative oral hygiene regimen  - Implement oral medicated treatments as ordered  - Initiate Nutrition services referral as needed  Outcome: Progressing     Problem: HEMATOLOGIC - ADULT  Goal: Maintains hematologic stability  Description  INTERVENTIONS  - Assess for signs and symptoms of bleeding or hemorrhage  - Monitor labs  - Administer supportive blood products/factors as ordered and appropriate  Outcome: Progressing     Problem: PAIN - ADULT  Goal: Verbalizes/displays adequate comfort level or baseline comfort level  Description  Interventions:  - Encourage patient to monitor pain and request assistance  - Assess pain using appropriate pain scale  - Administer analgesics based on type and severity of pain and evaluate response  - Implement non-pharmacological measures as appropriate and evaluate response  - Consider cultural and social influences on pain and pain management  - Notify physician/advanced practitioner if interventions unsuccessful or patient reports new pain  Outcome: Progressing     Problem: INFECTION - ADULT  Goal: Absence or prevention of progression during hospitalization  Description  INTERVENTIONS:  - Assess and monitor for signs and symptoms of infection  - Monitor lab/diagnostic results  - Monitor all insertion sites, i e  indwelling lines, tubes, and drains  - Monitor endotracheal if appropriate and nasal secretions for changes in amount and color  - [place] appropriate cooling/warming therapies per order  - Administer medications as ordered  - Instruct and encourage patient and family to use good hand hygiene technique  - Identify and instruct in appropriate isolation precautions for identified infection/condition  Outcome: Progressing  Goal: Absence of fever/infection during neutropenic period  Description  INTERVENTIONS:  - Monitor WBC    Outcome: Progressing     Problem: SAFETY ADULT  Goal: Patient will remain free of falls  Description  INTERVENTIONS:  - Assess patient frequently for physical needs  -  Identify cognitive and physical deficits and behaviors that affect risk of falls    -  [place] fall precautions as indicated by assessment   - Educate patient/family on patient safety including physical limitations  - Instruct patient to call for assistance with activity based on assessment  - Modify environment to reduce risk of injury  - Consider OT/PT consult to assist with strengthening/mobility  Outcome: Progressing  Goal: Maintain or return to baseline ADL function  Description  INTERVENTIONS:  -  Assess patient's ability to carry out ADLs; assess patient's baseline for ADL function and identify physical deficits which impact ability to perform ADLs (bathing, care of mouth/teeth, toileting, grooming, dressing, etc )  - Assess/evaluate cause of self-care deficits   - Assess range of motion  - Assess patient's mobility; develop plan if impaired  - Assess patient's need for assistive devices and provide as appropriate  - Encourage maximum independence but intervene and supervise when necessary  - Involve family in performance of ADLs  - Assess for home care needs following discharge   - Consider OT consult to assist with ADL evaluation and planning for discharge  - Provide patient education as appropriate  Outcome: Progressing  Goal: Maintain or return mobility status to optimal level  Description  INTERVENTIONS:  - Assess patient's baseline mobility status (ambulation, transfers, stairs, etc )    - Identify cognitive and physical deficits and behaviors that affect mobility  - Identify mobility aids required to assist with transfers and/or ambulation (gait belt, sit-to-stand, lift, walker, cane, etc )  - [place] fall precautions as indicated by assessment  - Record patient progress and toleration of activity level on Mobility SBAR; progress patient to next Phase/Stage  - Instruct patient to call for assistance with activity based on assessment  - Consider rehabilitation consult to assist with strengthening/weightbearing, etc   Outcome: Progressing     Problem: DISCHARGE PLANNING  Goal: Discharge to home or other facility with appropriate resources  Description  INTERVENTIONS:  - Identify barriers to discharge w/patient and caregiver  - Arrange for needed discharge resources and transportation as appropriate  - Identify discharge learning needs (meds, wound care, etc )  - Arrange for interpretive services to assist at discharge as needed  - Refer to Case Management Department for coordinating discharge planning if the patient needs post-hospital services based on physician/advanced practitioner order or complex needs related to functional status, cognitive ability, or social support system  Outcome: Progressing

## 2020-01-18 ENCOUNTER — APPOINTMENT (INPATIENT)
Dept: CT IMAGING | Facility: HOSPITAL | Age: 60
DRG: 197 | End: 2020-01-18
Payer: COMMERCIAL

## 2020-01-18 LAB
ABO GROUP BLD BPU: NORMAL
ANION GAP SERPL CALCULATED.3IONS-SCNC: 7 MMOL/L (ref 4–13)
APTT PPP: 104 SECONDS (ref 23–37)
APTT PPP: 55 SECONDS (ref 23–37)
APTT PPP: 75 SECONDS (ref 23–37)
BASOPHILS # BLD AUTO: 0.04 THOUSANDS/ΜL (ref 0–0.1)
BASOPHILS NFR BLD AUTO: 1 % (ref 0–1)
BPU ID: NORMAL
BUN SERPL-MCNC: 11 MG/DL (ref 5–25)
CALCIUM SERPL-MCNC: 8.2 MG/DL (ref 8.3–10.1)
CHLORIDE SERPL-SCNC: 110 MMOL/L (ref 100–108)
CO2 SERPL-SCNC: 25 MMOL/L (ref 21–32)
CREAT SERPL-MCNC: 0.75 MG/DL (ref 0.6–1.3)
CROSSMATCH: NORMAL
EOSINOPHIL # BLD AUTO: 0.16 THOUSAND/ΜL (ref 0–0.61)
EOSINOPHIL NFR BLD AUTO: 4 % (ref 0–6)
ERYTHROCYTE [DISTWIDTH] IN BLOOD BY AUTOMATED COUNT: 20.6 % (ref 11.6–15.1)
GFR SERPL CREATININE-BSD FRML MDRD: 89 ML/MIN/1.73SQ M
GLUCOSE SERPL-MCNC: 88 MG/DL (ref 65–140)
HCT VFR BLD AUTO: 26.6 % (ref 34.8–46.1)
HGB BLD-MCNC: 8.1 G/DL (ref 11.5–15.4)
IMM GRANULOCYTES # BLD AUTO: 0.03 THOUSAND/UL (ref 0–0.2)
IMM GRANULOCYTES NFR BLD AUTO: 1 % (ref 0–2)
LYMPHOCYTES # BLD AUTO: 1.27 THOUSANDS/ΜL (ref 0.6–4.47)
LYMPHOCYTES NFR BLD AUTO: 29 % (ref 14–44)
MCH RBC QN AUTO: 32.8 PG (ref 26.8–34.3)
MCHC RBC AUTO-ENTMCNC: 30.5 G/DL (ref 31.4–37.4)
MCV RBC AUTO: 108 FL (ref 82–98)
MONOCYTES # BLD AUTO: 0.36 THOUSAND/ΜL (ref 0.17–1.22)
MONOCYTES NFR BLD AUTO: 8 % (ref 4–12)
NEUTROPHILS # BLD AUTO: 2.54 THOUSANDS/ΜL (ref 1.85–7.62)
NEUTS SEG NFR BLD AUTO: 57 % (ref 43–75)
NRBC BLD AUTO-RTO: 0 /100 WBCS
PLATELET # BLD AUTO: 221 THOUSANDS/UL (ref 149–390)
PMV BLD AUTO: 11 FL (ref 8.9–12.7)
POTASSIUM SERPL-SCNC: 3.9 MMOL/L (ref 3.5–5.3)
RBC # BLD AUTO: 2.47 MILLION/UL (ref 3.81–5.12)
SODIUM SERPL-SCNC: 142 MMOL/L (ref 136–145)
UNIT DISPENSE STATUS: NORMAL
UNIT PRODUCT CODE: NORMAL
UNIT RH: NORMAL
WBC # BLD AUTO: 4.4 THOUSAND/UL (ref 4.31–10.16)

## 2020-01-18 PROCEDURE — 99232 SBSQ HOSP IP/OBS MODERATE 35: CPT | Performed by: INTERNAL MEDICINE

## 2020-01-18 PROCEDURE — 70450 CT HEAD/BRAIN W/O DYE: CPT

## 2020-01-18 PROCEDURE — 80048 BASIC METABOLIC PNL TOTAL CA: CPT | Performed by: FAMILY MEDICINE

## 2020-01-18 PROCEDURE — 85025 COMPLETE CBC W/AUTO DIFF WBC: CPT | Performed by: FAMILY MEDICINE

## 2020-01-18 PROCEDURE — 85730 THROMBOPLASTIN TIME PARTIAL: CPT | Performed by: INTERNAL MEDICINE

## 2020-01-18 PROCEDURE — C9113 INJ PANTOPRAZOLE SODIUM, VIA: HCPCS | Performed by: PHYSICIAN ASSISTANT

## 2020-01-18 RX ORDER — SENNOSIDES 8.6 MG
1 TABLET ORAL
Status: DISCONTINUED | OUTPATIENT
Start: 2020-01-18 | End: 2020-01-22 | Stop reason: HOSPADM

## 2020-01-18 RX ORDER — AMLODIPINE BESYLATE 2.5 MG/1
2.5 TABLET ORAL DAILY
Status: DISCONTINUED | OUTPATIENT
Start: 2020-01-18 | End: 2020-01-19

## 2020-01-18 RX ORDER — LEVOTHYROXINE SODIUM 0.1 MG/1
100 TABLET ORAL
Status: DISCONTINUED | OUTPATIENT
Start: 2020-01-19 | End: 2020-01-22 | Stop reason: HOSPADM

## 2020-01-18 RX ORDER — FERROUS SULFATE 325(65) MG
325 TABLET ORAL
Status: DISCONTINUED | OUTPATIENT
Start: 2020-01-19 | End: 2020-01-22 | Stop reason: HOSPADM

## 2020-01-18 RX ADMIN — MIRTAZAPINE 45 MG: 15 TABLET, FILM COATED ORAL at 22:20

## 2020-01-18 RX ADMIN — SUCRALFATE 1000 MG: 1 SUSPENSION ORAL at 04:37

## 2020-01-18 RX ADMIN — BENZONATATE 100 MG: 100 CAPSULE ORAL at 12:12

## 2020-01-18 RX ADMIN — DOCUSATE SODIUM 100 MG: 100 CAPSULE, LIQUID FILLED ORAL at 16:40

## 2020-01-18 RX ADMIN — BENZONATATE 100 MG: 100 CAPSULE ORAL at 22:35

## 2020-01-18 RX ADMIN — AMLODIPINE BESYLATE 2.5 MG: 2.5 TABLET ORAL at 12:12

## 2020-01-18 RX ADMIN — SUCRALFATE 1000 MG: 1 SUSPENSION ORAL at 22:20

## 2020-01-18 RX ADMIN — HEPARIN SODIUM 13 UNITS/KG/HR: 10000 INJECTION, SOLUTION INTRAVENOUS at 22:21

## 2020-01-18 RX ADMIN — HEPARIN SODIUM 2800 UNITS: 1000 INJECTION INTRAVENOUS; SUBCUTANEOUS at 20:37

## 2020-01-18 RX ADMIN — MAGNESIUM CITRATE 296 ML: 1.75 LIQUID ORAL at 09:37

## 2020-01-18 RX ADMIN — PANTOPRAZOLE SODIUM 40 MG: 40 INJECTION, POWDER, FOR SOLUTION INTRAVENOUS at 09:35

## 2020-01-18 RX ADMIN — SUCRALFATE 1000 MG: 1 SUSPENSION ORAL at 16:40

## 2020-01-18 RX ADMIN — RISPERIDONE 3 MG: 1 TABLET ORAL at 22:20

## 2020-01-18 RX ADMIN — BENZTROPINE MESYLATE 0.5 MG: 1 TABLET ORAL at 22:22

## 2020-01-18 RX ADMIN — GABAPENTIN 300 MG: 300 CAPSULE ORAL at 09:37

## 2020-01-18 RX ADMIN — FERROUS SULFATE TAB 325 MG (65 MG ELEMENTAL FE) 325 MG: 325 (65 FE) TAB at 09:37

## 2020-01-18 RX ADMIN — GUAIFENESIN 600 MG: 600 TABLET, EXTENDED RELEASE ORAL at 20:07

## 2020-01-18 RX ADMIN — GABAPENTIN 300 MG: 300 CAPSULE ORAL at 16:40

## 2020-01-18 RX ADMIN — SUCRALFATE 1000 MG: 1 SUSPENSION ORAL at 12:12

## 2020-01-18 RX ADMIN — PANTOPRAZOLE SODIUM 40 MG: 40 INJECTION, POWDER, FOR SOLUTION INTRAVENOUS at 20:38

## 2020-01-18 RX ADMIN — DOCUSATE SODIUM 100 MG: 100 CAPSULE, LIQUID FILLED ORAL at 09:35

## 2020-01-18 RX ADMIN — ACETAMINOPHEN 650 MG: 325 TABLET ORAL at 20:07

## 2020-01-18 RX ADMIN — PRAZOSIN HYDROCHLORIDE 4 MG: 1 CAPSULE ORAL at 22:26

## 2020-01-18 RX ADMIN — GUAIFENESIN 600 MG: 600 TABLET, EXTENDED RELEASE ORAL at 09:37

## 2020-01-18 RX ADMIN — POLYETHYLENE GLYCOL 3350 17 G: 17 POWDER, FOR SOLUTION ORAL at 09:37

## 2020-01-18 NOTE — ASSESSMENT & PLAN NOTE
· POA: no BM in approximately 10 days  H/o chronic constipation  · Multifactorial; on iron supplementaion, has had poor appetite and been relatively sedentary  Patient also has a h/o hypothyroid (TSH 10 765 and T4 0 64 on 1/18), not on thyroid supplementation  Was supposed to start in 2017, but never did  Patient does not have a reason why  · Has had several bowel movements since initiating a bowel regimen    Will continue

## 2020-01-18 NOTE — ASSESSMENT & PLAN NOTE
· BP slightly elevated, 490A-271W systolic  · IV hydralazine for systolic blood pressure more than 180  · Continue home dose prazosin, Norvasc 2 5 mg started yesterday  · Has been on HCTZ in the past   Will resume    Discontinue Norvasc

## 2020-01-18 NOTE — ASSESSMENT & PLAN NOTE
· POA, lower extremity Dopplers showed evidence of acute occlusive DVT involving the mid to distal femoral vein, popliteal vein and one of the posterior tibial veins in the proximal calf  · Recently hospitalized for approximately 1 week for acute gallstone pancreatitis  · Was deemed low risk at that time and not on pharmacologic VTE prophylaxis  · Currently on IV heparin infusion pending further GI evaluation to rule out occult GI bleed  · Eventual transition to Eliquis vs Xarelto once cleared from the GI standpoint

## 2020-01-18 NOTE — ASSESSMENT & PLAN NOTE
· Patient noted to have cholelithiasis on last admission    · She is asymptomatic for this at the moment; LFTs wnl  · Was supposed to follow-up with surgery for an elective cholecystectomy on 1/20  · Will need to follow up outpatient for cholecystectomy

## 2020-01-18 NOTE — PROGRESS NOTES
Progress Note - Stephanie Henderson 1960, 64 y o  female MRN: [id number]    Unit/Bed#: S -01 Encounter: [id number]    Primary Care Provider: Aleshia Blair MD   Date and time admitted to hospital: 1/16/2020  5:05 PM        * Acute deep vein thrombosis (DVT) of lower extremity ([address] )  Assessment & Plan  · POA: asymmetrical bilateral lower leg swelling, L>R with calf tenderness  · Etiology: Likely 2/2 prolonged immobilization; in hospital for a week, then was sedentary at home  Non-smoker, not on hormones, no h/o DVT  At the time patient was not started on VTE prophylaxis as she was deemed to be a low risk as per the VTE screening  · Doppler ultrasound demonstrated acute occlusive DVT from the mid to distal femoral vein, popliteal vein and one of the posterior tibial veins in the proximal calf  · CT PE not remarkalbe for acute PE  · 1/18: lower extremity edema improving  Negative carolann sign  Left calf still mildly tender  No respiratory distress  No signs or symptoms of recurrent DVT or developing PE  Plan  · Heparin drip  · Maintain INR between 40 and 60  · Monitor for bleeding  FOBT ordered    Anemia  Assessment & Plan  · Hemoglobin at 7 1, POA  · Hemoglobin 1/17L 6 8, s/p 1U PRBC, consented  · 1/18: hemoglobin improved, 8 1   · Etiology: Suspected MARCIA vs GI loss as patient reports h/o bloody bowel movements  · Most recent BM on 1/17 was red colored  FOBT ordered  · GI consult for GI bleed eval  EGD/colonoscopy to be performed 1/20  · Following Diet recommendations as per GI  · Diet 1/18 and 1/19: clear liquids, with bowel prep  · Diet 1/19: NPO at midnight  · Continue with iron supplementation daily  · Transfuse for hemoglobin level < 7  Hypertension  Assessment & Plan  · BP slightly elevated, 551N-983S systolic  · IV hydralazine for systolic blood pressure more than 180  · Continue home prazosin, will add norvasc 2 5mg daily  · Monitor blood pressure      Cholelithiasis  Assessment & Plan  · Patient noted to have cholelithiasis on last admission  · She is asymptomatic for this at the moment; LFTs wnl  · Was supposed to follow-up with surgery for an elective cholecystectomy on 1/20  · Will need to follow up outpatient for cholecystectomy       Depression  Assessment & Plan  Continue with home medication  Bilateral edema of lower extremity  Assessment & Plan  · Bilateral lower extremity edema noted on examination; likely 2/2 DVT  · Improving     Constipation  Assessment & Plan  · POA: no BM in 10 days  H/o chronic constipation  · BM yesterday, 1/17: red colored  · Multifactorial; on iron supplementaion, has had poor appetite and been relatively sedentary  Patient also has a h/o hypothyroid (TSH 10 765 and T4 0 64 on 1/18), not on thyroid supplementation  Was supposed to start in 2017, but never did  Patient does not have a reason why  Rectal exam done on admission, no apparent stool in rectal vault  Abdomen non-tender on exam   · Lipase slightly elevated 689; however, patient tolearting PO and has normal abdominal exam  Do not need to w/u at this time  · Patient initiated on bowel regimen; continue to follow with GI  · Initiate levothyroxine 100mcg daily a m - start tomorrow (dosing based on weight)      Hypothyroidism  Assessment & Plan  · TSHelevated 10 765, T4 0 64  · Will initiate levothyroxine 100mcg (weight based)          VTE Pharmacologic Prophylaxis:   Pharmacologic: Heparin Drip  Mechanical VTE Prophylaxis in Place: No    Discussions with Specialists or Other Care Team Provider: Discussed plan with attending provider, nursing    Education and Discussions with Family / Patient: discussed plan with patient and daughter    Current Length of Stay: 2 day(s)    Current Patient Status: Inpatient     Discharge Plan / Estimated Discharge Date: 3-4 days    Code Status: Level 1 - Full Code      Subjective:   Patient reports no acute events overnight    Patient still has some swelling the legs, however this continues to improve  Denies fevers, chills, chest pain, shortness of breath, nausea, vomiting, diarrhea  Patient did have a bowel movement yesterday, has not had a bowel movement since  Patient has been ambulating without difficulty  Objective:     Vitals:   Temp (24hrs), Av 8 °F (37 1 °C), Min:98 6 °F (37 °C), Max:99 2 °F (37 3 °C)    Temp:  [98 6 °F (37 °C)-99 2 °F (37 3 °C)] 98 7 °F (37 1 °C)  HR:  [71-81] 71  Resp:  [18-20] 18  BP: (149-163)/(76-82) 163/82  SpO2:  [94 %-98 %] 97 %  Body mass index is 24 09 kg/m²  Input and Output Summary (last 24 hours): Intake/Output Summary (Last 24 hours) at 2020 1138  Last data filed at 2020 1300  Gross per 24 hour   Intake 350 ml   Output 400 ml   Net -50 ml       Physical Exam:     Physical Exam   Constitutional: She appears well-developed  No distress  Appears pale  HENT:   Head: Normocephalic and atraumatic  Right Ear: External ear normal    Left Ear: External ear normal    Nose: Nose normal    Eyes: Conjunctivae are normal  Right eye exhibits no discharge  Left eye exhibits no discharge  No scleral icterus  Neck: Normal range of motion  Neck supple  Cardiovascular: Normal rate, regular rhythm, normal heart sounds and intact distal pulses  No murmur heard  Pulmonary/Chest: Effort normal and breath sounds normal  No stridor  No respiratory distress  She has no wheezes  She has no rales  Abdominal: Soft  Bowel sounds are normal  There is no tenderness  There is no rebound and no guarding  Musculoskeletal: Normal range of motion  She exhibits edema (2+ of the LLE, primarily in ankles and lower half of the calf  1+pitting edema in the R ahsan  )  She exhibits no tenderness  Neurological: She is alert  Skin: Skin is warm  Capillary refill takes less than 2 seconds  No rash noted  She is not diaphoretic  No erythema  There is pallor  Psychiatric: She has a normal mood and affect   Her behavior is normal  Nursing note and vitals reviewed  Additional Data:     Labs:    Results from last 7 days   Lab Units 01/18/20  0432   WBC Thousand/uL 4 40   HEMOGLOBIN g/dL 8 1*   HEMATOCRIT % 26 6*   PLATELETS Thousands/uL 221   NEUTROS PCT % 57   LYMPHS PCT % 29   MONOS PCT % 8   EOS PCT % 4     Results from last 7 days   Lab Units 01/18/20  0432  01/16/20  1554   POTASSIUM mmol/L 3 9   < > 4 1   CHLORIDE mmol/L 110*   < > 109*   CO2 mmol/L 25   < > 26   BUN mg/dL 11   < > 10   CREATININE mg/dL 0 75   < > 0 70   CALCIUM mg/dL 8 2*   < > 8 9   ALK PHOS U/L  --   --  99   ALT U/L  --   --  39   AST U/L  --   --  40    < > = values in this interval not displayed  Results from last 7 days   Lab Units 01/17/20  0911   INR  0 99       * I Have Reviewed All Lab Data Listed Above  * Additional Pertinent Lab Tests Reviewed:  All Labs Within Last 24 Hours Reviewed    Imaging:    Imaging Reports Reviewed Today Include:  No new imaging within the last 24 hours  Imaging Personally Reviewed by Myself Includes:  None    Recent Cultures (last 7 days):           Last 24 Hours Medication List:     Current Facility-Administered Medications:  acetaminophen 650 mg Oral Q6H PRN Dean Shrestha MD    amLODIPine 2 5 mg Oral Daily Placido Masterson DO    benzonatate 100 mg Oral TID PRN Placido Masterson DO    benztropine 0 5 mg Oral HS Dean Shrestha MD    docusate sodium 100 mg Oral BID Dean Shrestha MD    [START ON 1/19/2020] ferrous sulfate 325 mg Oral Daily With Breakfast Placido Masterson DO    gabapentin 300 mg Oral BID Dean Shrestha MD    guaiFENesin 600 mg Oral Q12H [address] Placido Masterson DO    heparin (porcine) 3-30 Units/kg/hr (Order-Specific) Intravenous Titrated Dean Shrestha MD Last Rate: 11 Units/kg/hr (01/18/20 6492)   heparin (porcine) 2,800 Units Intravenous PRN Dean Shrestha MD    heparin (porcine) 5,600 Units Intravenous PRN Dean Shrestha MD    hydrALAZINE 5 mg Intravenous Q6H PRN Dean Shrestha MD    [START ON 1/19/2020] levothyroxine 100 mcg Oral Early Morning Harry Guajardo DO    mirtazapine 45 mg Oral HS Jaylan Marquez MD    pantoprazole 40 mg Intravenous Q12H [address] Rocío Marques PA-C    [START ON 1/19/2020] polyethylene glycol 4,000 mL Oral Once Marcellus Heimlich, PA-C    polyethylene glycol 17 g Oral Daily Harry Guajardo DO    prazosin 4 mg Oral HS Jaylan Marquez MD    risperiDONE 3 mg Oral HS Jaylan Marquez MD    senna 1 tablet Oral HS PRN Harry Guajardo DO    sucralfate 1,000 mg Oral Q6H [address] Marcellus Heimlich, PA-C         Today, Patient Was Seen By: Harry Guajardo DO    ** Please Note: This note has been constructed using a voice recognition system   **

## 2020-01-18 NOTE — ASSESSMENT & PLAN NOTE
· TSH elevated 10 765, T4 0 64  · Started on levothyroxine 100 mcg daily today  · Recommend outpatient repeat TFTs in 6 weeks

## 2020-01-18 NOTE — ASSESSMENT & PLAN NOTE
· POA, she is status post IV iron at her last admission  · Received 1 unit PRBC transfusion this admission  Hemoglobin today 8 8  · Suspected chronic GI loss    Plan for EGD/colonoscopy tomorrow  · Bowel prep today

## 2020-01-18 NOTE — PLAN OF CARE
Problem: NEUROSENSORY - ADULT  Goal: Achieves maximal functionality and self care  Description  INTERVENTIONS  - Monitor swallowing and airway patency with patient fatigue and changes in neurological status  - Encourage and assist patient to increase activity and self care     - Encourage visually impaired, hearing impaired and aphasic patients to use assistive/communication devices  Outcome: Progressing     Problem: CARDIOVASCULAR - ADULT  Goal: Maintains optimal cardiac output and hemodynamic stability  Description  INTERVENTIONS:  - Monitor I/O, vital signs and rhythm  - Monitor for S/S and trends of decreased cardiac output  - Administer and titrate ordered vasoactive medications to optimize hemodynamic stability  - Assess quality of pulses, skin color and temperature  - Assess for signs of decreased coronary artery perfusion  - Instruct patient to report change in severity of symptoms  Outcome: Progressing  Goal: Absence of cardiac dysrhythmias or at baseline rhythm  Description  INTERVENTIONS:  - Continuous cardiac monitoring, vital signs, obtain 12 lead EKG if ordered  - Administer antiarrhythmic and heart rate control medications as ordered  - Monitor electrolytes and administer replacement therapy as ordered  Outcome: Progressing     Problem: GASTROINTESTINAL - ADULT  Goal: Minimal or absence of nausea and/or vomiting  Description  INTERVENTIONS:  - Administer IV fluids if ordered to ensure adequate hydration  - Maintain NPO status until nausea and vomiting are resolved  - Nasogastric tube if ordered  - Administer ordered antiemetic medications as needed  - Provide nonpharmacologic comfort measures as appropriate  - Advance diet as tolerated, if ordered  - Consider nutrition services referral to assist patient with adequate nutrition and appropriate food choices  Outcome: Progressing  Goal: Maintains or returns to baseline bowel function  Description  INTERVENTIONS:  - Assess bowel function  - Encourage oral fluids to ensure adequate hydration  - Administer IV fluids if ordered to ensure adequate hydration  - Administer ordered medications as needed  - Encourage mobilization and activity  - Consider nutritional services referral to assist patient with adequate nutrition and appropriate food choices  Outcome: Progressing  Goal: Maintains adequate nutritional intake  Description  INTERVENTIONS:  - Monitor percentage of each meal consumed  - Identify factors contributing to decreased intake, treat as appropriate  - Assist with meals as needed  - Monitor I&O, weight, and lab values if indicated  - Obtain nutrition services referral as needed  Outcome: Progressing  Goal: Establish and maintain optimal ostomy function  Description  INTERVENTIONS:  - Assess bowel function  - Encourage oral fluids to ensure adequate hydration  - Administer IV fluids if ordered to ensure adequate hydration   - Administer ordered medications as needed  - Encourage mobilization and activity  - Nutrition services referral to assist patient with appropriate food choices  - Assess stoma site  - Consider wound care consult   Outcome: Progressing     Problem: GENITOURINARY - ADULT  Goal: Maintains or returns to baseline urinary function  Description  INTERVENTIONS:  - Assess urinary function  - Encourage oral fluids to ensure adequate hydration if ordered  - Administer IV fluids as ordered to ensure adequate hydration  - Administer ordered medications as needed  - Offer frequent toileting  - Follow urinary retention protocol if ordered  Outcome: Progressing  Goal: Absence of urinary retention  Description  INTERVENTIONS:  - Assess patients ability to void and empty bladder  - Monitor I/O  - Bladder scan as needed  - Discuss with physician/AP medications to alleviate retention as needed  - Discuss catheterization for long term situations as appropriate  Outcome: Progressing  Goal: Urinary catheter remains patent  Description  INTERVENTIONS:  - Assess patency of urinary catheter  - If patient has a chronic camacho, consider changing catheter if non-functioning  - Follow guidelines for intermittent irrigation of non-functioning urinary catheter  Outcome: Progressing     Problem: METABOLIC, FLUID AND ELECTROLYTES - ADULT  Goal: Electrolytes maintained within normal limits  Description  INTERVENTIONS:  - Monitor labs and assess patient for signs and symptoms of electrolyte imbalances  - Administer electrolyte replacement as ordered  - Monitor response to electrolyte replacements, including repeat lab results as appropriate  - Instruct patient on fluid and nutrition as appropriate  Outcome: Progressing  Goal: Fluid balance maintained  Description  INTERVENTIONS:  - Monitor labs   - Monitor I/O and WT  - Instruct patient on fluid and nutrition as appropriate  - Assess for signs & symptoms of volume excess or deficit  Outcome: Progressing     Problem: SKIN/TISSUE INTEGRITY - ADULT  Goal: Skin integrity remains intact  Description  INTERVENTIONS  - Identify patients at risk for skin breakdown  - Assess and monitor skin integrity  - Assess and monitor nutrition and hydration status  - Monitor labs (i e  albumin)  - Assess for incontinence   - Turn and reposition patient  - Assist with mobility/ambulation  - Relieve pressure over bony prominences  - Avoid friction and shearing  - Provide appropriate hygiene as needed including keeping skin clean and dry  - Evaluate need for skin moisturizer/barrier cream  - Collaborate with interdisciplinary team (i e  Nutrition, Rehabilitation, etc )   - Patient/family teaching  Outcome: Progressing  Goal: Oral mucous membranes remain intact  Description  INTERVENTIONS  - Assess oral mucosa and hygiene practices  - Implement preventative oral hygiene regimen  - Implement oral medicated treatments as ordered  - Initiate Nutrition services referral as needed  Outcome: Progressing     Problem: HEMATOLOGIC - ADULT  Goal: Maintains hematologic stability  Description  INTERVENTIONS  - Assess for signs and symptoms of bleeding or hemorrhage  - Monitor labs  - Administer supportive blood products/factors as ordered and appropriate  Outcome: Progressing     Problem: PAIN - ADULT  Goal: Verbalizes/displays adequate comfort level or baseline comfort level  Description  Interventions:  - Encourage patient to monitor pain and request assistance  - Assess pain using appropriate pain scale  - Administer analgesics based on type and severity of pain and evaluate response  - Implement non-pharmacological measures as appropriate and evaluate response  - Consider cultural and social influences on pain and pain management  - Notify physician/advanced practitioner if interventions unsuccessful or patient reports new pain  Outcome: Progressing     Problem: INFECTION - ADULT  Goal: Absence or prevention of progression during hospitalization  Description  INTERVENTIONS:  - Assess and monitor for signs and symptoms of infection  - Monitor lab/diagnostic results  - Monitor all insertion sites, i e  indwelling lines, tubes, and drains  - Monitor endotracheal if appropriate and nasal secretions for changes in amount and color  - [place] appropriate cooling/warming therapies per order  - Administer medications as ordered  - Instruct and encourage patient and family to use good hand hygiene technique  - Identify and instruct in appropriate isolation precautions for identified infection/condition  Outcome: Progressing  Goal: Absence of fever/infection during neutropenic period  Description  INTERVENTIONS:  - Monitor WBC    Outcome: Progressing     Problem: SAFETY ADULT  Goal: Patient will remain free of falls  Description  INTERVENTIONS:  - Assess patient frequently for physical needs  -  Identify cognitive and physical deficits and behaviors that affect risk of falls    -  [place] fall precautions as indicated by assessment   - Educate patient/family on patient safety including physical limitations  - Instruct patient to call for assistance with activity based on assessment  - Modify environment to reduce risk of injury  - Consider OT/PT consult to assist with strengthening/mobility  Outcome: Progressing  Goal: Maintain or return to baseline ADL function  Description  INTERVENTIONS:  -  Assess patient's ability to carry out ADLs; assess patient's baseline for ADL function and identify physical deficits which impact ability to perform ADLs (bathing, care of mouth/teeth, toileting, grooming, dressing, etc )  - Assess/evaluate cause of self-care deficits   - Assess range of motion  - Assess patient's mobility; develop plan if impaired  - Assess patient's need for assistive devices and provide as appropriate  - Encourage maximum independence but intervene and supervise when necessary  - Involve family in performance of ADLs  - Assess for home care needs following discharge   - Consider OT consult to assist with ADL evaluation and planning for discharge  - Provide patient education as appropriate  Outcome: Progressing  Goal: Maintain or return mobility status to optimal level  Description  INTERVENTIONS:  - Assess patient's baseline mobility status (ambulation, transfers, stairs, etc )    - Identify cognitive and physical deficits and behaviors that affect mobility  - Identify mobility aids required to assist with transfers and/or ambulation (gait belt, sit-to-stand, lift, walker, cane, etc )  - [place] fall precautions as indicated by assessment  - Record patient progress and toleration of activity level on Mobility SBAR; progress patient to next Phase/Stage  - Instruct patient to call for assistance with activity based on assessment  - Consider rehabilitation consult to assist with strengthening/weightbearing, etc   Outcome: Progressing     Problem: DISCHARGE PLANNING  Goal: Discharge to home or other facility with appropriate resources  Description  INTERVENTIONS:  - Identify barriers to discharge w/patient and caregiver  - Arrange for needed discharge resources and transportation as appropriate  - Identify discharge learning needs (meds, wound care, etc )  - Arrange for interpretive services to assist at discharge as needed  - Refer to Case Management Department for coordinating discharge planning if the patient needs post-hospital services based on physician/advanced practitioner order or complex needs related to functional status, cognitive ability, or social support system  Outcome: Progressing

## 2020-01-18 NOTE — ASSESSMENT & PLAN NOTE
· POA: asymmetrical bilateral lower leg swelling, L>R with calf tenderness  · Etiology: Likely 2/2 prolonged immobilization; in hospital for a week, then was sedentary at home  Non-smoker, not on hormones, no h/o DVT  At the time patient was not started on VTE prophylaxis as she was deemed to be a low risk as per the VTE screening  · Doppler ultrasound demonstrated acute occlusive DVT from the mid to distal femoral vein, popliteal vein and one of the posterior tibial veins in the proximal calf  · CT PE not remarkalbe for acute PE  · 1/18: lower extremity edema improving  Negative carolann sign  Left calf still mildly tender  No respiratory distress  No signs or symptoms of recurrent DVT or developing PE  Plan  · Heparin drip  · Maintain INR between 40 and 60  · Monitor for bleeding    FOBT ordered

## 2020-01-18 NOTE — ASSESSMENT & PLAN NOTE
· POA: no BM in 10 days  H/o chronic constipation  · BM yesterday, 1/17: red colored  · Multifactorial; on iron supplementaion, has had poor appetite and been relatively sedentary  Patient also has a h/o hypothyroid (TSH 10 765 and T4 0 64 on 1/18), not on thyroid supplementation  Was supposed to start in 2017, but never did  Patient does not have a reason why  Rectal exam done on admission, no apparent stool in rectal vault  Abdomen non-tender on exam   · Lipase slightly elevated 689; however, patient tolearting PO and has normal abdominal exam  Do not need to w/u at this time     · Patient initiated on bowel regimen; continue to follow with GI  · Initiate levothyroxine 100mcg daily a m - start tomorrow (dosing based on weight)

## 2020-01-18 NOTE — ASSESSMENT & PLAN NOTE
· Patient noted to have cholelithiasis on last admission with acute gallstone pancreatitis  · Currently without symptoms  · Lipase was elevated at 689, however, patient tolerating PO without abdominal pain and benign abdominal exam   No additional workup indicated at this time  · Was supposed to follow-up with surgery for an elective cholecystectomy on 1/20  · Will need to reschedule outpatient surgery follow-up

## 2020-01-18 NOTE — PLAN OF CARE
Problem: NEUROSENSORY - ADULT  Goal: Achieves maximal functionality and self care  Description  INTERVENTIONS  - Monitor swallowing and airway patency with patient fatigue and changes in neurological status  - Encourage and assist patient to increase activity and self care     - Encourage visually impaired, hearing impaired and aphasic patients to use assistive/communication devices  Outcome: Progressing     Problem: CARDIOVASCULAR - ADULT  Goal: Maintains optimal cardiac output and hemodynamic stability  Description  INTERVENTIONS:  - Monitor I/O, vital signs and rhythm  - Monitor for S/S and trends of decreased cardiac output  - Administer and titrate ordered vasoactive medications to optimize hemodynamic stability  - Assess quality of pulses, skin color and temperature  - Assess for signs of decreased coronary artery perfusion  - Instruct patient to report change in severity of symptoms  Outcome: Progressing  Goal: Absence of cardiac dysrhythmias or at baseline rhythm  Description  INTERVENTIONS:  - Continuous cardiac monitoring, vital signs, obtain 12 lead EKG if ordered  - Administer antiarrhythmic and heart rate control medications as ordered  - Monitor electrolytes and administer replacement therapy as ordered  Outcome: Progressing     Problem: GASTROINTESTINAL - ADULT  Goal: Minimal or absence of nausea and/or vomiting  Description  INTERVENTIONS:  - Administer IV fluids if ordered to ensure adequate hydration  - Maintain NPO status until nausea and vomiting are resolved  - Nasogastric tube if ordered  - Administer ordered antiemetic medications as needed  - Provide nonpharmacologic comfort measures as appropriate  - Advance diet as tolerated, if ordered  - Consider nutrition services referral to assist patient with adequate nutrition and appropriate food choices  Outcome: Progressing  Goal: Maintains or returns to baseline bowel function  Description  INTERVENTIONS:  - Assess bowel function  - Encourage oral fluids to ensure adequate hydration  - Administer IV fluids if ordered to ensure adequate hydration  - Administer ordered medications as needed  - Encourage mobilization and activity  - Consider nutritional services referral to assist patient with adequate nutrition and appropriate food choices  Outcome: Progressing  Goal: Maintains adequate nutritional intake  Description  INTERVENTIONS:  - Monitor percentage of each meal consumed  - Identify factors contributing to decreased intake, treat as appropriate  - Assist with meals as needed  - Monitor I&O, weight, and lab values if indicated  - Obtain nutrition services referral as needed  Outcome: Progressing  Goal: Establish and maintain optimal ostomy function  Description  INTERVENTIONS:  - Assess bowel function  - Encourage oral fluids to ensure adequate hydration  - Administer IV fluids if ordered to ensure adequate hydration   - Administer ordered medications as needed  - Encourage mobilization and activity  - Nutrition services referral to assist patient with appropriate food choices  - Assess stoma site  - Consider wound care consult   Outcome: Progressing     Problem: GENITOURINARY - ADULT  Goal: Maintains or returns to baseline urinary function  Description  INTERVENTIONS:  - Assess urinary function  - Encourage oral fluids to ensure adequate hydration if ordered  - Administer IV fluids as ordered to ensure adequate hydration  - Administer ordered medications as needed  - Offer frequent toileting  - Follow urinary retention protocol if ordered  Outcome: Progressing  Goal: Absence of urinary retention  Description  INTERVENTIONS:  - Assess patients ability to void and empty bladder  - Monitor I/O  - Bladder scan as needed  - Discuss with physician/AP medications to alleviate retention as needed  - Discuss catheterization for long term situations as appropriate  Outcome: Progressing  Goal: Urinary catheter remains patent  Description  INTERVENTIONS:  - Assess patency of urinary catheter  - If patient has a chronic camacho, consider changing catheter if non-functioning  - Follow guidelines for intermittent irrigation of non-functioning urinary catheter  Outcome: Progressing     Problem: METABOLIC, FLUID AND ELECTROLYTES - ADULT  Goal: Electrolytes maintained within normal limits  Description  INTERVENTIONS:  - Monitor labs and assess patient for signs and symptoms of electrolyte imbalances  - Administer electrolyte replacement as ordered  - Monitor response to electrolyte replacements, including repeat lab results as appropriate  - Instruct patient on fluid and nutrition as appropriate  Outcome: Progressing  Goal: Fluid balance maintained  Description  INTERVENTIONS:  - Monitor labs   - Monitor I/O and WT  - Instruct patient on fluid and nutrition as appropriate  - Assess for signs & symptoms of volume excess or deficit  Outcome: Progressing     Problem: SKIN/TISSUE INTEGRITY - ADULT  Goal: Skin integrity remains intact  Description  INTERVENTIONS  - Identify patients at risk for skin breakdown  - Assess and monitor skin integrity  - Assess and monitor nutrition and hydration status  - Monitor labs (i e  albumin)  - Assess for incontinence   - Turn and reposition patient  - Assist with mobility/ambulation  - Relieve pressure over bony prominences  - Avoid friction and shearing  - Provide appropriate hygiene as needed including keeping skin clean and dry  - Evaluate need for skin moisturizer/barrier cream  - Collaborate with interdisciplinary team (i e  Nutrition, Rehabilitation, etc )   - Patient/family teaching  Outcome: Progressing  Goal: Oral mucous membranes remain intact  Description  INTERVENTIONS  - Assess oral mucosa and hygiene practices  - Implement preventative oral hygiene regimen  - Implement oral medicated treatments as ordered  - Initiate Nutrition services referral as needed  Outcome: Progressing     Problem: HEMATOLOGIC - ADULT  Goal: Maintains hematologic stability  Description  INTERVENTIONS  - Assess for signs and symptoms of bleeding or hemorrhage  - Monitor labs  - Administer supportive blood products/factors as ordered and appropriate  Outcome: Progressing     Problem: PAIN - ADULT  Goal: Verbalizes/displays adequate comfort level or baseline comfort level  Description  Interventions:  - Encourage patient to monitor pain and request assistance  - Assess pain using appropriate pain scale  - Administer analgesics based on type and severity of pain and evaluate response  - Implement non-pharmacological measures as appropriate and evaluate response  - Consider cultural and social influences on pain and pain management  - Notify physician/advanced practitioner if interventions unsuccessful or patient reports new pain  Outcome: Progressing     Problem: INFECTION - ADULT  Goal: Absence or prevention of progression during hospitalization  Description  INTERVENTIONS:  - Assess and monitor for signs and symptoms of infection  - Monitor lab/diagnostic results  - Monitor all insertion sites, i e  indwelling lines, tubes, and drains  - Monitor endotracheal if appropriate and nasal secretions for changes in amount and color  - [place] appropriate cooling/warming therapies per order  - Administer medications as ordered  - Instruct and encourage patient and family to use good hand hygiene technique  - Identify and instruct in appropriate isolation precautions for identified infection/condition  Outcome: Progressing  Goal: Absence of fever/infection during neutropenic period  Description  INTERVENTIONS:  - Monitor WBC    Outcome: Progressing     Problem: SAFETY ADULT  Goal: Patient will remain free of falls  Description  INTERVENTIONS:  - Assess patient frequently for physical needs  -  Identify cognitive and physical deficits and behaviors that affect risk of falls    -  [place] fall precautions as indicated by assessment   - Educate patient/family on patient safety including physical limitations  - Instruct patient to call for assistance with activity based on assessment  - Modify environment to reduce risk of injury  - Consider OT/PT consult to assist with strengthening/mobility  Outcome: Progressing  Goal: Maintain or return to baseline ADL function  Description  INTERVENTIONS:  -  Assess patient's ability to carry out ADLs; assess patient's baseline for ADL function and identify physical deficits which impact ability to perform ADLs (bathing, care of mouth/teeth, toileting, grooming, dressing, etc )  - Assess/evaluate cause of self-care deficits   - Assess range of motion  - Assess patient's mobility; develop plan if impaired  - Assess patient's need for assistive devices and provide as appropriate  - Encourage maximum independence but intervene and supervise when necessary  - Involve family in performance of ADLs  - Assess for home care needs following discharge   - Consider OT consult to assist with ADL evaluation and planning for discharge  - Provide patient education as appropriate  Outcome: Progressing  Goal: Maintain or return mobility status to optimal level  Description  INTERVENTIONS:  - Assess patient's baseline mobility status (ambulation, transfers, stairs, etc )    - Identify cognitive and physical deficits and behaviors that affect mobility  - Identify mobility aids required to assist with transfers and/or ambulation (gait belt, sit-to-stand, lift, walker, cane, etc )  - [place] fall precautions as indicated by assessment  - Record patient progress and toleration of activity level on Mobility SBAR; progress patient to next Phase/Stage  - Instruct patient to call for assistance with activity based on assessment  - Consider rehabilitation consult to assist with strengthening/weightbearing, etc   Outcome: Progressing     Problem: DISCHARGE PLANNING  Goal: Discharge to home or other facility with appropriate resources  Description  INTERVENTIONS:  - Identify barriers to discharge w/patient and caregiver  - Arrange for needed discharge resources and transportation as appropriate  - Identify discharge learning needs (meds, wound care, etc )  - Arrange for interpretive services to assist at discharge as needed  - Refer to Case Management Department for coordinating discharge planning if the patient needs post-hospital services based on physician/advanced practitioner order or complex needs related to functional status, cognitive ability, or social support system  Outcome: Progressing

## 2020-01-18 NOTE — ASSESSMENT & PLAN NOTE
· Hemoglobin at 7 1, POA  · Hemoglobin 1/17L 6 8, s/p 1U PRBC, consented  · 1/18: hemoglobin improved, 8 1   · Etiology: Suspected MARCIA vs GI loss as patient reports h/o bloody bowel movements  · Most recent BM on 1/17 was red colored  FOBT ordered  · GI consult for GI bleed eval  EGD/colonoscopy to be performed 1/20  · Following Diet recommendations as per GI  · Diet 1/18 and 1/19: clear liquids, with bowel prep  · Diet 1/19: NPO at midnight  · Continue with iron supplementation daily  · Transfuse for hemoglobin level < 7

## 2020-01-19 PROBLEM — K92.2 GI BLEED: Status: ACTIVE | Noted: 2020-01-19

## 2020-01-19 PROBLEM — D50.0 IRON DEFICIENCY ANEMIA DUE TO CHRONIC BLOOD LOSS: Status: ACTIVE | Noted: 2020-01-09

## 2020-01-19 LAB
ANION GAP SERPL CALCULATED.3IONS-SCNC: 6 MMOL/L (ref 4–13)
APTT PPP: 118 SECONDS (ref 23–37)
APTT PPP: 55 SECONDS (ref 23–37)
APTT PPP: 82 SECONDS (ref 23–37)
BUN SERPL-MCNC: 8 MG/DL (ref 5–25)
CALCIUM SERPL-MCNC: 9 MG/DL (ref 8.3–10.1)
CHLORIDE SERPL-SCNC: 110 MMOL/L (ref 100–108)
CO2 SERPL-SCNC: 27 MMOL/L (ref 21–32)
CREAT SERPL-MCNC: 0.74 MG/DL (ref 0.6–1.3)
ERYTHROCYTE [DISTWIDTH] IN BLOOD BY AUTOMATED COUNT: 20.2 % (ref 11.6–15.1)
GFR SERPL CREATININE-BSD FRML MDRD: 90 ML/MIN/1.73SQ M
GLUCOSE SERPL-MCNC: 90 MG/DL (ref 65–140)
HCT VFR BLD AUTO: 28.1 % (ref 34.8–46.1)
HEMOCCULT STL QL: NEGATIVE
HGB BLD-MCNC: 8.8 G/DL (ref 11.5–15.4)
MCH RBC QN AUTO: 33.5 PG (ref 26.8–34.3)
MCHC RBC AUTO-ENTMCNC: 31.3 G/DL (ref 31.4–37.4)
MCV RBC AUTO: 107 FL (ref 82–98)
PLATELET # BLD AUTO: 210 THOUSANDS/UL (ref 149–390)
PMV BLD AUTO: 10.6 FL (ref 8.9–12.7)
POTASSIUM SERPL-SCNC: 4.1 MMOL/L (ref 3.5–5.3)
RBC # BLD AUTO: 2.63 MILLION/UL (ref 3.81–5.12)
SODIUM SERPL-SCNC: 143 MMOL/L (ref 136–145)
WBC # BLD AUTO: 4.15 THOUSAND/UL (ref 4.31–10.16)

## 2020-01-19 PROCEDURE — 85027 COMPLETE CBC AUTOMATED: CPT | Performed by: INTERNAL MEDICINE

## 2020-01-19 PROCEDURE — 99232 SBSQ HOSP IP/OBS MODERATE 35: CPT | Performed by: INTERNAL MEDICINE

## 2020-01-19 PROCEDURE — 82272 OCCULT BLD FECES 1-3 TESTS: CPT | Performed by: FAMILY MEDICINE

## 2020-01-19 PROCEDURE — 85730 THROMBOPLASTIN TIME PARTIAL: CPT | Performed by: INTERNAL MEDICINE

## 2020-01-19 PROCEDURE — C9113 INJ PANTOPRAZOLE SODIUM, VIA: HCPCS | Performed by: PHYSICIAN ASSISTANT

## 2020-01-19 PROCEDURE — 80048 BASIC METABOLIC PNL TOTAL CA: CPT | Performed by: INTERNAL MEDICINE

## 2020-01-19 RX ORDER — HYDROCHLOROTHIAZIDE 12.5 MG/1
12.5 TABLET ORAL DAILY
Status: DISCONTINUED | OUTPATIENT
Start: 2020-01-19 | End: 2020-01-22 | Stop reason: HOSPADM

## 2020-01-19 RX ADMIN — POLYETHYLENE GLYCOL 3350, SODIUM SULFATE ANHYDROUS, SODIUM BICARBONATE, SODIUM CHLORIDE, POTASSIUM CHLORIDE 4000 ML: 236; 22.74; 6.74; 5.86; 2.97 POWDER, FOR SOLUTION ORAL at 12:24

## 2020-01-19 RX ADMIN — SUCRALFATE 1000 MG: 1 SUSPENSION ORAL at 12:24

## 2020-01-19 RX ADMIN — MIRTAZAPINE 45 MG: 15 TABLET, FILM COATED ORAL at 21:07

## 2020-01-19 RX ADMIN — HYDROCHLOROTHIAZIDE 12.5 MG: 12.5 TABLET ORAL at 12:24

## 2020-01-19 RX ADMIN — HEPARIN SODIUM 2800 UNITS: 1000 INJECTION INTRAVENOUS; SUBCUTANEOUS at 13:20

## 2020-01-19 RX ADMIN — DOCUSATE SODIUM 100 MG: 100 CAPSULE, LIQUID FILLED ORAL at 17:57

## 2020-01-19 RX ADMIN — DOCUSATE SODIUM 100 MG: 100 CAPSULE, LIQUID FILLED ORAL at 08:48

## 2020-01-19 RX ADMIN — LEVOTHYROXINE SODIUM 100 MCG: 100 TABLET ORAL at 07:17

## 2020-01-19 RX ADMIN — SUCRALFATE 1000 MG: 1 SUSPENSION ORAL at 07:17

## 2020-01-19 RX ADMIN — GABAPENTIN 300 MG: 300 CAPSULE ORAL at 08:48

## 2020-01-19 RX ADMIN — SUCRALFATE 1000 MG: 1 SUSPENSION ORAL at 23:38

## 2020-01-19 RX ADMIN — BENZONATATE 100 MG: 100 CAPSULE ORAL at 17:57

## 2020-01-19 RX ADMIN — AMLODIPINE BESYLATE 2.5 MG: 2.5 TABLET ORAL at 08:48

## 2020-01-19 RX ADMIN — GUAIFENESIN 600 MG: 600 TABLET, EXTENDED RELEASE ORAL at 08:48

## 2020-01-19 RX ADMIN — PANTOPRAZOLE SODIUM 40 MG: 40 INJECTION, POWDER, FOR SOLUTION INTRAVENOUS at 08:48

## 2020-01-19 RX ADMIN — PRAZOSIN HYDROCHLORIDE 4 MG: 1 CAPSULE ORAL at 21:08

## 2020-01-19 RX ADMIN — RISPERIDONE 3 MG: 1 TABLET ORAL at 21:07

## 2020-01-19 RX ADMIN — PANTOPRAZOLE SODIUM 40 MG: 40 INJECTION, POWDER, FOR SOLUTION INTRAVENOUS at 21:07

## 2020-01-19 RX ADMIN — BENZTROPINE MESYLATE 0.5 MG: 1 TABLET ORAL at 21:07

## 2020-01-19 RX ADMIN — GUAIFENESIN 600 MG: 600 TABLET, EXTENDED RELEASE ORAL at 21:07

## 2020-01-19 RX ADMIN — GABAPENTIN 300 MG: 300 CAPSULE ORAL at 17:57

## 2020-01-19 RX ADMIN — SUCRALFATE 1000 MG: 1 SUSPENSION ORAL at 17:57

## 2020-01-19 RX ADMIN — FERROUS SULFATE TAB 325 MG (65 MG ELEMENTAL FE) 325 MG: 325 (65 FE) TAB at 08:48

## 2020-01-19 NOTE — PLAN OF CARE
Problem: NEUROSENSORY - ADULT  Goal: Achieves maximal functionality and self care  Description  INTERVENTIONS  - Monitor swallowing and airway patency with patient fatigue and changes in neurological status  - Encourage and assist patient to increase activity and self care     - Encourage visually impaired, hearing impaired and aphasic patients to use assistive/communication devices  Outcome: Progressing     Problem: CARDIOVASCULAR - ADULT  Goal: Maintains optimal cardiac output and hemodynamic stability  Description  INTERVENTIONS:  - Monitor I/O, vital signs and rhythm  - Monitor for S/S and trends of decreased cardiac output  - Administer and titrate ordered vasoactive medications to optimize hemodynamic stability  - Assess quality of pulses, skin color and temperature  - Assess for signs of decreased coronary artery perfusion  - Instruct patient to report change in severity of symptoms  Outcome: Progressing  Goal: Absence of cardiac dysrhythmias or at baseline rhythm  Description  INTERVENTIONS:  - Continuous cardiac monitoring, vital signs, obtain 12 lead EKG if ordered  - Administer antiarrhythmic and heart rate control medications as ordered  - Monitor electrolytes and administer replacement therapy as ordered  Outcome: Progressing     Problem: GASTROINTESTINAL - ADULT  Goal: Minimal or absence of nausea and/or vomiting  Description  INTERVENTIONS:  - Administer IV fluids if ordered to ensure adequate hydration  - Maintain NPO status until nausea and vomiting are resolved  - Nasogastric tube if ordered  - Administer ordered antiemetic medications as needed  - Provide nonpharmacologic comfort measures as appropriate  - Advance diet as tolerated, if ordered  - Consider nutrition services referral to assist patient with adequate nutrition and appropriate food choices  Outcome: Progressing  Goal: Maintains or returns to baseline bowel function  Description  INTERVENTIONS:  - Assess bowel function  - Encourage oral fluids to ensure adequate hydration  - Administer IV fluids if ordered to ensure adequate hydration  - Administer ordered medications as needed  - Encourage mobilization and activity  - Consider nutritional services referral to assist patient with adequate nutrition and appropriate food choices  Outcome: Progressing  Goal: Maintains adequate nutritional intake  Description  INTERVENTIONS:  - Monitor percentage of each meal consumed  - Identify factors contributing to decreased intake, treat as appropriate  - Assist with meals as needed  - Monitor I&O, weight, and lab values if indicated  - Obtain nutrition services referral as needed  Outcome: Progressing  Goal: Establish and maintain optimal ostomy function  Description  INTERVENTIONS:  - Assess bowel function  - Encourage oral fluids to ensure adequate hydration  - Administer IV fluids if ordered to ensure adequate hydration   - Administer ordered medications as needed  - Encourage mobilization and activity  - Nutrition services referral to assist patient with appropriate food choices  - Assess stoma site  - Consider wound care consult   Outcome: Progressing     Problem: GENITOURINARY - ADULT  Goal: Maintains or returns to baseline urinary function  Description  INTERVENTIONS:  - Assess urinary function  - Encourage oral fluids to ensure adequate hydration if ordered  - Administer IV fluids as ordered to ensure adequate hydration  - Administer ordered medications as needed  - Offer frequent toileting  - Follow urinary retention protocol if ordered  Outcome: Progressing  Goal: Absence of urinary retention  Description  INTERVENTIONS:  - Assess patients ability to void and empty bladder  - Monitor I/O  - Bladder scan as needed  - Discuss with physician/AP medications to alleviate retention as needed  - Discuss catheterization for long term situations as appropriate  Outcome: Progressing  Goal: Urinary catheter remains patent  Description  INTERVENTIONS:  - Assess patency of urinary catheter  - If patient has a chronic camacho, consider changing catheter if non-functioning  - Follow guidelines for intermittent irrigation of non-functioning urinary catheter  Outcome: Progressing     Problem: METABOLIC, FLUID AND ELECTROLYTES - ADULT  Goal: Electrolytes maintained within normal limits  Description  INTERVENTIONS:  - Monitor labs and assess patient for signs and symptoms of electrolyte imbalances  - Administer electrolyte replacement as ordered  - Monitor response to electrolyte replacements, including repeat lab results as appropriate  - Instruct patient on fluid and nutrition as appropriate  Outcome: Progressing  Goal: Fluid balance maintained  Description  INTERVENTIONS:  - Monitor labs   - Monitor I/O and WT  - Instruct patient on fluid and nutrition as appropriate  - Assess for signs & symptoms of volume excess or deficit  Outcome: Progressing     Problem: SKIN/TISSUE INTEGRITY - ADULT  Goal: Skin integrity remains intact  Description  INTERVENTIONS  - Identify patients at risk for skin breakdown  - Assess and monitor skin integrity  - Assess and monitor nutrition and hydration status  - Monitor labs (i e  albumin)  - Assess for incontinence   - Turn and reposition patient  - Assist with mobility/ambulation  - Relieve pressure over bony prominences  - Avoid friction and shearing  - Provide appropriate hygiene as needed including keeping skin clean and dry  - Evaluate need for skin moisturizer/barrier cream  - Collaborate with interdisciplinary team (i e  Nutrition, Rehabilitation, etc )   - Patient/family teaching  Outcome: Progressing  Goal: Oral mucous membranes remain intact  Description  INTERVENTIONS  - Assess oral mucosa and hygiene practices  - Implement preventative oral hygiene regimen  - Implement oral medicated treatments as ordered  - Initiate Nutrition services referral as needed  Outcome: Progressing     Problem: HEMATOLOGIC - ADULT  Goal: Maintains hematologic stability  Description  INTERVENTIONS  - Assess for signs and symptoms of bleeding or hemorrhage  - Monitor labs  - Administer supportive blood products/factors as ordered and appropriate  Outcome: Progressing     Problem: PAIN - ADULT  Goal: Verbalizes/displays adequate comfort level or baseline comfort level  Description  Interventions:  - Encourage patient to monitor pain and request assistance  - Assess pain using appropriate pain scale  - Administer analgesics based on type and severity of pain and evaluate response  - Implement non-pharmacological measures as appropriate and evaluate response  - Consider cultural and social influences on pain and pain management  - Notify physician/advanced practitioner if interventions unsuccessful or patient reports new pain  Outcome: Progressing     Problem: INFECTION - ADULT  Goal: Absence or prevention of progression during hospitalization  Description  INTERVENTIONS:  - Assess and monitor for signs and symptoms of infection  - Monitor lab/diagnostic results  - Monitor all insertion sites, i e  indwelling lines, tubes, and drains  - Monitor endotracheal if appropriate and nasal secretions for changes in amount and color  - [place] appropriate cooling/warming therapies per order  - Administer medications as ordered  - Instruct and encourage patient and family to use good hand hygiene technique  - Identify and instruct in appropriate isolation precautions for identified infection/condition  Outcome: Progressing  Goal: Absence of fever/infection during neutropenic period  Description  INTERVENTIONS:  - Monitor WBC    Outcome: Progressing     Problem: SAFETY ADULT  Goal: Patient will remain free of falls  Description  INTERVENTIONS:  - Assess patient frequently for physical needs  -  Identify cognitive and physical deficits and behaviors that affect risk of falls    -  [place] fall precautions as indicated by assessment   - Educate patient/family on patient safety including physical limitations  - Instruct patient to call for assistance with activity based on assessment  - Modify environment to reduce risk of injury  - Consider OT/PT consult to assist with strengthening/mobility  Outcome: Progressing  Goal: Maintain or return to baseline ADL function  Description  INTERVENTIONS:  -  Assess patient's ability to carry out ADLs; assess patient's baseline for ADL function and identify physical deficits which impact ability to perform ADLs (bathing, care of mouth/teeth, toileting, grooming, dressing, etc )  - Assess/evaluate cause of self-care deficits   - Assess range of motion  - Assess patient's mobility; develop plan if impaired  - Assess patient's need for assistive devices and provide as appropriate  - Encourage maximum independence but intervene and supervise when necessary  - Involve family in performance of ADLs  - Assess for home care needs following discharge   - Consider OT consult to assist with ADL evaluation and planning for discharge  - Provide patient education as appropriate  Outcome: Progressing  Goal: Maintain or return mobility status to optimal level  Description  INTERVENTIONS:  - Assess patient's baseline mobility status (ambulation, transfers, stairs, etc )    - Identify cognitive and physical deficits and behaviors that affect mobility  - Identify mobility aids required to assist with transfers and/or ambulation (gait belt, sit-to-stand, lift, walker, cane, etc )  - [place] fall precautions as indicated by assessment  - Record patient progress and toleration of activity level on Mobility SBAR; progress patient to next Phase/Stage  - Instruct patient to call for assistance with activity based on assessment  - Consider rehabilitation consult to assist with strengthening/weightbearing, etc   Outcome: Progressing     Problem: DISCHARGE PLANNING  Goal: Discharge to home or other facility with appropriate resources  Description  INTERVENTIONS:  - Identify barriers to discharge w/patient and caregiver  - Arrange for needed discharge resources and transportation as appropriate  - Identify discharge learning needs (meds, wound care, etc )  - Arrange for interpretive services to assist at discharge as needed  - Refer to Case Management Department for coordinating discharge planning if the patient needs post-hospital services based on physician/advanced practitioner order or complex needs related to functional status, cognitive ability, or social support system  Outcome: Progressing     Problem: Potential for Falls  Goal: Patient will remain free of falls  Description  INTERVENTIONS:  - Assess patient frequently for physical needs  -  Identify cognitive and physical deficits and behaviors that affect risk of falls    -  [place] fall precautions as indicated by assessment   - Educate patient/family on patient safety including physical limitations  - Instruct patient to call for assistance with activity based on assessment  - Modify environment to reduce risk of injury  - Consider OT/PT consult to assist with strengthening/mobility  Outcome: Progressing

## 2020-01-19 NOTE — PROGRESS NOTES
Progress Note - Mario Michel 1960, 64 y o  female MRN: [id number]    Unit/Bed#: S -01 Encounter: [id number]    Primary Care Provider: Cash Acevedo MD   Date and time admitted to hospital: 1/16/2020  5:05 PM    * Acute deep vein thrombosis (DVT) of lower extremity ([address] )  Assessment & Plan  · POA, lower extremity Dopplers showed evidence of acute occlusive DVT involving the mid to distal femoral vein, popliteal vein and one of the posterior tibial veins in the proximal calf  · Recently hospitalized for approximately 1 week for acute gallstone pancreatitis  · Was deemed low risk at that time and not on pharmacologic VTE prophylaxis  · Currently on IV heparin infusion pending further GI evaluation to rule out occult GI bleed  · Eventual transition to Eliquis vs Xarelto once cleared from the GI standpoint    Iron deficiency anemia due to chronic blood loss  Assessment & Plan  · POA, she is status post IV iron at her last admission  · Received 1 unit PRBC transfusion this admission  Hemoglobin today 8 8  · Suspected chronic GI loss  Plan for EGD/colonoscopy tomorrow  · Bowel prep today    Essential hypertension  Assessment & Plan  · BP slightly elevated, [id number] systolic  · IV hydralazine for systolic blood pressure more than 180  · Continue home dose prazosin, Norvasc 2 5 mg started yesterday  · Has been on HCTZ in the past   Will resume    Discontinue Norvasc    Cholelithiasis  Assessment & Plan  · Patient noted to have cholelithiasis on last admission with acute gallstone pancreatitis  · Currently without symptoms  · Lipase was elevated at 689, however, patient tolerating PO without abdominal pain and benign abdominal exam   No additional workup indicated at this time  · Was supposed to follow-up with surgery for an elective cholecystectomy on 1/20  · Will need to reschedule outpatient surgery follow-up     Hypothyroidism  Assessment & Plan  · TSH elevated 10 765, T4 0 64  · Started on levothyroxine 100 mcg daily today  · Recommend outpatient repeat TFTs in 6 weeks    Constipation  Assessment & Plan  · POA: no BM in approximately 10 days  H/o chronic constipation  · Multifactorial; on iron supplementaion, has had poor appetite and been relatively sedentary  Patient also has a h/o hypothyroid (TSH 10 765 and T4 0 64 on ), not on thyroid supplementation  Was supposed to start in 2017, but never did  Patient does not have a reason why  · Has had several bowel movements since initiating a bowel regimen  Will continue    Bilateral edema of lower extremity  Assessment & Plan  · Bilateral lower extremity edema noted on examination; likely 2/2 DVT  · Improving     Depression  Assessment & Plan  Continue with home medication  VTE Pharmacologic Prophylaxis:   Pharmacologic: Heparin Drip  Mechanical VTE Prophylaxis in Place: Yes    Patient Centered Rounds: I have performed bedside rounds with nursing staff today  Discussions with Specialists or Other Care Team Provider:  Nursing    Education and Discussions with Family / Patient:  Patient and sister updated at the bedside, all questions answered    Current Length of Stay: 3 day(s)    Current Patient Status: Inpatient   Certification Statement: The patient will continue to require additional inpatient hospital stay due to Above diagnosis and care plan    Discharge Plan:  Pending further GI eval    Code Status: Level 1 - Full Code      Subjective:   No new complaints or acute overnight events  Reports several bowel movements, no abdominal pain, no dizziness or lightheadedness  Tolerating p o  Objective:     Vitals:   Temp (24hrs), Av 3 °F (36 8 °C), Min:98 °F (36 7 °C), Max:98 4 °F (36 9 °C)    Temp:  [98 °F (36 7 °C)-98 4 °F (36 9 °C)] 98 4 °F (36 9 °C)  HR:  [74-81] 81  Resp:  [18] 18  BP: (152-170)/(78-94) 153/94  SpO2:  [97 %-98 %] 97 %  Body mass index is 24 09 kg/m²  Input and Output Summary (last 24 hours):        Intake/Output Summary (Last 24 hours) at 1/19/2020 1212  Last data filed at 1/18/2020 2033  Gross per 24 hour   Intake --   Output 700 ml   Net -700 ml       Physical Exam:  General Appearance:    Alert, cooperative, no distress, appropriately responsive    Head:    Normocephalic, without obvious abnormality, atraumatic, mucous membranes moist    Eyes:    Conjunctiva/corneas clear, + pallor   Neck:   Supple   Lungs:     Clear to auscultation bilaterally, respirations unlabored, no crackles or wheeze     Heart:    Regular rate and rhythm, S1 and S2    Abdomen:     Soft, non-tender, nondistended   Extremities:   L>R lower extremity edema+1   Neurologic:  nonfocal         Additional Data:     Labs:    Results from last 7 days   Lab Units 01/19/20  0259 01/18/20  0432   WBC Thousand/uL 4 15* 4 40   HEMOGLOBIN g/dL 8 8* 8 1*   HEMATOCRIT % 28 1* 26 6*   PLATELETS Thousands/uL 210 221   NEUTROS PCT %  --  57   LYMPHS PCT %  --  29   MONOS PCT %  --  8   EOS PCT %  --  4     Results from last 7 days   Lab Units 01/19/20  0259  01/16/20  1554   POTASSIUM mmol/L 4 1   < > 4 1   CHLORIDE mmol/L 110*   < > 109*   CO2 mmol/L 27   < > 26   BUN mg/dL 8   < > 10   CREATININE mg/dL 0 74   < > 0 70   CALCIUM mg/dL 9 0   < > 8 9   ALK PHOS U/L  --   --  99   ALT U/L  --   --  39   AST U/L  --   --  40    < > = values in this interval not displayed  Results from last 7 days   Lab Units 01/17/20  0911   INR  0 99       * I Have Reviewed All Lab Data Listed Above  * Additional Pertinent Lab Tests Reviewed:  [facility]    Cultures:   Blood Culture: No results found for: BLOODCX  Urine Culture: No results found for: URINECX  Sputum Culture: No components found for: SPUTUMCX  Wound Culture: No results found for: WOUNDCULT    Last 24 Hours Medication List:     Current Facility-Administered Medications:  acetaminophen 650 mg Oral Q6H PRN Jaylan Marquez MD    benzonatate 100 mg Oral TID PRN Harry Guajardo DO benztropine 0 5 mg Oral HS Clarisse Gregory MD    docusate sodium 100 mg Oral BID Clarisse Gregory MD    ferrous sulfate 325 mg Oral Daily With Breakfast Keysha Glassing, DO    gabapentin 300 mg Oral BID Clarisse Gregory MD    guaiFENesin 600 mg Oral Q12H [address] Keysha Glassing, DO    heparin (porcine) 3-30 Units/kg/hr (Order-Specific) Intravenous Titrated Clarisse Gregory MD Last Rate: 10 Units/kg/hr (01/19/20 0446)   heparin (porcine) 2,800 Units Intravenous PRN Clarisse Gregory MD    heparin (porcine) 5,600 Units Intravenous PRN Clarisse Gregory MD    hydrALAZINE 5 mg Intravenous Q6H PRN Clarisse Gregory MD    hydrochlorothiazide 12 5 mg Oral Daily Julio Cesar Guallpa MD    levothyroxine 100 mcg Oral Early Morning Keysha Glassing, DO    mirtazapine 45 mg Oral HS Clarisse Gregory MD    pantoprazole 40 mg Intravenous Q12H [address] Izabel Bianchi PA-C    polyethylene glycol 4,000 mL Oral Once Izabel Bianchi PA-C    polyethylene glycol 17 g Oral Daily Keysha Glassing,     prazosin 4 mg Oral HS Clarisse Gregory MD    risperiDONE 3 mg Oral HS Clarisse Gregory MD    senna 1 tablet Oral HS PRN Keysha Glassing, DO    sucralfate 1,000 mg Oral Q6H [address] Izabel Bianchi PA-C         Today, Patient Was Seen By: Julio Cesar Guallpa MD    ** Please Note: Dragon 360 Dictation voice to text software may have been used in the creation of this document   **

## 2020-01-19 NOTE — PROGRESS NOTES
Progress Note - Darya Shin 64 y o  female MRN: [id number]    Unit/Bed#: S -01 Encounter: [id number]        Subjective:   Patient reports she took magnesium citrate bottle yesterday and Dulcolax, had several bowel movements since then which are small but formed and brown  No rectal bleeding reported  Denies any abdominal pain, nausea or vomiting  Objective:     Vitals: Blood pressure 153/94, pulse 81, temperature 98 4 °F (36 9 °C), temperature source Oral, resp  rate 18, height 5' 7" (1 702 m), weight 69 8 kg (153 lb 12 8 oz), SpO2 97 %  ,Body mass index is 24 09 kg/m²  Intake/Output Summary (Last 24 hours) at 1/19/2020 1217  Last data filed at 1/18/2020 2033  Gross per 24 hour   Intake --   Output 700 ml   Net -700 ml       Physical Exam:   General appearance: alert, chronically ill-appearing, appears stated age and cooperative  Lungs: clear to auscultation bilaterally, no labored breathing/accessory muscle use  Heart: regular rate and rhythm, S1, S2 normal, no murmur, click, rub or gallop  Abdomen: soft, non-tender; bowel sounds normal; no masses,  no organomegaly  Extremities: no edema    Invasive Devices     Peripheral Intravenous Line            Peripheral IV 01/17/20 Left Forearm 2 days    Long-Dwell Peripheral IV (Midline) 81/10/89 Right Basilic less than 1 day                Lab, Imaging and other studies: I have personally reviewed pertinent reports      Admission on 01/16/2020   Component Date Value    Protime 01/16/2020 12 2     INR 01/16/2020 0 96     PTT 01/16/2020 30     WBC 01/16/2020 5 26     RBC 01/16/2020 2 14*    Hemoglobin 01/16/2020 7 1*    Hematocrit 01/16/2020 23 6*    MCV 01/16/2020 110*    MCH 01/16/2020 33 2     MCHC 01/16/2020 30 1*    RDW 01/16/2020 20 5*    MPV 01/16/2020 10 8     Platelets 81/58/1753 256     nRBC 01/16/2020 0     Neutrophils Relative 01/16/2020 67     Immat GRANS % 01/16/2020 1     Lymphocytes Relative 01/16/2020 23     Monocytes Relative 01/16/2020 7     Eosinophils Relative 01/16/2020 2     Basophils Relative 01/16/2020 0     Neutrophils Absolute 01/16/2020 3 57     Immature Grans Absolute 01/16/2020 0 03     Lymphocytes Absolute 01/16/2020 1 19     Monocytes Absolute 01/16/2020 0 35     Eosinophils Absolute 01/16/2020 0 11     Basophils Absolute 01/16/2020 0 01     ABO Grouping 01/16/2020 O     Rh Factor 01/16/2020 Positive     Antibody Screen 01/16/2020 Negative     Specimen Expiration Date 01/16/2020 [id number]     PTT 01/17/2020 201*    Hemoglobin 01/17/2020 7 1*    Hematocrit 01/17/2020 24 0*    Sodium 01/17/2020 142     Potassium 01/17/2020 3 9     Chloride 01/17/2020 111*    CO2 01/17/2020 24     ANION GAP 01/17/2020 7     BUN 01/17/2020 10     Creatinine 01/17/2020 0 72     Glucose 01/17/2020 93     Calcium 01/17/2020 8 5     eGFR 01/17/2020 93     WBC 01/17/2020 4 13*    RBC 01/17/2020 2 03*    Hemoglobin 01/17/2020 6 8*    Hematocrit 01/17/2020 21 9*    MCV 01/17/2020 108*    MCH 01/17/2020 33 5     MCHC 01/17/2020 31 1*    RDW 01/17/2020 20 3*    MPV 01/17/2020 10 5     Platelets 70/37/7830 215     nRBC 01/17/2020 0     Neutrophils Relative 01/17/2020 53     Immat GRANS % 01/17/2020 1     Lymphocytes Relative 01/17/2020 32     Monocytes Relative 01/17/2020 9     Eosinophils Relative 01/17/2020 4     Basophils Relative 01/17/2020 1     Neutrophils Absolute 01/17/2020 2 25     Immature Grans Absolute 01/17/2020 0 02     Lymphocytes Absolute 01/17/2020 1 32     Monocytes Absolute 01/17/2020 0 36     Eosinophils Absolute 01/17/2020 0 15     Basophils Absolute 01/17/2020 0 03     Phosphorus 01/17/2020 4 2     Magnesium 01/17/2020 1 6     PTT 01/17/2020 93*    Hemoglobin 01/17/2020 6 7*    Hematocrit 01/17/2020 22 2*    Protime 01/17/2020 12 5     INR 01/17/2020 0 99     Unit Product Code 01/18/2020 [id number]     Unit Number 01/18/2020 [id number]     Unit ABO 01/18/2020 O  Unit [facility] 01/18/2020 POS     Crossmatch 01/18/2020 Compatible     Unit Dispense Status 01/18/2020 Presumed Trans     TSH 3RD GENERATON 01/17/2020 10 765*    Lipase 01/17/2020 689*    Free T4 01/17/2020 0 64*    PTT 01/17/2020 66*    Hemoglobin 01/17/2020 8 2*    Hematocrit 01/17/2020 26 4*    PTT 01/17/2020 78*    WBC 01/18/2020 4 40     RBC 01/18/2020 2 47*    Hemoglobin 01/18/2020 8 1*    Hematocrit 01/18/2020 26 6*    MCV 01/18/2020 108*    MCH 01/18/2020 32 8     MCHC 01/18/2020 30 5*    RDW 01/18/2020 20 6*    MPV 01/18/2020 11 0     Platelets 87/48/1898 221     nRBC 01/18/2020 0     Neutrophils Relative 01/18/2020 57     Immat GRANS % 01/18/2020 1     Lymphocytes Relative 01/18/2020 29     Monocytes Relative 01/18/2020 8     Eosinophils Relative 01/18/2020 4     Basophils Relative 01/18/2020 1     Neutrophils Absolute 01/18/2020 2 54     Immature Grans Absolute 01/18/2020 0 03     Lymphocytes Absolute 01/18/2020 1 27     Monocytes Absolute 01/18/2020 0 36     Eosinophils Absolute 01/18/2020 0 16     Basophils Absolute 01/18/2020 0 04     Sodium 01/18/2020 142     Potassium 01/18/2020 3 9     Chloride 01/18/2020 110*    CO2 01/18/2020 25     ANION GAP 01/18/2020 7     BUN 01/18/2020 11     Creatinine 01/18/2020 0 75     Glucose 01/18/2020 88     Calcium 01/18/2020 8 2*    eGFR 01/18/2020 89     PTT 01/18/2020 104*    PTT 01/18/2020 75*    PTT 01/18/2020 55*    PTT 01/19/2020 118*    Sodium 01/19/2020 143     Potassium 01/19/2020 4 1     Chloride 01/19/2020 110*    CO2 01/19/2020 27     ANION GAP 01/19/2020 6     BUN 01/19/2020 8     Creatinine 01/19/2020 0 74     Glucose 01/19/2020 90     Calcium 01/19/2020 9 0     eGFR 01/19/2020 90     WBC 01/19/2020 4 15*    RBC 01/19/2020 2 63*    Hemoglobin 01/19/2020 8 8*    Hematocrit 01/19/2020 28 1*    MCV 01/19/2020 107*    MCH 01/19/2020 33 5     MCHC 01/19/2020 31 3*    RDW 01/19/2020 20 2*    Platelets 97/00/5799 210     MPV 01/19/2020 10 6      Results for Saige Landry (MRN [id number]) as of 1/19/2020 12:17   Ref  Range 1/18/2020 05:47 1/18/2020 12:21 1/18/2020 18:56 1/19/2020 02:59   Sodium Latest Ref Range: 136 - 145 mmol/L    143   Potassium Latest Ref Range: 3 5 - 5 3 mmol/L    4 1   Chloride Latest Ref Range: 100 - 108 mmol/L    110 (H)   CO2 Latest Ref Range: 21 - 32 mmol/L    27   Anion Gap Latest Ref Range: 4 - 13 mmol/L    6   BUN Latest Ref Range: 5 - 25 mg/dL    8   Creatinine Latest Ref Range: 0 60 - 1 30 mg/dL    0 74   Glucose, Random Latest Ref Range: 65 - 140 mg/dL    90   Calcium Latest Ref Range: 8 3 - 10 1 mg/dL    9 0   eGFR Latest Units: ml/min/1 73sq m    90   WBC Latest Ref Range: 4 31 - 10 16 Thousand/uL    4 15 (L)   Red Blood Cell Count Latest Ref Range: 3 81 - 5 12 Million/uL    2 63 (L)   Hemoglobin Latest Ref Range: 11 5 - 15 4 g/dL    8 8 (L)   HCT Latest Ref Range: 34 8 - 46 1 %    28 1 (L)   MCV Latest Ref Range: 82 - 98 fL    107 (H)   MCH Latest Ref Range: 26 8 - 34 3 pg    33 5   MCHC Latest Ref Range: 31 4 - 37 4 g/dL    31 3 (L)   RDW Latest Ref Range: 11 6 - 15 1 %    20 2 (H)   Platelet Count Latest Ref Range: 149 - 390 Thousands/uL    210   MPV Latest Ref Range: 8 9 - 12 7 fL    10 6   PTT Latest Ref Range: 23 - 37 seconds  75 (H) 55 (H) 118 (H)   Crossmatch Unknown Compatible      Unit ABO Unknown O      Unit [facility] Unknown POS      Unit Number Unknown [id number]      Unit Dispense Status Unknown Presumed Trans          Assessment/Plan:    1  A symptomatic anemia with intermittent reported bright red blood per rectum; patient has indication for anticoagulation with recently diagnosed pulmonary embolism; rule out underlying gastrointestinal malignancy (upper or lower)    Patient has severe constipation at baseline and is in the process of a 2 day bowel prep at present    - continue clear liquid diet today  - administer 1 gal GoLYTELY this afternoon  - monitor hemoglobin, transfuse if needed  - plan for EGD and colonoscopy tomorrow  - procedures were explained in detail to the patient at this time including associated risks and benefits  - also consider possibility that chronic constipation could be related to hypothyroidism, as she has had elevated TSH level noted      2  Recent pancreatitis which was felt to be biliary in etiology, was recommended to see surgery for consultation regarding cholecystectomy    Clinically no evidence of pancreatitis at this time    - monitor abdominal exam; surgery follow-up regarding the prospect of cholecystectomy

## 2020-01-19 NOTE — QUICK NOTE
Called by nursing staff as patient had unwitnessed fall at home and never had CT of the head done  Patients family wondering why scan was never done  She is having persistent headache  Will obtain STAT CT head to rule out any possible intracranial abnormality

## 2020-01-20 ENCOUNTER — ANESTHESIA EVENT (INPATIENT)
Dept: GASTROENTEROLOGY | Facility: HOSPITAL | Age: 60
DRG: 197 | End: 2020-01-20
Payer: COMMERCIAL

## 2020-01-20 ENCOUNTER — APPOINTMENT (INPATIENT)
Dept: GASTROENTEROLOGY | Facility: HOSPITAL | Age: 60
DRG: 197 | End: 2020-01-20
Payer: COMMERCIAL

## 2020-01-20 ENCOUNTER — ANESTHESIA (INPATIENT)
Dept: GASTROENTEROLOGY | Facility: HOSPITAL | Age: 60
DRG: 197 | End: 2020-01-20
Payer: COMMERCIAL

## 2020-01-20 PROBLEM — R51.9 HEADACHE: Status: RESOLVED | Noted: 2019-01-03 | Resolved: 2020-01-20

## 2020-01-20 LAB
ANION GAP SERPL CALCULATED.3IONS-SCNC: 9 MMOL/L (ref 4–13)
APTT PPP: 77 SECONDS (ref 23–37)
BUN SERPL-MCNC: 6 MG/DL (ref 5–25)
CALCIUM SERPL-MCNC: 8.9 MG/DL (ref 8.3–10.1)
CHLORIDE SERPL-SCNC: 109 MMOL/L (ref 100–108)
CO2 SERPL-SCNC: 26 MMOL/L (ref 21–32)
CREAT SERPL-MCNC: 0.77 MG/DL (ref 0.6–1.3)
ERYTHROCYTE [DISTWIDTH] IN BLOOD BY AUTOMATED COUNT: 19.6 % (ref 11.6–15.1)
GFR SERPL CREATININE-BSD FRML MDRD: 86 ML/MIN/1.73SQ M
GLUCOSE SERPL-MCNC: 85 MG/DL (ref 65–140)
HCT VFR BLD AUTO: 27.8 % (ref 34.8–46.1)
HGB BLD-MCNC: 8.5 G/DL (ref 11.5–15.4)
MCH RBC QN AUTO: 33.1 PG (ref 26.8–34.3)
MCHC RBC AUTO-ENTMCNC: 30.6 G/DL (ref 31.4–37.4)
MCV RBC AUTO: 108 FL (ref 82–98)
PLATELET # BLD AUTO: 207 THOUSANDS/UL (ref 149–390)
PMV BLD AUTO: 11.2 FL (ref 8.9–12.7)
POTASSIUM SERPL-SCNC: 4.2 MMOL/L (ref 3.5–5.3)
RBC # BLD AUTO: 2.57 MILLION/UL (ref 3.81–5.12)
SODIUM SERPL-SCNC: 144 MMOL/L (ref 136–145)
WBC # BLD AUTO: 3.31 THOUSAND/UL (ref 4.31–10.16)

## 2020-01-20 PROCEDURE — 43239 EGD BIOPSY SINGLE/MULTIPLE: CPT | Performed by: INTERNAL MEDICINE

## 2020-01-20 PROCEDURE — 43255 EGD CONTROL BLEEDING ANY: CPT | Performed by: INTERNAL MEDICINE

## 2020-01-20 PROCEDURE — 85730 THROMBOPLASTIN TIME PARTIAL: CPT | Performed by: INTERNAL MEDICINE

## 2020-01-20 PROCEDURE — 88341 IMHCHEM/IMCYTCHM EA ADD ANTB: CPT | Performed by: PATHOLOGY

## 2020-01-20 PROCEDURE — 88305 TISSUE EXAM BY PATHOLOGIST: CPT | Performed by: PATHOLOGY

## 2020-01-20 PROCEDURE — 92610 EVALUATE SWALLOWING FUNCTION: CPT

## 2020-01-20 PROCEDURE — 85027 COMPLETE CBC AUTOMATED: CPT | Performed by: INTERNAL MEDICINE

## 2020-01-20 PROCEDURE — C9113 INJ PANTOPRAZOLE SODIUM, VIA: HCPCS | Performed by: PHYSICIAN ASSISTANT

## 2020-01-20 PROCEDURE — 45378 DIAGNOSTIC COLONOSCOPY: CPT | Performed by: INTERNAL MEDICINE

## 2020-01-20 PROCEDURE — 99232 SBSQ HOSP IP/OBS MODERATE 35: CPT | Performed by: INTERNAL MEDICINE

## 2020-01-20 PROCEDURE — 88342 IMHCHEM/IMCYTCHM 1ST ANTB: CPT | Performed by: PATHOLOGY

## 2020-01-20 PROCEDURE — 80048 BASIC METABOLIC PNL TOTAL CA: CPT | Performed by: INTERNAL MEDICINE

## 2020-01-20 RX ORDER — METOPROLOL TARTRATE 5 MG/5ML
INJECTION INTRAVENOUS AS NEEDED
Status: DISCONTINUED | OUTPATIENT
Start: 2020-01-20 | End: 2020-01-20 | Stop reason: SURG

## 2020-01-20 RX ORDER — HYDRALAZINE HYDROCHLORIDE 20 MG/ML
INJECTION INTRAMUSCULAR; INTRAVENOUS AS NEEDED
Status: DISCONTINUED | OUTPATIENT
Start: 2020-01-20 | End: 2020-01-20 | Stop reason: SURG

## 2020-01-20 RX ORDER — PROPOFOL 10 MG/ML
INJECTION, EMULSION INTRAVENOUS AS NEEDED
Status: DISCONTINUED | OUTPATIENT
Start: 2020-01-20 | End: 2020-01-20 | Stop reason: SURG

## 2020-01-20 RX ORDER — LIDOCAINE HYDROCHLORIDE 10 MG/ML
INJECTION, SOLUTION EPIDURAL; INFILTRATION; INTRACAUDAL; PERINEURAL AS NEEDED
Status: DISCONTINUED | OUTPATIENT
Start: 2020-01-20 | End: 2020-01-20 | Stop reason: SURG

## 2020-01-20 RX ORDER — SODIUM CHLORIDE, SODIUM LACTATE, POTASSIUM CHLORIDE, CALCIUM CHLORIDE 600; 310; 30; 20 MG/100ML; MG/100ML; MG/100ML; MG/100ML
INJECTION, SOLUTION INTRAVENOUS CONTINUOUS PRN
Status: DISCONTINUED | OUTPATIENT
Start: 2020-01-20 | End: 2020-01-20 | Stop reason: SURG

## 2020-01-20 RX ORDER — ONDANSETRON 2 MG/ML
INJECTION INTRAMUSCULAR; INTRAVENOUS AS NEEDED
Status: DISCONTINUED | OUTPATIENT
Start: 2020-01-20 | End: 2020-01-20 | Stop reason: SURG

## 2020-01-20 RX ADMIN — PROPOFOL 70 MG: 10 INJECTION, EMULSION INTRAVENOUS at 14:26

## 2020-01-20 RX ADMIN — PROPOFOL 20 MG: 10 INJECTION, EMULSION INTRAVENOUS at 14:53

## 2020-01-20 RX ADMIN — PROPOFOL 20 MG: 10 INJECTION, EMULSION INTRAVENOUS at 15:03

## 2020-01-20 RX ADMIN — PRAZOSIN HYDROCHLORIDE 4 MG: 1 CAPSULE ORAL at 21:20

## 2020-01-20 RX ADMIN — ACETAMINOPHEN 650 MG: 325 TABLET ORAL at 21:44

## 2020-01-20 RX ADMIN — METOPROLOL TARTRATE 2.5 MG: 5 INJECTION, SOLUTION INTRAVENOUS at 14:38

## 2020-01-20 RX ADMIN — ONDANSETRON 4 MG: 2 INJECTION INTRAMUSCULAR; INTRAVENOUS at 15:15

## 2020-01-20 RX ADMIN — GABAPENTIN 300 MG: 300 CAPSULE ORAL at 17:32

## 2020-01-20 RX ADMIN — EPINEPHRINE 0.3 MG: 0.1 INJECTION, SOLUTION ENDOTRACHEAL; INTRACARDIAC; INTRAVENOUS at 14:39

## 2020-01-20 RX ADMIN — RISPERIDONE 3 MG: 1 TABLET ORAL at 21:21

## 2020-01-20 RX ADMIN — PROPOFOL 40 MG: 10 INJECTION, EMULSION INTRAVENOUS at 14:42

## 2020-01-20 RX ADMIN — MIRTAZAPINE 45 MG: 15 TABLET, FILM COATED ORAL at 21:21

## 2020-01-20 RX ADMIN — PROPOFOL 20 MG: 10 INJECTION, EMULSION INTRAVENOUS at 14:38

## 2020-01-20 RX ADMIN — METOPROLOL TARTRATE 2.5 MG: 5 INJECTION, SOLUTION INTRAVENOUS at 15:08

## 2020-01-20 RX ADMIN — PANTOPRAZOLE SODIUM 40 MG: 40 INJECTION, POWDER, FOR SOLUTION INTRAVENOUS at 08:11

## 2020-01-20 RX ADMIN — GABAPENTIN 300 MG: 300 CAPSULE ORAL at 08:11

## 2020-01-20 RX ADMIN — SODIUM CHLORIDE, SODIUM LACTATE, POTASSIUM CHLORIDE, AND CALCIUM CHLORIDE: .6; .31; .03; .02 INJECTION, SOLUTION INTRAVENOUS at 14:24

## 2020-01-20 RX ADMIN — LIDOCAINE HYDROCHLORIDE 70 MG: 10 INJECTION, SOLUTION EPIDURAL; INFILTRATION; INTRACAUDAL; PERINEURAL at 14:26

## 2020-01-20 RX ADMIN — PROPOFOL 20 MG: 10 INJECTION, EMULSION INTRAVENOUS at 14:31

## 2020-01-20 RX ADMIN — HYDROCHLOROTHIAZIDE 12.5 MG: 12.5 TABLET ORAL at 08:11

## 2020-01-20 RX ADMIN — PROPOFOL 30 MG: 10 INJECTION, EMULSION INTRAVENOUS at 14:29

## 2020-01-20 RX ADMIN — DOCUSATE SODIUM 100 MG: 100 CAPSULE, LIQUID FILLED ORAL at 17:32

## 2020-01-20 RX ADMIN — BENZTROPINE MESYLATE 0.5 MG: 1 TABLET ORAL at 21:21

## 2020-01-20 RX ADMIN — HEPARIN SODIUM 12 UNITS/KG/HR: 10000 INJECTION, SOLUTION INTRAVENOUS at 08:11

## 2020-01-20 RX ADMIN — GUAIFENESIN 600 MG: 600 TABLET, EXTENDED RELEASE ORAL at 21:21

## 2020-01-20 RX ADMIN — SUCRALFATE 1000 MG: 1 SUSPENSION ORAL at 17:32

## 2020-01-20 RX ADMIN — HYDRALAZINE HYDROCHLORIDE 5 MG: 20 INJECTION INTRAMUSCULAR; INTRAVENOUS at 15:34

## 2020-01-20 RX ADMIN — PROPOFOL 20 MG: 10 INJECTION, EMULSION INTRAVENOUS at 14:48

## 2020-01-20 RX ADMIN — PANTOPRAZOLE SODIUM 40 MG: 40 INJECTION, POWDER, FOR SOLUTION INTRAVENOUS at 21:21

## 2020-01-20 RX ADMIN — PROPOFOL 20 MG: 10 INJECTION, EMULSION INTRAVENOUS at 14:33

## 2020-01-20 RX ADMIN — PROPOFOL 30 MG: 10 INJECTION, EMULSION INTRAVENOUS at 14:57

## 2020-01-20 NOTE — PHYSICAL THERAPY NOTE
PHYSICAL THERAPY CANCELLATION NOTE      Patient Name: Chemo Watkins  UPJMF'H Date: 1/20/2020    Chart review performed, spoke w/ RN Henry Garcia  Pt is currently off floor for colonoscopy  Will continue to follow as appropriate and as schedule allows       Dave Avila, PT, DPT

## 2020-01-20 NOTE — SOCIAL WORK
Cm attempted to meet with pt and family to discuss DCP as PT is recommending VNA services and pt is a readmission  Pt is currently getting a colonoscopy  Cm will follow to assist with needs and planning for DC

## 2020-01-20 NOTE — ASSESSMENT & PLAN NOTE
· BP slightly elevated, [id number] systolic    Plan  · IV hydralazine for systolic blood pressure more than 180  · Continue home dose prazosin  · Has been on HCTZ in the past, continue

## 2020-01-20 NOTE — PLAN OF CARE
Problem: NEUROSENSORY - ADULT  Goal: Achieves maximal functionality and self care  Description  INTERVENTIONS  - Monitor swallowing and airway patency with patient fatigue and changes in neurological status  - Encourage and assist patient to increase activity and self care     - Encourage visually impaired, hearing impaired and aphasic patients to use assistive/communication devices  Outcome: Progressing     Problem: CARDIOVASCULAR - ADULT  Goal: Maintains optimal cardiac output and hemodynamic stability  Description  INTERVENTIONS:  - Monitor I/O, vital signs and rhythm  - Monitor for S/S and trends of decreased cardiac output  - Administer and titrate ordered vasoactive medications to optimize hemodynamic stability  - Assess quality of pulses, skin color and temperature  - Assess for signs of decreased coronary artery perfusion  - Instruct patient to report change in severity of symptoms  Outcome: Progressing  Goal: Absence of cardiac dysrhythmias or at baseline rhythm  Description  INTERVENTIONS:  - Continuous cardiac monitoring, vital signs, obtain 12 lead EKG if ordered  - Administer antiarrhythmic and heart rate control medications as ordered  - Monitor electrolytes and administer replacement therapy as ordered  Outcome: Progressing     Problem: GASTROINTESTINAL - ADULT  Goal: Minimal or absence of nausea and/or vomiting  Description  INTERVENTIONS:  - Administer IV fluids if ordered to ensure adequate hydration  - Maintain NPO status until nausea and vomiting are resolved  - Nasogastric tube if ordered  - Administer ordered antiemetic medications as needed  - Provide nonpharmacologic comfort measures as appropriate  - Advance diet as tolerated, if ordered  - Consider nutrition services referral to assist patient with adequate nutrition and appropriate food choices  Outcome: Progressing  Goal: Maintains or returns to baseline bowel function  Description  INTERVENTIONS:  - Assess bowel function  - Encourage oral fluids to ensure adequate hydration  - Administer IV fluids if ordered to ensure adequate hydration  - Administer ordered medications as needed  - Encourage mobilization and activity  - Consider nutritional services referral to assist patient with adequate nutrition and appropriate food choices  Outcome: Progressing  Goal: Maintains adequate nutritional intake  Description  INTERVENTIONS:  - Monitor percentage of each meal consumed  - Identify factors contributing to decreased intake, treat as appropriate  - Assist with meals as needed  - Monitor I&O, weight, and lab values if indicated  - Obtain nutrition services referral as needed  Outcome: Progressing  Goal: Establish and maintain optimal ostomy function  Description  INTERVENTIONS:  - Assess bowel function  - Encourage oral fluids to ensure adequate hydration  - Administer IV fluids if ordered to ensure adequate hydration   - Administer ordered medications as needed  - Encourage mobilization and activity  - Nutrition services referral to assist patient with appropriate food choices  - Assess stoma site  - Consider wound care consult   Outcome: Progressing     Problem: GENITOURINARY - ADULT  Goal: Maintains or returns to baseline urinary function  Description  INTERVENTIONS:  - Assess urinary function  - Encourage oral fluids to ensure adequate hydration if ordered  - Administer IV fluids as ordered to ensure adequate hydration  - Administer ordered medications as needed  - Offer frequent toileting  - Follow urinary retention protocol if ordered  Outcome: Progressing  Goal: Absence of urinary retention  Description  INTERVENTIONS:  - Assess patients ability to void and empty bladder  - Monitor I/O  - Bladder scan as needed  - Discuss with physician/AP medications to alleviate retention as needed  - Discuss catheterization for long term situations as appropriate  Outcome: Progressing  Goal: Urinary catheter remains patent  Description  INTERVENTIONS:  - Assess patency of urinary catheter  - If patient has a chronic camacho, consider changing catheter if non-functioning  - Follow guidelines for intermittent irrigation of non-functioning urinary catheter  Outcome: Progressing     Problem: METABOLIC, FLUID AND ELECTROLYTES - ADULT  Goal: Electrolytes maintained within normal limits  Description  INTERVENTIONS:  - Monitor labs and assess patient for signs and symptoms of electrolyte imbalances  - Administer electrolyte replacement as ordered  - Monitor response to electrolyte replacements, including repeat lab results as appropriate  - Instruct patient on fluid and nutrition as appropriate  Outcome: Progressing  Goal: Fluid balance maintained  Description  INTERVENTIONS:  - Monitor labs   - Monitor I/O and WT  - Instruct patient on fluid and nutrition as appropriate  - Assess for signs & symptoms of volume excess or deficit  Outcome: Progressing     Problem: SKIN/TISSUE INTEGRITY - ADULT  Goal: Skin integrity remains intact  Description  INTERVENTIONS  - Identify patients at risk for skin breakdown  - Assess and monitor skin integrity  - Assess and monitor nutrition and hydration status  - Monitor labs (i e  albumin)  - Assess for incontinence   - Turn and reposition patient  - Assist with mobility/ambulation  - Relieve pressure over bony prominences  - Avoid friction and shearing  - Provide appropriate hygiene as needed including keeping skin clean and dry  - Evaluate need for skin moisturizer/barrier cream  - Collaborate with interdisciplinary team (i e  Nutrition, Rehabilitation, etc )   - Patient/family teaching  Outcome: Progressing  Goal: Oral mucous membranes remain intact  Description  INTERVENTIONS  - Assess oral mucosa and hygiene practices  - Implement preventative oral hygiene regimen  - Implement oral medicated treatments as ordered  - Initiate Nutrition services referral as needed  Outcome: Progressing     Problem: HEMATOLOGIC - ADULT  Goal: Maintains hematologic stability  Description  INTERVENTIONS  - Assess for signs and symptoms of bleeding or hemorrhage  - Monitor labs  - Administer supportive blood products/factors as ordered and appropriate  Outcome: Progressing     Problem: PAIN - ADULT  Goal: Verbalizes/displays adequate comfort level or baseline comfort level  Description  Interventions:  - Encourage patient to monitor pain and request assistance  - Assess pain using appropriate pain scale  - Administer analgesics based on type and severity of pain and evaluate response  - Implement non-pharmacological measures as appropriate and evaluate response  - Consider cultural and social influences on pain and pain management  - Notify physician/advanced practitioner if interventions unsuccessful or patient reports new pain  Outcome: Progressing     Problem: INFECTION - ADULT  Goal: Absence or prevention of progression during hospitalization  Description  INTERVENTIONS:  - Assess and monitor for signs and symptoms of infection  - Monitor lab/diagnostic results  - Monitor all insertion sites, i e  indwelling lines, tubes, and drains  - Monitor endotracheal if appropriate and nasal secretions for changes in amount and color  - [place] appropriate cooling/warming therapies per order  - Administer medications as ordered  - Instruct and encourage patient and family to use good hand hygiene technique  - Identify and instruct in appropriate isolation precautions for identified infection/condition  Outcome: Progressing  Goal: Absence of fever/infection during neutropenic period  Description  INTERVENTIONS:  - Monitor WBC    Outcome: Progressing     Problem: SAFETY ADULT  Goal: Patient will remain free of falls  Description  INTERVENTIONS:  - Assess patient frequently for physical needs  -  Identify cognitive and physical deficits and behaviors that affect risk of falls    -  [place] fall precautions as indicated by assessment   - Educate patient/family on patient safety including physical limitations  - Instruct patient to call for assistance with activity based on assessment  - Modify environment to reduce risk of injury  - Consider OT/PT consult to assist with strengthening/mobility  Outcome: Progressing  Goal: Maintain or return to baseline ADL function  Description  INTERVENTIONS:  -  Assess patient's ability to carry out ADLs; assess patient's baseline for ADL function and identify physical deficits which impact ability to perform ADLs (bathing, care of mouth/teeth, toileting, grooming, dressing, etc )  - Assess/evaluate cause of self-care deficits   - Assess range of motion  - Assess patient's mobility; develop plan if impaired  - Assess patient's need for assistive devices and provide as appropriate  - Encourage maximum independence but intervene and supervise when necessary  - Involve family in performance of ADLs  - Assess for home care needs following discharge   - Consider OT consult to assist with ADL evaluation and planning for discharge  - Provide patient education as appropriate  Outcome: Progressing  Goal: Maintain or return mobility status to optimal level  Description  INTERVENTIONS:  - Assess patient's baseline mobility status (ambulation, transfers, stairs, etc )    - Identify cognitive and physical deficits and behaviors that affect mobility  - Identify mobility aids required to assist with transfers and/or ambulation (gait belt, sit-to-stand, lift, walker, cane, etc )  - [place] fall precautions as indicated by assessment  - Record patient progress and toleration of activity level on Mobility SBAR; progress patient to next Phase/Stage  - Instruct patient to call for assistance with activity based on assessment  - Consider rehabilitation consult to assist with strengthening/weightbearing, etc   Outcome: Progressing     Problem: DISCHARGE PLANNING  Goal: Discharge to home or other facility with appropriate resources  Description  INTERVENTIONS:  - Identify barriers to discharge w/patient and caregiver  - Arrange for needed discharge resources and transportation as appropriate  - Identify discharge learning needs (meds, wound care, etc )  - Arrange for interpretive services to assist at discharge as needed  - Refer to Case Management Department for coordinating discharge planning if the patient needs post-hospital services based on physician/advanced practitioner order or complex needs related to functional status, cognitive ability, or social support system  Outcome: Progressing     Problem: Potential for Falls  Goal: Patient will remain free of falls  Description  INTERVENTIONS:  - Assess patient frequently for physical needs  -  Identify cognitive and physical deficits and behaviors that affect risk of falls    -  [place] fall precautions as indicated by assessment   - Educate patient/family on patient safety including physical limitations  - Instruct patient to call for assistance with activity based on assessment  - Modify environment to reduce risk of injury  - Consider OT/PT consult to assist with strengthening/mobility  Outcome: Progressing

## 2020-01-20 NOTE — ASSESSMENT & PLAN NOTE
· Patient states she does not currently have a headache  Plan  · Monitor symptoms  · Tylenol as needed

## 2020-01-20 NOTE — ANESTHESIA PREPROCEDURE EVALUATION
Review of Systems/Medical History          Cardiovascular  Hypertension , DVT (Acute on Heparin Drip Left Calf)   Pulmonary  Not a smoker ,        GI/Hepatic            Endo/Other  History of thyroid disease , hypothyroidism,      GYN       Hematology  Anemia ,    Comment: Transfused1 unit Musculoskeletal       Neurology    Headaches,   Comment: Brain Surgery [facility] cerebral Aneurysm  -18 years ago Psychology   Anxiety, Depression ,              Physical Exam    Airway    Mallampati score: I  TM Distance: >3 FB  Neck ROM: full     Dental   upper dentures and lower dentures,     Cardiovascular      Pulmonary      Other Findings        Anesthesia Plan  ASA Score- 4     Anesthesia Type- IV sedation with anesthesia with ASA Monitors  Additional Monitors:   Airway Plan:         Plan Factors-Patient not instructed to abstain from smoking on day of procedure       Induction- intravenous  Postoperative Plan-     Informed Consent- Anesthetic plan and risks discussed with patient, sibling and daughter  I personally reviewed this patient with the CRNA  Discussed and agreed on the Anesthesia Plan with the CRNA             Lab Results   Component Value Date    GLUC 85 01/20/2020    GLUF 83 10/29/2018    ALT 39 01/16/2020    AST 40 01/16/2020    BUN 6 01/20/2020    CALCIUM 8 9 01/20/2020     (H) 01/20/2020    CHOL 189 09/01/2015    CO2 26 01/20/2020    CREATININE 0 77 01/20/2020    HDL 47 10/29/2018    INR 0 99 01/17/2020    HCT 27 8 (L) 01/20/2020    HGB 8 5 (L) 01/20/2020    PROT 7 2 09/01/2015    HGBA1C 4 9 10/29/2018    MG 1 6 01/17/2020    PHOS 4 2 01/17/2020     01/20/2020    K 4 2 01/20/2020     09/01/2015    TRIG 51 01/09/2020    WBC 3 31 (L) 01/20/2020

## 2020-01-20 NOTE — ASSESSMENT & PLAN NOTE
· TSH elevated 10 765, T4 0 64    Plan  · Continue levothyroxine 100 mcg daily  · Recommend outpatient repeat TFTs in 6 weeks

## 2020-01-20 NOTE — ASSESSMENT & PLAN NOTE
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

## 2020-01-20 NOTE — PLAN OF CARE
Problem: NEUROSENSORY - ADULT  Goal: Achieves maximal functionality and self care  Description  INTERVENTIONS  - Monitor swallowing and airway patency with patient fatigue and changes in neurological status  - Encourage and assist patient to increase activity and self care     - Encourage visually impaired, hearing impaired and aphasic patients to use assistive/communication devices  Outcome: Progressing     Problem: CARDIOVASCULAR - ADULT  Goal: Maintains optimal cardiac output and hemodynamic stability  Description  INTERVENTIONS:  - Monitor I/O, vital signs and rhythm  - Monitor for S/S and trends of decreased cardiac output  - Administer and titrate ordered vasoactive medications to optimize hemodynamic stability  - Assess quality of pulses, skin color and temperature  - Assess for signs of decreased coronary artery perfusion  - Instruct patient to report change in severity of symptoms  Outcome: Progressing  Goal: Absence of cardiac dysrhythmias or at baseline rhythm  Description  INTERVENTIONS:  - Continuous cardiac monitoring, vital signs, obtain 12 lead EKG if ordered  - Administer antiarrhythmic and heart rate control medications as ordered  - Monitor electrolytes and administer replacement therapy as ordered  Outcome: Progressing     Problem: GASTROINTESTINAL - ADULT  Goal: Minimal or absence of nausea and/or vomiting  Description  INTERVENTIONS:  - Administer IV fluids if ordered to ensure adequate hydration  - Maintain NPO status until nausea and vomiting are resolved  - Nasogastric tube if ordered  - Administer ordered antiemetic medications as needed  - Provide nonpharmacologic comfort measures as appropriate  - Advance diet as tolerated, if ordered  - Consider nutrition services referral to assist patient with adequate nutrition and appropriate food choices  Outcome: Progressing  Goal: Maintains or returns to baseline bowel function  Description  INTERVENTIONS:  - Assess bowel function  - Encourage oral fluids to ensure adequate hydration  - Administer IV fluids if ordered to ensure adequate hydration  - Administer ordered medications as needed  - Encourage mobilization and activity  - Consider nutritional services referral to assist patient with adequate nutrition and appropriate food choices  Outcome: Progressing  Goal: Maintains adequate nutritional intake  Description  INTERVENTIONS:  - Monitor percentage of each meal consumed  - Identify factors contributing to decreased intake, treat as appropriate  - Assist with meals as needed  - Monitor I&O, weight, and lab values if indicated  - Obtain nutrition services referral as needed  Outcome: Progressing  Goal: Establish and maintain optimal ostomy function  Description  INTERVENTIONS:  - Assess bowel function  - Encourage oral fluids to ensure adequate hydration  - Administer IV fluids if ordered to ensure adequate hydration   - Administer ordered medications as needed  - Encourage mobilization and activity  - Nutrition services referral to assist patient with appropriate food choices  - Assess stoma site  - Consider wound care consult   Outcome: Progressing     Problem: GENITOURINARY - ADULT  Goal: Maintains or returns to baseline urinary function  Description  INTERVENTIONS:  - Assess urinary function  - Encourage oral fluids to ensure adequate hydration if ordered  - Administer IV fluids as ordered to ensure adequate hydration  - Administer ordered medications as needed  - Offer frequent toileting  - Follow urinary retention protocol if ordered  Outcome: Progressing  Goal: Absence of urinary retention  Description  INTERVENTIONS:  - Assess patients ability to void and empty bladder  - Monitor I/O  - Bladder scan as needed  - Discuss with physician/AP medications to alleviate retention as needed  - Discuss catheterization for long term situations as appropriate  Outcome: Progressing  Goal: Urinary catheter remains patent  Description  INTERVENTIONS:  - Assess patency of urinary catheter  - If patient has a chronic camacho, consider changing catheter if non-functioning  - Follow guidelines for intermittent irrigation of non-functioning urinary catheter  Outcome: Progressing     Problem: METABOLIC, FLUID AND ELECTROLYTES - ADULT  Goal: Electrolytes maintained within normal limits  Description  INTERVENTIONS:  - Monitor labs and assess patient for signs and symptoms of electrolyte imbalances  - Administer electrolyte replacement as ordered  - Monitor response to electrolyte replacements, including repeat lab results as appropriate  - Instruct patient on fluid and nutrition as appropriate  Outcome: Progressing  Goal: Fluid balance maintained  Description  INTERVENTIONS:  - Monitor labs   - Monitor I/O and WT  - Instruct patient on fluid and nutrition as appropriate  - Assess for signs & symptoms of volume excess or deficit  Outcome: Progressing     Problem: SKIN/TISSUE INTEGRITY - ADULT  Goal: Skin integrity remains intact  Description  INTERVENTIONS  - Identify patients at risk for skin breakdown  - Assess and monitor skin integrity  - Assess and monitor nutrition and hydration status  - Monitor labs (i e  albumin)  - Assess for incontinence   - Turn and reposition patient  - Assist with mobility/ambulation  - Relieve pressure over bony prominences  - Avoid friction and shearing  - Provide appropriate hygiene as needed including keeping skin clean and dry  - Evaluate need for skin moisturizer/barrier cream  - Collaborate with interdisciplinary team (i e  Nutrition, Rehabilitation, etc )   - Patient/family teaching  Outcome: Progressing  Goal: Oral mucous membranes remain intact  Description  INTERVENTIONS  - Assess oral mucosa and hygiene practices  - Implement preventative oral hygiene regimen  - Implement oral medicated treatments as ordered  - Initiate Nutrition services referral as needed  Outcome: Progressing     Problem: HEMATOLOGIC - ADULT  Goal: Maintains hematologic stability  Description  INTERVENTIONS  - Assess for signs and symptoms of bleeding or hemorrhage  - Monitor labs  - Administer supportive blood products/factors as ordered and appropriate  Outcome: Progressing     Problem: PAIN - ADULT  Goal: Verbalizes/displays adequate comfort level or baseline comfort level  Description  Interventions:  - Encourage patient to monitor pain and request assistance  - Assess pain using appropriate pain scale  - Administer analgesics based on type and severity of pain and evaluate response  - Implement non-pharmacological measures as appropriate and evaluate response  - Consider cultural and social influences on pain and pain management  - Notify physician/advanced practitioner if interventions unsuccessful or patient reports new pain  Outcome: Progressing     Problem: INFECTION - ADULT  Goal: Absence or prevention of progression during hospitalization  Description  INTERVENTIONS:  - Assess and monitor for signs and symptoms of infection  - Monitor lab/diagnostic results  - Monitor all insertion sites, i e  indwelling lines, tubes, and drains  - Monitor endotracheal if appropriate and nasal secretions for changes in amount and color  - [place] appropriate cooling/warming therapies per order  - Administer medications as ordered  - Instruct and encourage patient and family to use good hand hygiene technique  - Identify and instruct in appropriate isolation precautions for identified infection/condition  Outcome: Progressing  Goal: Absence of fever/infection during neutropenic period  Description  INTERVENTIONS:  - Monitor WBC    Outcome: Progressing     Problem: SAFETY ADULT  Goal: Patient will remain free of falls  Description  INTERVENTIONS:  - Assess patient frequently for physical needs  -  Identify cognitive and physical deficits and behaviors that affect risk of falls    -  [place] fall precautions as indicated by assessment   - Educate patient/family on patient safety including physical limitations  - Instruct patient to call for assistance with activity based on assessment  - Modify environment to reduce risk of injury  - Consider OT/PT consult to assist with strengthening/mobility  Outcome: Progressing  Goal: Maintain or return to baseline ADL function  Description  INTERVENTIONS:  -  Assess patient's ability to carry out ADLs; assess patient's baseline for ADL function and identify physical deficits which impact ability to perform ADLs (bathing, care of mouth/teeth, toileting, grooming, dressing, etc )  - Assess/evaluate cause of self-care deficits   - Assess range of motion  - Assess patient's mobility; develop plan if impaired  - Assess patient's need for assistive devices and provide as appropriate  - Encourage maximum independence but intervene and supervise when necessary  - Involve family in performance of ADLs  - Assess for home care needs following discharge   - Consider OT consult to assist with ADL evaluation and planning for discharge  - Provide patient education as appropriate  Outcome: Progressing  Goal: Maintain or return mobility status to optimal level  Description  INTERVENTIONS:  - Assess patient's baseline mobility status (ambulation, transfers, stairs, etc )    - Identify cognitive and physical deficits and behaviors that affect mobility  - Identify mobility aids required to assist with transfers and/or ambulation (gait belt, sit-to-stand, lift, walker, cane, etc )  - [place] fall precautions as indicated by assessment  - Record patient progress and toleration of activity level on Mobility SBAR; progress patient to next Phase/Stage  - Instruct patient to call for assistance with activity based on assessment  - Consider rehabilitation consult to assist with strengthening/weightbearing, etc   Outcome: Progressing     Problem: DISCHARGE PLANNING  Goal: Discharge to home or other facility with appropriate resources  Description  INTERVENTIONS:  - Identify barriers to discharge w/patient and caregiver  - Arrange for needed discharge resources and transportation as appropriate  - Identify discharge learning needs (meds, wound care, etc )  - Arrange for interpretive services to assist at discharge as needed  - Refer to Case Management Department for coordinating discharge planning if the patient needs post-hospital services based on physician/advanced practitioner order or complex needs related to functional status, cognitive ability, or social support system  Outcome: Progressing     Problem: Potential for Falls  Goal: Patient will remain free of falls  Description  INTERVENTIONS:  - Assess patient frequently for physical needs  -  Identify cognitive and physical deficits and behaviors that affect risk of falls    -  [place] fall precautions as indicated by assessment   - Educate patient/family on patient safety including physical limitations  - Instruct patient to call for assistance with activity based on assessment  - Modify environment to reduce risk of injury  - Consider OT/PT consult to assist with strengthening/mobility  Outcome: Progressing

## 2020-01-20 NOTE — ASSESSMENT & PLAN NOTE
· POA, she is status post IV iron at her last admission  · Received 1 unit PRBC transfusion this admission  Hemoglobin today 8 5, down from 8 8 yesterday  · Suspected chronic GI loss  Plan for EGD/colonoscopy today    Plan  · Follow-up colonoscopy and EGD results  · Hold Heparin, will check with GI before resuming    · CBC in a m , if hemoglobin less than 7 or symptomatic consider blood transfusion

## 2020-01-20 NOTE — ASSESSMENT & PLAN NOTE
· POA: Bilateral lower extremity edema noted on examination; likely 2/2 DVT  · 01/20/2020:  Edema noted in left lower extremity  Patient continues to have tenderness of left calf  Homans sign positive  Patient is not short of breath and does not have chest pain  Plan  · Hold IV heparin due to suspicion of GI bleed  Will resume after checking with GI  · Will discharge with oral anticoagulation (Eliquis/Xarelto) x6 months

## 2020-01-20 NOTE — SPEECH THERAPY NOTE
Speech Language/Pathology    Speech-Language Pathology Bedside Swallow Evaluation        Patient Name: Aline Pineda    XIHKC'Q Date: 1/20/2020     Problem List  Principal Problem:    [facility]  Active Problems:    Essential hypertension    Hypothyroidism    Iron deficiency anemia due to chronic blood loss    Cholelithiasis    Depression    Constipation    Bilateral edema of lower extremity    Suprapubic abdominal pain         Past Medical History  Past Medical History:   Diagnosis Date    IRINEO (acute kidney injury) ([facility] ) 1/9/2020    Anemia 1/9/2020    Disease of thyroid gland     Hypertension     Psychiatric disorder        Past Surgical History  Past Surgical History:   Procedure Laterality Date    BRAIN SURGERY      patient can not tell any other details  Summary    Pt presents with suspected functional oropharyngeal swallow, though assessment limited to clear liquids per MD team  No overt s/s aspiration  Recommendations:   Diet: clear liquids, thin liquid   Meds: as tolerated   Frequent Oral care: 2x/day  Aspiration precautions and compensatory swallowing strategies: upright posture, slow rate of feeding and small bites/sips  Other Recommendations/ considerations: ST will cont to follow to assess tolerance of diet and assess solid consistencies once cleared by MD team       Current Medical Status  Pt is a 64 y o  female who presented to [facility] with acute deep vein thrombosis of lower extremity  Past medical history of depression, hypothyroidism and hypertension who presents to the ED after patient was noted to have a deep vein thrombosis on Doppler ultrasound  Patient was seen today by the primary care provider who noted that the patient had bilateral lower leg edema more on the right leg than the right, with calf tenderness    Of note is that the patient was recently hospitalized for 6 days for acute pancreatitis and metabolic derangement, and was discharged about 2 days ago  ST initially cancelled swallow eval this afternoon as pt vomiting following EGD/colonoscopy, however following vomiting episode, pt reported tolerating cup of jello w/o difficulty and was agreeable to accepting jello/water for swallow eval        Past medical history:   Please see H&P for details    Special Studies:  EGD 1/20: 1  Moderate gastritis and several superficial clean based ulcers noted in the pre-pyloric region  2  Losing duodenal ulcer noted along the duodenal sweep with edematous mucosa surrounding it, this was injected using epinephrine    Colonoscopy 1/20: 1  Fair to poor prep however no evidence of recent or blood was seen throughout the colon  2  Small internal hemorrhoids  Social/Education/Vocational Hx:  Pt lives at home     Swallow Information   Current Risks for Dysphagia & Aspiration: coughing w/ po   Current Symptoms/Concerns: coughing with po per pt/family, however pt reported cough at rest   Current Diet: clear liquid   Baseline Diet: regular diet and thin liquids  Takes pills-unknown     Baseline Assessment   Behavior/Cognition: alert  Speech/Language Status: able to participate in conversation and able to follow commands  Patient Positioning: upright in bed     Swallow Mechanism Exam   Facial: symmetrical  Labial: WFL  Lingual: WFL  Velum: symmetrical  Mandible: adequate ROM  Dentition: adequate  Vocal quality:clear/adequate   Volitional Cough: strong/productive   Respiratory: RA    Consistencies Assessed and Performance   Consistencies Administered: jello, thin liquids by straw     Oral Stage: Good retrieval  Adequate manipulation of jello  Suspect good oral control of thin liquids by straw  Pharyngeal Stage: timely swallow initiation; laryngeal rise present to palpation  No overt s/s aspiration  Voice clear  No c/o pharyngeal stasis          Esophageal Concerns: none reported      Results Reviewed with: patient and family Dysphagia Goals: pt will tolerate clear liquid with thin liquids without s/s of aspiration x48 hours

## 2020-01-20 NOTE — SPEECH THERAPY NOTE
Speech Language/Pathology    Speech/Language Pathology Cancel Note    Patient Name: Cece Rizzo  WLINR'W Date: 1/20/2020    ST consulted for bedside swallow evaluation  Attempted x2, pt was off the floor for EGD/colonoscopy  Re-attempted following procedures, spoke w/ RN who reports pt now vomiting   Will hold on swallow eval and re-attempt tomorrow AM        Shaneka Sharp, CF-SLP

## 2020-01-20 NOTE — ANESTHESIA POSTPROCEDURE EVALUATION
Post-Op Assessment Note    CV Status:  Stable  Pain Score: 4 (abdominal pain/gas pain)    Pain management: adequate     Mental Status:  Alert and awake   Hydration Status:  Euvolemic   PONV Controlled:  Controlled   Airway Patency:  Patent   Post Op Vitals Reviewed: Yes      Staff: CRNA           BP   194/65   Temp 97 5 °F (36 4 °C) (01/20/20 1522)    Pulse 67 (01/20/20 1522)   Resp 16 (01/20/20 1522)    SpO2 94 % (01/20/20 1522) Room air

## 2020-01-20 NOTE — ASSESSMENT & PLAN NOTE
· Suprapubic abdominal pain noted on examination  · Denies any urinary symptoms  · However reports to going once day  · 01/20/2020:  Patient denies urinary symptoms today  Denies suprapubic abdominal pain today      Plan  · Will follow-up as needed

## 2020-01-20 NOTE — PROGRESS NOTES
Progress Note - Renee Factor 1960, 64 y o  female MRN: [id number]    Unit/Bed#: S -01 Encounter: [id number]    Primary Care Provider: Mickey Baez MD   Date and time admitted to hospital: 1/16/2020  5:05 PM        * Acute deep vein thrombosis (DVT) of lower extremity ([address] )  Assessment & Plan  · POA, lower extremity Dopplers showed evidence of acute occlusive DVT involving the mid to distal femoral vein, popliteal vein and one of the posterior tibial veins in the proximal calf  · CTA (01/16/2020) = chronic emboli bilaterally " "no evidence of acute embolism    · Recently hospitalized for approximately 1 week for acute gallstone pancreatitis  · Was deemed low risk at that time and not on pharmacologic VTE prophylaxis  · 01/20/2020:  Patient continues to have left lower extremity tenderness and swelling  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  Will order compression stocking  Plan  · Hold IV heparin infusion pending further GI evaluation to rule out occult GI bleed, will check with GI before resuming  · Ttransition to Eliquis vs Xarelto once cleared from the GI standpoint (ordered cost check on Xarelto, notified nurse to hold Xarelto at not to administer)  · Compression stocking ordered    Iron deficiency anemia due to chronic blood loss  Assessment & Plan  · POA, she is status post IV iron at her last admission  · Received 1 unit PRBC transfusion this admission  Hemoglobin today 8 5, down from 8 8 yesterday  · Suspected chronic GI loss  Plan for EGD/colonoscopy today    Plan  · Follow-up colonoscopy and EGD results  · Hold Heparin, will check with GI before resuming  · CBC in a m , if hemoglobin less than 7 or symptomatic consider blood transfusion    Constipation  Assessment & Plan  · POA: no BM in approximately 10 days  H/o chronic constipation  · Multifactorial; on iron supplementaion, has had poor appetite and been relatively sedentary   Patient also has a h/o hypothyroid (TSH 10 765 and T4 0 64 on 1/18), not on thyroid supplementation  Was supposed to start in 2017, but never did  Patient does not have a reason why  · Has had several bowel movements since initiating a bowel regimen  Will continue    Suprapubic abdominal pain  Assessment & Plan  · Suprapubic abdominal pain noted on examination  · Denies any urinary symptoms  · However reports to going once day  · 01/20/2020:  Patient denies urinary symptoms today  Denies suprapubic abdominal pain today  Plan  · Will follow-up as needed    Bilateral edema of lower extremity  Assessment & Plan  · POA: Bilateral lower extremity edema noted on examination; likely 2/2 DVT  · 01/20/2020:  Edema noted in left lower extremity  Patient continues to have tenderness of left calf  Homans sign positive  Patient is not short of breath and does not have chest pain  Plan  · Hold IV heparin due to suspicion of GI bleed  Will resume after checking with GI  · Will discharge with oral anticoagulation (Eliquis/Xarelto) x6 months  Depression  Assessment & Plan  Continue with home medication  Cholelithiasis  Assessment & Plan  · Patient noted to have cholelithiasis on last admission with acute gallstone pancreatitis  · Currently without symptoms  · Lipase was elevated at 689, however, patient tolerating PO without abdominal pain and benign abdominal exam   No additional workup indicated at this time  · Was supposed to follow-up with surgery for an elective cholecystectomy on 1/20  · Will need to reschedule outpatient surgery follow-up     Hypothyroidism  Assessment & Plan  · TSH elevated 10 765, T4 0 64    Plan  · Continue levothyroxine 100 mcg daily  · Recommend outpatient repeat TFTs in 6 weeks        Essential hypertension  Assessment & Plan  · BP slightly elevated, 438C-814F systolic    Plan  · IV hydralazine for systolic blood pressure more than 180    · Continue home dose prazosin  · Has been on HCTZ in the past, continue  Headache-resolved as of 2020  Assessment & Plan  · Patient states she does not currently have a headache  Plan  · Monitor symptoms  · Tylenol as needed  VTE Pharmacologic Prophylaxis:   Pharmacologic: Heparin on hold due to suspicion of GI bleed  Will resume after checking with GI   Mechanical VTE Prophylaxis in Place: Yes    Discussions with Specialists or Other Care Team Provider:  Attending physician    Education and Discussions with Family / Patient:  Discussed plan with sister and patient  They understand and are agreeable  Current Length of Stay: 4 day(s)    Current Patient Status: Inpatient     Discharge Plan / Estimated Discharge Date:  24-48 hours    Code Status: Level 1 - Full Code      Subjective:   Patient says she is feeling Kelsi Ouch and that she feels better than yesterday  She denies having breathing difficulty  She does complain of feeling cold and has coughing with oral intake which her sister's states has gotten worse over the last couple of days    She has had a bowel movement yesterday and this morning though it was loose patient denies having blood in stool  She has no other complaints at this time  Objective:     Vitals:   Temp (24hrs), Av °F (36 7 °C), Min:97 6 °F (36 4 °C), Max:98 4 °F (36 9 °C)    Temp:  [97 6 °F (36 4 °C)-98 4 °F (36 9 °C)] 97 6 °F (36 4 °C)  HR:  [62-74] 62  Resp:  [18] 18  BP: (142-159)/(77-78) 153/77  SpO2:  [96 %-99 %] 99 %  Body mass index is 24 09 kg/m²  Input and Output Summary (last 24 hours): Intake/Output Summary (Last 24 hours) at 2020 1353  Last data filed at 2020 2101  Gross per 24 hour   Intake 0 ml   Output --   Net 0 ml       Physical Exam:     Physical Exam   Constitutional: She is oriented to person, place, and time  She appears well-developed and well-nourished  No distress  In no acute distress  HENT:   Head: Normocephalic and atraumatic     Nose: Nose normal    Cardiovascular: Normal rate, regular rhythm, normal heart sounds and intact distal pulses  Exam reveals no gallop and no friction rub  No murmur heard  Pulmonary/Chest: Effort normal and breath sounds normal  No stridor  No respiratory distress  She has no wheezes  She has no rales  She exhibits no tenderness  Abdominal: Soft  Bowel sounds are normal  She exhibits no distension  There is no tenderness  There is no guarding  Musculoskeletal: She exhibits edema (Nonpitting edema, left lower extremity ) and tenderness  Tenderness to palpation of left lower extremity (left calf)  No erythema noted  Homans sign positive on the left  Neurological: She is alert and oriented to person, place, and time  Skin: Skin is warm and dry  Capillary refill takes less than 2 seconds  She is not diaphoretic  No erythema  Nursing note and vitals reviewed  Additional Data:     Labs:    Results from last 7 days   Lab Units 01/20/20 0449 01/18/20  0432   WBC Thousand/uL 3 31*   < > 4 40   HEMOGLOBIN g/dL 8 5*   < > 8 1*   HEMATOCRIT % 27 8*   < > 26 6*   PLATELETS Thousands/uL 207   < > 221   NEUTROS PCT %  --   --  57   LYMPHS PCT %  --   --  29   MONOS PCT %  --   --  8   EOS PCT %  --   --  4    < > = values in this interval not displayed  Results from last 7 days   Lab Units 01/20/20  0449  01/16/20  1554   POTASSIUM mmol/L 4 2   < > 4 1   CHLORIDE mmol/L 109*   < > 109*   CO2 mmol/L 26   < > 26   BUN mg/dL 6   < > 10   CREATININE mg/dL 0 77   < > 0 70   CALCIUM mg/dL 8 9   < > 8 9   ALK PHOS U/L  --   --  99   ALT U/L  --   --  39   AST U/L  --   --  40    < > = values in this interval not displayed  Results from last 7 days   Lab Units 01/17/20  0911   INR  0 99       * I Have Reviewed All Lab Data Listed Above  * Additional Pertinent Lab Tests Reviewed:  All Labs Within Last 24 Hours Reviewed    Imaging:    Imaging Reports Reviewed Today Include:  Lower extremity Doppler, CT angiography for PE  Imaging Personally Reviewed by Myself Includes:  None    Recent Cultures (last 7 days):           Last 24 Hours Medication List:     Current Facility-Administered Medications:  acetaminophen 650 mg Oral Q6H PRN Mohini Watkins MD    benzonatate 100 mg Oral TID PRN Akila Nunez DO    benztropine 0 5 mg Oral HS Mohini Watkins MD    docusate sodium 100 mg Oral BID Mohini Watkins MD    ferrous sulfate 325 mg Oral Daily With Breakfast Akila Nunez DO    gabapentin 300 mg Oral BID Mohini Watkins MD    guaiFENesin 600 mg Oral Q12H [facility] Akila Nunez DO    heparin (porcine) 3-30 Units/kg/hr (Order-Specific) Intravenous Titrated Mohini Watkins MD Last Rate: Stopped (01/20/20 0900)   heparin (porcine) 2,800 Units Intravenous PRN Mohini Watkins MD    heparin (porcine) 5,600 Units Intravenous PRN Mohini Watkins MD    hydrALAZINE 5 mg Intravenous Q6H PRN Mohini Watkins MD    hydrochlorothiazide 12 5 mg Oral Daily Krystal Dorsey MD    levothyroxine 100 mcg Oral Early Morning Akila Nunez DO    mirtazapine 45 mg Oral HS Mohini Watkins MD    pantoprazole 40 mg Intravenous Q12H [facility]    polyethylene glycol 17 g Oral Daily Akila Nunez DO    prazosin 4 mg Oral HS Mohini Watkins MD    risperiDONE 3 mg Oral HS MD Luigi Milton ON 1/21/2020] rivaroxaban 2 5 mg Oral Daily With Breakfast Hemal Flanagan MD    senna 1 tablet Oral HS PRN Akila Nunez DO    sucralfate 1,000 mg Oral Q6H [facility]         Today, Patient Was Seen By: Bib Mason MD    ** Please Note: This note has been constructed using a voice recognition system   **

## 2020-01-21 PROBLEM — R05.9 COUGH: Status: ACTIVE | Noted: 2020-01-21

## 2020-01-21 LAB
APTT PPP: 57 SECONDS (ref 23–37)
APTT PPP: 70 SECONDS (ref 23–37)
APTT PPP: 73 SECONDS (ref 23–37)
APTT PPP: >210 SECONDS (ref 23–37)
BASOPHILS # BLD AUTO: 0.02 THOUSANDS/ΜL (ref 0–0.1)
BASOPHILS NFR BLD AUTO: 0 % (ref 0–1)
EOSINOPHIL # BLD AUTO: 0.09 THOUSAND/ΜL (ref 0–0.61)
EOSINOPHIL NFR BLD AUTO: 2 % (ref 0–6)
ERYTHROCYTE [DISTWIDTH] IN BLOOD BY AUTOMATED COUNT: 19.4 % (ref 11.6–15.1)
FOLATE SERPL-MCNC: 1.9 NG/ML (ref 3.1–17.5)
HCT VFR BLD AUTO: 29.5 % (ref 34.8–46.1)
HGB BLD-MCNC: 8.8 G/DL (ref 11.5–15.4)
IMM GRANULOCYTES # BLD AUTO: 0.01 THOUSAND/UL (ref 0–0.2)
IMM GRANULOCYTES NFR BLD AUTO: 0 % (ref 0–2)
LYMPHOCYTES # BLD AUTO: 1.12 THOUSANDS/ΜL (ref 0.6–4.47)
LYMPHOCYTES NFR BLD AUTO: 20 % (ref 14–44)
MCH RBC QN AUTO: 33 PG (ref 26.8–34.3)
MCHC RBC AUTO-ENTMCNC: 29.8 G/DL (ref 31.4–37.4)
MCV RBC AUTO: 111 FL (ref 82–98)
MONOCYTES # BLD AUTO: 0.25 THOUSAND/ΜL (ref 0.17–1.22)
MONOCYTES NFR BLD AUTO: 4 % (ref 4–12)
NEUTROPHILS # BLD AUTO: 4.21 THOUSANDS/ΜL (ref 1.85–7.62)
NEUTS SEG NFR BLD AUTO: 74 % (ref 43–75)
NRBC BLD AUTO-RTO: 0 /100 WBCS
PLATELET # BLD AUTO: 202 THOUSANDS/UL (ref 149–390)
PMV BLD AUTO: 11.4 FL (ref 8.9–12.7)
RBC # BLD AUTO: 2.67 MILLION/UL (ref 3.81–5.12)
VIT B12 SERPL-MCNC: 350 PG/ML (ref 100–900)
WBC # BLD AUTO: 5.7 THOUSAND/UL (ref 4.31–10.16)

## 2020-01-21 PROCEDURE — 99232 SBSQ HOSP IP/OBS MODERATE 35: CPT | Performed by: INTERNAL MEDICINE

## 2020-01-21 PROCEDURE — 85730 THROMBOPLASTIN TIME PARTIAL: CPT | Performed by: INTERNAL MEDICINE

## 2020-01-21 PROCEDURE — C9113 INJ PANTOPRAZOLE SODIUM, VIA: HCPCS | Performed by: PHYSICIAN ASSISTANT

## 2020-01-21 PROCEDURE — 97165 OT EVAL LOW COMPLEX 30 MIN: CPT

## 2020-01-21 PROCEDURE — 85025 COMPLETE CBC W/AUTO DIFF WBC: CPT | Performed by: FAMILY MEDICINE

## 2020-01-21 PROCEDURE — 82746 ASSAY OF FOLIC ACID SERUM: CPT | Performed by: FAMILY MEDICINE

## 2020-01-21 PROCEDURE — 82607 VITAMIN B-12: CPT | Performed by: FAMILY MEDICINE

## 2020-01-21 RX ORDER — PANTOPRAZOLE SODIUM 40 MG/1
40 TABLET, DELAYED RELEASE ORAL
Status: DISCONTINUED | OUTPATIENT
Start: 2020-01-21 | End: 2020-01-22 | Stop reason: HOSPADM

## 2020-01-21 RX ORDER — SODIUM CHLORIDE 9 MG/ML
100 INJECTION, SOLUTION INTRAVENOUS CONTINUOUS
Status: DISCONTINUED | OUTPATIENT
Start: 2020-01-21 | End: 2020-01-22

## 2020-01-21 RX ORDER — LANOLIN ALCOHOL/MO/W.PET/CERES
3 CREAM (GRAM) TOPICAL ONCE
Status: COMPLETED | OUTPATIENT
Start: 2020-01-21 | End: 2020-01-21

## 2020-01-21 RX ADMIN — GABAPENTIN 300 MG: 300 CAPSULE ORAL at 17:27

## 2020-01-21 RX ADMIN — MELATONIN 3 MG: at 21:50

## 2020-01-21 RX ADMIN — GUAIFENESIN 600 MG: 600 TABLET, EXTENDED RELEASE ORAL at 21:19

## 2020-01-21 RX ADMIN — RISPERIDONE 3 MG: 1 TABLET ORAL at 21:19

## 2020-01-21 RX ADMIN — SODIUM CHLORIDE 100 ML/HR: 0.9 INJECTION, SOLUTION INTRAVENOUS at 18:17

## 2020-01-21 RX ADMIN — HEPARIN SODIUM 11 UNITS/KG/HR: 10000 INJECTION, SOLUTION INTRAVENOUS at 23:55

## 2020-01-21 RX ADMIN — LEVOTHYROXINE SODIUM 100 MCG: 100 TABLET ORAL at 07:30

## 2020-01-21 RX ADMIN — HEPARIN SODIUM 2800 UNITS: 1000 INJECTION INTRAVENOUS; SUBCUTANEOUS at 01:51

## 2020-01-21 RX ADMIN — DOCUSATE SODIUM 100 MG: 100 CAPSULE, LIQUID FILLED ORAL at 08:00

## 2020-01-21 RX ADMIN — GUAIFENESIN 600 MG: 600 TABLET, EXTENDED RELEASE ORAL at 07:59

## 2020-01-21 RX ADMIN — MIRTAZAPINE 45 MG: 15 TABLET, FILM COATED ORAL at 21:19

## 2020-01-21 RX ADMIN — SUCRALFATE 1000 MG: 1 SUSPENSION ORAL at 07:30

## 2020-01-21 RX ADMIN — SUCRALFATE 1000 MG: 1 SUSPENSION ORAL at 11:33

## 2020-01-21 RX ADMIN — SUCRALFATE 1000 MG: 1 SUSPENSION ORAL at 17:27

## 2020-01-21 RX ADMIN — BENZTROPINE MESYLATE 0.5 MG: 1 TABLET ORAL at 21:19

## 2020-01-21 RX ADMIN — DOCUSATE SODIUM 100 MG: 100 CAPSULE, LIQUID FILLED ORAL at 17:27

## 2020-01-21 RX ADMIN — HYDROCHLOROTHIAZIDE 12.5 MG: 12.5 TABLET ORAL at 08:01

## 2020-01-21 RX ADMIN — SODIUM CHLORIDE 1000 ML: 0.9 INJECTION, SOLUTION INTRAVENOUS at 16:16

## 2020-01-21 RX ADMIN — PANTOPRAZOLE SODIUM 40 MG: 40 INJECTION, POWDER, FOR SOLUTION INTRAVENOUS at 07:59

## 2020-01-21 RX ADMIN — PANTOPRAZOLE SODIUM 40 MG: 40 TABLET, DELAYED RELEASE ORAL at 17:27

## 2020-01-21 RX ADMIN — ACETAMINOPHEN 650 MG: 325 TABLET ORAL at 07:59

## 2020-01-21 RX ADMIN — GABAPENTIN 300 MG: 300 CAPSULE ORAL at 07:59

## 2020-01-21 RX ADMIN — FERROUS SULFATE TAB 325 MG (65 MG ELEMENTAL FE) 325 MG: 325 (65 FE) TAB at 07:59

## 2020-01-21 NOTE — ASSESSMENT & PLAN NOTE
· BP WNL    Plan  · IV hydralazine Q 6 p r n  for systolic blood pressure more than 180  · Continue home dose prazosin  · Has been on HCTZ in the past, continue    · Will monitor blood pressure

## 2020-01-21 NOTE — ASSESSMENT & PLAN NOTE
· POA, she is status post IV iron at her last admission  · Received 1 unit PRBC transfusion this admission  Hemoglobin today 8 5, down from 8 8 yesterday  · Suspected chronic GI loss  · Appreciate GI consultation:  · EGD revealed moderate gastritis, superficial clean based ulcers in pre-pyloric region, duodenal ulcer with oozing hemorrhage which was injected with epinephrine  Follow-up biopsy results in 2 3 weeks  · Colonoscopy revealed internal hemorrhoids  But due to inadequate bowel prep they recommend repeat colonoscopy in 3 months  · The patient was on Protonix drip but has been transition to p o  Protonix    Plan  · Continue p o  Protonix b i d   · After cleared with GI, have resumed heparin and regular diet  · CBC in a m , if hemoglobin less than 7 or symptomatic consider blood transfusion  · Upon discharge  · Colonoscopy in 3 months as per GI  · Follow-up biopsy results in 2-3 weeks  · Recommend PPI b i d   For 8-12 weeks then once daily thereafter

## 2020-01-21 NOTE — PROGRESS NOTES
Progress Note - Mirta Aguilar 64 y o  female MRN: [id number]  Unit/Bed#: S -[place] Encounter: [id number]    Assessment and Plan:   Principal Problem:    Acute deep vein thrombosis (DVT) of lower extremity (HCC)  Active Problems:    Essential hypertension    Hypothyroidism    Iron deficiency anemia due to chronic blood loss    Cholelithiasis    Depression    Constipation    Bilateral edema of lower extremity    Suprapubic abdominal pain    Macrocytic anemia  -EGD with moderate gastritis and superficial prepyloric ulcerations she also had a duodenal ulcers one with friable edges with oozing of blood epi was injected  Biopsies were obtained  -follow up pathology  -Colonoscopy with small internal hemorrhoids with no signs of bleeding  -PO PPI BID   -avoid NSAIDs  -okay to resume anticoagulation  -please call if questions    ----------------------------------------------------------------------------------------------------------------    Subjective:     She feels well  No abdominal pain  Had a brown stoolt lacy    Objective:     Vitals: Blood pressure 115/74, pulse 61, temperature 98 4 °F (36 9 °C), temperature source Oral, resp  rate 18, height 5' 7" (1 702 m), weight 69 8 kg (153 lb 12 8 oz), SpO2 98 %  ,Body mass index is 24 09 kg/m²        Intake/Output Summary (Last 24 hours) at 1/21/2020 1458  Last data filed at 1/20/2020 1522  Gross per 24 hour   Intake 400 ml   Output --   Net 400 ml       Physical Exam:     General Appearance: Alert, appears stated age and cooperative  Lungs: Clear to auscultation bilaterally, no rales or rhonchi, no labored breathing/accessory muscle use  Heart: Regular rate and rhythm, S1, S2 normal, no murmur, click, rub or gallop  Abdomen: Soft, non-tender, non-distended; bowel sounds normal; no masses or no organomegaly  Extremities: No cyanosis, clubbing, or edema    Invasive Devices     Peripheral Intravenous Line            Peripheral IV 01/17/20 Left Forearm 4 days    Long-Dwell Peripheral IV (Midline) 12/43/34 Right Basilic 2 days                Lab Results:  Results from last 7 days   Lab Units 01/21/20  0800   WBC Thousand/uL 5 70   HEMOGLOBIN g/dL 8 8*   HEMATOCRIT % 29 5*   PLATELETS Thousands/uL 202   NEUTROS PCT % 74   LYMPHS PCT % 20   MONOS PCT % 4   EOS PCT % 2     Results from last 7 days   Lab Units 01/20/20  0449  01/16/20  1554   POTASSIUM mmol/L 4 2   < > 4 1   CHLORIDE mmol/L 109*   < > 109*   CO2 mmol/L 26   < > 26   BUN mg/dL 6   < > 10   CREATININE mg/dL 0 77   < > 0 70   CALCIUM mg/dL 8 9   < > 8 9   ALK PHOS U/L  --   --  99   ALT U/L  --   --  39   AST U/L  --   --  40    < > = values in this interval not displayed  Results from last 7 days   Lab Units 01/17/20  0911   INR  0 99     Results from last 7 days   Lab Units 01/17/20  0629   LIPASE u/L 689*       Imaging Studies: I have personally reviewed pertinent imaging studies  Ct Head Wo Contrast    Result Date: 1/19/2020  Impression: No acute intracranial abnormality  Workstation performed: [id number]     Cta Chest Pe Study    Result Date: 1/16/2020  Impression: Few small nonocclusive chronic emboli bilaterally  No evidence of acute embolus  Measured RV/LV ratio is within normal limits at less than 0 9     I personally discussed this study with Maryann García on 1/16/2020 at 8:45 PM  Workstation performed: [id number]

## 2020-01-21 NOTE — PLAN OF CARE
Problem: NEUROSENSORY - ADULT  Goal: Achieves maximal functionality and self care  Description  INTERVENTIONS  - Monitor swallowing and airway patency with patient fatigue and changes in neurological status  - Encourage and assist patient to increase activity and self care     - Encourage visually impaired, hearing impaired and aphasic patients to use assistive/communication devices  Outcome: Progressing     Problem: CARDIOVASCULAR - ADULT  Goal: Maintains optimal cardiac output and hemodynamic stability  Description  INTERVENTIONS:  - Monitor I/O, vital signs and rhythm  - Monitor for S/S and trends of decreased cardiac output  - Administer and titrate ordered vasoactive medications to optimize hemodynamic stability  - Assess quality of pulses, skin color and temperature  - Assess for signs of decreased coronary artery perfusion  - Instruct patient to report change in severity of symptoms  Outcome: Progressing  Goal: Absence of cardiac dysrhythmias or at baseline rhythm  Description  INTERVENTIONS:  - Continuous cardiac monitoring, vital signs, obtain 12 lead EKG if ordered  - Administer antiarrhythmic and heart rate control medications as ordered  - Monitor electrolytes and administer replacement therapy as ordered  Outcome: Progressing     Problem: GASTROINTESTINAL - ADULT  Goal: Minimal or absence of nausea and/or vomiting  Description  INTERVENTIONS:  - Administer IV fluids if ordered to ensure adequate hydration  - Maintain NPO status until nausea and vomiting are resolved  - Nasogastric tube if ordered  - Administer ordered antiemetic medications as needed  - Provide nonpharmacologic comfort measures as appropriate  - Advance diet as tolerated, if ordered  - Consider nutrition services referral to assist patient with adequate nutrition and appropriate food choices  Outcome: Progressing  Goal: Maintains or returns to baseline bowel function  Description  INTERVENTIONS:  - Assess bowel function  - Encourage oral fluids to ensure adequate hydration  - Administer IV fluids if ordered to ensure adequate hydration  - Administer ordered medications as needed  - Encourage mobilization and activity  - Consider nutritional services referral to assist patient with adequate nutrition and appropriate food choices  Outcome: Progressing  Goal: Maintains adequate nutritional intake  Description  INTERVENTIONS:  - Monitor percentage of each meal consumed  - Identify factors contributing to decreased intake, treat as appropriate  - Assist with meals as needed  - Monitor I&O, weight, and lab values if indicated  - Obtain nutrition services referral as needed  Outcome: Progressing  Goal: Establish and maintain optimal ostomy function  Description  INTERVENTIONS:  - Assess bowel function  - Encourage oral fluids to ensure adequate hydration  - Administer IV fluids if ordered to ensure adequate hydration   - Administer ordered medications as needed  - Encourage mobilization and activity  - Nutrition services referral to assist patient with appropriate food choices  - Assess stoma site  - Consider wound care consult   Outcome: Progressing     Problem: GENITOURINARY - ADULT  Goal: Maintains or returns to baseline urinary function  Description  INTERVENTIONS:  - Assess urinary function  - Encourage oral fluids to ensure adequate hydration if ordered  - Administer IV fluids as ordered to ensure adequate hydration  - Administer ordered medications as needed  - Offer frequent toileting  - Follow urinary retention protocol if ordered  Outcome: Progressing  Goal: Absence of urinary retention  Description  INTERVENTIONS:  - Assess patients ability to void and empty bladder  - Monitor I/O  - Bladder scan as needed  - Discuss with physician/AP medications to alleviate retention as needed  - Discuss catheterization for long term situations as appropriate  Outcome: Progressing  Goal: Urinary catheter remains patent  Description  INTERVENTIONS:  - Assess patency of urinary catheter  - If patient has a chronic camacho, consider changing catheter if non-functioning  - Follow guidelines for intermittent irrigation of non-functioning urinary catheter  Outcome: Progressing     Problem: METABOLIC, FLUID AND ELECTROLYTES - ADULT  Goal: Electrolytes maintained within normal limits  Description  INTERVENTIONS:  - Monitor labs and assess patient for signs and symptoms of electrolyte imbalances  - Administer electrolyte replacement as ordered  - Monitor response to electrolyte replacements, including repeat lab results as appropriate  - Instruct patient on fluid and nutrition as appropriate  Outcome: Progressing  Goal: Fluid balance maintained  Description  INTERVENTIONS:  - Monitor labs   - Monitor I/O and WT  - Instruct patient on fluid and nutrition as appropriate  - Assess for signs & symptoms of volume excess or deficit  Outcome: Progressing     Problem: SKIN/TISSUE INTEGRITY - ADULT  Goal: Skin integrity remains intact  Description  INTERVENTIONS  - Identify patients at risk for skin breakdown  - Assess and monitor skin integrity  - Assess and monitor nutrition and hydration status  - Monitor labs (i e  albumin)  - Assess for incontinence   - Turn and reposition patient  - Assist with mobility/ambulation  - Relieve pressure over bony prominences  - Avoid friction and shearing  - Provide appropriate hygiene as needed including keeping skin clean and dry  - Evaluate need for skin moisturizer/barrier cream  - Collaborate with interdisciplinary team (i e  Nutrition, Rehabilitation, etc )   - Patient/family teaching  Outcome: Progressing  Goal: Oral mucous membranes remain intact  Description  INTERVENTIONS  - Assess oral mucosa and hygiene practices  - Implement preventative oral hygiene regimen  - Implement oral medicated treatments as ordered  - Initiate Nutrition services referral as needed  Outcome: Progressing     Problem: HEMATOLOGIC - ADULT  Goal: Maintains hematologic stability  Description  INTERVENTIONS  - Assess for signs and symptoms of bleeding or hemorrhage  - Monitor labs  - Administer supportive blood products/factors as ordered and appropriate  Outcome: Progressing     Problem: PAIN - ADULT  Goal: Verbalizes/displays adequate comfort level or baseline comfort level  Description  Interventions:  - Encourage patient to monitor pain and request assistance  - Assess pain using appropriate pain scale  - Administer analgesics based on type and severity of pain and evaluate response  - Implement non-pharmacological measures as appropriate and evaluate response  - Consider cultural and social influences on pain and pain management  - Notify physician/advanced practitioner if interventions unsuccessful or patient reports new pain  Outcome: Progressing     Problem: INFECTION - ADULT  Goal: Absence or prevention of progression during hospitalization  Description  INTERVENTIONS:  - Assess and monitor for signs and symptoms of infection  - Monitor lab/diagnostic results  - Monitor all insertion sites, i e  indwelling lines, tubes, and drains  - Monitor endotracheal if appropriate and nasal secretions for changes in amount and color  - [place] appropriate cooling/warming therapies per order  - Administer medications as ordered  - Instruct and encourage patient and family to use good hand hygiene technique  - Identify and instruct in appropriate isolation precautions for identified infection/condition  Outcome: Progressing  Goal: Absence of fever/infection during neutropenic period  Description  INTERVENTIONS:  - Monitor WBC    Outcome: Progressing     Problem: SAFETY ADULT  Goal: Patient will remain free of falls  Description  INTERVENTIONS:  - Assess patient frequently for physical needs  -  Identify cognitive and physical deficits and behaviors that affect risk of falls    -  [place] fall precautions as indicated by assessment   - Educate patient/family on patient safety including physical limitations  - Instruct patient to call for assistance with activity based on assessment  - Modify environment to reduce risk of injury  - Consider OT/PT consult to assist with strengthening/mobility  Outcome: Progressing  Goal: Maintain or return to baseline ADL function  Description  INTERVENTIONS:  -  Assess patient's ability to carry out ADLs; assess patient's baseline for ADL function and identify physical deficits which impact ability to perform ADLs (bathing, care of mouth/teeth, toileting, grooming, dressing, etc )  - Assess/evaluate cause of self-care deficits   - Assess range of motion  - Assess patient's mobility; develop plan if impaired  - Assess patient's need for assistive devices and provide as appropriate  - Encourage maximum independence but intervene and supervise when necessary  - Involve family in performance of ADLs  - Assess for home care needs following discharge   - Consider OT consult to assist with ADL evaluation and planning for discharge  - Provide patient education as appropriate  Outcome: Progressing  Goal: Maintain or return mobility status to optimal level  Description  INTERVENTIONS:  - Assess patient's baseline mobility status (ambulation, transfers, stairs, etc )    - Identify cognitive and physical deficits and behaviors that affect mobility  - Identify mobility aids required to assist with transfers and/or ambulation (gait belt, sit-to-stand, lift, walker, cane, etc )  - [place] fall precautions as indicated by assessment  - Record patient progress and toleration of activity level on Mobility SBAR; progress patient to next Phase/Stage  - Instruct patient to call for assistance with activity based on assessment  - Consider rehabilitation consult to assist with strengthening/weightbearing, etc   Outcome: Progressing     Problem: DISCHARGE PLANNING  Goal: Discharge to home or other facility with appropriate resources  Description  INTERVENTIONS:  - Identify barriers to discharge w/patient and caregiver  - Arrange for needed discharge resources and transportation as appropriate  - Identify discharge learning needs (meds, wound care, etc )  - Arrange for interpretive services to assist at discharge as needed  - Refer to Case Management Department for coordinating discharge planning if the patient needs post-hospital services based on physician/advanced practitioner order or complex needs related to functional status, cognitive ability, or social support system  Outcome: Progressing     Problem: Potential for Falls  Goal: Patient will remain free of falls  Description  INTERVENTIONS:  - Assess patient frequently for physical needs  -  Identify cognitive and physical deficits and behaviors that affect risk of falls  -  [place] fall precautions as indicated by assessment   - Educate patient/family on patient safety including physical limitations  - Instruct patient to call for assistance with activity based on assessment  - Modify environment to reduce risk of injury  - Consider OT/PT consult to assist with strengthening/mobility  Outcome: Progressing     Problem: Nutrition/Hydration-ADULT  Goal: Nutrient/Hydration intake appropriate for improving, restoring or maintaining nutritional needs  Description  Monitor and assess patient's nutrition/hydration status for malnutrition  Collaborate with interdisciplinary team and initiate plan and interventions as ordered  Monitor patient's weight and dietary intake as ordered or per policy  Utilize nutrition screening tool and intervene as necessary  Determine patient's food preferences and provide high-protein, high-caloric foods as appropriate       INTERVENTIONS:  - Monitor oral intake, urinary output, labs, and treatment plans  - Assess nutrition and hydration status and recommend course of action  - Evaluate amount of meals eaten  - Assist patient with eating if necessary   - Allow adequate time for meals  - Recommend/ encourage appropriate diets, oral nutritional supplements, and vitamin/mineral supplements  - Order, calculate, and assess calorie counts as needed  - Recommend, monitor, and adjust tube feedings and TPN/PPN based on assessed needs  - Assess need for intravenous fluids  - Provide specific nutrition/hydration education as appropriate  - Include patient/family/caregiver in decisions related to nutrition  Outcome: Progressing

## 2020-01-21 NOTE — ASSESSMENT & PLAN NOTE
· Patient complains of cough with oral intake (both liquids and solids)  · Appreciate speech therapy consultation: - recommendations include  · Diet: clear liquids, thin liquid   · Meds: as tolerated   · Frequent Oral care: 2x/day  · Aspiration precautions     Plan  - managed as per speech therapy recommendations:  Ordered aspiration precautions and clear liquid diet

## 2020-01-21 NOTE — PLAN OF CARE
Problem: NEUROSENSORY - ADULT  Goal: Achieves maximal functionality and self care  Description  INTERVENTIONS  - Monitor swallowing and airway patency with patient fatigue and changes in neurological status  - Encourage and assist patient to increase activity and self care     - Encourage visually impaired, hearing impaired and aphasic patients to use assistive/communication devices  Outcome: Progressing     Problem: CARDIOVASCULAR - ADULT  Goal: Maintains optimal cardiac output and hemodynamic stability  Description  INTERVENTIONS:  - Monitor I/O, vital signs and rhythm  - Monitor for S/S and trends of decreased cardiac output  - Administer and titrate ordered vasoactive medications to optimize hemodynamic stability  - Assess quality of pulses, skin color and temperature  - Assess for signs of decreased coronary artery perfusion  - Instruct patient to report change in severity of symptoms  Outcome: Progressing  Goal: Absence of cardiac dysrhythmias or at baseline rhythm  Description  INTERVENTIONS:  - Continuous cardiac monitoring, vital signs, obtain 12 lead EKG if ordered  - Administer antiarrhythmic and heart rate control medications as ordered  - Monitor electrolytes and administer replacement therapy as ordered  Outcome: Progressing     Problem: GASTROINTESTINAL - ADULT  Goal: Minimal or absence of nausea and/or vomiting  Description  INTERVENTIONS:  - Administer IV fluids if ordered to ensure adequate hydration  - Maintain NPO status until nausea and vomiting are resolved  - Nasogastric tube if ordered  - Administer ordered antiemetic medications as needed  - Provide nonpharmacologic comfort measures as appropriate  - Advance diet as tolerated, if ordered  - Consider nutrition services referral to assist patient with adequate nutrition and appropriate food choices  Outcome: Progressing  Goal: Maintains or returns to baseline bowel function  Description  INTERVENTIONS:  - Assess bowel function  - Encourage oral fluids to ensure adequate hydration  - Administer IV fluids if ordered to ensure adequate hydration  - Administer ordered medications as needed  - Encourage mobilization and activity  - Consider nutritional services referral to assist patient with adequate nutrition and appropriate food choices  Outcome: Progressing  Goal: Maintains adequate nutritional intake  Description  INTERVENTIONS:  - Monitor percentage of each meal consumed  - Identify factors contributing to decreased intake, treat as appropriate  - Assist with meals as needed  - Monitor I&O, weight, and lab values if indicated  - Obtain nutrition services referral as needed  Outcome: Progressing  Goal: Establish and maintain optimal ostomy function  Description  INTERVENTIONS:  - Assess bowel function  - Encourage oral fluids to ensure adequate hydration  - Administer IV fluids if ordered to ensure adequate hydration   - Administer ordered medications as needed  - Encourage mobilization and activity  - Nutrition services referral to assist patient with appropriate food choices  - Assess stoma site  - Consider wound care consult   Outcome: Progressing     Problem: GENITOURINARY - ADULT  Goal: Maintains or returns to baseline urinary function  Description  INTERVENTIONS:  - Assess urinary function  - Encourage oral fluids to ensure adequate hydration if ordered  - Administer IV fluids as ordered to ensure adequate hydration  - Administer ordered medications as needed  - Offer frequent toileting  - Follow urinary retention protocol if ordered  Outcome: Progressing  Goal: Absence of urinary retention  Description  INTERVENTIONS:  - Assess patients ability to void and empty bladder  - Monitor I/O  - Bladder scan as needed  - Discuss with physician/AP medications to alleviate retention as needed  - Discuss catheterization for long term situations as appropriate  Outcome: Progressing  Goal: Urinary catheter remains patent  Description  INTERVENTIONS:  - Assess patency of urinary catheter  - If patient has a chronic camacho, consider changing catheter if non-functioning  - Follow guidelines for intermittent irrigation of non-functioning urinary catheter  Outcome: Progressing     Problem: METABOLIC, FLUID AND ELECTROLYTES - ADULT  Goal: Electrolytes maintained within normal limits  Description  INTERVENTIONS:  - Monitor labs and assess patient for signs and symptoms of electrolyte imbalances  - Administer electrolyte replacement as ordered  - Monitor response to electrolyte replacements, including repeat lab results as appropriate  - Instruct patient on fluid and nutrition as appropriate  Outcome: Progressing  Goal: Fluid balance maintained  Description  INTERVENTIONS:  - Monitor labs   - Monitor I/O and WT  - Instruct patient on fluid and nutrition as appropriate  - Assess for signs & symptoms of volume excess or deficit  Outcome: Progressing     Problem: SKIN/TISSUE INTEGRITY - ADULT  Goal: Skin integrity remains intact  Description  INTERVENTIONS  - Identify patients at risk for skin breakdown  - Assess and monitor skin integrity  - Assess and monitor nutrition and hydration status  - Monitor labs (i e  albumin)  - Assess for incontinence   - Turn and reposition patient  - Assist with mobility/ambulation  - Relieve pressure over bony prominences  - Avoid friction and shearing  - Provide appropriate hygiene as needed including keeping skin clean and dry  - Evaluate need for skin moisturizer/barrier cream  - Collaborate with interdisciplinary team (i e  Nutrition, Rehabilitation, etc )   - Patient/family teaching  Outcome: Progressing  Goal: Oral mucous membranes remain intact  Description  INTERVENTIONS  - Assess oral mucosa and hygiene practices  - Implement preventative oral hygiene regimen  - Implement oral medicated treatments as ordered  - Initiate Nutrition services referral as needed  Outcome: Progressing     Problem: HEMATOLOGIC - ADULT  Goal: Maintains hematologic stability  Description  INTERVENTIONS  - Assess for signs and symptoms of bleeding or hemorrhage  - Monitor labs  - Administer supportive blood products/factors as ordered and appropriate  Outcome: Progressing     Problem: PAIN - ADULT  Goal: Verbalizes/displays adequate comfort level or baseline comfort level  Description  Interventions:  - Encourage patient to monitor pain and request assistance  - Assess pain using appropriate pain scale  - Administer analgesics based on type and severity of pain and evaluate response  - Implement non-pharmacological measures as appropriate and evaluate response  - Consider cultural and social influences on pain and pain management  - Notify physician/advanced practitioner if interventions unsuccessful or patient reports new pain  Outcome: Progressing     Problem: INFECTION - ADULT  Goal: Absence or prevention of progression during hospitalization  Description  INTERVENTIONS:  - Assess and monitor for signs and symptoms of infection  - Monitor lab/diagnostic results  - Monitor all insertion sites, i e  indwelling lines, tubes, and drains  - Monitor endotracheal if appropriate and nasal secretions for changes in amount and color  - [place] appropriate cooling/warming therapies per order  - Administer medications as ordered  - Instruct and encourage patient and family to use good hand hygiene technique  - Identify and instruct in appropriate isolation precautions for identified infection/condition  Outcome: Progressing  Goal: Absence of fever/infection during neutropenic period  Description  INTERVENTIONS:  - Monitor WBC    Outcome: Progressing     Problem: SAFETY ADULT  Goal: Patient will remain free of falls  Description  INTERVENTIONS:  - Assess patient frequently for physical needs  -  Identify cognitive and physical deficits and behaviors that affect risk of falls    -  [place] fall precautions as indicated by assessment   - Educate patient/family on patient safety including physical limitations  - Instruct patient to call for assistance with activity based on assessment  - Modify environment to reduce risk of injury  - Consider OT/PT consult to assist with strengthening/mobility  Outcome: Progressing  Goal: Maintain or return to baseline ADL function  Description  INTERVENTIONS:  -  Assess patient's ability to carry out ADLs; assess patient's baseline for ADL function and identify physical deficits which impact ability to perform ADLs (bathing, care of mouth/teeth, toileting, grooming, dressing, etc )  - Assess/evaluate cause of self-care deficits   - Assess range of motion  - Assess patient's mobility; develop plan if impaired  - Assess patient's need for assistive devices and provide as appropriate  - Encourage maximum independence but intervene and supervise when necessary  - Involve family in performance of ADLs  - Assess for home care needs following discharge   - Consider OT consult to assist with ADL evaluation and planning for discharge  - Provide patient education as appropriate  Outcome: Progressing  Goal: Maintain or return mobility status to optimal level  Description  INTERVENTIONS:  - Assess patient's baseline mobility status (ambulation, transfers, stairs, etc )    - Identify cognitive and physical deficits and behaviors that affect mobility  - Identify mobility aids required to assist with transfers and/or ambulation (gait belt, sit-to-stand, lift, walker, cane, etc )  - [place] fall precautions as indicated by assessment  - Record patient progress and toleration of activity level on Mobility SBAR; progress patient to next Phase/Stage  - Instruct patient to call for assistance with activity based on assessment  - Consider rehabilitation consult to assist with strengthening/weightbearing, etc   Outcome: Progressing     Problem: DISCHARGE PLANNING  Goal: Discharge to home or other facility with appropriate resources  Description  INTERVENTIONS:  - Identify barriers to discharge w/patient and caregiver  - Arrange for needed discharge resources and transportation as appropriate  - Identify discharge learning needs (meds, wound care, etc )  - Arrange for interpretive services to assist at discharge as needed  - Refer to Case Management Department for coordinating discharge planning if the patient needs post-hospital services based on physician/advanced practitioner order or complex needs related to functional status, cognitive ability, or social support system  Outcome: Progressing     Problem: Potential for Falls  Goal: Patient will remain free of falls  Description  INTERVENTIONS:  - Assess patient frequently for physical needs  -  Identify cognitive and physical deficits and behaviors that affect risk of falls  -  [place] fall precautions as indicated by assessment   - Educate patient/family on patient safety including physical limitations  - Instruct patient to call for assistance with activity based on assessment  - Modify environment to reduce risk of injury  - Consider OT/PT consult to assist with strengthening/mobility  Outcome: Progressing     Problem: Nutrition/Hydration-ADULT  Goal: Nutrient/Hydration intake appropriate for improving, restoring or maintaining nutritional needs  Description  Monitor and assess patient's nutrition/hydration status for malnutrition  Collaborate with interdisciplinary team and initiate plan and interventions as ordered  Monitor patient's weight and dietary intake as ordered or per policy  Utilize nutrition screening tool and intervene as necessary  Determine patient's food preferences and provide high-protein, high-caloric foods as appropriate       INTERVENTIONS:  - Monitor oral intake, urinary output, labs, and treatment plans  - Assess nutrition and hydration status and recommend course of action  - Evaluate amount of meals eaten  - Assist patient with eating if necessary   - Allow adequate time for meals  - Recommend/ encourage appropriate diets, oral nutritional supplements, and vitamin/mineral supplements  - Order, calculate, and assess calorie counts as needed  - Recommend, monitor, and adjust tube feedings and TPN/PPN based on assessed needs  - Assess need for intravenous fluids  - Provide specific nutrition/hydration education as appropriate  - Include patient/family/caregiver in decisions related to nutrition  Outcome: Progressing

## 2020-01-21 NOTE — PROGRESS NOTES
JAMAR came to assess pt  Pt started on a 1L bolus  Pt reports she is not dizzy or seeing double anymore but feels pounding in her head  Will continue to monitor   Jimmy Glover RN

## 2020-01-21 NOTE — OCCUPATIONAL THERAPY NOTE
Occupational Therapy Evaluation      Stephanie Henderson    1/21/2020    Principal Problem:    Acute deep vein thrombosis (DVT) of lower extremity (HCC)  Active Problems:    Essential hypertension    Hypothyroidism    Iron deficiency anemia due to chronic blood loss    Cholelithiasis    Depression    Constipation    Bilateral edema of lower extremity    Suprapubic abdominal pain      Past Medical History:   Diagnosis Date    IRINEO (acute kidney injury) ([address] ) 1/9/2020    Anemia 1/9/2020    Disease of thyroid gland     Hypertension     Psychiatric disorder        Past Surgical History:   Procedure Laterality Date    BRAIN SURGERY      patient can not tell any other details  01/21/20 0930   Note Type   Note type Eval only   Restrictions/Precautions   Weight Bearing Precautions Per Order No   Other Precautions Fall Risk   Pain Assessment   Pain Assessment No/denies pain  (c/o feeling hungry)   Pain Score No Pain   Home Living   Type of [facility] One level;Elevator   Additional Comments per sister, pt has recently been staying w her niece in an apartment w elevator access  Was previously living w son/DIL in 1  with 4STE  Pt will most likely return home w niece   Prior Function   Level of [place] Independent with ADLs and functional mobility   Lives With [facility] Help From Family   ADL Assistance Independent  (assist as needed)   IADLs Needs assistance   Falls in the last 6 months 1 to 4   Comments reported with 2 falls   Lifestyle   Autonomy pt reports being mostly indepenent w self care  family assist from time to time   Reciprocal Relationships supportive family   Intrinsic Gratification watch TV   Psychosocial   Psychosocial (WDL) WDL   ADL   LB Dressing Assistance 5  Supervision/Setup   LB Dressing Deficit Don/doff R sock; Don/doff L sock   Additional Comments pt demonstrates ability to complete self care w S/set up/ increased time for completion   Pt with decreased endurance, decreased inititation at this time due to feeling hungry and fatigued   Bed Mobility   Rolling R 6  Modified independent   Additional items Bedrails   Rolling L 6  Modified independent   Additional items Bedrails   Supine to Sit 6  Modified independent   Additional items HOB elevated   Sit to Supine 7  Independent   Transfers   Sit to Stand 5  Supervision   Stand to Sit 5  Supervision   Stand pivot 5  Supervision   Toilet transfer 5  Supervision   Functional Mobility   Functional Mobility 5  Supervision   Additional Comments short distance in her room  sister reports walking up/down hallway w patient HHA   Balance   Static Sitting Good   Dynamic Sitting Good   Static Standing Good   Dynamic Standing Fair +   Activity Tolerance   Activity Tolerance Patient limited by fatigue   Medical Staff Made Aware  [facility]   Nurse Made Aware appropriate to see per MAGUE Reaves   RUE Assessment   RUE Assessment WFL   LUE Assessment   LUE Assessment WFL   Hand Function   Gross Motor Coordination Functional   Fine Motor Coordination Functional   Vision - Complex Assessment   Tracking Able to track stimulus in all quads without difficulty   Perception   Inattention/Neglect Appears intact   Cognition   Arousal/Participation Cooperative; Alert   Attention Attends with cues to redirect   Orientation Level Oriented X4   Memory Within functional limits   Following Commands Follows one step commands without difficulty   Assessment   Limitation Decreased endurance   Assessment Pt is a 64 y o  female seen for OT evaluation s/p admit to [address] [place] [address] on 1/16/2020 w/ Acute deep vein thrombosis (DVT) of lower extremity ([facility] [address] )  Pt came in w c/o LLE swelling and pain  She was recently hospitalized for pancreatitis  Comorbidities affecting pt's functional performance at time of assessment include: HTN and depression, anxiety, anemia, cholelithiasis   Personal factors affecting pt at time of IE include:difficulty performing IADLS , flat affect and decreased initiation and engagement   Prior to admission, pt was living w her niece in an apartment w elevator access  Able to complete most self care by herself, assist as needed  2 reported falls  Upon evaluation: Pt demonstrates ability to complete self care by herself w increased time, set up and encouragement  S/mod I for bed mobiltiy and transfers in her room    Pt scored   75/100 on the barthel index  Based on findings from OT evaluation and functional performance deficits, pt has been identified as a low complexity evaluation  From OT standpoint, recommendation at time of d/c would be home w family support  Pt is currently functioning close to her baseline  At this time, ongoing skilled OT needs not identified while in acute care  DC OT      Goals   Patient Goals to get some food   Plan   OT Frequency Eval only   Recommendation   OT Discharge Recommendation Home with family support   OT - OK to Discharge Yes   Barthel Index   Feeding 10   Bathing 0   Grooming Score 5   Dressing Score 10   Bladder Score 10   Bowels Score 10   Toilet Use Score 10   Transfers (Bed/Chair) Score 10   Mobility (Level Surface) Score 10   Stairs Score 0   Barthel Index Score 75

## 2020-01-21 NOTE — PROGRESS NOTES
Patient asleep in bed  No signs of distress noted at this time  Family asleep at bedside  Bed low and locked; call bell within reach  Will continue to monitor   Dennie Reasons, RN

## 2020-01-21 NOTE — ASSESSMENT & PLAN NOTE
· POA, lower extremity Dopplers showed evidence of acute occlusive DVT involving the mid to distal femoral vein, popliteal vein and one of the posterior tibial veins in the proximal calf  · CTA (01/16/2020) = chronic emboli bilaterally " "no evidence of acute embolism    · Recently hospitalized for approximately 1 week for acute gallstone pancreatitis  · Was deemed low risk at that time and not on pharmacologic VTE prophylaxis  · 01/21/2020:  Patient continues to have left lower extremity tenderness and swelling  Denies chest pain or shortness of breath  · Heparin per hold yesterday in preparation for GI evaluation with EGD and colonoscopy  Spoke with GI today in confirm that they are okay with restarting heparin/anticoagulation  They also advised patient could start regular diet  · Today APTT was significantly elevated so heparin rate was reduced  Plan  · Heparin resumed  Will monitor the aPTT  · Eliquis starter pack cost check sent  Will switch over to Eliquis if financially feasible for patient    · Plan to use oral anticoagulation for a minimum of 6 months upon discharge

## 2020-01-21 NOTE — PROGRESS NOTES
Progress Note - Lai Hartley 1960, 64 y o  female MRN: [id number]    Unit/Bed#: S -01 Encounter: [id number]    Primary Care Provider: Danisha Mccallum MD   Date and time admitted to hospital: 1/16/2020  5:05 PM        * Acute deep vein thrombosis (DVT) of lower extremity ([address] )  Assessment & Plan  · POA, lower extremity Dopplers showed evidence of acute occlusive DVT involving the mid to distal femoral vein, popliteal vein and one of the posterior tibial veins in the proximal calf  · CTA (01/16/2020) = chronic emboli bilaterally " "no evidence of acute embolism    · Recently hospitalized for approximately 1 week for acute gallstone pancreatitis  · Was deemed low risk at that time and not on pharmacologic VTE prophylaxis  · 01/21/2020:  Patient continues to have left lower extremity tenderness and swelling  Denies chest pain or shortness of breath  · Heparin per hold yesterday in preparation for GI evaluation with EGD and colonoscopy  Spoke with GI today in confirm that they are okay with restarting heparin/anticoagulation  They also advised patient could start regular diet  · Today APTT was significantly elevated so heparin rate was reduced  Plan  · Heparin resumed  Will monitor the aPTT  · Eliquis starter pack cost check sent  Will switch over to Eliquis if financially feasible for patient  · Plan to use oral anticoagulation for a minimum of 6 months upon discharge    Iron deficiency anemia due to chronic blood loss  Assessment & Plan  · POA, she is status post IV iron at her last admission  · Received 1 unit PRBC transfusion this admission  Hemoglobin today 8 5, down from 8 8 yesterday  · Suspected chronic GI loss  · Appreciate GI consultation:  · EGD revealed moderate gastritis, superficial clean based ulcers in pre-pyloric region, duodenal ulcer with oozing hemorrhage which was injected with epinephrine    Follow-up biopsy results in 2 3 weeks  · Colonoscopy revealed internal hemorrhoids  But due to inadequate bowel prep they recommend repeat colonoscopy in 3 months  · The patient was on Protonix drip but has been transition to p o  Protonix    Plan  · Continue p o  Protonix b i d   · After cleared with GI, have resumed heparin and regular diet  · CBC in a m , if hemoglobin less than 7 or symptomatic consider blood transfusion  · Upon discharge  · Colonoscopy in 3 months as per GI  · Follow-up biopsy results in 2-3 weeks  · Recommend PPI b i d  For 8-12 weeks then once daily thereafter    Constipation  Assessment & Plan  · POA: no BM in approximately 10 days  H/o chronic constipation  · Multifactorial; on iron supplementaion, has had poor appetite and been relatively sedentary  Patient also has a h/o hypothyroid (TSH 10 765 and T4 0 64 on 1/18), not on thyroid supplementation  Was supposed to start in 2017, but never did  Patient does not have a reason why  · Has had several bowel movements since initiating a bowel regimen  Will continue    Cough  Assessment & Plan  · Patient complains of cough with oral intake (both liquids and solids)  · Appreciate speech therapy consultation: - recommendations include  · Diet: clear liquids, thin liquid   · Meds: as tolerated   · Frequent Oral care: 2x/day  · Aspiration precautions     Plan  - managed as per speech therapy recommendations:  Ordered aspiration precautions and clear liquid diet  Suprapubic abdominal pain  Assessment & Plan  · Suprapubic abdominal pain noted on examination  · Denies any urinary symptoms  · However reports to going once day  · 01/20/2020:  Patient denies urinary symptoms today  Denies suprapubic abdominal pain today  Plan  · Will follow-up as needed    Bilateral edema of lower extremity  Assessment & Plan  · POA: Bilateral lower extremity edema noted on examination; likely 2/2 DVT  · 01/20/2020:  Edema noted in left lower extremity  Patient continues to have tenderness of left calf  Homans sign positive  Patient is not short of breath and does not have chest pain  · Heparin is put on hold yesterday in preparation for colonoscopy an EGD  It has been resumed after confirming that this is okay with GI  Plan  · Continue heparin drip, will transition to oral anticoagulation once Eliquis starter pack cost check (has been ordered)  · Will discharge with oral anticoagulation (Eliquis) x6 months  Depression  Assessment & Plan  Continue with home medication  Cholelithiasis  Assessment & Plan  · Patient noted to have cholelithiasis on last admission with acute gallstone pancreatitis  · Currently without symptoms  · Lipase was elevated at 689, however, patient tolerating PO without abdominal pain and benign abdominal exam   No additional workup indicated at this time  · Was supposed to follow-up with surgery for an elective cholecystectomy on 1/20  · Will need to reschedule outpatient surgery follow-up     Hypothyroidism  Assessment & Plan  · TSH elevated 10 765, T4 0 64    Plan  · Continue levothyroxine 100 mcg daily  · Recommend outpatient repeat TFTs in 6 weeks        Essential hypertension  Assessment & Plan  · BP WNL    Plan  · IV hydralazine Q 6 p r n  for systolic blood pressure more than 180  · Continue home dose prazosin  · Has been on HCTZ in the past, continue  · Will monitor blood pressure        VTE Pharmacologic Prophylaxis:   Pharmacologic: Heparin  Mechanical VTE Prophylaxis in Place: No    Discussions with Specialists or Other Care Team Provider: GI, Attending provider    Education and Discussions with Family / Patient:  Discussed plan with patient and patient's sister  Plan includes staying in patient for at least 1 more day and treating with Protonix  Patient states she understands and is agreeable      Current Length of Stay: 5 day(s)    Current Patient Status: Inpatient     Discharge Plan / Estimated Discharge Date:  24 hours    Code Status: Level 1 - Full Code      Subjective:   Patient continues to complain of cough after oral intake  She also continues to have left lower extremity tenderness on palpation  She has not had a bowel movement since before EGD/colonoscopy  She states her abdominal pain has improved significantly  She has no other complaints  Discussion with sister was in the room during encounter revealed that patient will now live with her daughter (she currently lives with son and daughter-in-law)  The patient and her sister feel as though the daughter will take better care of her  An attempt was made to contact patient's daughter to update her by phone but there was no answer  Objective:     Vitals:   Temp (24hrs), Av 7 °F (37 6 °C), Min:97 9 °F (36 6 °C), Max:102 2 °F (39 °C)    Temp:  [97 9 °F (36 6 °C)-102 2 °F (39 °C)] 98 4 °F (36 9 °C)  HR:  [61-87] 61  Resp:  [16-18] 18  BP: (115-179)/(74-83) 115/74  SpO2:  [95 %-98 %] 98 %  Body mass index is 24 09 kg/m²  Input and Output Summary (last 24 hours):     No intake or output data in the 24 hours ending 20 1532    Physical Exam:     Physical Exam   Constitutional: She is oriented to person, place, and time  She appears well-developed and well-nourished  No distress  In no acute distress  HENT:   Head: Normocephalic and atraumatic  Nose: Nose normal    Cardiovascular: Normal rate, regular rhythm, normal heart sounds and intact distal pulses  Exam reveals no gallop and no friction rub  No murmur heard  Pulmonary/Chest: Effort normal and breath sounds normal  No stridor  No respiratory distress  She has no wheezes  She has no rales  She exhibits no tenderness  Abdominal: Soft  Bowel sounds are normal  She exhibits no distension  There is no tenderness  There is no guarding  Musculoskeletal: She exhibits edema (Nonpitting edema, left lower extremity ) and tenderness  Tenderness to palpation of left lower extremity (left calf)  No erythema noted      Neurological: She is alert and oriented to person, place, and time  Skin: Skin is warm and dry  She is not diaphoretic  No erythema  Nursing note and vitals reviewed  Additional Data:     Labs:    Results from last 7 days   Lab Units 01/21/20  0800   WBC Thousand/uL 5 70   HEMOGLOBIN g/dL 8 8*   HEMATOCRIT % 29 5*   PLATELETS Thousands/uL 202   NEUTROS PCT % 74   LYMPHS PCT % 20   MONOS PCT % 4   EOS PCT % 2     Results from last 7 days   Lab Units 01/20/20  0449  01/16/20  1554   POTASSIUM mmol/L 4 2   < > 4 1   CHLORIDE mmol/L 109*   < > 109*   CO2 mmol/L 26   < > 26   BUN mg/dL 6   < > 10   CREATININE mg/dL 0 77   < > 0 70   CALCIUM mg/dL 8 9   < > 8 9   ALK PHOS U/L  --   --  99   ALT U/L  --   --  39   AST U/L  --   --  40    < > = values in this interval not displayed  Results from last 7 days   Lab Units 01/17/20  0911   INR  0 99       * I Have Reviewed All Lab Data Listed Above  * Additional Pertinent Lab Tests Reviewed:  All Labs Within Last 24 Hours Reviewed    Imaging:    Imaging Reports Reviewed Today Include:  None  Imaging Personally Reviewed by Myself Includes:  None    Recent Cultures (last 7 days):           Last 24 Hours Medication List:     Current Facility-Administered Medications:  acetaminophen 650 mg Oral Q6H PRN [place] Wilde MD    benzonatate 100 mg Oral TID PRN Nikolay Sheppard DO    benztropine 0 5 mg Oral HS [place] Wilde MD    docusate sodium 100 mg Oral BID [place] Wilde MD    ferrous sulfate 325 mg Oral Daily With Breakfast Nikolay Sheppard DO    gabapentin 300 mg Oral BID [place] Wilde MD    guaiFENesin 600 mg Oral Q12H [address] Nikolay Sheppard,     heparin (porcine) 3-30 Units/kg/hr (Order-Specific) Intravenous Titrated [place] Wilde MD Last Rate: 11 Units/kg/hr (01/21/20 1133)   heparin (porcine) 2,800 Units Intravenous PRN [place] Wilde MD    heparin (porcine) 5,600 Units Intravenous PRN [place] Wilde MD    hydrALAZINE 5 mg Intravenous Q6H PRN [place] Wilde MD    hydrochlorothiazide 12 5 mg Oral Daily Margarita Yip MD levothyroxine 100 mcg Oral Early Morning Carmina Cadet, DO    mirtazapine 45 mg Oral HS Nila Rascon MD    pantoprazole 40 mg Oral BID AC Shelby Fisher PA-C    polyethylene glycol 17 g Oral Daily Carmina Cadet, DO    prazosin 4 mg Oral HS Nila Rascon MD    risperiDONE 3 mg Oral HS Nila Rascon MD    senna 1 tablet Oral HS PRN Carmina Cadet, DO    sucralfate 1,000 mg Oral Q6H [address] Brenton Heaton PA-C         Today, Patient Was Seen By: Mark Hartman MD    ** Please Note: This note has been constructed using a voice recognition system   **

## 2020-01-21 NOTE — PROGRESS NOTES
Made aware by PCA Ayah Hudson pt's blood pressure was 79/45 manually  I came into to assess pt and retake blood pressure  I got manual bp 80/42 and had a second RN check as well with the same reading  Pt states "she does feel dizzy and is seeing double" no other symptoms present  HR=80 other vitals WDL  Messaged the [facility] resident to let them know and to come up and assess  Will continue to monitor   Tomi Fernández RN

## 2020-01-21 NOTE — PROGRESS NOTES
Progress Note - Adam Stein 1960, 64 y o  female MRN: [id number]    Unit/Bed#: S -01 Encounter: [id number]    Primary Care Provider: Russel Singer MD   Date and time admitted to hospital: 1/16/2020  5:05 PM        * Acute deep vein thrombosis (DVT) of lower extremity ([address] )  Assessment & Plan  · POA, lower extremity Dopplers showed evidence of acute occlusive DVT involving the mid to distal femoral vein, popliteal vein and one of the posterior tibial veins in the proximal calf  · CTA (01/16/2020) = chronic emboli bilaterally " "no evidence of acute embolism    · Recently hospitalized for approximately 1 week for acute gallstone pancreatitis  · Was deemed low risk at that time and not on pharmacologic VTE prophylaxis  · 01/21/2020:  Patient continues to have left lower extremity tenderness and swelling  Denies chest pain or shortness of breath  · Heparin per hold yesterday in preparation for GI evaluation with EGD and colonoscopy  Spoke with GI today in confirm that they are okay with restarting heparin/anticoagulation  They also advised patient could start regular diet  · Today APTT was significantly elevated so heparin rate was reduced  Plan  · Heparin resumed  Will monitor the aPTT  · Eliquis starter pack cost check sent  Will switch over to Eliquis if financially feasible for patient  · Plan to use oral anticoagulation for a minimum of 6 months upon discharge    Iron deficiency anemia due to chronic blood loss  Assessment & Plan  · POA, she is status post IV iron at her last admission  · Received 1 unit PRBC transfusion this admission  Hemoglobin today 8 5, down from 8 8 yesterday  · Suspected chronic GI loss  · Appreciate GI consultation:  · EGD revealed moderate gastritis, superficial clean based ulcers in pre-pyloric region, duodenal ulcer with oozing hemorrhage which was injected with epinephrine    Follow-up biopsy results in 2 3 weeks  · Colonoscopy revealed internal hemorrhoids  But due to inadequate bowel prep they recommend repeat colonoscopy in 3 months  · The patient was on Protonix drip but has been transition to p o  Protonix    Plan  · Continue p o  Protonix b i d   · After cleared with GI, have resumed heparin and regular diet  · CBC in a m , if hemoglobin less than 7 or symptomatic consider blood transfusion  · Upon discharge  · Colonoscopy in 3 months as per GI  · Follow-up biopsy results in 2-3 weeks  · Recommend PPI b i d  For 8-12 weeks then once daily thereafter    Constipation  Assessment & Plan  · POA: no BM in approximately 10 days  H/o chronic constipation  · Multifactorial; on iron supplementaion, has had poor appetite and been relatively sedentary  Patient also has a h/o hypothyroid (TSH 10 765 and T4 0 64 on 1/18), not on thyroid supplementation  Was supposed to start in 2017, but never did  Patient does not have a reason why  · Has had several bowel movements since initiating a bowel regimen  Will continue    Cough  Assessment & Plan  · Patient complains of cough with oral intake (both liquids and solids)  · Appreciate speech therapy consultation: - recommendations include  · Diet: clear liquids, thin liquid   · Meds: as tolerated   · Frequent Oral care: 2x/day  · Aspiration precautions     Plan  - managed as per speech therapy recommendations:  Ordered aspiration precautions and clear liquid diet  Suprapubic abdominal pain  Assessment & Plan  · Suprapubic abdominal pain noted on examination  · Denies any urinary symptoms  · However reports to going once day  · 01/20/2020:  Patient denies urinary symptoms today  Denies suprapubic abdominal pain today  Plan  · Will follow-up as needed    Bilateral edema of lower extremity  Assessment & Plan  · POA: Bilateral lower extremity edema noted on examination; likely 2/2 DVT  · 01/20/2020:  Edema noted in left lower extremity  Patient continues to have tenderness of left calf  Homans sign positive  Patient is not short of breath and does not have chest pain  · Heparin is put on hold yesterday in preparation for colonoscopy an EGD  It has been resumed after confirming that this is okay with GI  Plan  · Continue heparin drip, will transition to oral anticoagulation once Eliquis starter pack cost check (has been ordered)  · Will discharge with oral anticoagulation (Eliquis) x6 months  Depression  Assessment & Plan  Continue with home medication  Cholelithiasis  Assessment & Plan  · Patient noted to have cholelithiasis on last admission with acute gallstone pancreatitis  · Currently without symptoms  · Lipase was elevated at 689, however, patient tolerating PO without abdominal pain and benign abdominal exam   No additional workup indicated at this time  · Was supposed to follow-up with surgery for an elective cholecystectomy on 1/20  · Will need to reschedule outpatient surgery follow-up     Hypothyroidism  Assessment & Plan  · TSH elevated 10 765, T4 0 64    Plan  · Continue levothyroxine 100 mcg daily  · Recommend outpatient repeat TFTs in 6 weeks        Essential hypertension  Assessment & Plan  · BP WNL    Plan  · IV hydralazine Q 6 p r n  for systolic blood pressure more than 180  · Continue home dose prazosin  · Has been on HCTZ in the past, continue  · Will monitor blood pressure        VTE Pharmacologic Prophylaxis:   Pharmacologic: Heparin  Mechanical VTE Prophylaxis in Place: No    Discussions with Specialists or Other Care Team Provider: GI - cleared pt to start heparin and escalate diet  Attending provider  Education and Discussions with Family / Patient: discussed plan with patient  Current Length of Stay: 5 day(s)    Current Patient Status: Inpatient     Discharge Plan / Estimated Discharge Date: 24-48 hours    Code Status: Level 1 - Full Code      Subjective:   Pt continues to have Left lower ext tenderness  No bowel movement since pre-colonoscopy/EGD   Continues to c/o cough with PO intake  Cough has not worsened  She also states she felt feverish over night but has no other complaints  Overall, pt says she feels better  Objective:     Vitals:   Temp (24hrs), Av 9 °F (37 7 °C), Min:98 4 °F (36 9 °C), Max:102 2 °F (39 °C)    Temp:  [98 4 °F (36 9 °C)-102 2 °F (39 °C)] 98 6 °F (37 °C)  HR:  [61-87] 82  Resp:  [16-18] 18  BP: ()/(42-77) 82/42  SpO2:  [95 %-98 %] 95 %  Body mass index is 24 09 kg/m²  Input and Output Summary (last 24 hours):     No intake or output data in the 24 hours ending 20 1609    Physical Exam:     Physical Exam   Constitutional: She is oriented to person, place, and time  She appears well-developed and well-nourished  No distress  In no acute distress  HENT:   Head: Normocephalic and atraumatic  Nose: Nose normal    Cardiovascular: Normal rate, regular rhythm and intact distal pulses  Exam reveals no gallop and no friction rub  No murmur heard  Faint S1/S2, but audible and normal in character   Pulmonary/Chest: Effort normal  No stridor  No respiratory distress  She has no wheezes  She has no rales  She exhibits no tenderness  Decreased breath sounds in left upper lung region  Abdominal: Soft  Bowel sounds are normal  She exhibits no distension  There is no tenderness  There is no guarding  Musculoskeletal: She exhibits edema (Nonpitting edema, left lower extremity ) and tenderness  Tenderness to palpation of left lower extremity (left calf)  No erythema noted  Homans sign positive on the left  Neurological: She is alert and oriented to person, place, and time  Skin: Skin is warm and dry  She is not diaphoretic  No erythema  Nursing note and vitals reviewed          Additional Data:     Labs:    Results from last 7 days   Lab Units 20  0800   WBC Thousand/uL 5 70   HEMOGLOBIN g/dL 8 8*   HEMATOCRIT % 29 5*   PLATELETS Thousands/uL 202   NEUTROS PCT % 74   LYMPHS PCT % 20   MONOS PCT % 4   EOS PCT % 2 Results from last 7 days   Lab Units 01/20/20  0449  01/16/20  1554   POTASSIUM mmol/L 4 2   < > 4 1   CHLORIDE mmol/L 109*   < > 109*   CO2 mmol/L 26   < > 26   BUN mg/dL 6   < > 10   CREATININE mg/dL 0 77   < > 0 70   CALCIUM mg/dL 8 9   < > 8 9   ALK PHOS U/L  --   --  99   ALT U/L  --   --  39   AST U/L  --   --  40    < > = values in this interval not displayed  Results from last 7 days   Lab Units 01/17/20  0911   INR  0 99       * I Have Reviewed All Lab Data Listed Above  * Additional Pertinent Lab Tests Reviewed:  All Labs Within Last 24 Hours Reviewed    Imaging:    Imaging Reports Reviewed Today Include: EGD, Colonscopy  Imaging Personally Reviewed by Myself Includes:  EGD, Colonscopy    Recent Cultures (last 7 days):           Last 24 Hours Medication List:     Current Facility-Administered Medications:  acetaminophen 650 mg Oral Q6H PRN Sandra Jose MD    benzonatate 100 mg Oral TID PRN [place], DO    benztropine 0 5 mg Oral HS Sandra MD Damon    docusate sodium 100 mg Oral BID Sandra MD Damon    ferrous sulfate 325 mg Oral Daily With Breakfast Pretty Bayou[place]ani Lisa DO    gabapentin 300 mg Oral BID Sandra Jose MD    guaiFENesin 600 mg Oral Q12H [facility], DO    heparin (porcine) 3-30 Units/kg/hr (Order-Specific) Intravenous Titrated Sandra Jose MD Last Rate: 11 Units/kg/hr (01/21/20 1133)   heparin (porcine) 2,800 Units Intravenous PRN Sandra Jose MD    heparin (porcine) 5,600 Units Intravenous PRN Sandra MD Damon    hydrALAZINE 5 mg Intravenous Q6H PRN Sandra MD Damon    hydrochlorothiazide 12 5 mg Oral Daily Balta Amezcua MD    levothyroxine 100 mcg Oral Early Morning [place], DO    mirtazapine 45 mg Oral HS Sandra MD Damon    pantoprazole 40 mg Oral BID ADOLFO Cordon PA-C    polyethylene glycol 17 g Oral Daily [place], DO    prazosin 4 mg Oral HS Sandra MD Damon    risperiDONE 3 mg Oral HS Sandra MD Damon    senna 1 tablet Oral HS PRN [place], DO sucralfate 1,000 mg Oral Q6H [facility] Yordy Kelly PA-C         Today, Patient Was Seen By: Lani Onofre MD    ** Please Note: This note has been constructed using a voice recognition system   **

## 2020-01-21 NOTE — ASSESSMENT & PLAN NOTE
· POA: Bilateral lower extremity edema noted on examination; likely 2/2 DVT  · 01/20/2020:  Edema noted in left lower extremity  Patient continues to have tenderness of left calf  Homans sign positive  Patient is not short of breath and does not have chest pain  · Heparin is put on hold yesterday in preparation for colonoscopy an EGD  It has been resumed after confirming that this is okay with GI  Plan  · Continue heparin drip, will transition to oral anticoagulation once Eliquis starter pack cost check (has been ordered)  · Will discharge with oral anticoagulation (Eliquis) x6 months

## 2020-01-21 NOTE — SOCIAL WORK
LOS 5   Not a bundle;  Readmission  Pt was recently hospitalized and treated for abdominal pain found to be gallstone pancreatitis  Sister states pt was discharged and family assisted her with following up with her doctors  Daughter became concerned with pt's legs swelling and contacted the doctor  Pt was seen in the office and sent to the ED as they thought pt had DVT  Pt was admitted for DVT and is being treated for this  Pt was recently living at home with her son and DIL  House is one level and there are 4 KACIE  Pt then began staying with her daughter who lives in an apartment with and elevator  Pt needs assistance with ALDs and IADLs  She does not use a device to ambulate  Pt does not have a POA/LW and cm provided information to pt's sister as requested  Pt has not been to rehab or had VNA in the past   Pt's pharmacy is [facility] at [address] [place] [address] [id number], [place]  Pt is able to afford her medications with her RX plan  Pt has dx of Recurrent major depressive disorder and takes medications for this  Sister states she will make arrangements for pt to follow up with doctor in [place]  Pt does not have hx of D&A  Pt receives SSD as income  Pt's PCP is Dr Tomasa Samaniego Cm reviewed the recommendations of the care team for pt to return home with VNA services  Cm provided Freedom of Choice  Sister stated she will be taking pt home with her to [place] a few days after pt's DC  She states she would be better able to care for her and pt's daughter is in agreement  Sister does not want VNA services arranged at this time  CM reviewed the availability of treatment team to discuss questions or concerns patient and/or family may have regarding  understanding medications and recognizing signs and symptoms at discharge  CM also encouraged patient to follow up with all recommended appointments after discharge  CM reviewed the information that will be provided to pt/family on the discharge instructions  Patient advised of importance for patient and family to participate in managing patient's medical well being  Pt will most likely be placed on Eliquis prior to DC  Cm will assist with find cost with insurance coverage  Cm reviewed this with pt's sister  Cm will continue to follow in the event additional needs arise

## 2020-01-22 VITALS
RESPIRATION RATE: 18 BRPM | DIASTOLIC BLOOD PRESSURE: 76 MMHG | OXYGEN SATURATION: 99 % | BODY MASS INDEX: 24.14 KG/M2 | SYSTOLIC BLOOD PRESSURE: 121 MMHG | HEART RATE: 74 BPM | HEIGHT: 67 IN | WEIGHT: 153.8 LBS | TEMPERATURE: 98.6 F

## 2020-01-22 LAB
ANION GAP SERPL CALCULATED.3IONS-SCNC: 8 MMOL/L (ref 4–13)
APTT PPP: 54 SECONDS (ref 23–37)
BUN SERPL-MCNC: 13 MG/DL (ref 5–25)
CALCIUM SERPL-MCNC: 8.6 MG/DL (ref 8.3–10.1)
CHLORIDE SERPL-SCNC: 112 MMOL/L (ref 100–108)
CO2 SERPL-SCNC: 23 MMOL/L (ref 21–32)
CREAT SERPL-MCNC: 0.93 MG/DL (ref 0.6–1.3)
ERYTHROCYTE [DISTWIDTH] IN BLOOD BY AUTOMATED COUNT: 18.8 % (ref 11.6–15.1)
GFR SERPL CREATININE-BSD FRML MDRD: 68 ML/MIN/1.73SQ M
GLUCOSE SERPL-MCNC: 94 MG/DL (ref 65–140)
HCT VFR BLD AUTO: 28 % (ref 34.8–46.1)
HGB BLD-MCNC: 8.1 G/DL (ref 11.5–15.4)
MCH RBC QN AUTO: 32.4 PG (ref 26.8–34.3)
MCHC RBC AUTO-ENTMCNC: 28.9 G/DL (ref 31.4–37.4)
MCV RBC AUTO: 112 FL (ref 82–98)
PLATELET # BLD AUTO: 190 THOUSANDS/UL (ref 149–390)
PMV BLD AUTO: 10.4 FL (ref 8.9–12.7)
POTASSIUM SERPL-SCNC: 3.9 MMOL/L (ref 3.5–5.3)
RBC # BLD AUTO: 2.5 MILLION/UL (ref 3.81–5.12)
SODIUM SERPL-SCNC: 143 MMOL/L (ref 136–145)
WBC # BLD AUTO: 4.54 THOUSAND/UL (ref 4.31–10.16)

## 2020-01-22 PROCEDURE — 85027 COMPLETE CBC AUTOMATED: CPT | Performed by: INTERNAL MEDICINE

## 2020-01-22 PROCEDURE — 99239 HOSP IP/OBS DSCHRG MGMT >30: CPT | Performed by: INTERNAL MEDICINE

## 2020-01-22 PROCEDURE — 92526 ORAL FUNCTION THERAPY: CPT

## 2020-01-22 PROCEDURE — 85730 THROMBOPLASTIN TIME PARTIAL: CPT | Performed by: INTERNAL MEDICINE

## 2020-01-22 PROCEDURE — 80048 BASIC METABOLIC PNL TOTAL CA: CPT | Performed by: INTERNAL MEDICINE

## 2020-01-22 RX ORDER — HYDROCHLOROTHIAZIDE 12.5 MG/1
12.5 TABLET ORAL DAILY
Qty: 30 TABLET | Refills: 0 | Status: SHIPPED | OUTPATIENT
Start: 2020-01-23 | End: 2020-01-27 | Stop reason: SDUPTHER

## 2020-01-22 RX ORDER — PANTOPRAZOLE SODIUM 40 MG/1
40 TABLET, DELAYED RELEASE ORAL 2 TIMES DAILY
Qty: 60 TABLET | Refills: 0 | Status: SHIPPED | OUTPATIENT
Start: 2020-01-22

## 2020-01-22 RX ORDER — BENZTROPINE MESYLATE 0.5 MG/1
0.5 TABLET ORAL
Qty: 30 TABLET | Refills: 0 | Status: SHIPPED | OUTPATIENT
Start: 2020-01-22

## 2020-01-22 RX ORDER — RISPERIDONE 3 MG/1
3 TABLET, FILM COATED ORAL
Qty: 30 TABLET | Refills: 0 | Status: SHIPPED | OUTPATIENT
Start: 2020-01-22

## 2020-01-22 RX ORDER — LEVOTHYROXINE SODIUM 0.1 MG/1
100 TABLET ORAL
Qty: 30 TABLET | Refills: 0 | Status: SHIPPED | OUTPATIENT
Start: 2020-01-23 | End: 2020-01-27 | Stop reason: SDUPTHER

## 2020-01-22 RX ORDER — ADHESIVE BANDAGE 3/4"
BANDAGE TOPICAL DAILY
Qty: 1 EACH | Refills: 0 | Status: SHIPPED | OUTPATIENT
Start: 2020-01-22

## 2020-01-22 RX ADMIN — SODIUM CHLORIDE 100 ML/HR: 0.9 INJECTION, SOLUTION INTRAVENOUS at 06:25

## 2020-01-22 RX ADMIN — SUCRALFATE 1000 MG: 1 SUSPENSION ORAL at 00:30

## 2020-01-22 RX ADMIN — FERROUS SULFATE TAB 325 MG (65 MG ELEMENTAL FE) 325 MG: 325 (65 FE) TAB at 10:00

## 2020-01-22 RX ADMIN — DOCUSATE SODIUM 100 MG: 100 CAPSULE, LIQUID FILLED ORAL at 10:00

## 2020-01-22 RX ADMIN — POLYETHYLENE GLYCOL 3350 17 G: 17 POWDER, FOR SOLUTION ORAL at 10:00

## 2020-01-22 RX ADMIN — SUCRALFATE 1000 MG: 1 SUSPENSION ORAL at 06:26

## 2020-01-22 RX ADMIN — HYDROCHLOROTHIAZIDE 12.5 MG: 12.5 TABLET ORAL at 10:00

## 2020-01-22 RX ADMIN — GABAPENTIN 300 MG: 300 CAPSULE ORAL at 10:00

## 2020-01-22 RX ADMIN — LEVOTHYROXINE SODIUM 100 MCG: 100 TABLET ORAL at 06:26

## 2020-01-22 RX ADMIN — APIXABAN 10 MG: 5 TABLET, FILM COATED ORAL at 11:22

## 2020-01-22 RX ADMIN — PANTOPRAZOLE SODIUM 40 MG: 40 TABLET, DELAYED RELEASE ORAL at 06:26

## 2020-01-22 RX ADMIN — GUAIFENESIN 600 MG: 600 TABLET, EXTENDED RELEASE ORAL at 10:00

## 2020-01-22 NOTE — DISCHARGE INSTRUCTIONS
Deep Vein Thrombosis Prevention   WHAT YOU NEED TO KNOW:   Deep vein thrombosis (DVT) is a blood clot that forms in a deep vein of the body  The deep veins in the legs, thighs, and hips are the most common sites for DVT  DVT can also occur in your arms  The clot prevents the normal flow of blood in the vein  The blood backs up and causes pain and swelling  The DVT can break into smaller pieces and travel to your lungs and cause a blockage called a pulmonary embolism  A pulmonary embolism can become life-threatening  DISCHARGE INSTRUCTIONS:   Call 911 for any of the following:   · You feel lightheaded, short of breath, and have chest pain  · You cough up blood  Seek care immediately if:   · Your arm or leg feels warm, tender, and painful  It may look swollen and red  Contact your healthcare provider if:   · You have questions or concerns about your condition or care  Risk factors for DVT:  A DVT can happen to anybody, but certain things can increase your risk  You may be at higher risk if you have had DVT in the past  You may also be at risk if you have a family member who has had blood clots  The following conditions also increase your risk:  · Limited activity caused by bed rest, a leg cast, or sitting for long periods    · Injury to a deep vein, or surgery    · A blood disorder that makes your blood clot faster than normal, such as factor V Leiden mutation    · Age older than 60 years    · Use of hormone replacement therapy or some types of birth control medicine    · Pregnancy, and for 6 weeks after childbirth     · Cancer or heart failure     · A catheter placed in a large vein    · Smoking    · Obesity or varicose veins  Prevent DVT:   · Guidelines for everyone:      ¨ Maintain a healthy weight  Ask your healthcare provider how much you should weigh  Ask him to help you create a weight loss plan if you are overweight  ¨ Do not smoke    Nicotine and other chemicals in cigarettes and cigars can damage blood vessels and increase your risk for a DVT  Ask your healthcare provider for information if you currently smoke and need help to quit  E-cigarettes or smokeless tobacco still contain nicotine  Talk to your healthcare provider before you use these products  ¨ Move regularly if you sit for long periods of time  If you travel by car or work at a desk, move and stretch in your seat several times each hour  In an airplane, get up and walk every hour  Exercise your legs while sitting by raising and lowering your heels  Keep your toes on the floor while you do this  You can also raise and lower your toes while keeping your heels on the floor  Also tighten and release your leg muscles while sitting  ¨ Exercise regularly  to help increase your blood flow  Walking is a good low-impact exercise  Talk to your healthcare provider about the best exercise plan for you  · Guidelines for people at high risk for DVT:      ¨ Take blood thinner medicines as directed  Your healthcare provider may recommend blood thinners and other medicines to help prevent blood clots  ¨ Wear pressure stockings as directed  The stockings are tight and put pressure on your legs  This improves blood flow and helps prevent clots  Wear the stockings during the day  Do not wear them when you sleep  ¨ Elevate your legs  above the level of your heart as often as you can  This will help decrease swelling and pain  Prop your legs on pillows or blankets to keep them elevated comfortably  ¨ Get up and move as directed after surgery or an injury, or during an illness  Early and regular movement can help decrease your risk for DVT by helping to increase your blood flow  Ask your healthcare provider what type of activity you need and how often you should do it  ¨ Change body positions often if you are bedridden  Ask for help to change your position every 1 to 2 hours    Follow up with your healthcare provider as directed:  Write down your questions so you remember to ask them during your visits  © 2017 [employer] Information is for End User's use only and may not be sold, redistributed or otherwise used for commercial purposes  All illustrations and images included in CareNotes® are the copyrighted property of [employer]  or Rodney Alarcon  The above information is an  only  It is not intended as medical advice for individual conditions or treatments  Talk to your doctor, nurse or pharmacist before following any medical regimen to see if it is safe and effective for you  Peptic Ulcer   WHAT YOU NEED TO KNOW:   A peptic ulcer is an open sore in the lining of your stomach, intestine, or esophagus  Peptic ulcers have different names, depending on their location  Gastric ulcers are peptic ulcers in the stomach  Duodenal ulcers are peptic ulcers in the intestine  A peptic ulcer in the esophagus is called an esophageal ulcer  Peptic ulcers may be a short-term or long-term problem  DISCHARGE INSTRUCTIONS:   Medicines:   · Medicines  that decrease the amount of acid made by your stomach may be given  You may also need medicines that protect your stomach lining from acid and antibiotics to treat H  pylori infection  · Take your medicine as directed  Contact your healthcare provider if you think your medicine is not helping or if you have side effects  Tell him or her if you are allergic to any medicine  Keep a list of the medicines, vitamins, and herbs you take  Include the amounts, and when and why you take them  Bring the list or the pill bottles to follow-up visits  Carry your medicine list with you in case of an emergency  Follow up with your healthcare provider as directed:  Write down your questions so you remember to ask them during your visits  Nutrition:   · Avoid carbonated drinks, alcohol, and caffeine  Caffeine is found in some coffees, teas, and sodas   It is also found in chocolate  · Do not eat foods that upset your stomach  These include spicy or acidic foods, such as oranges  · Eat small meals more often rather than big meals less often  An empty stomach may make your symptoms worse  Quit smoking:  If you smoke, it is never too late to quit  Smoking increases your risk of developing ulcers  Ask your healthcare provider for information if you need help quitting  Contact your healthcare provider if:   · You have a fever  · You have diarrhea or constipation  · Your stomach pain does not go away or gets worse after you take medicine  · You have questions or concerns about your condition or care  Seek care immediately or call 911 if:   · You have a fast heartbeat, fast breathing, or are too dizzy or weak to stand up  · You have severe pain in your stomach  · Your vomit looks like coffee grounds or has blood in it  · Your bowel movements are bloody or black  · You have sudden shortness of breath  © 2017 [address] [employer]  Information is for End User's use only and may not be sold, redistributed or otherwise used for commercial purposes  All illustrations and images included in CareNotes® are the copyrighted property of [employer]  or Rodney Alarcon  The above information is an  only  It is not intended as medical advice for individual conditions or treatments  Talk to your doctor, nurse or pharmacist before following any medical regimen to see if it is safe and effective for you

## 2020-01-22 NOTE — SPEECH THERAPY NOTE
Speech Language/Pathology    Speech/Language Pathology Progress Note    Patient Name: Gauri Han  LOCLN'A Date: 1/22/2020       Subjective:  Pt awake/alert eating lunch reclined in bed (~30-45 degrees upright) - repositioned fully upright and educated on aspiration precautions  Pt/mother at bedside report coughing only w/ po intake (both solids/liquids)  Objective:  Pt seen for dysphagia follow up  Assessed tolerance of regular diet at lunch w/ chicken noodle soup, mashed potatoes, pork, spinach, and thin liquids by straw  Pt self-fed  Functional mastication/transfer  No oral residue  Prompt swallow initiation w/ laryngeal rise present to palpation  No overt s/s aspiration across all oral intake  No c/o pharyngeal stasis  Assessment:  Pt presents w/ functional oropharyngeal swallow  No overt s/s aspiration across po intake or presence of dysphagia symptoms  However, pt reports cough w/ oral intake - discussed aspiration precautions       Plan/Recommendations:  -Cont regular diet/thin liquids  -Meds as tolerated  -Aspiration precautions  -Pt planned for d/c today; discussed w/ pt/family at bedside - if coughing w/ po intake continues/symptoms do not improve, rec pt return for OP VBS - provided SLP contact information/discussed obtaining order from primary MD

## 2020-01-22 NOTE — PLAN OF CARE
Problem: NEUROSENSORY - ADULT  Goal: Achieves maximal functionality and self care  Description  INTERVENTIONS  - Monitor swallowing and airway patency with patient fatigue and changes in neurological status  - Encourage and assist patient to increase activity and self care     - Encourage visually impaired, hearing impaired and aphasic patients to use assistive/communication devices  Outcome: Progressing     Problem: CARDIOVASCULAR - ADULT  Goal: Maintains optimal cardiac output and hemodynamic stability  Description  INTERVENTIONS:  - Monitor I/O, vital signs and rhythm  - Monitor for S/S and trends of decreased cardiac output  - Administer and titrate ordered vasoactive medications to optimize hemodynamic stability  - Assess quality of pulses, skin color and temperature  - Assess for signs of decreased coronary artery perfusion  - Instruct patient to report change in severity of symptoms  Outcome: Progressing  Goal: Absence of cardiac dysrhythmias or at baseline rhythm  Description  INTERVENTIONS:  - Continuous cardiac monitoring, vital signs, obtain 12 lead EKG if ordered  - Administer antiarrhythmic and heart rate control medications as ordered  - Monitor electrolytes and administer replacement therapy as ordered  Outcome: Progressing     Problem: GASTROINTESTINAL - ADULT  Goal: Minimal or absence of nausea and/or vomiting  Description  INTERVENTIONS:  - Administer IV fluids if ordered to ensure adequate hydration  - Maintain NPO status until nausea and vomiting are resolved  - Nasogastric tube if ordered  - Administer ordered antiemetic medications as needed  - Provide nonpharmacologic comfort measures as appropriate  - Advance diet as tolerated, if ordered  - Consider nutrition services referral to assist patient with adequate nutrition and appropriate food choices  Outcome: Progressing  Goal: Maintains or returns to baseline bowel function  Description  INTERVENTIONS:  - Assess bowel function  - Encourage oral fluids to ensure adequate hydration  - Administer IV fluids if ordered to ensure adequate hydration  - Administer ordered medications as needed  - Encourage mobilization and activity  - Consider nutritional services referral to assist patient with adequate nutrition and appropriate food choices  Outcome: Progressing  Goal: Maintains adequate nutritional intake  Description  INTERVENTIONS:  - Monitor percentage of each meal consumed  - Identify factors contributing to decreased intake, treat as appropriate  - Assist with meals as needed  - Monitor I&O, weight, and lab values if indicated  - Obtain nutrition services referral as needed  Outcome: Progressing  Goal: Establish and maintain optimal ostomy function  Description  INTERVENTIONS:  - Assess bowel function  - Encourage oral fluids to ensure adequate hydration  - Administer IV fluids if ordered to ensure adequate hydration   - Administer ordered medications as needed  - Encourage mobilization and activity  - Nutrition services referral to assist patient with appropriate food choices  - Assess stoma site  - Consider wound care consult   Outcome: Progressing     Problem: GENITOURINARY - ADULT  Goal: Maintains or returns to baseline urinary function  Description  INTERVENTIONS:  - Assess urinary function  - Encourage oral fluids to ensure adequate hydration if ordered  - Administer IV fluids as ordered to ensure adequate hydration  - Administer ordered medications as needed  - Offer frequent toileting  - Follow urinary retention protocol if ordered  Outcome: Progressing  Goal: Absence of urinary retention  Description  INTERVENTIONS:  - Assess patients ability to void and empty bladder  - Monitor I/O  - Bladder scan as needed  - Discuss with physician/AP medications to alleviate retention as needed  - Discuss catheterization for long term situations as appropriate  Outcome: Progressing  Goal: Urinary catheter remains patent  Description  INTERVENTIONS:  - Assess patency of urinary catheter  - If patient has a chronic camacho, consider changing catheter if non-functioning  - Follow guidelines for intermittent irrigation of non-functioning urinary catheter  Outcome: Progressing     Problem: METABOLIC, FLUID AND ELECTROLYTES - ADULT  Goal: Electrolytes maintained within normal limits  Description  INTERVENTIONS:  - Monitor labs and assess patient for signs and symptoms of electrolyte imbalances  - Administer electrolyte replacement as ordered  - Monitor response to electrolyte replacements, including repeat lab results as appropriate  - Instruct patient on fluid and nutrition as appropriate  Outcome: Progressing  Goal: Fluid balance maintained  Description  INTERVENTIONS:  - Monitor labs   - Monitor I/O and WT  - Instruct patient on fluid and nutrition as appropriate  - Assess for signs & symptoms of volume excess or deficit  Outcome: Progressing     Problem: SKIN/TISSUE INTEGRITY - ADULT  Goal: Skin integrity remains intact  Description  INTERVENTIONS  - Identify patients at risk for skin breakdown  - Assess and monitor skin integrity  - Assess and monitor nutrition and hydration status  - Monitor labs (i e  albumin)  - Assess for incontinence   - Turn and reposition patient  - Assist with mobility/ambulation  - Relieve pressure over bony prominences  - Avoid friction and shearing  - Provide appropriate hygiene as needed including keeping skin clean and dry  - Evaluate need for skin moisturizer/barrier cream  - Collaborate with interdisciplinary team (i e  Nutrition, Rehabilitation, etc )   - Patient/family teaching  Outcome: Progressing  Goal: Oral mucous membranes remain intact  Description  INTERVENTIONS  - Assess oral mucosa and hygiene practices  - Implement preventative oral hygiene regimen  - Implement oral medicated treatments as ordered  - Initiate Nutrition services referral as needed  Outcome: Progressing     Problem: HEMATOLOGIC - ADULT  Goal: Maintains hematologic stability  Description  INTERVENTIONS  - Assess for signs and symptoms of bleeding or hemorrhage  - Monitor labs  - Administer supportive blood products/factors as ordered and appropriate  Outcome: Progressing     Problem: PAIN - ADULT  Goal: Verbalizes/displays adequate comfort level or baseline comfort level  Description  Interventions:  - Encourage patient to monitor pain and request assistance  - Assess pain using appropriate pain scale  - Administer analgesics based on type and severity of pain and evaluate response  - Implement non-pharmacological measures as appropriate and evaluate response  - Consider cultural and social influences on pain and pain management  - Notify physician/advanced practitioner if interventions unsuccessful or patient reports new pain  Outcome: Progressing     Problem: INFECTION - ADULT  Goal: Absence or prevention of progression during hospitalization  Description  INTERVENTIONS:  - Assess and monitor for signs and symptoms of infection  - Monitor lab/diagnostic results  - Monitor all insertion sites, i e  indwelling lines, tubes, and drains  - Monitor endotracheal if appropriate and nasal secretions for changes in amount and color  - [place] appropriate cooling/warming therapies per order  - Administer medications as ordered  - Instruct and encourage patient and family to use good hand hygiene technique  - Identify and instruct in appropriate isolation precautions for identified infection/condition  Outcome: Progressing  Goal: Absence of fever/infection during neutropenic period  Description  INTERVENTIONS:  - Monitor WBC    Outcome: Progressing     Problem: SAFETY ADULT  Goal: Patient will remain free of falls  Description  INTERVENTIONS:  - Assess patient frequently for physical needs  -  Identify cognitive and physical deficits and behaviors that affect risk of falls    -  [place] fall precautions as indicated by assessment   - Educate patient/family on patient safety including physical limitations  - Instruct patient to call for assistance with activity based on assessment  - Modify environment to reduce risk of injury  - Consider OT/PT consult to assist with strengthening/mobility  Outcome: Progressing  Goal: Maintain or return to baseline ADL function  Description  INTERVENTIONS:  -  Assess patient's ability to carry out ADLs; assess patient's baseline for ADL function and identify physical deficits which impact ability to perform ADLs (bathing, care of mouth/teeth, toileting, grooming, dressing, etc )  - Assess/evaluate cause of self-care deficits   - Assess range of motion  - Assess patient's mobility; develop plan if impaired  - Assess patient's need for assistive devices and provide as appropriate  - Encourage maximum independence but intervene and supervise when necessary  - Involve family in performance of ADLs  - Assess for home care needs following discharge   - Consider OT consult to assist with ADL evaluation and planning for discharge  - Provide patient education as appropriate  Outcome: Progressing  Goal: Maintain or return mobility status to optimal level  Description  INTERVENTIONS:  - Assess patient's baseline mobility status (ambulation, transfers, stairs, etc )    - Identify cognitive and physical deficits and behaviors that affect mobility  - Identify mobility aids required to assist with transfers and/or ambulation (gait belt, sit-to-stand, lift, walker, cane, etc )  - [place] fall precautions as indicated by assessment  - Record patient progress and toleration of activity level on Mobility SBAR; progress patient to next Phase/Stage  - Instruct patient to call for assistance with activity based on assessment  - Consider rehabilitation consult to assist with strengthening/weightbearing, etc   Outcome: Progressing     Problem: DISCHARGE PLANNING  Goal: Discharge to home or other facility with appropriate resources  Description  INTERVENTIONS:  - Identify barriers to discharge w/patient and caregiver  - Arrange for needed discharge resources and transportation as appropriate  - Identify discharge learning needs (meds, wound care, etc )  - Arrange for interpretive services to assist at discharge as needed  - Refer to Case Management Department for coordinating discharge planning if the patient needs post-hospital services based on physician/advanced practitioner order or complex needs related to functional status, cognitive ability, or social support system  Outcome: Progressing     Problem: Potential for Falls  Goal: Patient will remain free of falls  Description  INTERVENTIONS:  - Assess patient frequently for physical needs  -  Identify cognitive and physical deficits and behaviors that affect risk of falls  -  [place] fall precautions as indicated by assessment   - Educate patient/family on patient safety including physical limitations  - Instruct patient to call for assistance with activity based on assessment  - Modify environment to reduce risk of injury  - Consider OT/PT consult to assist with strengthening/mobility  Outcome: Progressing     Problem: Nutrition/Hydration-ADULT  Goal: Nutrient/Hydration intake appropriate for improving, restoring or maintaining nutritional needs  Description  Monitor and assess patient's nutrition/hydration status for malnutrition  Collaborate with interdisciplinary team and initiate plan and interventions as ordered  Monitor patient's weight and dietary intake as ordered or per policy  Utilize nutrition screening tool and intervene as necessary  Determine patient's food preferences and provide high-protein, high-caloric foods as appropriate       INTERVENTIONS:  - Monitor oral intake, urinary output, labs, and treatment plans  - Assess nutrition and hydration status and recommend course of action  - Evaluate amount of meals eaten  - Assist patient with eating if necessary   - Allow adequate time for meals  - Recommend/ encourage appropriate diets, oral nutritional supplements, and vitamin/mineral supplements  - Order, calculate, and assess calorie counts as needed  - Recommend, monitor, and adjust tube feedings and TPN/PPN based on assessed needs  - Assess need for intravenous fluids  - Provide specific nutrition/hydration education as appropriate  - Include patient/family/caregiver in decisions related to nutrition  Outcome: Progressing

## 2020-01-23 ENCOUNTER — TRANSITIONAL CARE MANAGEMENT (OUTPATIENT)
Dept: FAMILY MEDICINE CLINIC | Facility: CLINIC | Age: 60
End: 2020-01-23

## 2020-01-23 NOTE — UTILIZATION REVIEW
Notification of Discharge  This is a Notification of Discharge from our facility [address]  Please be advised that this patient has been discharge from our facility  Below you will find the admission and discharge date and time including the patients disposition  PRESENTATION DATE: 1/16/2020  5:05 PM  OBS ADMISSION DATE:   IP ADMISSION DATE: 1/16/20 1833   DISCHARGE DATE: 1/22/2020  1:49 PM  DISPOSITION: Home/Self Care Home/Self Care   Admission Orders listed below:  Admission Orders (From admission, onward)     Ordered        01/16/20 1833  Inpatient Admission (expected length of stay for this patient Order details is greater than two midnights)  Once                   Please contact the UR Department if additional information is required to close this patient's authorization/case  [address] [facility] Utilization Review Department  Main: [phone] x carefully listen to the prompts  All voicemails are confidential   [email or web address]  Send all requests for admission clinical reviews, approved or denied determinations and any other requests to dedicated fax number below belonging to the campus where the patient is receiving treatment   List of dedicated fax numbers:  [address] [place] [address] DENIALS (Administrative/Medical Necessity) [phone]   [address]  (Maternity/NICU/Pediatrics) [phone]   [facility] [phone]   [facility] [phone]   [facility] [phone]   [address] [phone]   [facility]  [phone]   [address] [facility]  [address] [facility] [address] [facility] [phone]

## 2020-01-27 ENCOUNTER — OFFICE VISIT (OUTPATIENT)
Dept: FAMILY MEDICINE CLINIC | Facility: CLINIC | Age: 60
End: 2020-01-27

## 2020-01-27 VITALS
BODY MASS INDEX: 23.76 KG/M2 | TEMPERATURE: 97.4 F | RESPIRATION RATE: 16 BRPM | DIASTOLIC BLOOD PRESSURE: 70 MMHG | WEIGHT: 151.4 LBS | HEART RATE: 78 BPM | HEIGHT: 67 IN | SYSTOLIC BLOOD PRESSURE: 110 MMHG

## 2020-01-27 DIAGNOSIS — E03.9 HYPOTHYROIDISM: ICD-10-CM

## 2020-01-27 DIAGNOSIS — I10 ESSENTIAL HYPERTENSION: ICD-10-CM

## 2020-01-27 DIAGNOSIS — I82.412 ACUTE DEEP VEIN THROMBOSIS (DVT) OF FEMORAL VEIN OF LEFT LOWER EXTREMITY (HCC): ICD-10-CM

## 2020-01-27 DIAGNOSIS — E03.8 OTHER SPECIFIED HYPOTHYROIDISM: ICD-10-CM

## 2020-01-27 DIAGNOSIS — I82.402 ACUTE DEEP VEIN THROMBOSIS (DVT) OF LEFT LOWER EXTREMITY, UNSPECIFIED VEIN (HCC): Primary | ICD-10-CM

## 2020-01-27 PROCEDURE — 3008F BODY MASS INDEX DOCD: CPT | Performed by: INTERNAL MEDICINE

## 2020-01-27 PROCEDURE — 3008F BODY MASS INDEX DOCD: CPT | Performed by: NURSE PRACTITIONER

## 2020-01-27 PROCEDURE — 99495 TRANSJ CARE MGMT MOD F2F 14D: CPT | Performed by: FAMILY MEDICINE

## 2020-01-27 RX ORDER — HYDROXYZINE 50 MG/1
TABLET, FILM COATED ORAL
COMMUNITY
Start: 2020-01-27

## 2020-01-27 RX ORDER — BLOOD PRESSURE TEST KIT-MEDIUM
KIT MISCELLANEOUS
COMMUNITY
Start: 2020-01-23

## 2020-01-27 RX ORDER — HYDROCHLOROTHIAZIDE 12.5 MG/1
12.5 TABLET ORAL DAILY
Qty: 90 TABLET | Refills: 1 | Status: SHIPPED | OUTPATIENT
Start: 2020-01-27

## 2020-01-27 RX ORDER — LEVOTHYROXINE SODIUM 0.1 MG/1
100 TABLET ORAL
Qty: 30 TABLET | Refills: 1 | Status: SHIPPED | OUTPATIENT
Start: 2020-01-27

## 2020-01-27 NOTE — ASSESSMENT & PLAN NOTE
Patient recently started on levothyroxine 100 mcg daily  Will refill this time  TSH on 01/16/2020 was mildly elevated, will repeat in 1 month

## 2020-01-27 NOTE — ASSESSMENT & PLAN NOTE
First episode of DVT, likely provoke from her hospitalization and prolonged sedentary state  Patient is currently anticoagulated with Eliquis 5 mg 2 times a day  Pain is stable  Patient requesting refill on her medication at this time

## 2020-01-27 NOTE — PROGRESS NOTES
Assessment/Plan:    Acute deep vein thrombosis (DVT) of lower extremity (HCC)  First episode of DVT, likely provoke from her hospitalization and prolonged sedentary state  Patient is currently anticoagulated with Eliquis 5 mg 2 times a day  Pain is stable  Patient requesting refill on her medication at this time  Hypothyroidism  Patient recently started on levothyroxine 100 mcg daily  Will refill this time  TSH on 01/16/2020 was mildly elevated, will repeat in 1 month    Essential hypertension  -goal bp 130/90, pt's bp 110/70 at goal  -Refilled hydrochlorothiazide per patient request, encouraged medication compliance  -Advised patient on symptoms of hypotension & severe HTN  -Limit salt-intake & caffeine in diet, minimize stress level  -Weight reduction efforts via improved diet & increased exercise recommend 150 minutes a week  -DASH diet handout given via AVS  -last urine microalbumin > than 1 year ago, will order today  -Discussed importance of treating HTN to prevent ASCVD, CVA        TCM Call (since 12/27/2019)     Date and time call was made  1/23/2020  3:37 PM    Hospital care reviewed  Records reviewed    Patient was hospitialized at  [facility]    Date of Admission  01/16/20    Date of discharge  01/22/20    Diagnosis  Acute DVT of lower extremity    Disposition  Home      TCM Call (since 12/27/2019)     None          Subjective:      Patient ID: Linnea Ramos is a 64 y o  female  HPI    70-year-old female patient here for transitional care visit after hospitalization on 01/16/2020 for acute DVT  Patient is accompanied by her daughter and her sister today who are her caretakers who are acting as   Patient was also recently hospitalized on 01/09/2020 for total of 6 days at that time, patient was deemed to be low risk for with VTE screening and was not started on VT prophylaxis  Patient denies any trauma to her lower extremity    On ultrasound, there is acute occlusive deep vein thrombosis from mid to distal femoral vein, popliteal vein while the posterior tibial vein in the proximal calf  Patient reports significant improvement of her symptoms compared to when it 1st started  Patient is currently anticoagulated on Eliquis  Patient is accompanied by her daughter and her sister today who are her caretakers who are acting as   Main concern regarding patient's medical condition at this time is regarding her new anticoagulation medication and it is possible interaction with other medications  Patient requesting refill for Eliquis as well as levothyroxine, as well as her blood pressure medications  Patient is planning on moving to [place] with her sister temporarily on Saturday  Patient TSH on 1/16/2020 6 842  Review of Systems   Constitutional: Negative for chills and fever  HENT: Positive for sore throat  Negative for congestion and rhinorrhea  Respiratory: Negative for shortness of breath  Cardiovascular: Negative for chest pain  Gastrointestinal: Positive for constipation  Negative for abdominal pain, blood in stool, diarrhea, nausea and vomiting  Endocrine: Positive for cold intolerance  Genitourinary: Negative for dyspareunia  Musculoskeletal: Positive for arthralgias  Patient complaining of back pain as well as elbow pain   Skin: Negative for rash  Allergic/Immunologic: Negative for environmental allergies and food allergies  Neurological: Negative for dizziness, light-headedness and headaches  Psychiatric/Behavioral: Negative for sleep disturbance  Objective:    /70 (BP Location: Left arm, Patient Position: Sitting, Cuff Size: Large)   Pulse 78   Temp (!) 97 4 °F (36 3 °C) (Tympanic)   Resp 16   Ht 5' 7" (1 702 m)   Wt 68 7 kg (151 lb 6 4 oz)   BMI 23 71 kg/m²     Physical Exam   Constitutional: She appears well-developed and well-nourished  HENT:   Head: Normocephalic     Nose: Nose normal    Eyes: EOM are normal  Right eye exhibits no discharge  Left eye exhibits no discharge  Neck: Neck supple  Cardiovascular: Normal rate and regular rhythm  No murmur heard  Pulmonary/Chest: Effort normal and breath sounds normal  No respiratory distress  Abdominal: Soft  Bowel sounds are normal  She exhibits no distension  Musculoskeletal: She exhibits edema and tenderness  She exhibits no deformity  Mild edema and tenderness to palpation on the left lower extremity compared to the right, no significant erythema or temperature discrepancy   Neurological: She is alert  Skin: Skin is warm and dry  Capillary refill takes less than 2 seconds  No rash noted  No erythema  No pallor  Psychiatric: She has a normal mood and affect  Vitals reviewed  PAULETTE Correia    Family Medicine, PGY-2

## 2020-01-27 NOTE — ASSESSMENT & PLAN NOTE
-goal bp 130/90, pt's bp 110/70 at goal  -Refilled hydrochlorothiazide per patient request, encouraged medication compliance  -Advised patient on symptoms of hypotension & severe HTN  -Limit salt-intake & caffeine in diet, minimize stress level  -Weight reduction efforts via improved diet & increased exercise recommend 150 minutes a week  -DASH diet handout given via AVS  -last urine microalbumin > than 1 year ago, will order today  -Discussed importance of treating HTN to prevent ASCVD, CVA

## 2020-01-28 ENCOUNTER — HOSPITAL ENCOUNTER (OUTPATIENT)
Dept: GASTROENTEROLOGY | Facility: HOSPITAL | Age: 60
Setting detail: OUTPATIENT SURGERY
Discharge: HOME | End: 2020-01-28
Payer: COMMERCIAL

## 2020-01-28 ENCOUNTER — OFFICE VISIT (OUTPATIENT)
Dept: OBGYN CLINIC | Facility: CLINIC | Age: 60
End: 2020-01-28

## 2020-01-28 VITALS
BODY MASS INDEX: 23.49 KG/M2 | SYSTOLIC BLOOD PRESSURE: 114 MMHG | DIASTOLIC BLOOD PRESSURE: 71 MMHG | WEIGHT: 150 LBS | HEART RATE: 66 BPM

## 2020-01-28 DIAGNOSIS — Z12.31 ENCOUNTER FOR SCREENING MAMMOGRAM FOR MALIGNANT NEOPLASM OF BREAST: ICD-10-CM

## 2020-01-28 DIAGNOSIS — Z01.419 ENCOUNTER FOR ANNUAL ROUTINE GYNECOLOGICAL EXAMINATION: Primary | ICD-10-CM

## 2020-01-28 PROCEDURE — 87624 HPV HI-RISK TYP POOLED RSLT: CPT | Performed by: NURSE PRACTITIONER

## 2020-01-28 PROCEDURE — G0145 SCR C/V CYTO,THINLAYER,RESCR: HCPCS | Performed by: NURSE PRACTITIONER

## 2020-01-28 PROCEDURE — 99386 PREV VISIT NEW AGE 40-64: CPT | Performed by: NURSE PRACTITIONER

## 2020-01-28 PROCEDURE — 1111F DSCHRG MED/CURRENT MED MERGE: CPT | Performed by: NURSE PRACTITIONER

## 2020-01-28 PROCEDURE — 3725F SCREEN DEPRESSION PERFORMED: CPT | Performed by: NURSE PRACTITIONER

## 2020-01-28 NOTE — PROGRESS NOTES
Assessment/Plan     Diagnoses and all orders for this visit:    Encounter for annual routine gynecological examination    Encounter for screening mammogram for malignant neoplasm of breast  -     Mammo screening bilateral w cad; Future         Plan  Patient Instructions   PAP  results will be available in 2 weeks  Make mammogram appointment  Call with needs or concerns  Return in 1 year    Pt and her sister who cares for her verbalized understanding of all discussed  Return in about 1 year (around 2021) for annual GYN exam     Ila Arias is a 64 y o  female who presents for annual exam  The patient has no complaints today  The patient is not sexually active  GYN screening history: last pap: was normal  Pt states > 6 years agoThe patient is not taking hormone replacement therapy  Patient denies post-menopausal vaginal bleeding    The patient participates in regular exercise: yes discussed importance of exercise calcium and vitamin D   The patient reports that there is not domestic violence in her life  The patient is not having menopausal symptoms none  Pt's sister states she was recently in the hospital, they attempted a colonoscopy, pt was constipated, has an appoitment for a repeat in 3 weeks      Menstrual History:  OB History        5    Para   5    Term   5            AB        Living   5       SAB        TAB        Ectopic        Multiple        Live Births                    Menarche age: 15  No LMP recorded  Patient is postmenopausal  LMP was late forties       The following portions of the patient's history were reviewed and updated as appropriate: allergies, current medications, past family history, past medical history, past social history, past surgical history and problem list     Review of Systems  Pertinent items are noted in HPI       Objective      /71   Pulse 66   Wt 68 kg (150 lb)   BMI 23 49 kg/m²      General:   alert and oriented, in no acute distress, alert, appears stated age and cooperative   Heart: regular rate and rhythm, S1, S2 normal, no murmur, click, rub or gallop   Lungs: clear to auscultation bilaterally   Thyroid: Negative masses   Abdomen: soft, non-tender, without masses or organomegaly   Vulva: normal   Vagina: normal mucosa   Cervix: no bleeding following Pap, no cervical motion tenderness and no lesions   Uterus: normal size, non-tender, normal shape and consistency   Adnexa: normal adnexa   Breasts: NT, negative masses, discharge or dimpling         Lab Review  PAP collected today, has a mammogram order, plans to schedule

## 2020-01-28 NOTE — LETTER
2/3/2020    To Gauri Han  : 1960      This letter is to advise you that your recent PAP SMEAR results were reviewed by me and are NORMAL    We will see you in 1 year for your annual exam     Vasquez Brooke

## 2020-01-28 NOTE — PATIENT INSTRUCTIONS
PAP  results will be available in 2 weeks  Make mammogram appointment  Call with needs or concerns  Return in 1 year

## 2020-01-29 LAB
HPV HR 12 DNA CVX QL NAA+PROBE: NEGATIVE
HPV16 DNA CVX QL NAA+PROBE: NEGATIVE
HPV18 DNA CVX QL NAA+PROBE: NEGATIVE

## 2020-01-29 NOTE — DISCHARGE SUMMARY
Discharge- Viraj Soliman 1960, 64 y o  female MRN: [id number]    Unit/Bed#: S -01 Encounter: [id number]    Primary Care Provider: Nelly Avila MD   Date and time admitted to hospital: 1/16/2020  5:05 PM        Hospital Discharge Problem List:  1  Acute DVT of LLE  2  Essential HTN  3  Hypothyroidism  4  Iron deficiency anemia/acute on chronic blood loss anemia  5  Cholelithiasis  6  Constipation  7  Depression/anxiety    Discharging Physician / Practitioner: Christina Broderick MD  PCP: Nelly Avila MD  Admission Date:   Admission Orders (From admission, onward)     Ordered        01/16/20 1833  Inpatient Admission (expected length of stay for this patient Order details is greater than two midnights)  Once                   Discharge Date: 01/22/20    Resolved Problems  Date Reviewed: 1/28/2020          Resolved    Headache 1/20/2020     Resolved by  Meche Keller MD          Consultations During Hospital Stay:  · Gastroenterology    Procedures Performed:   EGD and colonoscopy 1/17/20: IMPRESSION:  1  Moderate gastritis and several superficial clean based ulcers noted in the pre-pyloric region  2  Losing duodenal ulcer noted along the duodenal sweep with edematous mucosa surrounding it, this was injected using epinephrine         RECOMMENDATION:  1  Follow-up biopsy results in 2-3 weeks  2  Avoid NSAIDs  3  Continue Protonix drip for total of 72 hours  Would recommend b i d  PPI for the next 8-12 weeks followed by once daily thereafter  4  Clear liquid diet today  Significant Findings / Test Results:   Ct Head Wo Contrast    Result Date: 1/19/2020  Impression: No acute intracranial abnormality  Workstation performed: [id number]     Cta Chest Pe Study    Result Date: 1/16/2020  Impression: Few small nonocclusive chronic emboli bilaterally  No evidence of acute embolus  Measured RV/LV ratio is within normal limits at less than 0 9     I personally discussed this study with Joshua Torrez Rosi on 1/16/2020 at 8:45 PM  Workstation performed: [id number]     Duplex venous u/s Bilat LE: 1/16/20    CONCLUSION:  Impression:  RIGHT LOWER LIMB:  No evidence of acute or chronic deep vein thrombosis  No evidence of superficial thrombophlebitis noted  Doppler evaluation shows a normal response to augmentation maneuvers  Popliteal, posterior tibial and anterior tibial arterial Doppler waveforms are  triphasic  LEFT LOWER LIMB: Abnormal  There is evidence of acute occlusive deep vein thrombosis from the mid to  distal femoral vein, popliteal vein and one of the posterior tibial veins in the  proximal calf  No evidence of superficial thrombophlebitis noted  Doppler evaluation shows a normal response to augmentation maneuvers  Popliteal, posterior tibial and anterior tibial arterial Doppler waveforms are  triphasic  Incidental Findings:   · None    Test Results Pending at Discharge (will require follow up):   · EGD bx results       Complications:  None    Reason for Admission: LLE acute DVT    Hospital Course: LLE pain and swelling    Tomas Varela is a 64 y o  female patient who originally presented to the hospital on 1/16/2020 due to left lower extremity pain and swelling  Patient was found to have a LLE acute DVT from the mid to distal femoral vein, popliteal vein and one of posterior tibial veins in the proximal calf  She was started on [facility] with heparin gtt which was transitioned to Eliquis during her admission  Of note, patient was recently admitted for acute gallstone pancreatitis and had down trending Hgb that admission with fecal occult + stool  Plans were to follow-up with GI as outpt for further testing though given need for [facility] this admission and inherent bleeding risk, GI was consulted for EGD colonoscopy in the hospital   Patient required transfusion of 1u packed RBC during her admission    Patient underwent EGD on 1/16/20 and was found to have moderate gastritis and several superficial clean based ulcers noted in the pre-pyloric region  An oozing duodenal ulcer was also noted which was injected using epinephrine  Bx were taken  Patient was maintained on a protonix drip for total of 72 hours and then transitioned to PPI bid for the next 8-12 weeks followed by once daily thereafter  She is to follow-up with GI in 2-3 weeks to discuss bx results  Her hgb remained stable  Unclear etiology of patient's LLE DVT, may be provoked by immobility with recent hospitalization for acute pancreatitis though cannot say for certain  Will require at least 6 months AC, possibly indefinite, with ongoing discussions warranted between patient and her PCP regarding appropriate total duration of AC  Patient will also need follow-up with general sx to discuss timing of elective cholecystectomy now that she is on [facility]  Would recommend at least 3 months uninterrupted AC before surgery considered  Please see above list of diagnoses and related plan for additional information  Condition at Discharge: good     Discharge Day Visit / Exam:     Subjective:  Patient seen and examined this morning  No acute events overnight, sitting up in bed with family at bedside  No current complaints, hopeful to be discharged today  Vitals: Blood Pressure: 121/76 (01/22/20 0700)  Pulse: 74 (01/22/20 0700)  Temperature: 98 6 °F (37 °C) (01/22/20 0700)  Temp Source: Oral (01/22/20 0700)  Respirations: 18 (01/22/20 0700)  Height: 5' 7" (170 2 cm) (01/20/20 1418)  Weight - Scale: 69 8 kg (153 lb 12 8 oz) (01/16/20 1916)  SpO2: 99 % (01/22/20 0700)  Exam:   Physical Exam   Constitutional: She is oriented to person, place, and time  She appears well-developed and well-nourished  HENT:   Head: Normocephalic and atraumatic  Eyes: Pupils are equal, round, and reactive to light  Neck: Normal range of motion  Cardiovascular: Normal rate and regular rhythm  Pulmonary/Chest: Effort normal  No respiratory distress   She has no wheezes  She has no rales  Abdominal: Soft  She exhibits no distension  There is no tenderness  Musculoskeletal: Normal range of motion  Neurological: She is alert and oriented to person, place, and time  No cranial nerve deficit  Skin: Skin is warm and dry  Nursing note and vitals reviewed  Discussion with Family: Discussed with patient's sister and daughter in detail    Discharge instructions/Information to patient and family:   See after visit summary for information provided to patient and family  Provisions for Follow-Up Care:  See after visit summary for information related to follow-up care and any pertinent home health orders  Disposition:     Home      Planned Readmission: None     Discharge Statement:  I spent 34 minutes discharging the patient  This time was spent on the day of discharge  I had direct contact with the patient on the day of discharge  Greater than 50% of the total time was spent examining patient, answering all patient questions, arranging and discussing plan of care with patient as well as directly providing post-discharge instructions  Additional time then spent on discharge activities  Discharge Medications:  See after visit summary for reconciled discharge medications provided to patient and family        ** Please Note: This note has been constructed using a voice recognition system **

## 2020-01-31 LAB
LAB AP GYN PRIMARY INTERPRETATION: NORMAL
Lab: NORMAL

## 2020-02-06 ENCOUNTER — TELEPHONE (OUTPATIENT)
Dept: GASTROENTEROLOGY | Facility: CLINIC | Age: 60
End: 2020-02-06

## 2020-02-06 ENCOUNTER — TELEPHONE (OUTPATIENT)
Dept: GASTROENTEROLOGY | Facility: AMBULARY SURGERY CENTER | Age: 60
End: 2020-02-06

## 2020-02-06 NOTE — TELEPHONE ENCOUNTER
Attempted to contact pt via telephone, lmom for pt to call office  Letter also sent    Recall set for repeat procedures in 3m

## 2020-02-06 NOTE — TELEPHONE ENCOUNTER
----- Message from Brit Larsen MD sent at 2/4/2020  8:19 AM EST -----  Please inform the patient that H pylori is negative, there is evidence of gastritis but no intestinal metaplasia  Would recommend repeat EGD in 3 months as there was a lot of edema around the duodenal ulcer to document healing  She needs to be on b i d  Dosing of PPI  Colonoscopy needs to be repeated in 3 months with a 2 day prep  Avoid fiber material for 5 days prior  She was a Hospital patient, should be seen in the office prior to scheduling these procedures by a PA

## 2020-02-06 NOTE — LETTER
February 6, 2020     Jordyn Griffiths  [facility]      Dear Ms Jean Baptiste: We have attempted to reach you regarding your results  We ask that you please contact our office upon receipt of this letter to receive your results  Thank you in advance for your cooperation and assistance          Sincerely,   [facility] Staff  [phone]

## 2020-04-24 ENCOUNTER — TELEPHONE (OUTPATIENT)
Dept: GASTROENTEROLOGY | Facility: CLINIC | Age: 60
End: 2020-04-24

## 2020-05-11 ENCOUNTER — TELEPHONE (OUTPATIENT)
Dept: OBGYN CLINIC | Facility: CLINIC | Age: 60
End: 2020-05-11

## 2020-08-02 ENCOUNTER — TELEPHONE (OUTPATIENT)
Dept: GASTROENTEROLOGY | Facility: AMBULARY SURGERY CENTER | Age: 60
End: 2020-08-02

## 2020-08-02 NOTE — TELEPHONE ENCOUNTER
LVM to call back and schedule follow up with Dr Margy Cornejo and will need to schedule repeat EGD and colonoscopy as well

## 2020-08-02 NOTE — TELEPHONE ENCOUNTER
LVM to call back and schedule follow up with Dr Jacklyn Andrade and will need to schedule repeat EGD and colonoscopy as well

## 2021-09-02 NOTE — ASSESSMENT & PLAN NOTE
Pt phoned made an appt for Dec 9 and she is having her bone density done at [facility] order by PCP and she would like  For Dr Gasca Begin to place orders for blood work to be done prior to her appt. Please call pt and let her know when they are placed she will go to a [facility] facility. · Anioin gap noted at 18 on admission; now 17  · Bicarb 22  · Metabolic acidosis likely due to starvation ketoacidosis secondary to nausea and vomiting  · Lactic acid noted at 1 2  · Should improve with fluid hydration  · Continue to monitor  · A m   Labs for 1/11/2020 have not resulted    Plan:  · IV fluids with D5W
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
## 2023-05-12 ENCOUNTER — APPOINTMENT (EMERGENCY)
Dept: NON INVASIVE DIAGNOSTICS | Facility: HOSPITAL | Age: 63
End: 2023-05-12

## 2023-05-12 ENCOUNTER — HOSPITAL ENCOUNTER (EMERGENCY)
Facility: HOSPITAL | Age: 63
Discharge: HOME | End: 2023-05-12
Attending: EMERGENCY MEDICINE

## 2023-05-12 ENCOUNTER — APPOINTMENT (EMERGENCY)
Dept: RADIOLOGY | Facility: HOSPITAL | Age: 63
End: 2023-05-12

## 2023-05-12 VITALS
DIASTOLIC BLOOD PRESSURE: 73 MMHG | SYSTOLIC BLOOD PRESSURE: 138 MMHG | TEMPERATURE: 97.2 F | HEART RATE: 61 BPM | OXYGEN SATURATION: 98 % | RESPIRATION RATE: 16 BRPM

## 2023-05-12 DIAGNOSIS — I82.509 CHRONIC DEEP VEIN THROMBOSIS (DVT) (HCC): ICD-10-CM

## 2023-05-12 DIAGNOSIS — M79.661 RIGHT CALF PAIN: Primary | ICD-10-CM

## 2023-05-12 LAB
ALBUMIN SERPL BCP-MCNC: 4.1 G/DL (ref 3.5–5)
ALP SERPL-CCNC: 89 U/L (ref 34–104)
ALT SERPL W P-5'-P-CCNC: 8 U/L (ref 7–52)
ANION GAP SERPL CALCULATED.3IONS-SCNC: 9 MMOL/L (ref 4–13)
APTT PPP: 36 SECONDS (ref 23–37)
AST SERPL W P-5'-P-CCNC: 14 U/L (ref 13–39)
BASOPHILS # BLD AUTO: 0.03 THOUSANDS/ÂΜL (ref 0–0.1)
BASOPHILS NFR BLD AUTO: 1 % (ref 0–1)
BILIRUB SERPL-MCNC: 0.37 MG/DL (ref 0.2–1)
BUN SERPL-MCNC: 20 MG/DL (ref 5–25)
CALCIUM SERPL-MCNC: 9.3 MG/DL (ref 8.4–10.2)
CHLORIDE SERPL-SCNC: 106 MMOL/L (ref 96–108)
CO2 SERPL-SCNC: 27 MMOL/L (ref 21–32)
CREAT SERPL-MCNC: 1.28 MG/DL (ref 0.6–1.3)
EOSINOPHIL # BLD AUTO: 0.18 THOUSAND/ÂΜL (ref 0–0.61)
EOSINOPHIL NFR BLD AUTO: 3 % (ref 0–6)
ERYTHROCYTE [DISTWIDTH] IN BLOOD BY AUTOMATED COUNT: 13.1 % (ref 11.6–15.1)
GFR SERPL CREATININE-BSD FRML MDRD: 45 ML/MIN/1.73SQ M
GLUCOSE SERPL-MCNC: 94 MG/DL (ref 65–140)
HCT VFR BLD AUTO: 39.9 % (ref 34.8–46.1)
HGB BLD-MCNC: 13.8 G/DL (ref 11.5–15.4)
IMM GRANULOCYTES # BLD AUTO: 0.01 THOUSAND/UL (ref 0–0.2)
IMM GRANULOCYTES NFR BLD AUTO: 0 % (ref 0–2)
INR PPP: 1.1 (ref 0.84–1.19)
LYMPHOCYTES # BLD AUTO: 2.62 THOUSANDS/ÂΜL (ref 0.6–4.47)
LYMPHOCYTES NFR BLD AUTO: 41 % (ref 14–44)
MCH RBC QN AUTO: 31.2 PG (ref 26.8–34.3)
MCHC RBC AUTO-ENTMCNC: 34.6 G/DL (ref 31.4–37.4)
MCV RBC AUTO: 90 FL (ref 82–98)
MONOCYTES # BLD AUTO: 0.3 THOUSAND/ÂΜL (ref 0.17–1.22)
MONOCYTES NFR BLD AUTO: 5 % (ref 4–12)
NEUTROPHILS # BLD AUTO: 3.32 THOUSANDS/ÂΜL (ref 1.85–7.62)
NEUTS SEG NFR BLD AUTO: 50 % (ref 43–75)
NRBC BLD AUTO-RTO: 0 /100 WBCS
PLATELET # BLD AUTO: 178 THOUSANDS/UL (ref 149–390)
PMV BLD AUTO: 10.9 FL (ref 8.9–12.7)
POTASSIUM SERPL-SCNC: 3.9 MMOL/L (ref 3.5–5.3)
PROT SERPL-MCNC: 7.3 G/DL (ref 6.4–8.4)
PROTHROMBIN TIME: 14.6 SECONDS (ref 11.6–14.5)
RBC # BLD AUTO: 4.43 MILLION/UL (ref 3.81–5.12)
SODIUM SERPL-SCNC: 142 MMOL/L (ref 135–147)
WBC # BLD AUTO: 6.46 THOUSAND/UL (ref 4.31–10.16)

## 2023-05-12 NOTE — ED PROVIDER NOTES
"History  Chief Complaint   Patient presents with   • Leg Pain     Pt came into ER with left leg pain for a couple of weeks  Pt reports she had a blood clot in the right leg and is on  blood thinner for the clot  Pt denies dyspnea  Patient is a 29-year-old female presenting with daughter for evaluation of left leg pain  Patient reports past medical history significant for a DVT, IRINEO, anemia, hypertension  Patient reports her family care provider is located in [place] prompting her to be unable to get appointments and travel easily to these appointments  Patient reports he is in having right-sided lower leg pain contrary to nursing triage note the patient reports that her right lower leg/calf is in more pain than her left leg however the patient reports she \"need an x-ray because I need my bones checked\"  Patient specifically denies any direct traumatic inciting events, wounds, rashes over the area of pain  Patient reports has tried Tylenol for relief of her pain and states this has been ineffective  Prior to Admission Medications   Prescriptions Last Dose Informant Patient Reported? Taking?    Blood Pressure Monitoring (BLOOD PRESSURE CUFF) MISC   No No   Sig: by Does not apply route daily   Blood Pressure Monitoring (RA BLOOD PRESSURE CUFF MONITOR) MISC   Yes No   Sig: As directed   apixaban (ELIQUIS) 5 mg   No No   Sig: Take 1 tablet (5 mg total) by mouth 2 (two) times a day   benztropine (COGENTIN) 0 5 mg tablet   No No   Sig: Take 1 tablet (0 5 mg total) by mouth daily at bedtime   ferrous sulfate 324 (65 Fe) mg   No No   Sig: Take 1 tablet (324 mg total) by mouth daily before breakfast   gabapentin (NEURONTIN) 300 mg capsule   Yes No   Sig: Take 300 mg by mouth 2 (two) times a day   hydrOXYzine HCL (ATARAX) 50 mg tablet   Yes No   hydrochlorothiazide (HYDRODIURIL) 12 5 mg tablet   No No   Sig: Take 1 tablet (12 5 mg total) by mouth daily   levothyroxine 100 mcg tablet   No No   Sig: Take 1 " tablet (100 mcg total) by mouth daily in the early morning   mirtazapine (REMERON) 45 MG tablet   Yes No   Sig: Take 45 mg by mouth daily at bedtime   pantoprazole (PROTONIX) 40 mg tablet   No No   Sig: Take 1 tablet (40 mg total) by mouth 2 (two) times a day   polyethylene glycol (MIRALAX) 17 g packet   No No   Sig: Take 17 g by mouth daily as needed (constipation) for up to 7 days   prazosin (MINIPRESS) 2 mg capsule   Yes No   Sig: Take 4 mg by mouth daily at bedtime   risperiDONE (RisperDAL) 3 mg tablet   No No   Sig: Take 1 tablet (3 mg total) by mouth daily at bedtime      Facility-Administered Medications: None       Past Medical History:   Diagnosis Date   • IRINEO (acute kidney injury) ([facility] [address] ) 1/9/2020   • Anemia 1/9/2020   • Disease of thyroid gland    • Hypertension    • Psychiatric disorder        Past Surgical History:   Procedure Laterality Date   • BRAIN SURGERY      patient can not tell any other details  • US GUIDED INJECTION FOR RESEARCH STUDY  12/30/2016       Family History   Problem Relation Age of Onset   • Cancer Father      I have reviewed and agree with the history as documented  E-Cigarette/Vaping     E-Cigarette/Vaping Substances     Social History     Tobacco Use   • Smoking status: Never   • Smokeless tobacco: Never   Substance Use Topics   • Alcohol use: No   • Drug use: No       Review of Systems   Constitutional: Negative for chills, fatigue and fever  HENT: Negative for congestion, ear pain, rhinorrhea and sore throat  Eyes: Negative for redness  Respiratory: Negative for chest tightness and shortness of breath  Cardiovascular: Negative for chest pain and palpitations  Gastrointestinal: Negative for abdominal pain, nausea and vomiting  Genitourinary: Negative for dysuria and hematuria  Musculoskeletal: Positive for arthralgias and myalgias  Skin: Negative for rash  Neurological: Negative for dizziness, syncope, light-headedness and numbness         Physical "Exam  Physical Exam  Vitals and nursing note reviewed  Constitutional:       Appearance: She is well-developed  HENT:      Head: Normocephalic  Eyes:      General: No scleral icterus  Cardiovascular:      Rate and Rhythm: Normal rate and regular rhythm  Pulmonary:      Effort: Pulmonary effort is normal       Breath sounds: Normal breath sounds  No stridor  Abdominal:      General: There is no distension  Palpations: Abdomen is soft  Tenderness: There is no abdominal tenderness  Musculoskeletal:         General: Tenderness present  Normal range of motion  Legs:       Comments: Patient reports pain to the left lower leg \"bones\"  No tenderness to palpation, no overlying skin changes, 2+ dorsalis pedis pulse, 2+ posterior tibialis pulse, less than 2-second capillary refill to bilateral toes and feet  Skin:     General: Skin is warm and dry  Capillary Refill: Capillary refill takes less than 2 seconds  Neurological:      Mental Status: She is alert and oriented to person, place, and time           Vital Signs  ED Triage Vitals [05/12/23 1748]   Temperature Pulse Respirations Blood Pressure SpO2   (!) 97 2 °F (36 2 °C) 61 16 138/73 98 %      Temp Source Heart Rate Source Patient Position - Orthostatic VS BP Location FiO2 (%)   Tympanic Monitor Sitting Left arm --      Pain Score       --           Vitals:    05/12/23 1748   BP: 138/73   Pulse: 61   Patient Position - Orthostatic VS: Sitting         Visual Acuity      ED Medications  Medications - No data to display    Diagnostic Studies  Results Reviewed     Procedure Component Value Units Date/Time    APTT [[id number]]  (Normal) Collected: 05/12/23 1758    Lab Status: Final result Specimen: Blood from Arm, Right Updated: 05/12/23 1829     PTT 36 seconds     Protime-INR [[id number]]  (Abnormal) Collected: 05/12/23 1758    Lab Status: Final result Specimen: Blood from Arm, Right Updated: 05/12/23 1829     Protime 14 6 seconds      INR " 1 10    Comprehensive metabolic panel [[id number]] Collected: 05/12/23 1758    Lab Status: Final result Specimen: Blood from Arm, Right Updated: 05/12/23 1824     Sodium 142 mmol/L      Potassium 3 9 mmol/L      Chloride 106 mmol/L      CO2 27 mmol/L      ANION GAP 9 mmol/L      BUN 20 mg/dL      Creatinine 1 28 mg/dL      Glucose 94 mg/dL      Calcium 9 3 mg/dL      AST 14 U/L      ALT 8 U/L      Alkaline Phosphatase 89 U/L      Total Protein 7 3 g/dL      Albumin 4 1 g/dL      Total Bilirubin 0 37 mg/dL      eGFR 45 ml/min/1 73sq m     Narrative:      [place] guidelines for Chronic Kidney Disease (CKD):   •  Stage 1 with normal or high GFR (GFR > 90 mL/min/1 73 square meters)  •  Stage 2 Mild CKD (GFR = 60-89 mL/min/1 73 square meters)  •  Stage 3A Moderate CKD (GFR = 45-59 mL/min/1 73 square meters)  •  Stage 3B Moderate CKD (GFR = 30-44 mL/min/1 73 square meters)  •  Stage 4 Severe CKD (GFR = 15-29 mL/min/1 73 square meters)  •  Stage 5 End Stage CKD (GFR <15 mL/min/1 73 square meters)  Note: GFR calculation is accurate only with a steady state creatinine    CBC and differential [[id number]] Collected: 05/12/23 1758    Lab Status: Final result Specimen: Blood from Arm, Right Updated: 05/12/23 1805     WBC 6 46 Thousand/uL      RBC 4 43 Million/uL      Hemoglobin 13 8 g/dL      Hematocrit 39 9 %      MCV 90 fL      MCH 31 2 pg      MCHC 34 6 g/dL      RDW 13 1 %      MPV 10 9 fL      Platelets 580 Thousands/uL      nRBC 0 /100 WBCs      Neutrophils Relative 50 %      Immat GRANS % 0 %      Lymphocytes Relative 41 %      Monocytes Relative 5 %      Eosinophils Relative 3 %      Basophils Relative 1 %      Neutrophils Absolute 3 32 Thousands/µL      Immature Grans Absolute 0 01 Thousand/uL      Lymphocytes Absolute 2 62 Thousands/µL      Monocytes Absolute 0 30 Thousand/µL      Eosinophils Absolute 0 18 Thousand/µL      Basophils Absolute 0 03 Thousands/µL                  XR tibia fibula 2 "views LEFT   ED Interpretation by Sim Gross PA-C (05/12 2033)   No acute fractures or dislocations visualized      XR tibia fibula 2 views RIGHT   ED Interpretation by Sim Gross PA-C (05/12 2033)   No acute fractures or dislocations visualized        VAS lower limb venous duplex study, unilateral/limited    (Results Pending)              Procedures  Procedures         ED Course  ED Course as of 05/12/23 2037   Fri May 12, 2023   2033 Patient was reexamined at this time and informed of laboratory and/or imaging results and was found to be stable for discharge  Return to emergency department criteria was reviewed with the patient who verbalized understanding and was agreeable to discharge and the treatment plan at this time  Medical Decision Making  64 y/o female with a history of DVT, hypertension managed on Eliquis presenting for evaluation of right-sided calf pain and bilateral lower leg \"bone\" pain patient denies any direct traumatic inciting events, fevers, chills, long distance travel, recent surgeries  Patient reports she is concerned for clot and is requesting an x-ray to \"check my bones\"  Was explained to the patient that in the absence of trauma or open wound to suggest osteomyelitis x-rays were not indicated however the patient continued to request this imaging study which was ordered after the explanation of the potential for radiation exposure patient verbalized understanding  Physical exam is reassuring without any overlying skin changes, rashes or wounds to suggest an infection, cellulitis or osteomyelitis, some tenderness to the right calf concerning for potential clot, ultrasound obtained for potential breakthrough clot  Work-up reveals no acute fractures or dislocations on my interpretation of x-rays no other associated abnormalities  Ultrasound shows chronic DVTs   Patient advised to continue Eliquis and follow-up with " "vascular care and family care  All imaging and/or lab testing discussed with patient, strict return to ED precautions discussed  Patient and/or family members verbalizes understanding and agrees with plan  Patient is stable for discharge     Portions of the record may have been created with voice recognition software  Occasional wrong word or \"sound a like\" substitutions may have occurred due to the inherent limitations of voice recognition software  Read the chart carefully and recognize, using context, where substitutions have occurred  Amount and/or Complexity of Data Reviewed  Labs: ordered  Radiology: ordered and independent interpretation performed  Disposition  Final diagnoses:   Right calf pain   Chronic deep vein thrombosis (DVT) ([address] )     Time reflects when diagnosis was documented in both MDM as applicable and the Disposition within this note     Time User Action Codes Description Comment    5/12/2023  8:33 PM Ok Urrutia Add [W79 108] Right calf pain     5/12/2023  8:34 PM Ok Urrutia Add [I82 509] Chronic deep vein thrombosis (DVT) [facility])       ED Disposition     ED Disposition   Discharge    Condition   Good    Date/Time   Fri May 12, 2023  8:33 PM    Comment   Lance Fowler discharge to home/self care  Follow-up Information     Follow up With Specialties Details Why Contact Info Additional Information    [address] Vascular Surgery Schedule an appointment as soon as possible for a visit in 2 days As needed [address] [facility] [id number] [phone] [address], [address] [place] [address], [address] [phone]    Claudia Diaz MD Family Medicine Schedule an appointment as soon as possible for a visit   [address]  [phone]             Patient's Medications   Discharge Prescriptions    No medications on file       No discharge procedures on file      PDMP " Review     None          ED Provider  Electronically Signed by           Hua Landa PA-C  05/12/23 9426

## 2023-05-12 NOTE — ED NOTES
Assumed care of patient  Patient resting comfortably in stretcher with no signs of distress at this time  Patient awaiting Venous Duplex study         Jennifer Kaur RN  05/12/23 0244

## 2023-05-13 NOTE — DISCHARGE INSTRUCTIONS
Please continue taking your Eliquis as your ultrasound today demonstrated a chronic deep vein clot  Please make sure you follow-up with the vascular surgeon there is a phone number listed in your discharge paperwork  Please make sure you follow-up with your family care provider you may use heat packs Tylenol and Motrin and stretching to help with your pain

## 2023-05-18 ENCOUNTER — OFFICE VISIT (OUTPATIENT)
Dept: FAMILY MEDICINE CLINIC | Facility: CLINIC | Age: 63
End: 2023-05-18

## 2023-05-18 VITALS
SYSTOLIC BLOOD PRESSURE: 130 MMHG | OXYGEN SATURATION: 97 % | BODY MASS INDEX: 28.02 KG/M2 | DIASTOLIC BLOOD PRESSURE: 80 MMHG | TEMPERATURE: 97.1 F | HEIGHT: 65 IN | WEIGHT: 168.2 LBS | RESPIRATION RATE: 16 BRPM | HEART RATE: 68 BPM

## 2023-05-18 DIAGNOSIS — I82.5Z1 CHRONIC DEEP VEIN THROMBOSIS (DVT) OF DISTAL VEIN OF RIGHT LOWER EXTREMITY (HCC): ICD-10-CM

## 2023-05-18 DIAGNOSIS — Z00.00 ENCOUNTER FOR SCREENING AND PREVENTATIVE CARE: ICD-10-CM

## 2023-05-18 DIAGNOSIS — Z12.31 ENCOUNTER FOR SCREENING MAMMOGRAM FOR BREAST CANCER: ICD-10-CM

## 2023-05-18 DIAGNOSIS — I10 ESSENTIAL HYPERTENSION: ICD-10-CM

## 2023-05-18 DIAGNOSIS — Z13.6 SCREENING FOR HEART DISEASE: ICD-10-CM

## 2023-05-18 DIAGNOSIS — M25.561 PAIN IN BOTH KNEES, UNSPECIFIED CHRONICITY: Primary | ICD-10-CM

## 2023-05-18 DIAGNOSIS — F33.42 RECURRENT MAJOR DEPRESSIVE DISORDER, IN FULL REMISSION (HCC): Chronic | ICD-10-CM

## 2023-05-18 DIAGNOSIS — I82.402 ACUTE DEEP VEIN THROMBOSIS (DVT) OF LEFT LOWER EXTREMITY, UNSPECIFIED VEIN (HCC): ICD-10-CM

## 2023-05-18 DIAGNOSIS — F41.9 ANXIETY: ICD-10-CM

## 2023-05-18 DIAGNOSIS — I82.501 CHRONIC DEEP VEIN THROMBOSIS (DVT) OF RIGHT LOWER EXTREMITY, UNSPECIFIED VEIN (HCC): ICD-10-CM

## 2023-05-18 DIAGNOSIS — F99 PSYCHIATRIC ILLNESS: ICD-10-CM

## 2023-05-18 DIAGNOSIS — Z11.4 SCREENING FOR HIV (HUMAN IMMUNODEFICIENCY VIRUS): ICD-10-CM

## 2023-05-18 DIAGNOSIS — Z13.1 SCREENING FOR DIABETES MELLITUS: ICD-10-CM

## 2023-05-18 DIAGNOSIS — Z11.59 NEED FOR HEPATITIS C SCREENING TEST: ICD-10-CM

## 2023-05-18 DIAGNOSIS — E03.9 HYPOTHYROIDISM, UNSPECIFIED TYPE: ICD-10-CM

## 2023-05-18 DIAGNOSIS — M25.562 PAIN IN BOTH KNEES, UNSPECIFIED CHRONICITY: Primary | ICD-10-CM

## 2023-05-18 RX ORDER — BISACODYL 5 MG/1
5 TABLET, DELAYED RELEASE ORAL WEEKLY
COMMUNITY

## 2023-05-18 RX ORDER — FOLIC ACID 1 MG/1
1000 TABLET ORAL DAILY
COMMUNITY
Start: 2023-05-02

## 2023-05-18 NOTE — ASSESSMENT & PLAN NOTE
Managed by a psychiatrist, no records  Pt does not know what meds she takes though she is on risperidone  phq score is 0  Need records  Referred to psychiatry

## 2023-05-18 NOTE — ASSESSMENT & PLAN NOTE
Stable, unsure what meds she takes  She is under psychiatric care in [place], needs to establish here for ongoing care  Referred to psych

## 2023-05-18 NOTE — ASSESSMENT & PLAN NOTE
She has no idea what caused her DVT 6 years ago  No acute dvt on recent us  Continue eliquis    Referred to vascular surgery per er request

## 2023-05-18 NOTE — PROGRESS NOTES
Name: Silvano Lucas      : 1960      MRN: [id number]  Encounter Provider: JAMILA He  Encounter Date: 2023   Encounter department: [facility]     1  Pain in both knees, unspecified chronicity  Assessment & Plan:  Based on report of 2 falls on her knees recently mild swelling and tenderness, refer to ortho  Orders:  -     Ambulatory Referral to Orthopedic Surgery; Future    2  Encounter for screening mammogram for breast cancer    3  Chronic deep vein thrombosis (DVT) of right lower extremity, unspecified vein (HCC)  Assessment & Plan:  She has no idea what caused her DVT 6 years ago  No acute dvt on recent us  Continue eliquis  Referred to vascular surgery per er request     Orders:  -     Ambulatory Referral to Vascular Surgery; Future    4  Screening for heart disease    5  Screening for diabetes mellitus  -     Hemoglobin A1C; Future    6  Hypothyroidism, unspecified type  Assessment & Plan:  Per pt she is not taking levothyroxine  Get tsh/t4    Orders:  -     TSH, 3rd generation with Free T4 reflex; Future    7  Encounter for screening and preventative care  -     Lipid panel; Future  -     Hemoglobin A1C; Future  -     UA w Reflex to Microscopic w Reflex to Culture    8  Screening for HIV (human immunodeficiency virus)  -     HIV 1/2 AG/AB w Reflex [facility] for 2 yr old and above; Future    9  Need for hepatitis C screening test  -     Hepatitis C Antibody; Future    10  Acute deep vein thrombosis (DVT) of left lower extremity, unspecified vein (HCC)  Assessment & Plan:  She has no idea what caused her DVT 6 years ago  No acute dvt on recent us  Continue eliquis  Referred to vascular surgery per er request     Orders:  -     apixaban (ELIQUIS) 5 mg; Take 1 tablet (5 mg total) by mouth 2 (two) times a day    11  Anxiety  Assessment & Plan:  Stable, unsure what meds she takes    She is under psychiatric care in [place], needs to establish here for ongoing care  Referred to psych  12  Psychiatric illness  Assessment & Plan:  Pt does not know her diagnosis  She only knows she takes risperidone  She says the rest of the psych meds on her list are inaccurate  Need records  Referred to psych  Orders:  -     Ambulatory Referral to Pediatric Psychiatry; Future    13  Essential hypertension  Assessment & Plan:  Denies history  hctz is on med list, she says she does not take this  Check labs, f/u next month  14  Chronic deep vein thrombosis (DVT) of distal vein of right lower extremity ([address] )  Assessment & Plan:  She has no idea what caused her DVT 6 years ago  No acute dvt on recent us  Continue eliquis  Referred to vascular surgery per er request       15  Recurrent major depressive disorder, in full remission [facility])  Assessment & Plan:  Managed by a psychiatrist, no records  Pt does not know what meds she takes though she is on risperidone  phq score is 0  Need records  Referred to psychiatry  BMI Counseling: Body mass index is 27 99 kg/m²  The BMI is above normal  Nutrition recommendations include decreasing portion sizes, encouraging healthy choices of fruits and vegetables, consuming healthier snacks, moderation in carbohydrate intake, increasing intake of lean protein, reducing intake of saturated and trans fat and reducing intake of cholesterol  Exercise recommendations include moderate physical activity 150 minutes/week, exercising 3-5 times per week and strength training exercises  No pharmacotherapy was ordered  Rationale for BMI follow-up plan is due to patient being overweight or obese  Subjective      Pt is a 64 yo female here to establish care and for ER f u  PMH includes pancreatitis, cholelithiasis, hypothyroidism, DVT, HTN, MDD, anxiety and panic attacks, iron deficiency anemia, vitamin D deficiency, heart murmur, blood clot, thyroid disease, psychiatric disorder    [facility] 5/12 with complaint of leg pain for several weeks  Previously seeing a doctor in [place] but unable to travel there for appointments  She reported R lower leg pain and needing an xray of her legs to check her bones  No trauma or injury, no wounds, rashes, etc   Her exam was normal, PT 14  6  XR of BLE normal   On US chronic dvts noted, she is already on eliquis and was advised to f u with vascular surgery  She says she had blood clots in her RLE 6 years ago and she has not had issue with her leg since  She takes eliquis  She started about 3 months ago with BLE pain, R>L  She describes the pain as sharp/pinching when she is sitting down  She rates pain 9-10/10  She has swelling and pain on her knees  She has fallen twice because her legs go numb  She states once she fell down the stairs, once she fell down a hill  She says she lost her balance  She did not hit her head either time, denies syncope  She denies cardiac history  She does not know why she had the clots  She does not know what medications she takes, her sister gives them to her  She says the med list we have is incorrect  Review of Systems   Constitutional: Positive for fatigue  Negative for appetite change, chills, diaphoresis and fever  Eyes: Negative for visual disturbance  Respiratory: Negative for shortness of breath  Cardiovascular: Positive for leg swelling  Negative for chest pain and palpitations  Gastrointestinal: Positive for constipation  Negative for nausea and vomiting  Genitourinary: Negative for difficulty urinating and frequency  Musculoskeletal: Positive for arthralgias and myalgias  Skin: Negative  Neurological: Positive for dizziness  Negative for light-headedness and headaches  Psychiatric/Behavioral: Negative for confusion, dysphoric mood, hallucinations, self-injury, sleep disturbance and suicidal ideas  The patient is nervous/anxious          Current Outpatient Medications on File Prior to Visit "  Medication Sig   • bisacodyl (DULCOLAX) 5 mg EC tablet Take 5 mg by mouth once a week   • ferrous sulfate 324 (65 Fe) mg Take 1 tablet (324 mg total) by mouth daily before breakfast   • gabapentin (NEURONTIN) 300 mg capsule Take 300 mg by mouth 2 (two) times a day   • polyethylene glycol (MIRALAX) 17 g packet Take 17 g by mouth daily as needed (constipation) for up to 7 days   • risperiDONE (RisperDAL) 3 mg tablet Take 1 tablet (3 mg total) by mouth daily at bedtime   • [DISCONTINUED] apixaban (ELIQUIS) 5 mg Take 1 tablet (5 mg total) by mouth 2 (two) times a day   • benztropine (COGENTIN) 0 5 mg tablet Take 1 tablet (0 5 mg total) by mouth daily at bedtime   • Blood Pressure Monitoring (BLOOD PRESSURE CUFF) MISC by Does not apply route daily   • Blood Pressure Monitoring (RA BLOOD PRESSURE CUFF MONITOR) MISC As directed   • folic acid (FOLVITE) 1 mg tablet Take 1,000 mcg by mouth daily   • hydrochlorothiazide (HYDRODIURIL) 12 5 mg tablet Take 1 tablet (12 5 mg total) by mouth daily   • hydrOXYzine HCL (ATARAX) 50 mg tablet    • levothyroxine 100 mcg tablet Take 1 tablet (100 mcg total) by mouth daily in the early morning   • mirtazapine (REMERON) 45 MG tablet Take 45 mg by mouth daily at bedtime   • pantoprazole (PROTONIX) 40 mg tablet Take 1 tablet (40 mg total) by mouth 2 (two) times a day   • prazosin (MINIPRESS) 2 mg capsule Take 4 mg by mouth daily at bedtime       Objective     /80 (BP Location: Left arm, Patient Position: Sitting, Cuff Size: Standard)   Pulse 68   Temp (!) 97 1 °F (36 2 °C) (Tympanic)   Resp 16   Ht 5' 5\" (1 651 m)   Wt 76 3 kg (168 lb 3 2 oz)   SpO2 97%   BMI 27 99 kg/m²     Physical Exam  Vitals reviewed  Constitutional:       General: She is awake  She is not in acute distress  Appearance: Normal appearance  She is well-developed and well-groomed  She is not ill-appearing     Eyes:      General: Lids are normal       Conjunctiva/sclera: Conjunctivae normal  " Cardiovascular:      Rate and Rhythm: Normal rate and regular rhythm  Pulses: Normal pulses  Heart sounds: Normal heart sounds  No murmur heard  Pulmonary:      Effort: Pulmonary effort is normal       Breath sounds: Normal breath sounds  Musculoskeletal:         General: Normal range of motion  Right knee: Swelling present  Tenderness present  Left knee: Swelling present  Tenderness present  Right lower leg: No edema  Left lower leg: No edema  Skin:     General: Skin is warm and dry  Findings: No bruising or erythema  Neurological:      Mental Status: She is alert and oriented to person, place, and time  Psychiatric:         Attention and Perception: Attention normal          Mood and Affect: Mood normal  Affect is flat  Speech: Speech is slurred  Behavior: Behavior is withdrawn  Behavior is cooperative  Thought Content:  Thought content normal          Judgment: Judgment normal        JAMILA Key

## 2023-05-18 NOTE — ASSESSMENT & PLAN NOTE
Pt does not know her diagnosis  She only knows she takes risperidone  She says the rest of the psych meds on her list are inaccurate  Need records  Referred to psych

## 2023-08-11 ENCOUNTER — OFFICE VISIT (OUTPATIENT)
Dept: FAMILY MEDICINE CLINIC | Facility: CLINIC | Age: 63
End: 2023-08-11
Payer: MEDICARE

## 2023-08-11 VITALS
SYSTOLIC BLOOD PRESSURE: 124 MMHG | OXYGEN SATURATION: 99 % | DIASTOLIC BLOOD PRESSURE: 70 MMHG | BODY MASS INDEX: 25.33 KG/M2 | HEART RATE: 73 BPM | HEIGHT: 65 IN | WEIGHT: 152 LBS

## 2023-08-11 DIAGNOSIS — F41.9 ANXIETY: ICD-10-CM

## 2023-08-11 DIAGNOSIS — I10 ESSENTIAL HYPERTENSION: ICD-10-CM

## 2023-08-11 DIAGNOSIS — I82.5Z1 CHRONIC DEEP VEIN THROMBOSIS (DVT) OF DISTAL VEIN OF RIGHT LOWER EXTREMITY (HCC): ICD-10-CM

## 2023-08-11 DIAGNOSIS — Z12.31 ENCOUNTER FOR SCREENING MAMMOGRAM FOR BREAST CANCER: ICD-10-CM

## 2023-08-11 DIAGNOSIS — E78.5 HYPERLIPIDEMIA, UNSPECIFIED HYPERLIPIDEMIA TYPE: ICD-10-CM

## 2023-08-11 DIAGNOSIS — F99 PSYCHIATRIC ILLNESS: ICD-10-CM

## 2023-08-11 DIAGNOSIS — E55.9 VITAMIN D DEFICIENCY: ICD-10-CM

## 2023-08-11 DIAGNOSIS — E03.9 HYPOTHYROIDISM, UNSPECIFIED TYPE: ICD-10-CM

## 2023-08-11 DIAGNOSIS — F33.9 RECURRENT MAJOR DEPRESSIVE DISORDER (HCC): Chronic | ICD-10-CM

## 2023-08-11 DIAGNOSIS — Z12.11 SCREEN FOR COLON CANCER: ICD-10-CM

## 2023-08-11 DIAGNOSIS — F31.9 BIPOLAR AFFECTIVE DISORDER, REMISSION STATUS UNSPECIFIED (HCC): ICD-10-CM

## 2023-08-11 DIAGNOSIS — Z00.00 ANNUAL PHYSICAL EXAM: Primary | ICD-10-CM

## 2023-08-11 PROCEDURE — 99396 PREV VISIT EST AGE 40-64: CPT | Performed by: NURSE PRACTITIONER

## 2023-08-11 RX ORDER — VENLAFAXINE 100 MG/1
100 TABLET ORAL DAILY
Qty: 90 TABLET | Refills: 0 | Status: SHIPPED | OUTPATIENT
Start: 2023-08-11

## 2023-08-11 RX ORDER — VENLAFAXINE 100 MG/1
TABLET ORAL
COMMUNITY
Start: 2023-08-01 | End: 2023-08-11 | Stop reason: SDUPTHER

## 2023-08-11 RX ORDER — PRAZOSIN HYDROCHLORIDE 2 MG/1
4 CAPSULE ORAL
Qty: 180 CAPSULE | Refills: 1 | Status: SHIPPED | OUTPATIENT
Start: 2023-08-11

## 2023-08-11 RX ORDER — ATORVASTATIN CALCIUM 10 MG/1
10 TABLET, FILM COATED ORAL
COMMUNITY
Start: 2023-06-20 | End: 2023-08-11 | Stop reason: SDUPTHER

## 2023-08-11 RX ORDER — HYDROCHLOROTHIAZIDE 12.5 MG/1
12.5 TABLET ORAL DAILY
Qty: 90 TABLET | Refills: 1 | Status: SHIPPED | OUTPATIENT
Start: 2023-08-11

## 2023-08-11 RX ORDER — BENZTROPINE MESYLATE 0.5 MG/1
0.5 TABLET ORAL
Qty: 90 TABLET | Refills: 0 | Status: SHIPPED | OUTPATIENT
Start: 2023-08-11

## 2023-08-11 RX ORDER — MELATONIN
1000 DAILY
Qty: 90 TABLET | Refills: 1 | Status: SHIPPED | OUTPATIENT
Start: 2023-08-11

## 2023-08-11 RX ORDER — RISPERIDONE 3 MG/1
3 TABLET ORAL DAILY
Qty: 90 TABLET | Refills: 0 | Status: SHIPPED | OUTPATIENT
Start: 2023-08-11

## 2023-08-11 RX ORDER — LEVOTHYROXINE SODIUM 0.05 MG/1
50 TABLET ORAL DAILY
Qty: 90 TABLET | Refills: 1 | Status: SHIPPED | OUTPATIENT
Start: 2023-08-11

## 2023-08-11 RX ORDER — LEVOTHYROXINE SODIUM 0.05 MG/1
TABLET ORAL
COMMUNITY
Start: 2023-05-24 | End: 2023-08-11

## 2023-08-11 RX ORDER — HYDROXYZINE 50 MG/1
50 TABLET, FILM COATED ORAL
Qty: 90 TABLET | Refills: 1 | Status: SHIPPED | OUTPATIENT
Start: 2023-08-11

## 2023-08-11 RX ORDER — ATORVASTATIN CALCIUM 10 MG/1
10 TABLET, FILM COATED ORAL
Qty: 90 TABLET | Refills: 1 | Status: SHIPPED | OUTPATIENT
Start: 2023-08-11

## 2023-08-11 NOTE — ASSESSMENT & PLAN NOTE
Stable with current management. , continue risperidone, venlafaxine, hydroxyzine. Needs a new psychiatrist, referral provided.

## 2023-08-11 NOTE — ASSESSMENT & PLAN NOTE
Care Coordination Assessment    Documented/Reviewed By:  Analilia Aggarwal RN Date/time:  6/2/2020  2:43 PM   Assessment completed with:  patient  Enrolled in care management program:  Yes  Living arrangement:  spouse  Support system:  family  Type of residence:  private residence  Home care services:  No  Equipment used at home:  none  Communication device:  No  Bed or wheelchair confined:  No  Inadequate nutrition:  No  Medication adherence problem:  No  Experiencing side effects from current medications:  No  History of fall(s) in last 6 months:  No  Difficulty keeping appointments:  No       Care Plan Note      Responses   Lifestyle Goals  Maintain blood pressure < 130/80, Medication management, Routine follow-up with doctor(s)   Self Management  Home BP Monitoring        The main concerns and/or symptoms the patient would like to address are: BP and nausea caused by meds    Education/instruction provided by Care Coordinator: HTN, medication    Follow Up Outreach Due: 2 weeks    Call to patient s/p ED visit for HTN,  Patient states she did have f/u with PCP the next day and medications were increased.  She does have orders to check BP daily and report to PCP for further possible med adjustment. Patient states she currently does not have a headache. She had some complaints of nausea but is unsure whether is was from medication or BP. Did get an order for zofran prn that she will be picking up today. Patient agrees to further f/u and education regarding HTN, will f/u as requested.    Analilia Aggarwal RN  Ambulatory     6/2/2020, 14:46     Stable with current management. , continue risperidone, venlafaxine. Needs a new psychiatrist, referral provided.

## 2023-08-11 NOTE — ASSESSMENT & PLAN NOTE
Mammogram ordered, gi referral for colonoscopy ordered, though pt says she had last year. She will provide name of provider who did her screening and will send to care gaps. Needs previously ordered labs. Reinforce healthy diet, encourage physical activity as tolerated.

## 2023-08-11 NOTE — ASSESSMENT & PLAN NOTE
Stable with current management. , continue risperidone, venlafaxine. Needs a new psychiatrist, referral provided.

## 2023-08-11 NOTE — PROGRESS NOTES
[facility] PRACTICE    NAME: Joo Paz  AGE: 66 y.o. SEX: female  : 1960     DATE: 2023     Assessment and Plan:     Problem List Items Addressed This Visit        Endocrine    Hypothyroidism     Levothyroxine dose is 50 mcg. Needs to get labs previously ordered. Relevant Medications    levothyroxine 50 mcg tablet       Cardiovascular and Mediastinum    Chronic deep vein thrombosis (DVT) of right lower extremity (HCC)     Previously referred to vascular surgery, needs to schedule. Continue eliquis. Essential hypertension     Stable with current management. , continue hctz, check labs as previously ordered. Relevant Medications    prazosin (MINIPRESS) 2 mg capsule    hydrochlorothiazide (HYDRODIURIL) 12.5 mg tablet       Other    Recurrent major depressive disorder (HCC) (Chronic)     Stable with current management. , continue risperidone, venlafaxine. Needs a new psychiatrist, referral provided. Relevant Medications    venlafaxine (EFFEXOR) 100 MG tablet    benztropine (COGENTIN) 0.5 mg tablet    hydrOXYzine HCL (ATARAX) 50 mg tablet    risperiDONE (RisperDAL) 3 mg tablet    Other Relevant Orders    Ambulatory Referral to Psychiatry    Annual physical exam - Primary     Mammogram ordered, gi referral for colonoscopy ordered, though pt says she had last year. She will provide name of provider who did her screening and will send to [facility]. Needs previously ordered labs. Reinforce healthy diet, encourage physical activity as tolerated. Anxiety     Stable with current management. , continue risperidone, venlafaxine, hydroxyzine. Needs a new psychiatrist, referral provided. Relevant Medications    hydrOXYzine HCL (ATARAX) 50 mg tablet    Other Relevant Orders    Ambulatory Referral to Psychiatry    Bipolar disorder ([address])     Stable with current management. , continue risperidone, venlafaxine. Needs a new psychiatrist, referral provided. Relevant Medications    venlafaxine (EFFEXOR) 100 MG tablet    hydrOXYzine HCL (ATARAX) 50 mg tablet    risperiDONE (RisperDAL) 3 mg tablet    Other Relevant Orders    Ambulatory Referral to Psychiatry    Psychiatric illness     Stable with current management. , continue risperidone, venlafaxine. Needs a new psychiatrist, referral provided. Relevant Medications    venlafaxine (EFFEXOR) 100 MG tablet    hydrOXYzine HCL (ATARAX) 50 mg tablet    risperiDONE (RisperDAL) 3 mg tablet    Other Relevant Orders    Ambulatory Referral to Psychiatry    Vitamin D deficiency     Refill sent for vitamin D. Relevant Medications    cholecalciferol (VITAMIN D3) 1,000 units tablet   Other Visit Diagnoses     Hyperlipidemia, unspecified hyperlipidemia type        Relevant Medications    atorvastatin (LIPITOR) 10 mg tablet    Encounter for screening mammogram for breast cancer        Relevant Orders    Mammo screening bilateral w 3d & cad    Screen for colon cancer        Relevant Orders    Ambulatory Referral to Gastroenterology          Immunizations and preventive care screenings were discussed with patient today. Appropriate education was printed on patient's after visit summary. Counseling:  Dental Health: discussed importance of regular tooth brushing, flossing, and dental visits. Injury prevention: discussed safety/seat belts, safety helmets, smoke detectors, carbon dioxide detectors, and smoking near bedding or upholstery. · Exercise: the importance of regular exercise/physical activity was discussed. Recommend exercise 3-5 times per week for at least 30 minutes. No follow-ups on file. Chief Complaint:     Chief Complaint   Patient presents with   • Physical Exam      History of Present Illness:     Adult Annual Physical   Patient here for a comprehensive physical exam. The patient reports no problems.     Diet and Physical Activity  · Diet/Nutrition: well balanced diet, limited junk food and consuming 3-5 servings of fruits/vegetables daily. · Exercise: no formal exercise. Depression Screening  PHQ-2/9 Depression Screening         General Health  · Sleep: gets 4-6 hours of sleep on average and gets 7-8 hours of sleep on average. · Hearing: normal - bilateral.  · Vision: no vision problems, goes for regular eye exams and wears glasses. · Dental: has upper and lower dentures. /GYN Health  · Patient is: postmenopausal  · Last menstrual period: prior to menopause    · Contraceptive method: none. Review of Systems:     Review of Systems   Constitutional: Negative for activity change, appetite change, chills, fatigue, fever and unexpected weight change. HENT: Negative for hearing loss. Eyes: Negative for visual disturbance. Respiratory: Negative for cough, chest tightness and shortness of breath. Cardiovascular: Negative for chest pain, palpitations and leg swelling. Gastrointestinal: Positive for constipation. Negative for abdominal pain, diarrhea, nausea and vomiting. Genitourinary: Negative for difficulty urinating, dysuria and frequency. Musculoskeletal: Positive for arthralgias (R knee, R elbow). Negative for myalgias. Allergic/Immunologic: Negative for environmental allergies. Neurological: Negative for dizziness, weakness, light-headedness, numbness and headaches. Psychiatric/Behavioral: Negative for dysphoric mood and sleep disturbance. The patient is nervous/anxious.        Past Medical History:     Past Medical History:   Diagnosis Date   • IRINEO (acute kidney injury) ([address] [facility]) 01/09/2020   • Anemia 01/09/2020   • Anxiety    • Depression    • Disease of thyroid gland    • Heart murmur    • Hypertension    • Psychiatric disorder    • Substance abuse ([facility])     former heroin addiction stopped several decades ago      Past Surgical History:     Past Surgical History:   Procedure Laterality Date   • BRAIN SURGERY      had a hemorrhage; patient can not tell any other details.    • US GUIDED INJECTION FOR RESEARCH STUDY  12/30/2016      Social History:     Social History     Socioeconomic History   • Marital status: Single     Spouse name: None   • Number of children: None   • Years of education: None   • Highest education level: None   Occupational History   • None   Tobacco Use   • Smoking status: Former     Types: Cigarettes   • Smokeless tobacco: Never   • Tobacco comments:     Started age 17, quit about 30-40 years ago (entered 2023)   Vaping Use   • Vaping Use: Never used   Substance and Sexual Activity   • Alcohol use: No   • Drug use: No   • Sexual activity: Not Currently     Birth control/protection: Post-menopausal   Other Topics Concern   • None   Social History Narrative    Lives with her sister    Unemployed    Diet: balanced    Exercise: none    Caffeine: coffee daily     Social Determinants of Health     Financial Resource Strain: Not on file   Food Insecurity: Not on file   Transportation Needs: Not on file   Physical Activity: Not on file   Stress: Not on file   Social Connections: Not on file   Intimate Partner Violence: Not on file   Housing Stability: Not on file      Family History:     Family History   Problem Relation Age of Onset   • Mental illness Mother    • Asthma Mother    • Asthma Father    • Cancer Father    • No Known Problems Son    • No Known Problems Son    • No Known Problems Son    • No Known Problems Daughter    • No Known Problems Daughter       Current Medications:     Current Outpatient Medications   Medication Sig Dispense Refill   • apixaban (ELIQUIS) 5 mg Take 1 tablet (5 mg total) by mouth 2 (two) times a day 90 tablet 1   • atorvastatin (LIPITOR) 10 mg tablet Take 1 tablet (10 mg total) by mouth daily at bedtime 90 tablet 1   • benztropine (COGENTIN) 0.5 mg tablet Take 1 tablet (0.5 mg total) by mouth daily at bedtime 90 tablet 0   • bisacodyl (DULCOLAX) 5 mg EC tablet Take 5 mg by mouth once a week     • Blood Pressure Monitoring (BLOOD PRESSURE CUFF) MISC by Does not apply route daily 1 each 0   • Blood Pressure Monitoring (RA BLOOD PRESSURE CUFF MONITOR) MISC As directed     • cholecalciferol (VITAMIN D3) 1,000 units tablet Take 1 tablet (1,000 Units total) by mouth daily 90 tablet 1   • folic acid (FOLVITE) 1 mg tablet Take 1,000 mcg by mouth daily     • gabapentin (NEURONTIN) 300 mg capsule Take 300 mg by mouth 2 (two) times a day  0   • hydrochlorothiazide (HYDRODIURIL) 12.5 mg tablet Take 1 tablet (12.5 mg total) by mouth daily 90 tablet 1   • hydrOXYzine HCL (ATARAX) 50 mg tablet Take 1 tablet (50 mg total) by mouth daily at bedtime as needed for anxiety 90 tablet 1   • levothyroxine 50 mcg tablet Take 1 tablet (50 mcg total) by mouth daily 90 tablet 1   • mirtazapine (REMERON) 45 MG tablet Take 45 mg by mouth daily at bedtime  0   • pantoprazole (PROTONIX) 40 mg tablet Take 1 tablet (40 mg total) by mouth 2 (two) times a day 60 tablet 0   • prazosin (MINIPRESS) 2 mg capsule Take 2 capsules (4 mg total) by mouth daily at bedtime 180 capsule 1   • risperiDONE (RisperDAL) 3 mg tablet Take 1 tablet (3 mg total) by mouth daily 90 tablet 0   • venlafaxine (EFFEXOR) 100 MG tablet Take 1 tablet (100 mg total) by mouth in the morning 90 tablet 0   • ferrous sulfate 324 (65 Fe) mg Take 1 tablet (324 mg total) by mouth daily before breakfast 30 tablet 0   • polyethylene glycol (MIRALAX) 17 g packet Take 17 g by mouth daily as needed (constipation) for up to 7 days 119 g 0     No current facility-administered medications for this visit. Allergies:     No Known Allergies   Physical Exam:     /70 (BP Location: Left arm, Patient Position: Sitting, Cuff Size: Standard)   Pulse 73   Ht 5' 5" (1.651 m)   Wt 68.9 kg (152 lb)   SpO2 99%   BMI 25.29 kg/m²     Physical Exam  Vitals reviewed. Constitutional:       General: She is awake.  She is not in acute distress. Appearance: Normal appearance. She is well-developed and well-groomed. She is not ill-appearing. HENT:      Head: Normocephalic. Right Ear: Hearing, tympanic membrane, ear canal and external ear normal.      Left Ear: Hearing, tympanic membrane, ear canal and external ear normal.      Nose: Nose normal.      Mouth/Throat:      Lips: Pink. Mouth: Mucous membranes are moist.      Dentition: Has dentures. Pharynx: Oropharynx is clear. Uvula midline. Eyes:      General: Lids are normal.      Conjunctiva/sclera: Conjunctivae normal.      Pupils: Pupils are equal, round, and reactive to light. Neck:      Thyroid: No thyroid mass or thyromegaly. Vascular: Normal carotid pulses. No carotid bruit or JVD. Cardiovascular:      Rate and Rhythm: Normal rate and regular rhythm. Pulses: Normal pulses. Heart sounds: Normal heart sounds, S1 normal and S2 normal. No murmur heard. Pulmonary:      Effort: Pulmonary effort is normal.      Breath sounds: Normal breath sounds. Abdominal:      General: Abdomen is flat. Bowel sounds are normal.      Palpations: Abdomen is soft. Tenderness: There is no abdominal tenderness. Musculoskeletal:         General: Normal range of motion. Cervical back: Full passive range of motion without pain. Right lower leg: No edema. Left lower leg: No edema. Lymphadenopathy:      Head:      Right side of head: No submental, submandibular, tonsillar, preauricular, posterior auricular or occipital adenopathy. Left side of head: No submental, submandibular, tonsillar, preauricular, posterior auricular or occipital adenopathy. Cervical: No cervical adenopathy. Skin:     General: Skin is warm and dry. Neurological:      Mental Status: She is alert and oriented to person, place, and time. Sensory: No sensory deficit. Motor: Motor function is intact.    Psychiatric:         Attention and Perception: Attention normal.         Mood and Affect: Mood normal.         Speech: Speech normal.         Behavior: Behavior normal. Behavior is cooperative. Thought Content:  Thought content normal.         Cognition and Memory: Cognition normal.         Judgment: Judgment normal.          Venice Haynes [facility], [address] [facility]

## 2023-08-18 ENCOUNTER — OFFICE VISIT (OUTPATIENT)
Dept: OBGYN CLINIC | Facility: CLINIC | Age: 63
End: 2023-08-18
Payer: MEDICARE

## 2023-08-18 ENCOUNTER — APPOINTMENT (OUTPATIENT)
Dept: RADIOLOGY | Age: 63
End: 2023-08-18
Payer: MEDICARE

## 2023-08-18 ENCOUNTER — TELEPHONE (OUTPATIENT)
Dept: OBGYN CLINIC | Facility: CLINIC | Age: 63
End: 2023-08-18

## 2023-08-18 VITALS
BODY MASS INDEX: 25.33 KG/M2 | HEART RATE: 85 BPM | HEIGHT: 65 IN | WEIGHT: 152 LBS | SYSTOLIC BLOOD PRESSURE: 108 MMHG | DIASTOLIC BLOOD PRESSURE: 71 MMHG

## 2023-08-18 DIAGNOSIS — M25.562 LEFT KNEE PAIN, UNSPECIFIED CHRONICITY: ICD-10-CM

## 2023-08-18 DIAGNOSIS — M25.561 RIGHT KNEE PAIN, UNSPECIFIED CHRONICITY: ICD-10-CM

## 2023-08-18 DIAGNOSIS — M17.12 PRIMARY OSTEOARTHRITIS OF LEFT KNEE: ICD-10-CM

## 2023-08-18 DIAGNOSIS — M17.11 PRIMARY OSTEOARTHRITIS OF RIGHT KNEE: Primary | ICD-10-CM

## 2023-08-18 PROCEDURE — 20610 DRAIN/INJ JOINT/BURSA W/O US: CPT | Performed by: ORTHOPAEDIC SURGERY

## 2023-08-18 PROCEDURE — 73562 X-RAY EXAM OF KNEE 3: CPT

## 2023-08-18 PROCEDURE — 99204 OFFICE O/P NEW MOD 45 MIN: CPT | Performed by: ORTHOPAEDIC SURGERY

## 2023-08-18 RX ORDER — TRIAMCINOLONE ACETONIDE 40 MG/ML
40 INJECTION, SUSPENSION INTRA-ARTICULAR; INTRAMUSCULAR
Status: COMPLETED | OUTPATIENT
Start: 2023-08-18 | End: 2023-08-18

## 2023-08-18 RX ORDER — BUPIVACAINE HYDROCHLORIDE 2.5 MG/ML
4 INJECTION, SOLUTION INFILTRATION; PERINEURAL
Status: COMPLETED | OUTPATIENT
Start: 2023-08-18 | End: 2023-08-18

## 2023-08-18 RX ADMIN — BUPIVACAINE HYDROCHLORIDE 4 ML: 2.5 INJECTION, SOLUTION INFILTRATION; PERINEURAL at 09:30

## 2023-08-18 RX ADMIN — TRIAMCINOLONE ACETONIDE 40 MG: 40 INJECTION, SUSPENSION INTRA-ARTICULAR; INTRAMUSCULAR at 09:30

## 2023-08-18 NOTE — TELEPHONE ENCOUNTER
PATIENT ARRIVED FOR APPT DID NOT HAVE PHYSICAL COPY OF INS CARD WITH HER, RESPONSE HISTORY SHOWS SHE'S COVERED UNDER [place]IGHMARK. GAVE PT BLANCA NUMBER TO CALL BACK WITH INFO SO IT CAN BE UPDATED.

## 2023-08-18 NOTE — PROGRESS NOTES
CHIEF COMPLAINT/REASON FOR VISIT  Chief Complaint   Patient presents with   • Right Knee - Pain   • Left Knee - Pain        HISTORY OF PRESENT ILLNESS  William Giordano is a 66 y.o. female who presents for evaluation of his bilateral knee pain. Patient said that she has been dealing with chronic knee pain. She describes the knee pain in the right worse than the left. Her main concern is the right knee. She reports she has never had a steroid injection of the right knee. REVIEW OF SYSTEMS  Review of systems was performed and, outside that mentioned in the HPI, it was negative for symptomology related to the integumentary, hematologic, immunologic, allergic, neurologic, cardiovascular, respiratory, GI or  systems. MEDICAL HISTORY  Patient Active Problem List   Diagnosis   • Anxiety   • Essential hypertension   • Hypothyroidism   • Newly recognized heart murmur   • Vitamin D deficiency   • Recurrent major depressive disorder ([address])   • Health care maintenance   • Iron deficiency anemia due to chronic blood loss   • Acute pancreatitis   • Cholelithiasis   • Depression   • Constipation   • Bilateral edema of lower extremity   • Chronic deep vein thrombosis (DVT) of right lower extremity (HCC)   • Suprapubic abdominal pain   • Cough   • Encounter for screening mammogram for malignant neoplasm of breast   • Encounter for annual routine gynecological examination   • Psychiatric illness   • Pain in both knees   • Bipolar disorder ([address])   • Annual physical exam       SURGICAL HISTORY  Past Surgical History:   Procedure Laterality Date   • BRAIN SURGERY      had a hemorrhage; patient can not tell any other details.    • US GUIDED INJECTION FOR RESEARCH STUDY  12/30/2016       CURRENT MEDICATIONS    Current Outpatient Medications:   •  apixaban (ELIQUIS) 5 mg, Take 1 tablet (5 mg total) by mouth 2 (two) times a day, Disp: 90 tablet, Rfl: 1  •  atorvastatin (LIPITOR) 10 mg tablet, Take 1 tablet (10 mg total) by mouth daily at bedtime, Disp: 90 tablet, Rfl: 1  •  benztropine (COGENTIN) 0.5 mg tablet, Take 1 tablet (0.5 mg total) by mouth daily at bedtime, Disp: 90 tablet, Rfl: 0  •  bisacodyl (DULCOLAX) 5 mg EC tablet, Take 5 mg by mouth once a week, Disp: , Rfl:   •  Blood Pressure Monitoring (BLOOD PRESSURE CUFF) MISC, by Does not apply route daily, Disp: 1 each, Rfl: 0  •  Blood Pressure Monitoring (RA BLOOD PRESSURE CUFF MONITOR) MISC, As directed, Disp: , Rfl:   •  cholecalciferol (VITAMIN D3) 1,000 units tablet, Take 1 tablet (1,000 Units total) by mouth daily, Disp: 90 tablet, Rfl: 1  •  folic acid (FOLVITE) 1 mg tablet, Take 1,000 mcg by mouth daily, Disp: , Rfl:   •  gabapentin (NEURONTIN) 300 mg capsule, Take 300 mg by mouth 2 (two) times a day, Disp: , Rfl: 0  •  hydrochlorothiazide (HYDRODIURIL) 12.5 mg tablet, Take 1 tablet (12.5 mg total) by mouth daily, Disp: 90 tablet, Rfl: 1  •  hydrOXYzine HCL (ATARAX) 50 mg tablet, Take 1 tablet (50 mg total) by mouth daily at bedtime as needed for anxiety, Disp: 90 tablet, Rfl: 1  •  levothyroxine 50 mcg tablet, Take 1 tablet (50 mcg total) by mouth daily, Disp: 90 tablet, Rfl: 1  •  mirtazapine (REMERON) 45 MG tablet, Take 45 mg by mouth daily at bedtime, Disp: , Rfl: 0  •  pantoprazole (PROTONIX) 40 mg tablet, Take 1 tablet (40 mg total) by mouth 2 (two) times a day, Disp: 60 tablet, Rfl: 0  •  prazosin (MINIPRESS) 2 mg capsule, Take 2 capsules (4 mg total) by mouth daily at bedtime, Disp: 180 capsule, Rfl: 1  •  risperiDONE (RisperDAL) 3 mg tablet, Take 1 tablet (3 mg total) by mouth daily, Disp: 90 tablet, Rfl: 0  •  venlafaxine (EFFEXOR) 100 MG tablet, Take 1 tablet (100 mg total) by mouth in the morning, Disp: 90 tablet, Rfl: 0  •  ferrous sulfate 324 (65 Fe) mg, Take 1 tablet (324 mg total) by mouth daily before breakfast, Disp: 30 tablet, Rfl: 0  •  polyethylene glycol (MIRALAX) 17 g packet, Take 17 g by mouth daily as needed (constipation) for up to 7 days, Disp: 119 g, Rfl: 0    SOCIAL HISTORY  Social History     Socioeconomic History   • Marital status: Single     Spouse name: Not on file   • Number of children: Not on file   • Years of education: Not on file   • Highest education level: Not on file   Occupational History   • Not on file   Tobacco Use   • Smoking status: Former     Types: Cigarettes   • Smokeless tobacco: Never   • Tobacco comments:     Started age 17, quit about 30-40 years ago (entered 2023)   Vaping Use   • Vaping Use: Never used   Substance and Sexual Activity   • Alcohol use: No   • Drug use: No   • Sexual activity: Not Currently     Birth control/protection: Post-menopausal   Other Topics Concern   • Not on file   Social History Narrative    Lives with her sister    Unemployed    Diet: balanced    Exercise: none    Caffeine: coffee daily     Social Determinants of Health     Financial Resource Strain: Not on file   Food Insecurity: Not on file   Transportation Needs: Not on file   Physical Activity: Not on file   Stress: Not on file   Social Connections: Not on file   Intimate Partner Violence: Not on file   Housing Stability: Not on file       Objective     VITAL SIGNS  /71 (BP Location: Right arm, Patient Position: Sitting, Cuff Size: Standard)   Pulse 85   Ht 5' 5" (1.651 m)   Wt 68.9 kg (152 lb)   BMI 25.29 kg/m²      PHYSICAL EXAMINATION    Musculoskeletal: Left Knee Examination:  General: The patient is alert, oriented, and pleasant to interact with.   Patient ambulates with Normal gait pattern  Assistive Device: No  Alignment: normal  Skin is warm and dry to touch with no signs of erythema, ecchymosis, or infection   Effusion: none  ROM: 0° - 135°  verus 0° - 135° on contralateral side  MMT: 5/5 throughout  TTP: Medial joint line and Lateral joint line  2+ DP and PT pulses with brisk capillary refill to the toes  Sural, saphenous, tibial, superficial, and deep peroneal motor and sensory distributions intact  Sensation light touch intact distally    Musculoskeletal: Right Knee Examination:  General: The patient is alert, oriented, and pleasant to interact with. Patient ambulates with Normal gait pattern  Assistive Device: No  Alignment: normal  Skin is warm and dry to touch with no signs of erythema, ecchymosis, or infection   Effusion: minimal  ROM: 0° - 135°  verus 0° - 135° on contralateral side  MMT: 5/5 throughout  TTP: Lateral joint line  2+ DP and PT pulses with brisk capillary refill to the toes  Sural, saphenous, tibial, superficial, and deep peroneal motor and sensory distributions intact  Sensation light touch intact distally    RADIOGRAPHIC EXAMINATION/DIAGNOSTICS:  Left knee x-ray shows moderate tricompartmental osteoarthritis. Right knee x-ray shows moderate to severe tricompartmental osteoarthritis. ASSESSMENT/PLAN:  1. Right knee pain, moderate to severe osteoarthritis  2. Left knee pain, moderate osteoarthritis    Today, we discussed the non-operative treatment options that include, but are not limited to: rest, ice, activity modification, physical therapy, and/or injections. Patient with like to initially proceed with these conservative measures. After discussion of options for formal physical therapy and rest with other conservative treatments, patient opted to trial these initially. PT order placed. Benefits and risk of corticosteroid injection were discussed today and patient agreed to this. Corticosteroid injection done today. she will plan on following up for recheck p.r.n., they voiced their understanding of this plan and were in agreement with it. All questions answered today. If any issues, questions, or concerns arise between now and the next appointment, we have encouraged the patient contact our team.      Large joint arthrocentesis: R supra patellar bursa  Universal Protocol:  Consent: Verbal consent obtained.   Consent given by: patient  Patient understanding: patient states understanding of the procedure being performed  Site marked: the operative site was marked  Patient identity confirmed: verbally with patient    Supporting Documentation  Indications: pain   Procedure Details  Location: knee - R supra patellar bursa  Preparation: Patient was prepped and draped in the usual sterile fashion  Needle size: 22 G  Ultrasound guidance: no  Approach: lateral  Medications administered: 4 mL bupivacaine 0.25 %; 40 mg triamcinolone acetonide 40 mg/mL    Patient tolerance: patient tolerated the procedure well with no immediate complications  Dressing:  Sterile dressing applied

## 2023-08-28 ENCOUNTER — TELEPHONE (OUTPATIENT)
Dept: PSYCHIATRY | Facility: CLINIC | Age: 63
End: 2023-08-28

## 2023-08-28 NOTE — TELEPHONE ENCOUNTER
Contacted pt in regards to routine referral and to get placed on proper wait list. Unable to contact pt or lvm as phone is not in service.

## 2023-09-21 DIAGNOSIS — I82.402 ACUTE DEEP VEIN THROMBOSIS (DVT) OF LEFT LOWER EXTREMITY, UNSPECIFIED VEIN (HCC): ICD-10-CM

## 2023-09-21 NOTE — TELEPHONE ENCOUNTER
Reason for call:   [x] Refill   [] Prior Auth  [] Other:     Office:   [x] PCP/Provider - Venice Ariza   Speciality/Provider -     Medication: Eliquis 5 mg, 1 bid      Quantity: 180    Pharmacy: [facility]    Does the patient have enough for 3 days?    [] Yes   [x] No - Send as HP to POD- OUT OF MED

## 2023-11-20 DIAGNOSIS — F33.9 RECURRENT MAJOR DEPRESSIVE DISORDER (HCC): Chronic | ICD-10-CM

## 2023-11-20 RX ORDER — RISPERIDONE 3 MG/1
3 TABLET ORAL DAILY
Qty: 90 TABLET | Refills: 0 | Status: SHIPPED | OUTPATIENT
Start: 2023-11-20

## 2023-11-20 RX ORDER — VENLAFAXINE 100 MG/1
100 TABLET ORAL DAILY
Qty: 90 TABLET | Refills: 0 | Status: SHIPPED | OUTPATIENT
Start: 2023-11-20

## 2023-11-20 NOTE — TELEPHONE ENCOUNTER
Medication Refill Request     Name Risperidone   Dose/Frequency 3mg daily  Quantity 90  Verified pharmacy   [x]  Verified ordering Provider   [x]  Does patient have enough for the next 3 days? Yes [] No [x]    Medication Refill Request     Name Venlafaxine   Dose/Frequency 100 mg daily  Quantity 90  Verified pharmacy   [x]  Verified ordering Provider   [x]  Does patient have enough for the next 3 days?  Yes [] No [x]

## 2023-12-05 DIAGNOSIS — F33.9 RECURRENT MAJOR DEPRESSIVE DISORDER (HCC): Chronic | ICD-10-CM

## 2023-12-05 DIAGNOSIS — I10 ESSENTIAL HYPERTENSION: ICD-10-CM

## 2023-12-05 DIAGNOSIS — E55.9 VITAMIN D DEFICIENCY: ICD-10-CM

## 2023-12-05 DIAGNOSIS — F41.9 ANXIETY: ICD-10-CM

## 2023-12-05 RX ORDER — RISPERIDONE 3 MG/1
3 TABLET ORAL DAILY
Qty: 90 TABLET | Refills: 0 | Status: SHIPPED | OUTPATIENT
Start: 2023-12-05

## 2023-12-05 RX ORDER — BENZTROPINE MESYLATE 0.5 MG/1
0.5 TABLET ORAL
Qty: 90 TABLET | Refills: 0 | Status: SHIPPED | OUTPATIENT
Start: 2023-12-05

## 2023-12-05 RX ORDER — MELATONIN
1000 DAILY
Qty: 90 TABLET | Refills: 0 | Status: SHIPPED | OUTPATIENT
Start: 2023-12-05

## 2023-12-05 RX ORDER — HYDROXYZINE 50 MG/1
50 TABLET, FILM COATED ORAL
Qty: 90 TABLET | Refills: 0 | Status: SHIPPED | OUTPATIENT
Start: 2023-12-05

## 2023-12-05 RX ORDER — PRAZOSIN HYDROCHLORIDE 2 MG/1
4 CAPSULE ORAL
Qty: 180 CAPSULE | Refills: 0 | Status: SHIPPED | OUTPATIENT
Start: 2023-12-05

## 2023-12-05 NOTE — TELEPHONE ENCOUNTER
Macarena Reynoso calling in for refill for patient-    Reason for call:   [x] Refill   [] Prior Auth  [] Other:     Office:   [x] PCP/Provider - Kathy Sung   [] Specialty/Provider -     Medication:     risperiDONE (RisperDAL) 3 mg tablet-Take 1 tablet (3 mg total) by mouth daily     hydrOXYzine HCL (ATARAX) 50 mg tablet -Take 1 tablet (50 mg total) by mouth daily at bedtime as needed for anxiety     benztropine (COGENTIN) 0.5 mg tablet - Take 1 tablet (0.5 mg total) by mouth daily at bedtime     prazosin (MINIPRESS) 2 mg capsule -Take 2 capsules (4 mg total) by mouth daily at bedtime     cholecalciferol (VITAMIN D3) 1,000 units tablet - Take 1 tablet (1,000 Units total) by mouth daily     Quantity: 90    Pharmacy: [facility]    Does the patient have enough for 3 days?    [] Yes   [x] No - Send as HP to POD

## 2023-12-08 ENCOUNTER — TELEPHONE (OUTPATIENT)
Dept: FAMILY MEDICINE CLINIC | Facility: CLINIC | Age: 63
End: 2023-12-08

## 2023-12-08 DIAGNOSIS — I82.402 ACUTE DEEP VEIN THROMBOSIS (DVT) OF LEFT LOWER EXTREMITY, UNSPECIFIED VEIN (HCC): ICD-10-CM

## 2023-12-08 RX ORDER — APIXABAN 5 MG/1
5 TABLET, FILM COATED ORAL 2 TIMES DAILY
Qty: 180 TABLET | Refills: 0 | Status: SHIPPED | OUTPATIENT
Start: 2023-12-08

## 2024-02-15 ENCOUNTER — OFFICE VISIT (OUTPATIENT)
Dept: FAMILY MEDICINE CLINIC | Facility: CLINIC | Age: 64
End: 2024-02-15
Payer: MEDICARE

## 2024-02-15 VITALS
HEART RATE: 51 BPM | WEIGHT: 154.4 LBS | HEIGHT: 65 IN | RESPIRATION RATE: 16 BRPM | TEMPERATURE: 93.9 F | SYSTOLIC BLOOD PRESSURE: 118 MMHG | BODY MASS INDEX: 25.72 KG/M2 | DIASTOLIC BLOOD PRESSURE: 62 MMHG | OXYGEN SATURATION: 98 %

## 2024-02-15 DIAGNOSIS — E03.8 OTHER SPECIFIED HYPOTHYROIDISM: ICD-10-CM

## 2024-02-15 DIAGNOSIS — F31.9 BIPOLAR AFFECTIVE DISORDER, REMISSION STATUS UNSPECIFIED (HCC): ICD-10-CM

## 2024-02-15 DIAGNOSIS — F41.9 ANXIETY: ICD-10-CM

## 2024-02-15 DIAGNOSIS — I10 ESSENTIAL HYPERTENSION: ICD-10-CM

## 2024-02-15 DIAGNOSIS — I82.5Z1 CHRONIC DEEP VEIN THROMBOSIS (DVT) OF DISTAL VEIN OF RIGHT LOWER EXTREMITY (HCC): ICD-10-CM

## 2024-02-15 DIAGNOSIS — I82.402 ACUTE DEEP VEIN THROMBOSIS (DVT) OF LEFT LOWER EXTREMITY, UNSPECIFIED VEIN (HCC): ICD-10-CM

## 2024-02-15 DIAGNOSIS — E55.9 VITAMIN D DEFICIENCY: ICD-10-CM

## 2024-02-15 DIAGNOSIS — F33.9 RECURRENT MAJOR DEPRESSIVE DISORDER (HCC): Chronic | ICD-10-CM

## 2024-02-15 DIAGNOSIS — D50.0 IRON DEFICIENCY ANEMIA DUE TO CHRONIC BLOOD LOSS: ICD-10-CM

## 2024-02-15 DIAGNOSIS — Z86.718 HISTORY OF DVT IN ADULTHOOD: Primary | ICD-10-CM

## 2024-02-15 PROCEDURE — 99214 OFFICE O/P EST MOD 30 MIN: CPT | Performed by: NURSE PRACTITIONER

## 2024-02-15 RX ORDER — HYDROXYZINE 50 MG/1
50 TABLET, FILM COATED ORAL
Qty: 90 TABLET | Refills: 0 | Status: SHIPPED | OUTPATIENT
Start: 2024-02-15

## 2024-02-15 RX ORDER — VENLAFAXINE 100 MG/1
100 TABLET ORAL DAILY
Qty: 90 TABLET | Refills: 0 | Status: SHIPPED | OUTPATIENT
Start: 2024-02-15

## 2024-02-15 RX ORDER — MELATONIN
1000 DAILY
Qty: 90 TABLET | Refills: 0 | Status: SHIPPED | OUTPATIENT
Start: 2024-02-15

## 2024-02-15 NOTE — ASSESSMENT & PLAN NOTE
Ran out of eliquis, pt reminded that she needs to see vascular surgery as previously referred.  Will check coagulation panel, restart eliquis as history is not known.

## 2024-02-15 NOTE — ASSESSMENT & PLAN NOTE
Feels depressed, stopped taking venlafaxine months ago because she says the dose she had was wrong and her psychiatrist had reduced the dose from 150 to 100 mg.  She does not know why or when.  Refill 100 mg dose and resume.  Attempting to get pt psych evaluation asap through resident at [facility].  Continue risperidone.

## 2024-02-15 NOTE — ASSESSMENT & PLAN NOTE
Needs to establish with psychiatry asap, attempting to get pt seen at [facility] with psych resident.  Taking risperidone, previously on mirtazapine but she is not sure if she ran out or somebody d/c'd this.  Restart venlafaxine.

## 2024-02-15 NOTE — ASSESSMENT & PLAN NOTE
As above, needs asap psychiatric evaluation and management.  Restart venlafaxine, prescription sent for 100 mg dose.

## 2024-02-15 NOTE — PROGRESS NOTES
Name: Karen Jena Baptiste      : 1960      MRN: [id number]  Encounter Provider: JAMILA Johns  Encounter Date: 2/15/2024   Encounter department: [facility]    Assessment & Plan     1. History of DVT in adulthood  -     Coagulation Profile; Future    2. Vitamin D deficiency  Assessment & Plan:  Refill vit d.      Orders:  -     cholecalciferol (VITAMIN D3) 1,000 units tablet; Take 1 tablet (1,000 Units total) by mouth daily    3. Anxiety  Assessment & Plan:  As above, needs asap psychiatric evaluation and management.  Restart venlafaxine, prescription sent for 100 mg dose.    Orders:  -     hydrOXYzine HCL (ATARAX) 50 mg tablet; Take 1 tablet (50 mg total) by mouth daily at bedtime as needed for anxiety    4. Recurrent major depressive disorder (HCC)  Assessment & Plan:  Feels depressed, stopped taking venlafaxine months ago because she says the dose she had was wrong and her psychiatrist had reduced the dose from 150 to 100 mg.  She does not know why or when.  Refill 100 mg dose and resume.  Attempting to get pt psych evaluation asap through resident at [facility].  Continue risperidone.    Orders:  -     venlafaxine (EFFEXOR) 100 MG tablet; Take 1 tablet (100 mg total) by mouth in the morning    5. Acute deep vein thrombosis (DVT) of left lower extremity, unspecified vein (HCC)  -     apixaban (Eliquis) 5 mg; Take 1 tablet (5 mg total) by mouth 2 (two) times a day    6. Other specified hypothyroidism  Assessment & Plan:  Still taking levothyroxine 50 mcg but has not had her labs done.  Needs to get labs asap.        7. Essential hypertension  Assessment & Plan:  Stable with current management., continue hctz.  Needs to get labs as ordered previously.      8. Chronic deep vein thrombosis (DVT) of distal vein of right lower extremity (HCC)  Assessment & Plan:  Ran out of eliquis, pt reminded that she needs to see vascular surgery as previously referred.  Will check coagulation panel,  restart eliquis as history is not known.        9. Iron deficiency anemia due to chronic blood loss  Assessment & Plan:  Not taking fe.  Needs to get her labs done asap.  History of gastritis, ulcers and previously on protonix; no longer taking this.  She is currently out of eliquis but with history of DVT with unknown cause would like to resume.  Follow up pending results.      10. Bipolar affective disorder, remission status unspecified (HCC)  Assessment & Plan:  Needs to establish with psychiatry asap, attempting to get pt seen at [facility] with psych resident.  Taking risperidone, previously on mirtazapine but she is not sure if she ran out or somebody d/c'd this.  Restart venlafaxine.                Subjective      Pt is a 64 yo female here for routine follow up.  Past medical history history of cholelithiasis, hypothyroidism, DVT, HTN, osteoarthritis, anxiety, bipolar diorder, depression, edema, iron deficiency anemia, vitamin D deficiency.  No family with patient, she is a poor historian.  Mental illness was stable with current management at time of her last visit; she was referred to establish with a new psychiatrist and her meds were continued: venlafaxine, hydroxyzine, risperidone.  She was contacted by them in August but the number was not in service.  Pt states she does not have a phone, her daughter should be called.  Labs were previously ordered but not completed.  Unsure of thyroid function status on levothyroxine 50 mcg.  She was also referred to vascular surgery regarding the history of DVT but she still has yet to see them.  She takes eliquis.  She says she has been out of some of her medication for a long time because she ran out of refills.  She says her daughter handles this for her but her daughter never called to request refills.  She stopped taking the venlafaxine because what she had was 150 mg but her psychiatrist evidently decreased it to 100 mg.  She is also out of remeron.  She says she is  having auditory hallucinations, she says they mumble so she does not know what the voices say.  She feels safe, does not have SI.  No visual hallucinations.  She does not sleep much.      Review of Systems   Constitutional:  Positive for fatigue. Negative for appetite change.   Eyes:  Negative for visual disturbance.   Respiratory:  Negative for shortness of breath.    Cardiovascular:  Negative for chest pain and palpitations.   Gastrointestinal:  Negative for diarrhea, nausea and vomiting.   Neurological:  Negative for dizziness, light-headedness and headaches.   Psychiatric/Behavioral:  Positive for dysphoric mood, hallucinations and sleep disturbance. Negative for self-injury and suicidal ideas. The patient is nervous/anxious.        Current Outpatient Medications on File Prior to Visit   Medication Sig    atorvastatin (LIPITOR) 10 mg tablet Take 1 tablet (10 mg total) by mouth daily at bedtime    benztropine (COGENTIN) 0.5 mg tablet Take 1 tablet (0.5 mg total) by mouth daily at bedtime    Blood Pressure Monitoring (BLOOD PRESSURE CUFF) MISC by Does not apply route daily    folic acid (FOLVITE) 1 mg tablet Take 1,000 mcg by mouth daily    hydrochlorothiazide (HYDRODIURIL) 12.5 mg tablet Take 1 tablet (12.5 mg total) by mouth daily    levothyroxine 50 mcg tablet Take 1 tablet (50 mcg total) by mouth daily    prazosin (MINIPRESS) 2 mg capsule Take 2 capsules (4 mg total) by mouth daily at bedtime    risperiDONE (RisperDAL) 3 mg tablet Take 1 tablet (3 mg total) by mouth daily    [DISCONTINUED] Blood Pressure Monitoring (RA BLOOD PRESSURE CUFF MONITOR) MISC As directed    [DISCONTINUED] cholecalciferol (VITAMIN D3) 1,000 units tablet Take 1 tablet (1,000 Units total) by mouth daily    [DISCONTINUED] hydrOXYzine HCL (ATARAX) 50 mg tablet Take 1 tablet (50 mg total) by mouth daily at bedtime as needed for anxiety    [DISCONTINUED] pantoprazole (PROTONIX) 40 mg tablet Take 1 tablet (40 mg total) by mouth 2 (two) times  "a day    [DISCONTINUED] venlafaxine (EFFEXOR) 100 MG tablet Take 1 tablet (100 mg total) by mouth in the morning    bisacodyl (DULCOLAX) 5 mg EC tablet Take 5 mg by mouth once a week    ferrous sulfate 324 (65 Fe) mg Take 1 tablet (324 mg total) by mouth daily before breakfast    gabapentin (NEURONTIN) 300 mg capsule Take 300 mg by mouth 2 (two) times a day    mirtazapine (REMERON) 45 MG tablet Take 45 mg by mouth daily at bedtime    [DISCONTINUED] Eliquis 5 MG Take 1 tablet by mouth twice daily    [DISCONTINUED] polyethylene glycol (MIRALAX) 17 g packet Take 17 g by mouth daily as needed (constipation) for up to 7 days       Objective     /62 (BP Location: Right arm)   Pulse (!) 51   Temp (!) 93.9 °F (34.4 °C) (Tympanic)   Resp 16   Ht 5' 5\" (1.651 m)   Wt 70 kg (154 lb 6.4 oz)   SpO2 98%   BMI 25.69 kg/m²     Physical Exam  Vitals reviewed.   Constitutional:       General: She is awake. She is not in acute distress.     Appearance: Normal appearance. She is well-developed and well-groomed. She is not ill-appearing or diaphoretic.   Eyes:      General: Lids are normal.      Conjunctiva/sclera: Conjunctivae normal.      Pupils: Pupils are equal, round, and reactive to light.   Cardiovascular:      Rate and Rhythm: Regular rhythm. Bradycardia present.      Pulses: Normal pulses.      Heart sounds: Normal heart sounds.   Pulmonary:      Effort: Pulmonary effort is normal.      Breath sounds: Normal breath sounds.   Musculoskeletal:      Right lower leg: No edema.      Left lower leg: No edema.   Skin:     General: Skin is dry.   Neurological:      Mental Status: She is alert and oriented to person, place, and time.   Psychiatric:         Attention and Perception: Attention normal.         Mood and Affect: Mood normal. Affect is flat.         Speech: Speech normal.         Behavior: Behavior normal. Behavior is cooperative.         Cognition and Memory: Memory is impaired.         Judgment: Judgment " normal.       PHILIP JohnsNP

## 2024-02-15 NOTE — ASSESSMENT & PLAN NOTE
Not taking fe.  Needs to get her labs done asap.  History of gastritis, ulcers and previously on protonix; no longer taking this.  She is currently out of eliquis but with history of DVT with unknown cause would like to resume.  Follow up pending results.

## 2024-02-21 PROBLEM — Z00.00 HEALTH CARE MAINTENANCE: Status: RESOLVED | Noted: 2018-12-19 | Resolved: 2024-02-21

## 2024-02-21 PROBLEM — R05.9 COUGH: Status: RESOLVED | Noted: 2020-01-21 | Resolved: 2024-02-21

## 2024-02-21 PROBLEM — Z01.419 ENCOUNTER FOR ANNUAL ROUTINE GYNECOLOGICAL EXAMINATION: Status: RESOLVED | Noted: 2020-01-28 | Resolved: 2024-02-21

## 2024-02-29 DIAGNOSIS — F33.9 RECURRENT MAJOR DEPRESSIVE DISORDER (HCC): Chronic | ICD-10-CM

## 2024-02-29 RX ORDER — RISPERIDONE 3 MG/1
3 TABLET ORAL DAILY
Qty: 90 TABLET | Refills: 0 | Status: SHIPPED | OUTPATIENT
Start: 2024-02-29

## 2024-02-29 RX ORDER — BENZTROPINE MESYLATE 0.5 MG/1
0.5 TABLET ORAL
Qty: 90 TABLET | Refills: 1 | Status: SHIPPED | OUTPATIENT
Start: 2024-02-29

## 2024-03-11 DIAGNOSIS — F33.9 RECURRENT MAJOR DEPRESSIVE DISORDER (HCC): Chronic | ICD-10-CM

## 2024-03-11 RX ORDER — VENLAFAXINE 100 MG/1
100 TABLET ORAL DAILY
Qty: 90 TABLET | Refills: 0 | Status: SHIPPED | OUTPATIENT
Start: 2024-03-11

## 2024-03-11 NOTE — TELEPHONE ENCOUNTER
Reason for call:   [x] Refill   [] Prior Auth  [] Other:     Office:   [] PCP/Provider -   [x] Specialty/Provider -     RISPERIDONE  3 MG    VENLAFAXINE  100 MG    PHARMACY  Replaced by [facility] [address] [facility] [place]  [address] [facility]  [address] [facility] [address]  Phone: [phone]  Fax: [phone]     Patient have enough for 3 days?   [] Yes   [x] No - Send as HP to POD

## 2024-03-12 RX ORDER — RISPERIDONE 3 MG/1
3 TABLET ORAL DAILY
Qty: 90 TABLET | Refills: 0 | Status: SHIPPED | OUTPATIENT
Start: 2024-03-12

## 2024-04-19 DIAGNOSIS — I10 ESSENTIAL HYPERTENSION: ICD-10-CM

## 2024-04-19 DIAGNOSIS — F33.9 RECURRENT MAJOR DEPRESSIVE DISORDER (HCC): Chronic | ICD-10-CM

## 2024-04-19 DIAGNOSIS — E03.9 HYPOTHYROIDISM, UNSPECIFIED TYPE: ICD-10-CM

## 2024-04-19 RX ORDER — PRAZOSIN HYDROCHLORIDE 2 MG/1
4 CAPSULE ORAL
Qty: 180 CAPSULE | Refills: 1 | Status: SHIPPED | OUTPATIENT
Start: 2024-04-19

## 2024-04-19 RX ORDER — LEVOTHYROXINE SODIUM 0.05 MG/1
50 TABLET ORAL DAILY
Qty: 30 TABLET | Refills: 0 | Status: SHIPPED | OUTPATIENT
Start: 2024-04-19

## 2024-04-19 NOTE — TELEPHONE ENCOUNTER
Reason for call:   [x] Refill   [] Prior Auth  [] Other:     Office:   [x] PCP/Provider -   [] Specialty/Provider -     Medication:   prazosin (MINIPRESS) 2 mg capsule - Take 2 capsules (4 mg total) by mouth daily at bedtime   levothyroxine 50 mcg tablet - Take 1 tablet (50 mcg total) by mouth daily     Pharmacy:   [facility] [id number] [facility] [address] [facility]     Does the patient have enough for 3 days?   [] Yes   [x] No - Send as HP to POD

## 2024-04-19 NOTE — TELEPHONE ENCOUNTER
Patient's daughter called to get refills sent in for pt. Did not know names of Rx's so she will call back when she has them.

## 2024-05-04 DIAGNOSIS — I82.402 ACUTE DEEP VEIN THROMBOSIS (DVT) OF LEFT LOWER EXTREMITY, UNSPECIFIED VEIN (HCC): ICD-10-CM

## 2024-05-06 RX ORDER — APIXABAN 5 MG/1
5 TABLET, FILM COATED ORAL 2 TIMES DAILY
Qty: 60 TABLET | Refills: 0 | Status: SHIPPED | OUTPATIENT
Start: 2024-05-06

## 2024-05-08 DIAGNOSIS — F41.9 ANXIETY: ICD-10-CM

## 2024-05-08 DIAGNOSIS — I10 ESSENTIAL HYPERTENSION: ICD-10-CM

## 2024-05-08 RX ORDER — HYDROXYZINE 50 MG/1
50 TABLET, FILM COATED ORAL
Qty: 90 TABLET | Refills: 1 | Status: SHIPPED | OUTPATIENT
Start: 2024-05-08

## 2024-05-08 NOTE — TELEPHONE ENCOUNTER
Reason for call:   [x] Refill   [] Prior Auth  [] Other:     Office:   [x] PCP/Provider - JAMILA Johns  PCP - General  [] Specialty/Provider -     Medication:   hydrOXYzine HCL (ATARAX) 50 mg tablet- one daily # 90      Pharmacy: [facility] [id number]  [facility] [place]  [address] [facility]        Does the patient have enough for 3 days?   [] Yes   [x] No - Send as HP to POD

## 2024-05-28 DIAGNOSIS — I10 ESSENTIAL HYPERTENSION: ICD-10-CM

## 2024-05-28 RX ORDER — HYDROCHLOROTHIAZIDE 12.5 MG/1
12.5 TABLET ORAL DAILY
Qty: 90 TABLET | Refills: 1 | Status: SHIPPED | OUTPATIENT
Start: 2024-05-28

## 2024-05-28 NOTE — TELEPHONE ENCOUNTER
Reason for call:   [x] Refill   [] Prior Auth  [] Other:     Office:   [x] PCP/Provider - Keanu Tony [facility]  [] Specialty/Provider -     Medication:       [facility]  [place]  [address] [facility]  P: [phone]  F: [phone]    Does the patient have enough for 3 days?   [] Yes   [x] No - Send as HP to POD

## 2024-06-01 DIAGNOSIS — F33.9 RECURRENT MAJOR DEPRESSIVE DISORDER (HCC): Chronic | ICD-10-CM

## 2024-06-01 DIAGNOSIS — I82.402 ACUTE DEEP VEIN THROMBOSIS (DVT) OF LEFT LOWER EXTREMITY, UNSPECIFIED VEIN (HCC): ICD-10-CM

## 2024-06-01 RX ORDER — APIXABAN 5 MG/1
5 TABLET, FILM COATED ORAL 2 TIMES DAILY
Qty: 60 TABLET | Refills: 5 | Status: SHIPPED | OUTPATIENT
Start: 2024-06-01

## 2024-06-03 RX ORDER — RISPERIDONE 3 MG/1
3 TABLET ORAL DAILY
Qty: 90 TABLET | Refills: 0 | Status: SHIPPED | OUTPATIENT
Start: 2024-06-03

## 2024-06-21 DIAGNOSIS — F33.9 RECURRENT MAJOR DEPRESSIVE DISORDER (HCC): Chronic | ICD-10-CM

## 2024-06-21 RX ORDER — VENLAFAXINE 100 MG/1
100 TABLET ORAL DAILY
Qty: 90 TABLET | Refills: 1 | Status: SHIPPED | OUTPATIENT
Start: 2024-06-21

## 2024-06-21 NOTE — TELEPHONE ENCOUNTER
Reason for call:   [x] Refill   [] Prior Auth  [] Other:     Office:   [x] PCP/Provider -  JAMILA Johns   [] Specialty/Provider -     Medication: venlafaxine (EFFEXOR) 100 MG tablet    TDose/Frequency:  Take 1 tablet (100 mg total) by mouth in the morning     Quantity: 90    Pharmacy: [address] [phone]    Does the patient have enough for 3 days?   [] Yes   [x] No - Send as HP to POD

## 2024-06-24 NOTE — TELEPHONE ENCOUNTER
Daughter called back to follow up on refill    I explained it requires a PA and it can take our PA dept a few days to get sent out to her ins, then once ins receives the PA they can take up to 7-21 days to respond.

## 2024-07-05 ENCOUNTER — TELEPHONE (OUTPATIENT)
Dept: FAMILY MEDICINE CLINIC | Facility: CLINIC | Age: 64
End: 2024-07-05

## 2024-07-11 NOTE — TELEPHONE ENCOUNTER
Lauro from [facility] is calling stating that she spoke with Dione, Patients daughter about her Physicians Certification Form.   She is requesting the form be faxed to [place] at the following fax:  [phone]    Please advise, thank you!

## 2024-09-09 DIAGNOSIS — F33.9 RECURRENT MAJOR DEPRESSIVE DISORDER (HCC): Chronic | ICD-10-CM

## 2024-09-09 RX ORDER — RISPERIDONE 3 MG/1
3 TABLET ORAL DAILY
Qty: 30 TABLET | Refills: 0 | Status: SHIPPED | OUTPATIENT
Start: 2024-09-09

## 2024-09-21 DIAGNOSIS — E03.9 HYPOTHYROIDISM, UNSPECIFIED TYPE: ICD-10-CM

## 2024-09-23 DIAGNOSIS — E78.5 HYPERLIPIDEMIA, UNSPECIFIED HYPERLIPIDEMIA TYPE: ICD-10-CM

## 2024-09-23 DIAGNOSIS — E03.9 HYPOTHYROIDISM, UNSPECIFIED TYPE: Primary | ICD-10-CM

## 2024-09-23 DIAGNOSIS — I10 ESSENTIAL HYPERTENSION: ICD-10-CM

## 2024-09-23 RX ORDER — LEVOTHYROXINE SODIUM 50 UG/1
50 TABLET ORAL DAILY
Qty: 30 TABLET | Refills: 0 | Status: SHIPPED | OUTPATIENT
Start: 2024-09-23

## 2024-10-12 ENCOUNTER — HOSPITAL ENCOUNTER (EMERGENCY)
Facility: HOSPITAL | Age: 64
Discharge: HOME | End: 2024-10-12
Payer: COMMERCIAL

## 2024-10-12 ENCOUNTER — APPOINTMENT (EMERGENCY)
Dept: RADIOLOGY | Facility: HOSPITAL | Age: 64
End: 2024-10-12
Payer: COMMERCIAL

## 2024-10-12 VITALS
DIASTOLIC BLOOD PRESSURE: 80 MMHG | BODY MASS INDEX: 23.74 KG/M2 | TEMPERATURE: 97.9 F | SYSTOLIC BLOOD PRESSURE: 146 MMHG | OXYGEN SATURATION: 96 % | HEART RATE: 69 BPM | WEIGHT: 142.64 LBS | RESPIRATION RATE: 20 BRPM

## 2024-10-12 DIAGNOSIS — R07.89 NON-CARDIAC CHEST PAIN: Primary | ICD-10-CM

## 2024-10-12 DIAGNOSIS — M54.50 LOW BACK PAIN: ICD-10-CM

## 2024-10-12 LAB
ANION GAP SERPL CALCULATED.3IONS-SCNC: 7 MMOL/L (ref 4–13)
BASOPHILS # BLD AUTO: 0.03 THOUSANDS/ΜL (ref 0–0.1)
BASOPHILS NFR BLD AUTO: 1 % (ref 0–1)
BUN SERPL-MCNC: 16 MG/DL (ref 5–25)
CALCIUM SERPL-MCNC: 9 MG/DL (ref 8.4–10.2)
CARDIAC TROPONIN I PNL SERPL HS: 4 NG/L
CHLORIDE SERPL-SCNC: 109 MMOL/L (ref 96–108)
CO2 SERPL-SCNC: 25 MMOL/L (ref 21–32)
CREAT SERPL-MCNC: 0.89 MG/DL (ref 0.6–1.3)
EOSINOPHIL # BLD AUTO: 0.13 THOUSAND/ΜL (ref 0–0.61)
EOSINOPHIL NFR BLD AUTO: 2 % (ref 0–6)
ERYTHROCYTE [DISTWIDTH] IN BLOOD BY AUTOMATED COUNT: 13.2 % (ref 11.6–15.1)
GFR SERPL CREATININE-BSD FRML MDRD: 69 ML/MIN/1.73SQ M
GLUCOSE SERPL-MCNC: 86 MG/DL (ref 65–140)
HCT VFR BLD AUTO: 36.4 % (ref 34.8–46.1)
HGB BLD-MCNC: 12.7 G/DL (ref 11.5–15.4)
IMM GRANULOCYTES # BLD AUTO: 0.02 THOUSAND/UL (ref 0–0.2)
IMM GRANULOCYTES NFR BLD AUTO: 0 % (ref 0–2)
LYMPHOCYTES # BLD AUTO: 1.66 THOUSANDS/ΜL (ref 0.6–4.47)
LYMPHOCYTES NFR BLD AUTO: 30 % (ref 14–44)
MCH RBC QN AUTO: 32.9 PG (ref 26.8–34.3)
MCHC RBC AUTO-ENTMCNC: 34.9 G/DL (ref 31.4–37.4)
MCV RBC AUTO: 94 FL (ref 82–98)
MONOCYTES # BLD AUTO: 0.35 THOUSAND/ΜL (ref 0.17–1.22)
MONOCYTES NFR BLD AUTO: 6 % (ref 4–12)
NEUTROPHILS # BLD AUTO: 3.28 THOUSANDS/ΜL (ref 1.85–7.62)
NEUTS SEG NFR BLD AUTO: 61 % (ref 43–75)
NRBC BLD AUTO-RTO: 0 /100 WBCS
PLATELET # BLD AUTO: 143 THOUSANDS/UL (ref 149–390)
PMV BLD AUTO: 11 FL (ref 8.9–12.7)
POTASSIUM SERPL-SCNC: 3.8 MMOL/L (ref 3.5–5.3)
RBC # BLD AUTO: 3.86 MILLION/UL (ref 3.81–5.12)
SODIUM SERPL-SCNC: 141 MMOL/L (ref 135–147)
WBC # BLD AUTO: 5.47 THOUSAND/UL (ref 4.31–10.16)

## 2024-10-12 PROCEDURE — 80048 BASIC METABOLIC PNL TOTAL CA: CPT

## 2024-10-12 PROCEDURE — 71046 X-RAY EXAM CHEST 2 VIEWS: CPT

## 2024-10-12 PROCEDURE — 99285 EMERGENCY DEPT VISIT HI MDM: CPT

## 2024-10-12 PROCEDURE — 85025 COMPLETE CBC W/AUTO DIFF WBC: CPT

## 2024-10-12 PROCEDURE — 93005 ELECTROCARDIOGRAM TRACING: CPT

## 2024-10-12 PROCEDURE — 96374 THER/PROPH/DIAG INJ IV PUSH: CPT

## 2024-10-12 PROCEDURE — 84484 ASSAY OF TROPONIN QUANT: CPT

## 2024-10-12 PROCEDURE — 36415 COLL VENOUS BLD VENIPUNCTURE: CPT

## 2024-10-12 RX ORDER — DIAZEPAM 2 MG/1
2 TABLET ORAL ONCE
Status: COMPLETED | OUTPATIENT
Start: 2024-10-12 | End: 2024-10-12

## 2024-10-12 RX ORDER — LIDOCAINE 50 MG/G
1 PATCH TOPICAL DAILY
Qty: 14 PATCH | Refills: 0 | Status: SHIPPED | OUTPATIENT
Start: 2024-10-12

## 2024-10-12 RX ORDER — KETOROLAC TROMETHAMINE 30 MG/ML
15 INJECTION, SOLUTION INTRAMUSCULAR; INTRAVENOUS ONCE
Status: COMPLETED | OUTPATIENT
Start: 2024-10-12 | End: 2024-10-12

## 2024-10-12 RX ORDER — ACETAMINOPHEN 325 MG/1
975 TABLET ORAL ONCE
Status: COMPLETED | OUTPATIENT
Start: 2024-10-12 | End: 2024-10-12

## 2024-10-12 RX ORDER — LIDOCAINE 50 MG/G
1 PATCH TOPICAL ONCE
Status: DISCONTINUED | OUTPATIENT
Start: 2024-10-12 | End: 2024-10-12 | Stop reason: HOSPADM

## 2024-10-12 RX ORDER — METHOCARBAMOL 500 MG/1
500 TABLET, FILM COATED ORAL 2 TIMES DAILY
Qty: 20 TABLET | Refills: 0 | Status: SHIPPED | OUTPATIENT
Start: 2024-10-12

## 2024-10-12 RX ORDER — ACETAMINOPHEN 500 MG
500 TABLET ORAL EVERY 6 HOURS PRN
Qty: 30 TABLET | Refills: 0 | Status: SHIPPED | OUTPATIENT
Start: 2024-10-12

## 2024-10-12 RX ORDER — NAPROXEN 500 MG/1
500 TABLET ORAL 2 TIMES DAILY WITH MEALS
Qty: 30 TABLET | Refills: 0 | Status: SHIPPED | OUTPATIENT
Start: 2024-10-12

## 2024-10-12 RX ADMIN — LIDOCAINE 5% 1 PATCH: 700 PATCH TOPICAL at 15:50

## 2024-10-12 RX ADMIN — DIAZEPAM 2 MG: 2 TABLET ORAL at 15:50

## 2024-10-12 RX ADMIN — KETOROLAC TROMETHAMINE 15 MG: 30 INJECTION, SOLUTION INTRAMUSCULAR; INTRAVENOUS at 15:50

## 2024-10-12 RX ADMIN — ACETAMINOPHEN 975 MG: 325 TABLET ORAL at 15:50

## 2024-10-12 NOTE — ED PROVIDER NOTES
Time reflects when diagnosis was documented in both MDM as applicable and the Disposition within this note       Time User Action Codes Description Comment    10/12/2024  4:11 PM John Trujillo [R07.89] Non-cardiac chest pain     10/12/2024  4:11 PM John Trujillo [M54.50] Low back pain           ED Disposition       ED Disposition   Discharge    Condition   Stable    Date/Time   Sat Oct 12, 2024  4:10 PM    Comment   Karen Ita discharge to home/self care.                   Assessment & Plan       Medical Decision Making  Amount and/or Complexity of Data Reviewed  Labs: ordered.  Radiology: ordered and independent interpretation performed.    Risk  OTC drugs.  Prescription drug management.        63 y/o F presenting with 2 days of left chest pain. VSS. Pt in NAD. Suspect most likely MSK chest pain however will obtain troponin/EKG for ACS r/o. Doubt ptx, doubt PE on AC, doubt dissection. EKG nonischemic, trop neg, cxr clear. Will treat as MSK pain with strict RTED precautions.     ED Course as of 10/12/24 1633   Sat Oct 12, 2024   1550 Procedure Note: EKG  Date/Time: 10/12/24 4:25 PM   Interpreted by: John Trujillo MD  Indications / Diagnosis: CP  ECG reviewed by me, the ED Provider: yes   The EKG demonstrates:  Rhythm: normal sinus  Intervals: normal intervals  Axis: normal axis  QRS/Blocks: normal QRS  ST Changes: No acute ST Changes, no STD/KACIE.     1610 hs TnI 0hr: 4       Medications   lidocaine (LIDODERM) 5 % patch 1 patch (1 patch Topical Medication Applied 10/12/24 1550)   ketorolac (TORADOL) injection 15 mg (15 mg Intravenous Given 10/12/24 1550)   diazepam (VALIUM) tablet 2 mg (2 mg Oral Given 10/12/24 1550)   acetaminophen (TYLENOL) tablet 975 mg (975 mg Oral Given 10/12/24 1550)       ED Risk Strat Scores                           SBIRT 20yo+      Flowsheet Row Most Recent Value   Initial Alcohol Screen: US AUDIT-C     1. How often do you have a drink containing alcohol? 0 Filed at:  10/12/2024 1537   2. How many drinks containing alcohol do you have on a typical day you are drinking?  0 Filed at: 10/12/2024 1537   3a. Male UNDER 65: How often do you have five or more drinks on one occasion? 0 Filed at: 10/12/2024 1537   3b. FEMALE Any Age, or MALE 65+: How often do you have 4 or more drinks on one occassion? 0 Filed at: 10/12/2024 1537   Audit-C Score 0 Filed at: 10/12/2024 1537   EVA: How many times in the past year have you...    Used an illegal drug or used a prescription medication for non-medical reasons? Never Filed at: 10/12/2024 1537                            History of Present Illness       Chief Complaint   Patient presents with    Chest Pain     L sided chest pain that radiates into back and SOB that began yesterday after attempting to lift something heavy       Past Medical History:   Diagnosis Date    IRINEO (acute kidney injury) (HCC) 01/09/2020    Anemia 01/09/2020    Anxiety     Depression     Disease of thyroid gland     Heart murmur     Hypertension     Psychiatric disorder     Substance abuse (HCC)     former heroin addiction stopped several decades ago      Past Surgical History:   Procedure Laterality Date    BRAIN SURGERY      had a hemorrhage; patient can not tell any other details.    US GUIDED INJECTION FOR RESEARCH STUDY  12/30/2016      Family History   Problem Relation Age of Onset    Mental illness Mother     Asthma Mother     Asthma Father     Cancer Father     No Known Problems Son     No Known Problems Son     No Known Problems Son     No Known Problems Daughter     No Known Problems Daughter       Social History     Tobacco Use    Smoking status: Former     Types: Cigarettes    Smokeless tobacco: Never    Tobacco comments:     Started age 14, quit about 30-40 years ago (entered 2023)   Vaping Use    Vaping status: Never Used   Substance Use Topics    Alcohol use: No    Drug use: No      E-Cigarette/Vaping    E-Cigarette Use Never User       E-Cigarette/Vaping  Substances    Nicotine No     THC No     CBD No     Flavoring No     Other No     Unknown No       I have reviewed and agree with the history as documented.     HPI    65 y/o F with PMH VTE on eliquis presenting for evaluation of left sided chest pain. States pain present x2 days, started after lifting something heavy. Hurts to breath on the left side. Hurts with any movement.  No fever, cough, hemoptysis, numbness, weakness, abd pain. Reports compliance with home AC. Also has ongoing R low back pain recent dx sciatica.     Review of Systems   Constitutional:  Negative for chills and fever.   HENT:  Negative for ear pain and sore throat.    Eyes:  Negative for pain and visual disturbance.   Respiratory:  Negative for cough and shortness of breath.    Cardiovascular:  Positive for chest pain. Negative for palpitations.   Gastrointestinal:  Negative for abdominal pain and vomiting.   Genitourinary:  Negative for dysuria and hematuria.   Musculoskeletal:  Positive for back pain. Negative for arthralgias.   Skin:  Negative for color change and rash.   Neurological:  Negative for seizures and syncope.   All other systems reviewed and are negative.          Objective       ED Triage Vitals   Temperature Pulse Blood Pressure Respirations SpO2 Patient Position - Orthostatic VS   10/12/24 1542 10/12/24 1540 10/12/24 1540 10/12/24 1540 10/12/24 1540 10/12/24 1540   97.9 °F (36.6 °C) 69 146/80 20 96 % Lying      Temp Source Heart Rate Source BP Location FiO2 (%) Pain Score    10/12/24 1542 10/12/24 1540 10/12/24 1540 -- 10/12/24 1550    Oral Monitor Left arm  10 - Worst Possible Pain      Vitals      Date and Time Temp Pulse SpO2 Resp BP Pain Score FACES Pain Rating User   10/12/24 1550 -- -- -- -- -- 10 - Worst Possible Pain -- AM   10/12/24 1542 97.9 °F (36.6 °C) -- -- -- -- -- --    10/12/24 1540 -- 69 96 % 20 146/80 -- --             Physical Exam  Vitals and nursing note reviewed.   Constitutional:       General: She  is not in acute distress.  HENT:      Head: Normocephalic and atraumatic.      Right Ear: External ear normal.      Left Ear: External ear normal.      Nose: Nose normal.      Mouth/Throat:      Pharynx: Oropharynx is clear.   Eyes:      Extraocular Movements: Extraocular movements intact.      Pupils: Pupils are equal, round, and reactive to light.   Cardiovascular:      Rate and Rhythm: Normal rate and regular rhythm.      Pulses: Normal pulses.      Heart sounds: Normal heart sounds. No murmur heard.     No friction rub. No gallop.   Pulmonary:      Effort: Pulmonary effort is normal. No respiratory distress.      Breath sounds: Normal breath sounds. No decreased breath sounds, wheezing, rhonchi or rales.   Abdominal:      General: Abdomen is flat. There is no distension.      Palpations: Abdomen is soft.      Tenderness: There is no abdominal tenderness. There is no guarding or rebound.   Musculoskeletal:         General: No deformity. Normal range of motion.      Cervical back: Normal range of motion.      Right lower leg: No edema.      Left lower leg: No edema.   Skin:     General: Skin is warm and dry.      Capillary Refill: Capillary refill takes less than 2 seconds.      Findings: No rash.   Neurological:      General: No focal deficit present.      Mental Status: She is alert and oriented to person, place, and time.      Gait: Gait normal.   Psychiatric:         Mood and Affect: Mood normal.         Results Reviewed       Procedure Component Value Units Date/Time    HS Troponin 0hr (reflex protocol) [[id number]]  (Normal) Collected: 10/12/24 1545    Lab Status: Final result Specimen: Blood from Arm, Left Updated: 10/12/24 1610     hs TnI 0hr 4 ng/L     Basic metabolic panel [[id number]]  (Abnormal) Collected: 10/12/24 1545    Lab Status: Final result Specimen: Blood from Arm, Left Updated: 10/12/24 1605     Sodium 141 mmol/L      Potassium 3.8 mmol/L      Chloride 109 mmol/L      CO2 25 mmol/L      ANION  GAP 7 mmol/L      BUN 16 mg/dL      Creatinine 0.89 mg/dL      Glucose 86 mg/dL      Calcium 9.0 mg/dL      eGFR 69 ml/min/1.73sq m     Narrative:      National Kidney Disease Foundation guidelines for Chronic Kidney Disease (CKD):     Stage 1 with normal or high GFR (GFR > 90 mL/min/1.73 square meters)    Stage 2 Mild CKD (GFR = 60-89 mL/min/1.73 square meters)    Stage 3A Moderate CKD (GFR = 45-59 mL/min/1.73 square meters)    Stage 3B Moderate CKD (GFR = 30-44 mL/min/1.73 square meters)    Stage 4 Severe CKD (GFR = 15-29 mL/min/1.73 square meters)    Stage 5 End Stage CKD (GFR <15 mL/min/1.73 square meters)  Note: GFR calculation is accurate only with a steady state creatinine    CBC and differential [[id number]]  (Abnormal) Collected: 10/12/24 1549    Lab Status: Final result Specimen: Blood from Arm, Left Updated: 10/12/24 2472     WBC 5.47 Thousand/uL      RBC 3.86 Million/uL      Hemoglobin 12.7 g/dL      Hematocrit 36.4 %      MCV 94 fL      MCH 32.9 pg      MCHC 34.9 g/dL      RDW 13.2 %      MPV 11.0 fL      Platelets 143 Thousands/uL      nRBC 0 /100 WBCs      Segmented % 61 %      Immature Grans % 0 %      Lymphocytes % 30 %      Monocytes % 6 %      Eosinophils Relative 2 %      Basophils Relative 1 %      Absolute Neutrophils 3.28 Thousands/µL      Absolute Immature Grans 0.02 Thousand/uL      Absolute Lymphocytes 1.66 Thousands/µL      Absolute Monocytes 0.35 Thousand/µL      Eosinophils Absolute 0.13 Thousand/µL      Basophils Absolute 0.03 Thousands/µL             XR chest 2 views   ED Interpretation by John Trujillo MD (10/12 9248)   No acute cardiopulmonary findings per my interpretation           Procedures    ED Medication and Procedure Management   Prior to Admission Medications   Prescriptions Last Dose Informant Patient Reported? Taking?   Blood Pressure Monitoring (BLOOD PRESSURE CUFF) MISC   No No   Sig: by Does not apply route daily   apixaban (Eliquis) 5 mg   No No   Sig: Take 1 tablet  by mouth twice daily   atorvastatin (LIPITOR) 10 mg tablet   No No   Sig: Take 1 tablet (10 mg total) by mouth daily at bedtime   benztropine (COGENTIN) 0.5 mg tablet   No No   Sig: TAKE 1 TABLET BY MOUTH ONCE DAILY AT BEDTIME   bisacodyl (DULCOLAX) 5 mg EC tablet   Yes No   Sig: Take 5 mg by mouth once a week   cholecalciferol (VITAMIN D3) 1,000 units tablet   No No   Sig: Take 1 tablet (1,000 Units total) by mouth daily   ferrous sulfate 324 (65 Fe) mg  Family Member No No   Sig: Take 1 tablet (324 mg total) by mouth daily before breakfast   folic acid (FOLVITE) 1 mg tablet   Yes No   Sig: Take 1,000 mcg by mouth daily   gabapentin (NEURONTIN) 300 mg capsule  Family Member Yes No   Sig: Take 300 mg by mouth 2 (two) times a day   hydrOXYzine HCL (ATARAX) 50 mg tablet   No No   Sig: Take 1 tablet (50 mg total) by mouth daily at bedtime as needed for anxiety   hydroCHLOROthiazide 12.5 mg tablet   No No   Sig: Take 1 tablet (12.5 mg total) by mouth daily   levothyroxine 50 mcg tablet   No No   Sig: Take 1 tablet by mouth once daily   mirtazapine (REMERON) 45 MG tablet  Family Member Yes No   Sig: Take 45 mg by mouth daily at bedtime   prazosin (MINIPRESS) 2 mg capsule   No No   Sig: Take 2 capsules (4 mg total) by mouth daily at bedtime   risperiDONE (RisperDAL) 3 mg tablet   No No   Sig: Take 1 tablet by mouth once daily   venlafaxine (EFFEXOR) 100 MG tablet   No No   Sig: Take 1 tablet (100 mg total) by mouth in the morning      Facility-Administered Medications: None     Patient's Medications   Discharge Prescriptions    ACETAMINOPHEN (TYLENOL) 500 MG TABLET    Take 1 tablet (500 mg total) by mouth every 6 (six) hours as needed for mild pain       Start Date: 10/12/2024End Date: --       Order Dose: 500 mg       Quantity: 30 tablet    Refills: 0    LIDOCAINE (LIDODERM) 5 %    Apply 1 patch topically over 12 hours daily Remove & Discard patch within 12 hours or as directed by MD       Start Date: 10/12/2024End Date:  --       Order Dose: 1 patch       Quantity: 14 patch    Refills: 0    METHOCARBAMOL (ROBAXIN) 500 MG TABLET    Take 1 tablet (500 mg total) by mouth 2 (two) times a day       Start Date: 10/12/2024End Date: --       Order Dose: 500 mg       Quantity: 20 tablet    Refills: 0    NAPROXEN (NAPROSYN) 500 MG TABLET    Take 1 tablet (500 mg total) by mouth 2 (two) times a day with meals       Start Date: 10/12/2024End Date: --       Order Dose: 500 mg       Quantity: 30 tablet    Refills: 0     No discharge procedures on file.  ED SEPSIS DOCUMENTATION   Time reflects when diagnosis was documented in both MDM as applicable and the Disposition within this note       Time User Action Codes Description Comment    10/12/2024  4:11 PM John Trujillo [R07.89] Non-cardiac chest pain     10/12/2024  4:11 PM John Trujillo [M54.50] Low back pain                  John Trujillo MD  10/12/24 5694

## 2024-10-13 LAB
ATRIAL RATE: 65 BPM
P AXIS: -13 DEGREES
PR INTERVAL: 132 MS
QRS AXIS: 43 DEGREES
QRSD INTERVAL: 74 MS
QT INTERVAL: 404 MS
QTC INTERVAL: 420 MS
T WAVE AXIS: 43 DEGREES
VENTRICULAR RATE: 65 BPM

## 2024-10-13 PROCEDURE — 93010 ELECTROCARDIOGRAM REPORT: CPT | Performed by: STUDENT IN AN ORGANIZED HEALTH CARE EDUCATION/TRAINING PROGRAM

## 2024-10-17 DIAGNOSIS — F33.9 RECURRENT MAJOR DEPRESSIVE DISORDER (HCC): Chronic | ICD-10-CM

## 2024-10-21 RX ORDER — RISPERIDONE 3 MG/1
3 TABLET ORAL DAILY
Qty: 10 TABLET | Refills: 0 | Status: SHIPPED | OUTPATIENT
Start: 2024-10-21

## 2024-10-28 DIAGNOSIS — F41.9 ANXIETY: ICD-10-CM

## 2024-10-28 RX ORDER — HYDROXYZINE HYDROCHLORIDE 50 MG/1
TABLET, FILM COATED ORAL
Qty: 90 TABLET | Refills: 1 | Status: SHIPPED | OUTPATIENT
Start: 2024-10-28

## 2024-10-29 DIAGNOSIS — F33.9 RECURRENT MAJOR DEPRESSIVE DISORDER (HCC): Chronic | ICD-10-CM

## 2024-10-29 RX ORDER — RISPERIDONE 3 MG/1
3 TABLET ORAL DAILY
Qty: 10 TABLET | Refills: 0 | Status: SHIPPED | OUTPATIENT
Start: 2024-10-29 | End: 2024-11-06

## 2024-11-06 ENCOUNTER — OFFICE VISIT (OUTPATIENT)
Dept: FAMILY MEDICINE CLINIC | Facility: CLINIC | Age: 64
End: 2024-11-06
Payer: COMMERCIAL

## 2024-11-06 VITALS
BODY MASS INDEX: 24.49 KG/M2 | SYSTOLIC BLOOD PRESSURE: 124 MMHG | DIASTOLIC BLOOD PRESSURE: 68 MMHG | HEART RATE: 64 BPM | OXYGEN SATURATION: 98 % | TEMPERATURE: 97.9 F | RESPIRATION RATE: 16 BRPM | WEIGHT: 147 LBS | HEIGHT: 65 IN

## 2024-11-06 DIAGNOSIS — Z13.1 SCREENING FOR DIABETES MELLITUS: ICD-10-CM

## 2024-11-06 DIAGNOSIS — I10 ESSENTIAL HYPERTENSION: ICD-10-CM

## 2024-11-06 DIAGNOSIS — F33.9 RECURRENT MAJOR DEPRESSIVE DISORDER (HCC): Chronic | ICD-10-CM

## 2024-11-06 DIAGNOSIS — Z12.11 SCREEN FOR COLON CANCER: ICD-10-CM

## 2024-11-06 DIAGNOSIS — R26.81 UNSTEADY GAIT WHEN WALKING: ICD-10-CM

## 2024-11-06 DIAGNOSIS — F31.9 BIPOLAR AFFECTIVE DISORDER, REMISSION STATUS UNSPECIFIED (HCC): ICD-10-CM

## 2024-11-06 DIAGNOSIS — Z13.6 SCREENING FOR HEART DISEASE: ICD-10-CM

## 2024-11-06 DIAGNOSIS — Z78.0 POSTMENOPAUSAL: ICD-10-CM

## 2024-11-06 DIAGNOSIS — F99 PSYCHIATRIC ILLNESS: ICD-10-CM

## 2024-11-06 DIAGNOSIS — Z11.4 SCREENING FOR HIV (HUMAN IMMUNODEFICIENCY VIRUS): ICD-10-CM

## 2024-11-06 DIAGNOSIS — E03.9 HYPOTHYROIDISM, UNSPECIFIED TYPE: ICD-10-CM

## 2024-11-06 DIAGNOSIS — Z00.00 ANNUAL PHYSICAL EXAM: Primary | ICD-10-CM

## 2024-11-06 DIAGNOSIS — Z12.31 ENCOUNTER FOR SCREENING MAMMOGRAM FOR MALIGNANT NEOPLASM OF BREAST: ICD-10-CM

## 2024-11-06 DIAGNOSIS — Z91.89 AT RISK FOR SIDE EFFECT OF MEDICATION: ICD-10-CM

## 2024-11-06 DIAGNOSIS — F33.9 RECURRENT MAJOR DEPRESSIVE DISORDER, REMISSION STATUS UNSPECIFIED (HCC): Chronic | ICD-10-CM

## 2024-11-06 DIAGNOSIS — I82.501 CHRONIC DEEP VEIN THROMBOSIS (DVT) OF RIGHT LOWER EXTREMITY, UNSPECIFIED VEIN (HCC): ICD-10-CM

## 2024-11-06 DIAGNOSIS — Z11.59 NEED FOR HEPATITIS C SCREENING TEST: ICD-10-CM

## 2024-11-06 PROCEDURE — 99214 OFFICE O/P EST MOD 30 MIN: CPT | Performed by: NURSE PRACTITIONER

## 2024-11-06 PROCEDURE — 99396 PREV VISIT EST AGE 40-64: CPT | Performed by: NURSE PRACTITIONER

## 2024-11-06 RX ORDER — RISPERIDONE 3 MG/1
3 TABLET ORAL DAILY
Qty: 10 TABLET | Refills: 0 | Status: SHIPPED | OUTPATIENT
Start: 2024-11-06

## 2024-11-06 NOTE — PROGRESS NOTES
Adult Annual Physical  Name: Karen Jean Baptiste      : 1960      MRN: [id number]  Encounter Provider: JAMILA Johns  Encounter Date: 2024   Encounter department: [facility]    Assessment & Plan  Annual physical exam  Exam Unremarkable. Provided education on healthy dietary choices, and regular exercise as tolerated. Discussed influenza vaccine, patient declined. Mammogram, dxa ordered; referred to GI for repeat colonoscopy.  Last colonoscopy  had poor prep so was advised 3 month repeat with 2 day prep.  Pt verbalizes understanding. Repeat labs ordered.  Schedule eye exam, she says she does not need a referral.       Encounter for screening mammogram for malignant neoplasm of breast    Orders:    Mammo screening bilateral w 3d and cad; Future    Hypothyroidism, unspecified type  thin hair, and dry skin. Patient reports running out of her medication for unknown amount of time. Will need updated labs.   Orders:    TSH, 3rd generation with Free T4 reflex; Future    Chronic deep vein thrombosis (DVT) of right lower extremity, unspecified vein (HCC)  On eliquis.  She does not know the cause of DVT.  She is interested in finding out if she can come off eliqus.  Coagulation profile was ordered last year but not completed, new order put in.    Orders:    Coagulation Profile; Future    Postmenopausal  Continue vit D, dxa ordered.  Orders:    DXA bone density spine hip and pelvis; Future    Unsteady gait when walking  Patient reports feeling unsteady ambulating; gait observed is unremarkable but she has generalized weakness.  Daughter wants pt to have a new cane, pt does not like to use the cane.  Will order, unsure if it will be covered or not.  Referral to physical therapy for evaluation and treatment, would likely benefit from strengthening and stability work.  Orders:    Ambulatory Referral to Physical Therapy; Future    At risk for side effect of medication  Prolonged use of  risperidone without recent labs. Prescribed 10 day course until patient can obtain updated labs.   Orders:    Prolactin; Future    Psychiatric illness  Bipolar affective disorder, major depression, generalized anxiety disorder, insomnia.  Patient reports chronic continued depressive and anxious feelings without suicidal ideation.  PHQ-9 score of 16 today, indicating moderate depression.  LIZETT-7 score of 13 today, reports this is a chronic finding. Previously referred to psychiatry, seems there was trouble connecting with pt and/or daughter.  Placed new referral to psychiatry.  Orders:    Ambulatory referral to Psych Services; Future    Bipolar affective disorder, remission status unspecified (HCC)  As above, continue current meds, she needs to call to let me know what she needs filled since both she and her daughter are unsure.  Referred to psychiatry again, hopefully they can get her in asap because her diagnoses and medications and affect are more complex than management in primary care.  Orders:    Ambulatory referral to Psych Services; Future    Essential hypertension  Stable with current management., continue hctz.         Recurrent major depressive disorder, remission status unspecified (HCC)  Depression Screening Follow-up Plan: Patient's depression screening was positive with a PHQ-9 score of 16. Patient with underlying depression and was advised to continue current medications as prescribed. Patient assessed for underlying major depression. They have no active suicidal ideations. Brief counseling provided and recommend additional follow-up/re-evaluation next office visit.           Immunizations and preventive care screenings were discussed with patient today. Appropriate education was printed on patient's after visit summary.    Counseling:  Alcohol/drug use: discussed moderation in alcohol intake, the recommendations for healthy alcohol use, and avoidance of illicit drug use.  Dental Health: discussed  importance of regular tooth brushing, flossing, and dental visits.  Injury prevention: discussed safety/seat belts, safety helmets, smoke detectors, carbon monoxide detectors, and smoking near bedding or upholstery.  Exercise: the importance of regular exercise/physical activity was discussed. Recommend exercise 3-5 times per week for at least 30 minutes.          History of Present Illness     Adult Annual Physical:  Patient presents for annual physical. Karen is a 64 year old female here today for her annual physical..     Diet and Physical Activity:  - Diet/Nutrition: well balanced diet.  - Exercise: no formal exercise.    Depression Screening:    - PHQ-9 Score: 16    General Health:  - Sleep: 4-6 hours of sleep on average, sleeps poorly and snores loudly. Reported by family. Also has difficulty falling asleep as she was previously on sleeping pills, but not any longer.  - Hearing: decreased hearing bilateral ears and tinnitus. Chronic Tinnitus  - Vision: wears glasses and goes for regular eye exams.  - Dental: regular dental visits and brushes teeth once daily. Flosses daily    /GYN Health:  - Follows with GYN: yes.   - Menopause: postmenopausal.     Review of Systems   Constitutional:  Negative for chills, fatigue and fever.   HENT:  Positive for congestion (Recent cold), hearing loss (Chronic) and rhinorrhea (Recent cold). Negative for ear pain, postnasal drip, sinus pressure, sinus pain, sore throat and tinnitus.    Eyes:  Positive for visual disturbance (Blurry, even with glasses, overdue for eye exam). Negative for pain.   Respiratory:  Negative for cough and shortness of breath.    Cardiovascular:  Positive for chest pain (ED visit relates this to MSK etiology). Negative for palpitations.   Gastrointestinal:  Negative for abdominal pain, constipation, diarrhea, nausea and vomiting.   Genitourinary:  Negative for difficulty urinating, dysuria, frequency and hematuria.   Musculoskeletal:  Positive for  "arthralgias (Lumbar Spine) and back pain (Lumbar Spine). Negative for myalgias (Chest wall).   Skin:  Negative for color change and rash.   Neurological:  Negative for dizziness, syncope, weakness, light-headedness, numbness and headaches.   Psychiatric/Behavioral:  Positive for dysphoric mood (Chronic) and sleep disturbance (Difficulty falling and staying asleep). Negative for self-injury and suicidal ideas. The patient is nervous/anxious (Chronic).    All other systems reviewed and are negative.        Objective     /68 (BP Location: Left arm, Patient Position: Sitting, Cuff Size: Standard)   Pulse 64   Temp 97.9 °F (36.6 °C) (Tympanic)   Resp 16   Ht 5' 5\" (1.651 m)   Wt 66.7 kg (147 lb)   SpO2 98%   BMI 24.46 kg/m²     Physical Exam  Vitals and nursing note reviewed.   Constitutional:       General: She is awake. She is not in acute distress.     Appearance: Normal appearance. She is well-developed and well-groomed. She is not ill-appearing.   HENT:      Head: Normocephalic and atraumatic. Hair is abnormal (Thinning).      Right Ear: Hearing, tympanic membrane, ear canal and external ear normal.      Left Ear: Hearing, tympanic membrane, ear canal and external ear normal.      Nose: Congestion and rhinorrhea present. Rhinorrhea is clear.      Right Turbinates: Swollen.      Left Turbinates: Swollen.      Mouth/Throat:      Lips: Pink.      Mouth: Mucous membranes are moist.      Pharynx: Oropharynx is clear. Uvula midline. No posterior oropharyngeal erythema or postnasal drip.   Eyes:      Conjunctiva/sclera: Conjunctivae normal.      Pupils: Pupils are equal, round, and reactive to light.   Neck:      Thyroid: No thyroid mass, thyromegaly or thyroid tenderness.      Vascular: Normal carotid pulses. No carotid bruit or JVD.   Cardiovascular:      Rate and Rhythm: Normal rate and regular rhythm.      Pulses: Normal pulses.      Heart sounds: Murmur heard.   Pulmonary:      Effort: Pulmonary effort is " normal. No respiratory distress.      Breath sounds: Normal breath sounds and air entry.   Abdominal:      General: Abdomen is flat. Bowel sounds are normal. There is no distension.      Palpations: Abdomen is soft.      Tenderness: There is no abdominal tenderness.   Musculoskeletal:         General: No swelling.      Cervical back: Neck supple. No edema or tenderness.      Right lower leg: No edema.      Left lower leg: No edema.   Skin:     General: Skin is warm and dry.      Capillary Refill: Capillary refill takes less than 2 seconds.      Coloration: Skin is pale.   Neurological:      General: No focal deficit present.      Mental Status: She is alert and oriented to person, place, and time. Mental status is at baseline.      Motor: Weakness (Generalized) present.      Gait: Gait normal.   Psychiatric:         Attention and Perception: Attention and perception normal.         Mood and Affect: Mood normal. Affect is flat.         Speech: Speech is delayed.         Behavior: Behavior is cooperative.         Thought Content: Thought content normal.         Cognition and Memory: Cognition normal.         Judgment: Judgment normal.

## 2024-11-06 NOTE — ASSESSMENT & PLAN NOTE
Bipolar affective disorder, major depression, generalized anxiety disorder, insomnia.  Patient reports chronic continued depressive and anxious feelings without suicidal ideation.  PHQ-9 score of 16 today, indicating moderate depression.  LIZETT-7 score of 13 today, reports this is a chronic finding. Previously referred to psychiatry, seems there was trouble connecting with pt and/or daughter.  Placed new referral to psychiatry.  Orders:    Ambulatory referral to Psych Services; Future

## 2024-11-06 NOTE — ASSESSMENT & PLAN NOTE
Depression Screening Follow-up Plan: Patient's depression screening was positive with a PHQ-9 score of 16. Patient with underlying depression and was advised to continue current medications as prescribed. Patient assessed for underlying major depression. They have no active suicidal ideations. Brief counseling provided and recommend additional follow-up/re-evaluation next office visit.

## 2024-11-06 NOTE — TELEPHONE ENCOUNTER
Requested medication(s) are due for refill today: Yes  Patient has already received a courtesy refill: No  Other reason request has been forwarded to provider: per protocol   Implemented All Fall Risk Interventions:  [place] to call system. Call bell, personal items and telephone within reach. Instruct patient to call for assistance. Room bathroom lighting operational. Non-slip footwear when patient is off stretcher. Physically safe environment: no spills, clutter or unnecessary equipment. Stretcher in lowest position, wheels locked, appropriate side rails in place. Provide visual cue, wrist band, yellow gown, etc. Monitor gait and stability. Monitor for mental status changes and reorient to person, place, and time. Review medications for side effects contributing to fall risk. Reinforce activity limits and safety measures with patient and family.

## 2024-11-06 NOTE — ASSESSMENT & PLAN NOTE
As above, continue current meds, she needs to call to let me know what she needs filled since both she and her daughter are unsure.  Referred to psychiatry again, hopefully they can get her in asap because her diagnoses and medications and affect are more complex than management in primary care.  Orders:    Ambulatory referral to Psych Services; Future

## 2024-11-06 NOTE — ASSESSMENT & PLAN NOTE
Exam Unremarkable. Provided education on healthy dietary choices, and regular exercise as tolerated. Discussed influenza vaccine, patient declined. Mammogram, dxa ordered; referred to GI for repeat colonoscopy.  Last colonoscopy 2020 had poor prep so was advised 3 month repeat with 2 day prep.  Pt verbalizes understanding. Repeat labs ordered.  Schedule eye exam, she says she does not need a referral.

## 2024-11-06 NOTE — ASSESSMENT & PLAN NOTE
thin hair, and dry skin. Patient reports running out of her medication for unknown amount of time. Will need updated labs.   Orders:    TSH, 3rd generation with Free T4 reflex; Future

## 2024-11-06 NOTE — PATIENT INSTRUCTIONS
"Patient Education     Routine physical for adults   The Basics   Written by the doctors and editors at [facility]   What is a physical? -- A physical is a routine visit, or \"check-up,\" with your doctor. You might also hear it called a \"wellness visit\" or \"preventive visit.\"  During each visit, the doctor will:   Ask about your physical and mental health   Ask about your habits, behaviors, and lifestyle   Do an exam   Give you vaccines if needed   Talk to you about any medicines you take   Give advice about your health   Answer your questions  Getting regular check-ups is an important part of taking care of your health. It can help your doctor find and treat any problems you have. But it's also important for preventing health problems.  A routine physical is different from a \"sick visit.\" A sick visit is when you see a doctor because of a health concern or problem. Since physicals are scheduled ahead of time, you can think about what you want to ask the doctor.  How often should I get a physical? -- It depends on your age and health. In general, for people age 21 years and older:   If you are younger than 50 years, you might be able to get a physical every 3 years.   If you are 50 years or older, your doctor might recommend a physical every year.  If you have an ongoing health condition, like diabetes or high blood pressure, your doctor will probably want to see you more often.  What happens during a physical? -- In general, each visit will include:   Physical exam - The doctor or nurse will check your height, weight, heart rate, and blood pressure. They will also look at your eyes and ears. They will ask about how you are feeling and whether you have any symptoms that bother you.   Medicines - It's a good idea to bring a list of all the medicines you take to each doctor visit. Your doctor will talk to you about your medicines and answer any questions. Tell them if you are having any side effects that bother you. You " "should also tell them if you are having trouble paying for any of your medicines.   Habits and behaviors - This includes:   Your diet   Your exercise habits   Whether you smoke, drink alcohol, or use drugs   Whether you are sexually active   Whether you feel safe at home  Your doctor will talk to you about things you can do to improve your health and lower your risk of health problems. They will also offer help and support. For example, if you want to quit smoking, they can give you advice and might prescribe medicines. If you want to improve your diet or get more physical activity, they can help you with this, too.   Lab tests, if needed - The tests you get will depend on your age and situation. For example, your doctor might want to check your:   Cholesterol   Blood sugar   Iron level   Vaccines - The recommended vaccines will depend on your age, health, and what vaccines you already had. Vaccines are very important because they can prevent certain serious or deadly infections.   Discussion of screening - \"Screening\" means checking for diseases or other health problems before they cause symptoms. Your doctor can recommend screening based on your age, risk, and preferences. This might include tests to check for:   Cancer, such as breast, prostate, cervical, ovarian, colorectal, prostate, lung, or skin cancer   Sexually transmitted infections, such as chlamydia and gonorrhea   Mental health conditions like depression and anxiety  Your doctor will talk to you about the different types of screening tests. They can help you decide which screenings to have. They can also explain what the results might mean.   Answering questions - The physical is a good time to ask the doctor or nurse questions about your health. If needed, they can refer you to other doctors or specialists, too.  Adults older than 65 years often need other care, too. As you get older, your doctor will talk to you about:   How to prevent falling at " home   Hearing or vision tests   Memory testing   How to take your medicines safely   Making sure that you have the help and support you need at home  All topics are updated as new evidence becomes available and our peer review process is complete.  This topic retrieved from [employer] on: May 02, 2024.  Topic 120121 Version 1.0  Release: 32.4.3 - C32.122  © 2024 UpToDate, Inc. and/or its affiliates. All rights reserved.  Consumer Information Use and Disclaimer   Disclaimer: This generalized information is a limited summary of diagnosis, treatment, and/or medication information. It is not meant to be comprehensive and should be used as a tool to help the user understand and/or assess potential diagnostic and treatment options. It does NOT include all information about conditions, treatments, medications, side effects, or risks that may apply to a specific patient. It is not intended to be medical advice or a substitute for the medical advice, diagnosis, or treatment of a health care provider based on the health care provider's examination and assessment of a patient's specific and unique circumstances. Patients must speak with a health care provider for complete information about their health, medical questions, and treatment options, including any risks or benefits regarding use of medications. This information does not endorse any treatments or medications as safe, effective, or approved for treating a specific patient. UpToDate, Inc. and its affiliates disclaim any warranty or liability relating to this information or the use thereof.The use of this information is governed by the Terms of Use, available at https://www.[employer].com/en/know/clinical-effectiveness-terms. 2024© UpToDate, Inc. and its affiliates and/or licensors. All rights reserved.  Copyright   © 2024 UpToDate, Inc. and/or its affiliates. All rights reserved.

## 2024-11-06 NOTE — ASSESSMENT & PLAN NOTE
On eliquis.  She does not know the cause of DVT.  She is interested in finding out if she can come off eliqus.  Coagulation profile was ordered last year but not completed, new order put in.    Orders:    Coagulation Profile; Future

## 2024-11-07 ENCOUNTER — TELEPHONE (OUTPATIENT)
Age: 64
End: 2024-11-07

## 2024-11-07 NOTE — TELEPHONE ENCOUNTER
Contacted patient in regards to ASAP/STAT Referral in attempts to verify patient's needs of services and schedule soonest available appointment. Writer left vm to call intake at [phone]    Please transfer to intake to offer soonest appt.     Attempt #1

## 2024-11-08 NOTE — TELEPHONE ENCOUNTER
Contacted patient in regards to ASAP/STAT Referral in attempts to verify patient's needs of services and schedule soonest available appointment. Writer left vm to call intake at [phone]    Please transfer to intake to offer soonest appt.     Attempt #2

## 2024-11-11 NOTE — TELEPHONE ENCOUNTER
Contacted patient in regards to ASAP/STAT Referral in attempts to verify patient's needs of services and schedule soonest available appointment. Writer left vm to call intake at [phone]    Please transfer to intake to offer soonest appt.     Attempt #3  Referral closed due to unable to contact pt.

## 2024-11-23 DIAGNOSIS — F33.9 RECURRENT MAJOR DEPRESSIVE DISORDER (HCC): Chronic | ICD-10-CM

## 2024-11-25 RX ORDER — RISPERIDONE 3 MG/1
3 TABLET ORAL DAILY
Qty: 30 TABLET | Refills: 0 | Status: SHIPPED | OUTPATIENT
Start: 2024-11-25

## 2024-12-10 ENCOUNTER — OFFICE VISIT (OUTPATIENT)
Dept: URGENT CARE | Age: 64
End: 2024-12-10
Payer: COMMERCIAL

## 2024-12-10 VITALS
HEIGHT: 65 IN | RESPIRATION RATE: 18 BRPM | SYSTOLIC BLOOD PRESSURE: 132 MMHG | HEART RATE: 69 BPM | WEIGHT: 150 LBS | OXYGEN SATURATION: 98 % | BODY MASS INDEX: 24.99 KG/M2 | TEMPERATURE: 97.8 F | DIASTOLIC BLOOD PRESSURE: 76 MMHG

## 2024-12-10 DIAGNOSIS — H61.21 IMPACTED CERUMEN OF RIGHT EAR: Primary | ICD-10-CM

## 2024-12-10 PROCEDURE — 69209 REMOVE IMPACTED EAR WAX UNI: CPT | Performed by: EMERGENCY MEDICINE

## 2024-12-10 PROCEDURE — 99213 OFFICE O/P EST LOW 20 MIN: CPT | Performed by: EMERGENCY MEDICINE

## 2024-12-10 NOTE — PATIENT INSTRUCTIONS
Can continue debrox drops in the future for ear wax build up.  Acetaminophen for pain.  PCP follow-up in 2-3 days.  Proceed to the ER if symptoms worsen.

## 2024-12-10 NOTE — PROGRESS NOTES
[facility]        NAME: Karen Jean Baptiste is a 64 y.o. female  : 1960    MRN: [id number]  DATE: December 10, 2024  TIME: 5:48 PM      Assessment and Plan     Impacted cerumen of right ear [H61.21]  1. Impacted cerumen of right ear              Patient Instructions     Can continue debrox drops in the future for ear wax build up.  Acetaminophen for pain.  PCP follow-up in 2-3 days.  Proceed to the ER if symptoms worsen.     Chief Complaint     Chief Complaint   Patient presents with    Earache     C/o of right ear pain for 7 days, states that she thought it may be just wax and has been using a wax remover kit, still no improvement.          History of Present Illness     Patient is a 64-year-old female who presents with fullness in right ear over the past week.  States she has been using over-the-counter earwax remover kit but no improvement. Reports decreased hearing.  Denies drainage.  Denies fever.  Denies ear pain.    Earache   Associated symptoms include hearing loss (right ear). Pertinent negatives include no ear discharge.       Review of Systems     Review of Systems   HENT:  Positive for hearing loss (right ear). Negative for ear discharge and ear pain.    All other systems reviewed and are negative.        Current Medications       Current Outpatient Medications:     acetaminophen (TYLENOL) 500 mg tablet, Take 1 tablet (500 mg total) by mouth every 6 (six) hours as needed for mild pain, Disp: 30 tablet, Rfl: 0    apixaban (Eliquis) 5 mg, Take 1 tablet by mouth twice daily, Disp: 60 tablet, Rfl: 5    atorvastatin (LIPITOR) 10 mg tablet, Take 1 tablet (10 mg total) by mouth daily at bedtime, Disp: 90 tablet, Rfl: 1    benztropine (COGENTIN) 0.5 mg tablet, TAKE 1 TABLET BY MOUTH ONCE DAILY AT BEDTIME, Disp: 90 tablet, Rfl: 1    bisacodyl (DULCOLAX) 5 mg EC tablet, Take 5 mg by mouth once a week, Disp: , Rfl:     Blood Pressure Monitoring (BLOOD PRESSURE CUFF) MISC, by Does not apply route daily,  Disp: 1 each, Rfl: 0    cholecalciferol (VITAMIN D3) 1,000 units tablet, Take 1 tablet (1,000 Units total) by mouth daily, Disp: 90 tablet, Rfl: 0    ferrous sulfate 324 (65 Fe) mg, Take 1 tablet (324 mg total) by mouth daily before breakfast, Disp: 30 tablet, Rfl: 0    folic acid (FOLVITE) 1 mg tablet, Take 1,000 mcg by mouth daily (Patient not taking: Reported on 11/6/2024), Disp: , Rfl:     gabapentin (NEURONTIN) 300 mg capsule, Take 300 mg by mouth 2 (two) times a day (Patient not taking: Reported on 11/6/2024), Disp: , Rfl: 0    hydroCHLOROthiazide 12.5 mg tablet, Take 1 tablet (12.5 mg total) by mouth daily, Disp: 90 tablet, Rfl: 1    hydrOXYzine HCL (ATARAX) 50 mg tablet, TAKE 1 TABLET BY MOUTH ONCE DAILY AT BEDTIME AS NEEDED FOR ANXIETY, Disp: 90 tablet, Rfl: 1    levothyroxine 50 mcg tablet, Take 1 tablet by mouth once daily, Disp: 30 tablet, Rfl: 0    lidocaine (Lidoderm) 5 %, Apply 1 patch topically over 12 hours daily Remove & Discard patch within 12 hours or as directed by MD, Disp: 14 patch, Rfl: 0    methocarbamol (ROBAXIN) 500 mg tablet, Take 1 tablet (500 mg total) by mouth 2 (two) times a day (Patient not taking: Reported on 11/6/2024), Disp: 20 tablet, Rfl: 0    mirtazapine (REMERON) 45 MG tablet, Take 45 mg by mouth daily at bedtime (Patient not taking: Reported on 11/6/2024), Disp: , Rfl: 0    naproxen (Naprosyn) 500 mg tablet, Take 1 tablet (500 mg total) by mouth 2 (two) times a day with meals (Patient not taking: Reported on 11/6/2024), Disp: 30 tablet, Rfl: 0    prazosin (MINIPRESS) 2 mg capsule, Take 2 capsules (4 mg total) by mouth daily at bedtime, Disp: 180 capsule, Rfl: 1    risperiDONE (RisperDAL) 3 mg tablet, Take 1 tablet by mouth once daily, Disp: 30 tablet, Rfl: 0    venlafaxine (EFFEXOR) 100 MG tablet, Take 1 tablet (100 mg total) by mouth in the morning (Patient not taking: Reported on 11/6/2024), Disp: 90 tablet, Rfl: 1    Current Allergies     Allergies as of 12/10/2024    (No  "Known Allergies)              The following portions of the patient's history were reviewed and updated as appropriate: allergies, current medications, past family history, past medical history, past social history, past surgical history and problem list.     Past Medical History:   Diagnosis Date    IRINEO (acute kidney injury) (HCC) 01/09/2020    Anemia 01/09/2020    Anxiety     Depression     Disease of thyroid gland     Heart murmur     Hypertension     Psychiatric disorder     Substance abuse (HCC)     former heroin addiction stopped several decades ago       Past Surgical History:   Procedure Laterality Date    BRAIN SURGERY      had a hemorrhage; patient can not tell any other details.    US GUIDED INJECTION FOR RESEARCH STUDY  12/30/2016       Family History   Problem Relation Age of Onset    Mental illness Mother     Asthma Mother     Asthma Father     Cancer Father     No Known Problems Son     No Known Problems Son     No Known Problems Son     No Known Problems Daughter     No Known Problems Daughter          Medications have been verified.        Objective     /76   Pulse 69   Temp 97.8 °F (36.6 °C) (Tympanic)   Resp 18   Ht 5' 5\" (1.651 m)   Wt 68 kg (150 lb)   SpO2 98%   BMI 24.96 kg/m²   No LMP recorded. Patient is postmenopausal.         Physical Exam     Physical Exam  Vitals and nursing note reviewed.   Constitutional:       General: She is not in acute distress.     Appearance: Normal appearance. She is not ill-appearing, toxic-appearing or diaphoretic.   HENT:      Right Ear: There is impacted cerumen. Tympanic membrane is not injected, erythematous or bulging.      Left Ear: Tympanic membrane is not injected, erythematous or bulging.      Ears:      Comments: Reports Aleve monthly for menstrual cycle.  Denies history of sleep apnea hypertension.  Reports social alcohol use.  Denies prescription medications  Neurological:      Mental Status: She is alert.   Psychiatric:         Mood " and Affect: Mood normal.         Behavior: Behavior normal.         Thought Content: Thought content normal.         Judgment: Judgment normal.     Ear cerumen removal    Date/Time: 12/10/2024 5:00 PM    Performed by: JAMILA Louie  Authorized by: JAMILA Louie  Universal Protocol:  procedure performed by consultantConsent: Verbal consent obtained.  Risks and benefits: risks, benefits and alternatives were discussed  Consent given by: patient  Patient understanding: patient states understanding of the procedure being performed  Patient identity confirmed: verbally with patient    Patient location:  Clinic  Procedure details:     Location:  R ear    Procedure type: irrigation only      Approach:  External  Post-procedure details:     Complication:  None    Hearing quality:  Improved (back to normal)    Patient tolerance of procedure:  Tolerated well, no immediate complications

## 2024-12-13 DIAGNOSIS — I82.402 ACUTE DEEP VEIN THROMBOSIS (DVT) OF LEFT LOWER EXTREMITY, UNSPECIFIED VEIN (HCC): ICD-10-CM

## 2024-12-13 DIAGNOSIS — I10 ESSENTIAL HYPERTENSION: ICD-10-CM

## 2024-12-13 RX ORDER — PRAZOSIN HYDROCHLORIDE 2 MG/1
4 CAPSULE ORAL
Qty: 180 CAPSULE | Refills: 1 | Status: SHIPPED | OUTPATIENT
Start: 2024-12-13

## 2024-12-13 RX ORDER — APIXABAN 5 MG/1
5 TABLET, FILM COATED ORAL 2 TIMES DAILY
Qty: 60 TABLET | Refills: 0 | Status: SHIPPED | OUTPATIENT
Start: 2024-12-13

## 2024-12-13 NOTE — TELEPHONE ENCOUNTER
Pt requested a refill for the prazosin (MINIPRESS) 2 mg capsule. Please send to:       [facility] [id number] [facility]     Thank you for your help.

## 2024-12-18 ENCOUNTER — APPOINTMENT (OUTPATIENT)
Dept: LAB | Facility: HOSPITAL | Age: 64
End: 2024-12-18
Payer: COMMERCIAL

## 2024-12-18 DIAGNOSIS — Z91.89 AT RISK FOR SIDE EFFECT OF MEDICATION: ICD-10-CM

## 2024-12-18 DIAGNOSIS — Z13.1 SCREENING FOR DIABETES MELLITUS: ICD-10-CM

## 2024-12-18 DIAGNOSIS — I82.501 CHRONIC DEEP VEIN THROMBOSIS (DVT) OF RIGHT LOWER EXTREMITY, UNSPECIFIED VEIN (HCC): ICD-10-CM

## 2024-12-18 DIAGNOSIS — E03.9 HYPOTHYROIDISM, UNSPECIFIED TYPE: ICD-10-CM

## 2024-12-18 DIAGNOSIS — Z11.4 SCREENING FOR HIV (HUMAN IMMUNODEFICIENCY VIRUS): ICD-10-CM

## 2024-12-18 DIAGNOSIS — Z13.6 SCREENING FOR HEART DISEASE: ICD-10-CM

## 2024-12-18 DIAGNOSIS — Z11.59 NEED FOR HEPATITIS C SCREENING TEST: ICD-10-CM

## 2024-12-18 LAB
APTT PPP: 29 SECONDS (ref 23–34)
CHOLEST SERPL-MCNC: 164 MG/DL (ref ?–200)
EST. AVERAGE GLUCOSE BLD GHB EST-MCNC: 94 MG/DL
FIBRINOGEN PPP-MCNC: 324 MG/DL (ref 206–523)
HBA1C MFR BLD: 4.9 %
HCV AB SER QL: NORMAL
HDLC SERPL-MCNC: 56 MG/DL
HIV 1+2 AB+HIV1 P24 AG SERPL QL IA: NORMAL
HIV 2 AB SERPL QL IA: NORMAL
HIV1 AB SERPL QL IA: NORMAL
HIV1 P24 AG SERPL QL IA: NORMAL
INR PPP: 1 (ref 0.85–1.19)
LDLC SERPL CALC-MCNC: 89 MG/DL (ref 0–100)
NONHDLC SERPL-MCNC: 108 MG/DL
PLATELET # BLD AUTO: 167 THOUSANDS/UL (ref 149–390)
PMV BLD AUTO: 11.3 FL (ref 8.9–12.7)
PROLACTIN SERPL-MCNC: 54.67 NG/ML (ref 2.74–19.64)
PROTHROMBIN TIME: 13.4 SECONDS (ref 12.3–15)
T4 FREE SERPL-MCNC: 0.69 NG/DL (ref 0.61–1.12)
THROMBIN TIME: 16.8 SECONDS (ref 14.7–18.4)
THROMBIN TIME: 16.8 SECONDS (ref 14.7–18.4)
TRIGL SERPL-MCNC: 96 MG/DL (ref ?–150)
TSH SERPL DL<=0.05 MIU/L-ACNC: 6.62 UIU/ML (ref 0.45–4.5)

## 2024-12-18 PROCEDURE — 85730 THROMBOPLASTIN TIME PARTIAL: CPT

## 2024-12-18 PROCEDURE — 84146 ASSAY OF PROLACTIN: CPT

## 2024-12-18 PROCEDURE — 80061 LIPID PANEL: CPT

## 2024-12-18 PROCEDURE — 85049 AUTOMATED PLATELET COUNT: CPT

## 2024-12-18 PROCEDURE — 84443 ASSAY THYROID STIM HORMONE: CPT

## 2024-12-18 PROCEDURE — 86803 HEPATITIS C AB TEST: CPT

## 2024-12-18 PROCEDURE — 84439 ASSAY OF FREE THYROXINE: CPT

## 2024-12-18 PROCEDURE — 85670 THROMBIN TIME PLASMA: CPT

## 2024-12-18 PROCEDURE — 87389 HIV-1 AG W/HIV-1&-2 AB AG IA: CPT

## 2024-12-18 PROCEDURE — 85610 PROTHROMBIN TIME: CPT

## 2024-12-18 PROCEDURE — 36415 COLL VENOUS BLD VENIPUNCTURE: CPT

## 2024-12-18 PROCEDURE — 85384 FIBRINOGEN ACTIVITY: CPT

## 2024-12-18 PROCEDURE — 83036 HEMOGLOBIN GLYCOSYLATED A1C: CPT

## 2024-12-22 ENCOUNTER — HOSPITAL ENCOUNTER (EMERGENCY)
Facility: HOSPITAL | Age: 64
Discharge: HOME | End: 2024-12-22
Payer: COMMERCIAL

## 2024-12-22 ENCOUNTER — APPOINTMENT (EMERGENCY)
Dept: CT IMAGING | Facility: HOSPITAL | Age: 64
End: 2024-12-22
Payer: COMMERCIAL

## 2024-12-22 ENCOUNTER — APPOINTMENT (EMERGENCY)
Dept: NON INVASIVE DIAGNOSTICS | Facility: HOSPITAL | Age: 64
End: 2024-12-22
Payer: COMMERCIAL

## 2024-12-22 VITALS
DIASTOLIC BLOOD PRESSURE: 77 MMHG | HEART RATE: 64 BPM | WEIGHT: 154.1 LBS | RESPIRATION RATE: 18 BRPM | TEMPERATURE: 97.4 F | BODY MASS INDEX: 25.64 KG/M2 | OXYGEN SATURATION: 100 % | SYSTOLIC BLOOD PRESSURE: 128 MMHG

## 2024-12-22 DIAGNOSIS — R20.2 PARESTHESIA OF FOOT: Primary | ICD-10-CM

## 2024-12-22 DIAGNOSIS — M79.605 LEFT LEG PAIN: ICD-10-CM

## 2024-12-22 DIAGNOSIS — R07.9 CHEST PAIN: ICD-10-CM

## 2024-12-22 DIAGNOSIS — I82.509 CHRONIC DEEP VEIN THROMBOSIS (DVT) (HCC): ICD-10-CM

## 2024-12-22 LAB
2HR DELTA HS TROPONIN: 0 NG/L
ANION GAP SERPL CALCULATED.3IONS-SCNC: 7 MMOL/L (ref 4–13)
ATRIAL RATE: 63 BPM
BASOPHILS # BLD AUTO: 0.05 THOUSANDS/ÂΜL (ref 0–0.1)
BASOPHILS NFR BLD AUTO: 1 % (ref 0–1)
BUN SERPL-MCNC: 15 MG/DL (ref 5–25)
CALCIUM SERPL-MCNC: 9 MG/DL (ref 8.4–10.2)
CARDIAC TROPONIN I PNL SERPL HS: 5 NG/L (ref ?–50)
CARDIAC TROPONIN I PNL SERPL HS: 5 NG/L (ref ?–50)
CHLORIDE SERPL-SCNC: 108 MMOL/L (ref 96–108)
CO2 SERPL-SCNC: 25 MMOL/L (ref 21–32)
CREAT SERPL-MCNC: 0.95 MG/DL (ref 0.6–1.3)
EOSINOPHIL # BLD AUTO: 0.05 THOUSAND/ÂΜL (ref 0–0.61)
EOSINOPHIL NFR BLD AUTO: 1 % (ref 0–6)
ERYTHROCYTE [DISTWIDTH] IN BLOOD BY AUTOMATED COUNT: 13.2 % (ref 11.6–15.1)
GFR SERPL CREATININE-BSD FRML MDRD: 64 ML/MIN/1.73SQ M
GLUCOSE SERPL-MCNC: 82 MG/DL (ref 65–140)
HCT VFR BLD AUTO: 38.2 % (ref 34.8–46.1)
HGB BLD-MCNC: 12.9 G/DL (ref 11.5–15.4)
IMM GRANULOCYTES # BLD AUTO: 0.01 THOUSAND/UL (ref 0–0.2)
IMM GRANULOCYTES NFR BLD AUTO: 0 % (ref 0–2)
LYMPHOCYTES # BLD AUTO: 1.63 THOUSANDS/ÂΜL (ref 0.6–4.47)
LYMPHOCYTES NFR BLD AUTO: 28 % (ref 14–44)
MCH RBC QN AUTO: 32.5 PG (ref 26.8–34.3)
MCHC RBC AUTO-ENTMCNC: 33.8 G/DL (ref 31.4–37.4)
MCV RBC AUTO: 96 FL (ref 82–98)
MONOCYTES # BLD AUTO: 0.37 THOUSAND/ÂΜL (ref 0.17–1.22)
MONOCYTES NFR BLD AUTO: 6 % (ref 4–12)
NEUTROPHILS # BLD AUTO: 3.68 THOUSANDS/ÂΜL (ref 1.85–7.62)
NEUTS SEG NFR BLD AUTO: 64 % (ref 43–75)
NRBC BLD AUTO-RTO: 0 /100 WBCS
P AXIS: 34 DEGREES
PLATELET # BLD AUTO: 163 THOUSANDS/UL (ref 149–390)
PMV BLD AUTO: 11.4 FL (ref 8.9–12.7)
POTASSIUM SERPL-SCNC: 3.8 MMOL/L (ref 3.5–5.3)
PR INTERVAL: 150 MS
QRS AXIS: 44 DEGREES
QRSD INTERVAL: 70 MS
QT INTERVAL: 404 MS
QTC INTERVAL: 411 MS
RBC # BLD AUTO: 3.97 MILLION/UL (ref 3.81–5.12)
SODIUM SERPL-SCNC: 140 MMOL/L (ref 135–147)
T WAVE AXIS: 28 DEGREES
VENTRICULAR RATE: 62 BPM
WBC # BLD AUTO: 5.79 THOUSAND/UL (ref 4.31–10.16)

## 2024-12-22 PROCEDURE — 93971 EXTREMITY STUDY: CPT

## 2024-12-22 PROCEDURE — 85025 COMPLETE CBC W/AUTO DIFF WBC: CPT

## 2024-12-22 PROCEDURE — 93005 ELECTROCARDIOGRAM TRACING: CPT

## 2024-12-22 PROCEDURE — 96360 HYDRATION IV INFUSION INIT: CPT

## 2024-12-22 PROCEDURE — 96361 HYDRATE IV INFUSION ADD-ON: CPT

## 2024-12-22 PROCEDURE — 93010 ELECTROCARDIOGRAM REPORT: CPT | Performed by: INTERNAL MEDICINE

## 2024-12-22 PROCEDURE — 99285 EMERGENCY DEPT VISIT HI MDM: CPT

## 2024-12-22 PROCEDURE — 71275 CT ANGIOGRAPHY CHEST: CPT

## 2024-12-22 PROCEDURE — 84484 ASSAY OF TROPONIN QUANT: CPT

## 2024-12-22 PROCEDURE — 80048 BASIC METABOLIC PNL TOTAL CA: CPT

## 2024-12-22 PROCEDURE — 36415 COLL VENOUS BLD VENIPUNCTURE: CPT

## 2024-12-22 RX ADMIN — IOHEXOL 100 ML: 350 INJECTION, SOLUTION INTRAVENOUS at 17:21

## 2024-12-22 RX ADMIN — SODIUM CHLORIDE 500 ML: 0.9 INJECTION, SOLUTION INTRAVENOUS at 17:39

## 2024-12-22 NOTE — ED PROVIDER NOTES
Time reflects when diagnosis was documented in both MDM as applicable and the Disposition within this note       Time User Action Codes Description Comment    12/22/2024  7:41 PM Bhavna Pierce Add [R07.9] Chest pain     12/22/2024  7:41 PM Bhavna Pierce Add [M79.605] Left leg pain     12/22/2024  7:41 PM Bhavna Pierce Add [R20.2] Paresthesia of foot     12/22/2024  7:41 PM Bhavna Pierce Modify [R07.9] Chest pain     12/22/2024  7:41 PM Bhavna Pierce Modify [R20.2] Paresthesia of foot     12/22/2024  7:44 PM Bhavna Pierce Add [I82.509] Chronic deep vein thrombosis (DVT) (HCC)           ED Disposition       ED Disposition   Discharge    Condition   Stable    Date/Time   Sun Dec 22, 2024  7:45 PM    Comment   Karen eJan Baptiste discharge to home/self care.                   Assessment & Plan       Medical Decision Making  Strength, ROM intact. Able to distinguish light and sharp sensation. brown skin discoloration c/s with chronic disease. No significant foot pain, capillary refill wnl, pulses present and equal bilaterally, warm. Acute arterial occlusion not suspected at this time. Calf pain, hx of chronic dvt and noncompliant with anticoagulation- will order vascular study r/o acute dvt. Also c/o cp- will order cta pe study r/o pe. Ecg without acute ischemic changes or dysrhythmia on wet read. Chronic non obstructing dvt- no acute per vascular tech, discussed importance of compliance with anticoagulation, patient voiced understanding. Heart score 2, can f/u with PCP. CT PE without acute PE.     All imaging and/or lab testing discussed with patient, strict return to ED precautions discussed. Patient recommended to follow up promptly with appropriate outpatient provider and risk of morbidity/mortality if patient does not follow up as recommended was discussed. Patient and/or family members verbalizes understanding and agrees with plan. Patient and/or family members were given opportunity to ask questions, all questions were  "answered at this time. Patient is stable for discharge.     Portions of the record may have been created with voice recognition software. Occasional wrong word or \"sound a like\" substitutions may have occurred due to the inherent limitations of voice recognition software. Read the chart carefully and recognize, using context, where substitutions have occurred.       Amount and/or Complexity of Data Reviewed  Labs: ordered. Decision-making details documented in ED Course.  Radiology: ordered. Decision-making details documented in ED Course.    Risk  Prescription drug management.        ED Course as of 12/23/24 1019   Sun Dec 22, 2024   1545 Procedure Note: EKG  Date/Time: 12/22/24 3:45 PM   Performed by: Bhavna Pierce   Authorized by: Bhavna Pierce  ECG interpreted by me, the ED Provider: yes   The EKG demonstrates:  Rate 62 bpm  Rhythm NSR   QTc 412 ms   No ST elevations/depressions     1545 States she stopped taking her eliquis    1600 L calf pain and foot numbness. Stopped taking her eliquis     Sharp sensation still intact. Capillary refill <2s. PT, DP 2+. Foot warm. Chronic appearing Bilateral brown discoloration of LE.   1659 VAS lower limb venous duplex study, unilateral/limited  No acute DVT per vascular tech- chronic non-occlusive of peroneal vein noted.    1709 Discussion had with patient regarding importance of continuing eliquis as prescribed. Per chart review, new prescription was sent to pharmacy 12/13/24, daughter reports she already picked up the medication for her mother.    1908 Delta 2hr hsTnI: 0       Medications   sodium chloride 0.9 % bolus 500 mL (0 mL Intravenous Stopped 12/22/24 1952)   iohexol (OMNIPAQUE) 350 MG/ML injection (MULTI-DOSE) 100 mL (100 mL Intravenous Given 12/22/24 1721)       ED Risk Strat Scores   HEART Risk Score      Flowsheet Row Most Recent Value   Heart Score Risk Calculator    History 0 Filed at: 12/22/2024 1909   ECG 0 Filed at: 12/22/2024 1909   Age 1 Filed at: " "12/22/2024 1909   Risk Factors 1 Filed at: 12/22/2024 1909   Troponin 0 Filed at: 12/22/2024 1909   HEART Score 2 Filed at: 12/22/2024 1909          HEART Risk Score      Flowsheet Row Most Recent Value   Heart Score Risk Calculator    History 0 Filed at: 12/22/2024 1909   ECG 0 Filed at: 12/22/2024 1909   Age 1 Filed at: 12/22/2024 1909   Risk Factors 1 Filed at: 12/22/2024 1909   Troponin 0 Filed at: 12/22/2024 1909   HEART Score 2 Filed at: 12/22/2024 1909                            SBIRT 22yo+      Flowsheet Row Most Recent Value   Initial Alcohol Screen: US AUDIT-C     1. How often do you have a drink containing alcohol? 0 Filed at: 12/22/2024 1542   2. How many drinks containing alcohol do you have on a typical day you are drinking?  0 Filed at: 12/22/2024 1542   3a. Male UNDER 65: How often do you have five or more drinks on one occasion? 0 Filed at: 12/22/2024 1542   3b. FEMALE Any Age, or MALE 65+: How often do you have 4 or more drinks on one occassion? 0 Filed at: 12/22/2024 1542   Audit-C Score 0 Filed at: 12/22/2024 1542   EVA: How many times in the past year have you...    Used an illegal drug or used a prescription medication for non-medical reasons? Never Filed at: 12/22/2024 1542                            History of Present Illness       Chief Complaint   Patient presents with    Numbness     Left foot numbness x 3 weeks, did not reach out to PCP.     Chest Pain     Intermittent CP x 1 week. \"They told me its muscular, I've been here before for the same thing\".       Past Medical History:   Diagnosis Date    IRINEO (acute kidney injury) (HCC) 01/09/2020    Anemia 01/09/2020    Anxiety     Depression     Disease of thyroid gland     Heart murmur     Hypertension     Psychiatric disorder     Substance abuse (HCC)     former heroin addiction stopped several decades ago      Past Surgical History:   Procedure Laterality Date    BRAIN SURGERY      had a hemorrhage; patient can not tell any other details. "    US GUIDED INJECTION FOR RESEARCH STUDY  12/30/2016      Family History   Problem Relation Age of Onset    Mental illness Mother     Asthma Mother     Asthma Father     Cancer Father     No Known Problems Son     No Known Problems Son     No Known Problems Son     No Known Problems Daughter     No Known Problems Daughter       Social History     Tobacco Use    Smoking status: Former     Types: Cigarettes    Smokeless tobacco: Never    Tobacco comments:     Started age 14, quit about 30-40 years ago (entered 2023)   Vaping Use    Vaping status: Never Used   Substance Use Topics    Alcohol use: No    Drug use: No      E-Cigarette/Vaping    E-Cigarette Use Never User       E-Cigarette/Vaping Substances    Nicotine No     THC No     CBD No     Flavoring No     Other No     Unknown No       I have reviewed and agree with the history as documented.     64-year-old female past medical history of hypertension, heart murmur, anemia, substance use disorder presents to emergency department complaining of numbness of lateral portion of left foot for 3 weeks.  Also reports intermittent chest pain for 1 week.  Nonexertional.  Denies shortness of breath.  Hx of chronic DVTs, is prescribed and is supposed to be taking Eliquis, but reports she has not been taking it because she did not feel like it.     Complaining of 3 weeks of L lower leg pain and that part of her L foot feels numb. Denies injury. Denies discoloration, weakness, swelling, decreased ROM. Is still able to ambulate and bear weight without difficulty.       History provided by:  Patient      Review of Systems   Constitutional:  Negative for chills and fever.   HENT:  Negative for sore throat.    Eyes:  Negative for visual disturbance.   Respiratory:  Negative for cough and shortness of breath.    Cardiovascular:  Positive for chest pain. Negative for palpitations and leg swelling.   Gastrointestinal:  Negative for abdominal pain, nausea and vomiting.    Genitourinary:  Negative for dysuria.   Musculoskeletal:  Positive for myalgias. Negative for back pain and gait problem.   Skin:  Negative for rash.   Neurological:  Positive for numbness. Negative for dizziness, syncope and weakness.   All other systems reviewed and are negative.          Objective       ED Triage Vitals [12/22/24 1544]   Temperature Pulse Blood Pressure Respirations SpO2 Patient Position - Orthostatic VS   (!) 97.4 °F (36.3 °C) 69 (!) 185/96 16 98 % Sitting      Temp src Heart Rate Source BP Location FiO2 (%) Pain Score    -- Monitor Left arm -- --      Vitals      Date and Time Temp Pulse SpO2 Resp BP Pain Score FACES Pain Rating User   12/22/24 1911 -- 64 100 % 18 128/77 -- -- CO   12/22/24 1544 97.4 °F (36.3 °C) 69 98 % 16 185/96 -- -- FK            Physical Exam  Vitals and nursing note reviewed.   Constitutional:       General: She is awake. She is not in acute distress.     Appearance: Normal appearance. She is not ill-appearing, toxic-appearing or diaphoretic.   HENT:      Head: Normocephalic.      Mouth/Throat:      Lips: Pink.      Mouth: Mucous membranes are moist.   Eyes:      General: Vision grossly intact.   Cardiovascular:      Rate and Rhythm: Normal rate and regular rhythm.      Pulses:           Dorsalis pedis pulses are 2+ on the right side and 2+ on the left side.        Posterior tibial pulses are 2+ on the right side and 2+ on the left side.      Heart sounds: Normal heart sounds.   Pulmonary:      Effort: Pulmonary effort is normal. No respiratory distress.      Breath sounds: Normal breath sounds.   Abdominal:      General: There is no distension.   Musculoskeletal:      Right lower leg: No swelling. No edema.      Left lower leg: Tenderness present. No swelling. No edema.      Right foot: Normal range of motion.      Left foot: Normal range of motion.   Feet:      Right foot:      Skin integrity: Dry skin present.      Left foot:      Skin integrity: Dry skin present.    Skin:     General: Skin is warm and dry.      Capillary Refill: Capillary refill takes less than 2 seconds.      Findings: No erythema.   Neurological:      Mental Status: She is alert.      Sensory: No sensory deficit.      Motor: No weakness.      Gait: Gait normal.         Results Reviewed       Procedure Component Value Units Date/Time    HS Troponin I 2hr [[id number]]  (Normal) Collected: 12/22/24 1835    Lab Status: Final result Specimen: Blood from Arm, Left Updated: 12/22/24 1904     hs TnI 2hr 5 ng/L      Delta 2hr hsTnI 0 ng/L     HS Troponin 0hr (reflex protocol) [[id number]]  (Normal) Collected: 12/22/24 1610    Lab Status: Final result Specimen: Blood from Arm, Left Updated: 12/22/24 1639     hs TnI 0hr 5 ng/L     Basic metabolic panel [[id number]] Collected: 12/22/24 1610    Lab Status: Final result Specimen: Blood from Arm, Left Updated: 12/22/24 1634     Sodium 140 mmol/L      Potassium 3.8 mmol/L      Chloride 108 mmol/L      CO2 25 mmol/L      ANION GAP 7 mmol/L      BUN 15 mg/dL      Creatinine 0.95 mg/dL      Glucose 82 mg/dL      Calcium 9.0 mg/dL      eGFR 64 ml/min/1.73sq m     Narrative:      National Kid[employer] Foundation guidelines for Chronic Kidney Disease (CKD):     Stage 1 with normal or high GFR (GFR > 90 mL/min/1.73 square meters)    Stage 2 Mild CKD (GFR = 60-89 mL/min/1.73 square meters)    Stage 3A Moderate CKD (GFR = 45-59 mL/min/1.73 square meters)    Stage 3B Moderate CKD (GFR = 30-44 mL/min/1.73 square meters)    Stage 4 Severe CKD (GFR = 15-29 mL/min/1.73 square meters)    Stage 5 End Stage CKD (GFR <15 mL/min/1.73 square meters)  Note: GFR calculation is accurate only with a steady state creatinine    CBC and differential [[id number]] Collected: 12/22/24 1610    Lab Status: Final result Specimen: Blood from Arm, Left Updated: 12/22/24 1616     WBC 5.79 Thousand/uL      RBC 3.97 Million/uL      Hemoglobin 12.9 g/dL      Hematocrit 38.2 %      MCV 96 fL      MCH 32.5 pg       MCHC 33.8 g/dL      RDW 13.2 %      MPV 11.4 fL      Platelets 163 Thousands/uL      nRBC 0 /100 WBCs      Segmented % 64 %      Immature Grans % 0 %      Lymphocytes % 28 %      Monocytes % 6 %      Eosinophils Relative 1 %      Basophils Relative 1 %      Absolute Neutrophils 3.68 Thousands/µL      Absolute Immature Grans 0.01 Thousand/uL      Absolute Lymphocytes 1.63 Thousands/µL      Absolute Monocytes 0.37 Thousand/µL      Eosinophils Absolute 0.05 Thousand/µL      Basophils Absolute 0.05 Thousands/µL             CTA chest pe study   Final Interpretation by Phyllis Reddy MD (12/22 1937)      No pulmonary embolus.      No acute pulmonary disease.      Mild pulmonary artery enlargement which can be a normal variant or can be seen with pulmonary hypertension.            Workstation performed: [id number]         VAS lower limb venous duplex study, unilateral/limited    (Results Pending)       Procedures    ED Medication and Procedure Management   Prior to Admission Medications   Prescriptions Last Dose Informant Patient Reported? Taking?   Blood Pressure Monitoring (BLOOD PRESSURE CUFF) MISC   No No   Sig: by Does not apply route daily   Eliquis 5 MG Not Taking  No No   Sig: Take 1 tablet by mouth twice daily   Patient not taking: Reported on 12/22/2024   acetaminophen (TYLENOL) 500 mg tablet   No No   Sig: Take 1 tablet (500 mg total) by mouth every 6 (six) hours as needed for mild pain   atorvastatin (LIPITOR) 10 mg tablet   No No   Sig: Take 1 tablet (10 mg total) by mouth daily at bedtime   benztropine (COGENTIN) 0.5 mg tablet   No No   Sig: TAKE 1 TABLET BY MOUTH ONCE DAILY AT BEDTIME   bisacodyl (DULCOLAX) 5 mg EC tablet   Yes No   Sig: Take 5 mg by mouth once a week   cholecalciferol (VITAMIN D3) 1,000 units tablet   No No   Sig: Take 1 tablet (1,000 Units total) by mouth daily   ferrous sulfate 324 (65 Fe) mg  Family Member No No   Sig: Take 1 tablet (324 mg total) by mouth daily before breakfast    folic acid (FOLVITE) 1 mg tablet   Yes No   Sig: Take 1,000 mcg by mouth daily   Patient not taking: Reported on 11/6/2024   gabapentin (NEURONTIN) 300 mg capsule  Family Member Yes No   Sig: Take 300 mg by mouth 2 (two) times a day   Patient not taking: Reported on 11/6/2024   hydrOXYzine HCL (ATARAX) 50 mg tablet   No No   Sig: TAKE 1 TABLET BY MOUTH ONCE DAILY AT BEDTIME AS NEEDED FOR ANXIETY   hydroCHLOROthiazide 12.5 mg tablet   No No   Sig: Take 1 tablet (12.5 mg total) by mouth daily   levothyroxine 50 mcg tablet   No No   Sig: Take 1 tablet by mouth once daily   mirtazapine (REMERON) 45 MG tablet  Family Member Yes No   Sig: Take 45 mg by mouth daily at bedtime   Patient not taking: Reported on 11/6/2024   prazosin (MINIPRESS) 2 mg capsule   No No   Sig: Take 2 capsules (4 mg total) by mouth daily at bedtime   risperiDONE (RisperDAL) 3 mg tablet   No No   Sig: Take 1 tablet by mouth once daily   venlafaxine (EFFEXOR) 100 MG tablet   No No   Sig: Take 1 tablet (100 mg total) by mouth in the morning   Patient not taking: Reported on 11/6/2024      Facility-Administered Medications: None     Discharge Medication List as of 12/22/2024  7:45 PM        CONTINUE these medications which have NOT CHANGED    Details   acetaminophen (TYLENOL) 500 mg tablet Take 1 tablet (500 mg total) by mouth every 6 (six) hours as needed for mild pain, Starting Sat 10/12/2024, Normal      atorvastatin (LIPITOR) 10 mg tablet Take 1 tablet (10 mg total) by mouth daily at bedtime, Starting Fri 8/11/2023, Normal      benztropine (COGENTIN) 0.5 mg tablet TAKE 1 TABLET BY MOUTH ONCE DAILY AT BEDTIME, Starting Thu 2/29/2024, Normal      bisacodyl (DULCOLAX) 5 mg EC tablet Take 5 mg by mouth once a week, Historical Med      Blood Pressure Monitoring (BLOOD PRESSURE CUFF) MISC by Does not apply route daily, Starting Wed 1/22/2020, Print      cholecalciferol (VITAMIN D3) 1,000 units tablet Take 1 tablet (1,000 Units total) by mouth daily,  Starting Thu 2/15/2024, Normal      Eliquis 5 MG Take 1 tablet by mouth twice daily, Starting Fri 12/13/2024, Normal      ferrous sulfate 324 (65 Fe) mg Take 1 tablet (324 mg total) by mouth daily before breakfast, Starting Tue 1/14/2020, Until Wed 11/6/2024, Normal      folic acid (FOLVITE) 1 mg tablet Take 1,000 mcg by mouth daily, Starting Tue 5/2/2023, Historical Med      gabapentin (NEURONTIN) 300 mg capsule Take 300 mg by mouth 2 (two) times a day, Starting Sun 12/9/2018, Historical Med      hydroCHLOROthiazide 12.5 mg tablet Take 1 tablet (12.5 mg total) by mouth daily, Starting Tue 5/28/2024, Normal      hydrOXYzine HCL (ATARAX) 50 mg tablet TAKE 1 TABLET BY MOUTH ONCE DAILY AT BEDTIME AS NEEDED FOR ANXIETY, Normal      levothyroxine 50 mcg tablet Take 1 tablet by mouth once daily, Starting Mon 9/23/2024, Normal      mirtazapine (REMERON) 45 MG tablet Take 45 mg by mouth daily at bedtime, Starting Sun 12/9/2018, Historical Med      prazosin (MINIPRESS) 2 mg capsule Take 2 capsules (4 mg total) by mouth daily at bedtime, Starting Fri 12/13/2024, Normal      risperiDONE (RisperDAL) 3 mg tablet Take 1 tablet by mouth once daily, Starting Mon 11/25/2024, Normal      venlafaxine (EFFEXOR) 100 MG tablet Take 1 tablet (100 mg total) by mouth in the morning, Starting Fri 6/21/2024, Normal           No discharge procedures on file.  ED SEPSIS DOCUMENTATION   Time reflects when diagnosis was documented in both MDM as applicable and the Disposition within this note       Time User Action Codes Description Comment    12/22/2024  7:41 PM Bhavna Pierce Add [R07.9] Chest pain     12/22/2024  7:41 PM Bhavna Pierce Add [M79.605] Left leg pain     12/22/2024  7:41 PM Bhavna Pierce Add [R20.2] Paresthesia of foot     12/22/2024  7:41 PM Bhavna Pierce Modify [R07.9] Chest pain     12/22/2024  7:41 PM Bhavna Pierce Modify [R20.2] Paresthesia of foot     12/22/2024  7:44 PM Bhavna Pierce Add [I82.509] Chronic deep vein thrombosis (DVT)  (HCC)                  Bhavna Pierce PA-C  12/23/24 1017

## 2024-12-23 ENCOUNTER — TELEPHONE (OUTPATIENT)
Age: 64
End: 2024-12-23

## 2024-12-23 PROCEDURE — 93971 EXTREMITY STUDY: CPT | Performed by: SURGERY

## 2024-12-23 NOTE — DISCHARGE INSTRUCTIONS
Take your Eliquis as prescribed. Continue outpatient follow up with your PCP. Return to ED for new or worsening symptoms as discussed.

## 2024-12-23 NOTE — TELEPHONE ENCOUNTER
Patients daughter called in stating patient is having leg pain and wanted to know if she can get a referral for patient to see a specialist for her veins and blood clots in legs. Patient was seen in ED yesterday patients daughter did not want to schedule follow up at this time, stated ED just wanted her to ask PCP for referral.    Please advise, thank you

## 2024-12-26 DIAGNOSIS — F33.9 RECURRENT MAJOR DEPRESSIVE DISORDER (HCC): Chronic | ICD-10-CM

## 2024-12-26 RX ORDER — RISPERIDONE 3 MG/1
3 TABLET ORAL DAILY
Qty: 30 TABLET | Refills: 0 | Status: SHIPPED | OUTPATIENT
Start: 2024-12-26

## 2024-12-26 NOTE — TELEPHONE ENCOUNTER
LM to call back.    Silver Nitrate Text: The wound bed was treated with silver nitrate after the biopsy was performed.

## 2024-12-27 DIAGNOSIS — F33.9 RECURRENT MAJOR DEPRESSIVE DISORDER (HCC): Chronic | ICD-10-CM

## 2024-12-27 RX ORDER — RISPERIDONE 3 MG/1
3 TABLET ORAL DAILY
Qty: 30 TABLET | Refills: 0 | OUTPATIENT
Start: 2024-12-27

## 2024-12-27 NOTE — TELEPHONE ENCOUNTER
Requested Prescriptions     Pending Prescriptions Disp Refills    risperiDONE (RisperDAL) 3 mg tablet 30 tablet 0     Sig: Take 1 tablet (3 mg total) by mouth daily     Patient has less than 3 days left

## 2024-12-30 ENCOUNTER — OFFICE VISIT (OUTPATIENT)
Dept: FAMILY MEDICINE CLINIC | Facility: CLINIC | Age: 64
End: 2024-12-30
Payer: COMMERCIAL

## 2024-12-30 VITALS
HEART RATE: 77 BPM | BODY MASS INDEX: 25.56 KG/M2 | HEIGHT: 65 IN | RESPIRATION RATE: 16 BRPM | SYSTOLIC BLOOD PRESSURE: 142 MMHG | OXYGEN SATURATION: 98 % | WEIGHT: 153.4 LBS | DIASTOLIC BLOOD PRESSURE: 86 MMHG | TEMPERATURE: 98.1 F

## 2024-12-30 DIAGNOSIS — F31.9 BIPOLAR AFFECTIVE DISORDER, REMISSION STATUS UNSPECIFIED (HCC): ICD-10-CM

## 2024-12-30 DIAGNOSIS — Z12.11 SCREEN FOR COLON CANCER: ICD-10-CM

## 2024-12-30 DIAGNOSIS — I82.5Z1 CHRONIC DEEP VEIN THROMBOSIS (DVT) OF DISTAL VEIN OF RIGHT LOWER EXTREMITY (HCC): ICD-10-CM

## 2024-12-30 DIAGNOSIS — F33.9 RECURRENT MAJOR DEPRESSIVE DISORDER (HCC): Chronic | ICD-10-CM

## 2024-12-30 DIAGNOSIS — R20.0 NUMBNESS OF LEFT LOWER EXTREMITY: Primary | ICD-10-CM

## 2024-12-30 DIAGNOSIS — K59.00 CONSTIPATION, UNSPECIFIED CONSTIPATION TYPE: ICD-10-CM

## 2024-12-30 DIAGNOSIS — E03.9 HYPOTHYROIDISM, UNSPECIFIED TYPE: ICD-10-CM

## 2024-12-30 DIAGNOSIS — I10 ESSENTIAL HYPERTENSION: ICD-10-CM

## 2024-12-30 DIAGNOSIS — Z55.6 DIFFICULTY DEMONSTRATING HEALTH LITERACY: ICD-10-CM

## 2024-12-30 PROCEDURE — 99214 OFFICE O/P EST MOD 30 MIN: CPT | Performed by: NURSE PRACTITIONER

## 2024-12-30 RX ORDER — RISPERIDONE 2 MG/1
3 TABLET ORAL DAILY
Qty: 90 TABLET | Refills: 0 | Status: SHIPPED | OUTPATIENT
Start: 2024-12-30

## 2024-12-30 RX ORDER — LEVOTHYROXINE SODIUM 50 UG/1
50 TABLET ORAL DAILY
Qty: 30 TABLET | Refills: 0 | Status: SHIPPED | OUTPATIENT
Start: 2024-12-30

## 2024-12-30 RX ORDER — VENLAFAXINE 100 MG/1
100 TABLET ORAL DAILY
Qty: 90 TABLET | Refills: 1 | Status: SHIPPED | OUTPATIENT
Start: 2024-12-30

## 2024-12-30 RX ORDER — BISACODYL 5 MG/1
5 TABLET, DELAYED RELEASE ORAL WEEKLY
Qty: 30 TABLET | Refills: 1 | Status: SHIPPED | OUTPATIENT
Start: 2024-12-30

## 2024-12-30 SDOH — EDUCATIONAL SECURITY - EDUCATION ATTAINMENT: PROBLEMS RELATED TO HEALTH LITERACY: Z55.6

## 2024-12-30 NOTE — ASSESSMENT & PLAN NOTE
Pt states she ran out of levothyroxine 3 months ago, most recent TSH mildly elevated.  She says she told her daughter to call for a refill but she did not do so.  Refill sent, will repeat TSH/T4 in about 6-8 weeks.    Orders:    levothyroxine 50 mcg tablet; Take 1 tablet (50 mcg total) by mouth daily    TSH, 3rd generation with Free T4 reflex; Future

## 2024-12-30 NOTE — ASSESSMENT & PLAN NOTE
States her last bm was 2 weeks ago, she has no abdominal pain or distention.  Bowel sounds are present.  She requests refill of dulcolax.   Orders:    bisacodyl (DULCOLAX) 5 mg EC tablet; Take 1 tablet (5 mg total) by mouth once a week

## 2024-12-30 NOTE — ASSESSMENT & PLAN NOTE
Needs to establish with a psychiatrist, she did not answer for 3 attempts from department to do intake.  Refilled venlafaxine, risperidone.  Risperidone dose reduced to 2 mg due to elevated prolactin level.  Will repeat prior to her follow up visit in 2 months.    Orders:    risperiDONE (RisperDAL) 2 mg tablet; Take 1.5 tablets (3 mg total) by mouth daily    venlafaxine (EFFEXOR) 100 MG tablet; Take 1 tablet (100 mg total) by mouth in the morning

## 2024-12-30 NOTE — PROGRESS NOTES
Pt is a 64 y.o. female who is seen today for evaluation of .  She was seen in [facility] ER 12/22 with complaint of paresthesia of her foot, chest pain, left leg pain.  Past medical history of chronic DVT and reported non-compliance with anticoagulation.

## 2024-12-30 NOTE — ASSESSMENT & PLAN NOTE
Today blood pressure mildly elevated, was normal at her recent ER visit.  She says she is compliant with all meds, but she is a poor historian and does not know what she takes.  For now, continue with hctz, minipress and pt advised to bring all medications to every single visit.

## 2024-12-30 NOTE — PROGRESS NOTES
Name: Karen Jean Baptiste      : 1960      MRN: [id number]  Encounter Provider: JAMILA Johns  Encounter Date: 2024   Encounter department: [facility]  :  Assessment & Plan  Numbness of left lower extremity  Left lateral malleolus numb to the left 3-5th toes; not able to dorsiflex foot or wiggle toes.  Pulses are normal with good cap refill, no swelling or discoloration.  Will obtain EMG and refer to neuro.  May be related to low back pain that she reports goes down her legs (though earlier in the visit she said her legs do not hurt).    Orders:    EMG 2 limb lower extremity; Future    Ambulatory Referral to Neurology; Future    Recurrent major depressive disorder (HCC)  Needs to establish with a psychiatrist, she did not answer for 3 attempts from department to do intake.  Refilled venlafaxine, risperidone.  Risperidone dose reduced to 2 mg due to elevated prolactin level.  Will repeat prior to her follow up visit in 2 months.    Orders:    risperiDONE (RisperDAL) 2 mg tablet; Take 1.5 tablets (3 mg total) by mouth daily    venlafaxine (EFFEXOR) 100 MG tablet; Take 1 tablet (100 mg total) by mouth in the morning    Constipation, unspecified constipation type  States her last bm was 2 weeks ago, she has no abdominal pain or distention.  Bowel sounds are present.  She requests refill of dulcolax.   Orders:    bisacodyl (DULCOLAX) 5 mg EC tablet; Take 1 tablet (5 mg total) by mouth once a week    Hypothyroidism, unspecified type  Pt states she ran out of levothyroxine 3 months ago, most recent TSH mildly elevated.  She says she told her daughter to call for a refill but she did not do so.  Refill sent, will repeat TSH/T4 in about 6-8 weeks.    Orders:    levothyroxine 50 mcg tablet; Take 1 tablet (50 mcg total) by mouth daily    TSH, 3rd generation with Free T4 reflex; Future    Screen for colon cancer  Cologuard ordered.  Orders:    Cologuard    Essential hypertension  Today  blood pressure mildly elevated, was normal at her recent ER visit.  She says she is compliant with all meds, but she is a poor historian and does not know what she takes.  For now, continue with hctz, minipress and pt advised to bring all medications to every single visit.         Bipolar affective disorder, remission status unspecified (HCC)  Referral to psych closed because pt did not answer 3 attempts at intake.  Need to get pt seen by a psychiatrist for ongoing management.  Will reduce risperidone to 2 mg due to hyperprolactinemia (asymptomatic).    Orders:    Prolactin; Future    Difficulty demonstrating health literacy  Does not know what medications she is on or what they are for, unable to do an accurate med reconciliation.  Pt advised to bring all of her pills in a bag with her to every single doctor she sees since she is not able to verbally do this.       Chronic deep vein thrombosis (DVT) of distal vein of right lower extremity (HCC)  Pt is now taking eliquis again since she was in the ER.  Her coagulation profile was normal.  Unclear if the clot was provoked at onset, she does not recall.  For now, continue medication.                History of Present Illness     Pt is a 64 y.o. female who is seen today for evaluation of .  She was seen in [facility] ER 12/22 with complaint of paresthesia of her foot, chest pain, left leg pain.  Past medical history of chronic DVT and reported non-compliance with anticoagulation.   She says she has since been compliant with this since she was told that the clot in her leg is still there..  She says numbness in the left foot started around the lateral malleolus about 3 weeks ago and has since moved across the top of the lateral foot to the 3, 4, and 5th toes.  She denies pain, numbness in her leg.        Review of Systems   Respiratory:  Negative for shortness of breath.    Cardiovascular:  Negative for chest pain, palpitations and leg swelling.  "  Musculoskeletal:  Positive for back pain. Negative for arthralgias and myalgias.   Skin:  Negative for color change.   Neurological:  Positive for weakness and numbness.       Objective   /86 (BP Location: Left arm, Patient Position: Sitting, Cuff Size: Standard)   Pulse 77   Temp 98.1 °F (36.7 °C) (Tympanic)   Resp 16   Ht 5' 5\" (1.651 m)   Wt 69.6 kg (153 lb 6.4 oz)   SpO2 98%   BMI 25.53 kg/m²      Physical Exam  Vitals reviewed.   Constitutional:       General: She is awake. She is not in acute distress.     Appearance: Normal appearance. She is well-developed and well-groomed. She is not ill-appearing.   Eyes:      General: Lids are normal.      Conjunctiva/sclera: Conjunctivae normal.   Cardiovascular:      Rate and Rhythm: Normal rate and regular rhythm.      Pulses: Normal pulses.      Heart sounds: Normal heart sounds.   Pulmonary:      Effort: Pulmonary effort is normal. No tachypnea, accessory muscle usage or respiratory distress.      Breath sounds: Normal breath sounds.   Musculoskeletal:      Right lower leg: No edema.      Left lower leg: No edema.      Right foot: Normal.      Left foot: Normal capillary refill. Foot drop present. No swelling, deformity, tenderness or bony tenderness. Normal pulse.   Skin:     General: Skin is warm and dry.      Capillary Refill: Capillary refill takes less than 2 seconds.      Findings: No erythema.   Neurological:      Mental Status: She is alert and oriented to person, place, and time.      Motor: Weakness (left ankle weak, unable to dorsiflex or wiggle toes well) present.      Gait: Gait normal.   Psychiatric:         Attention and Perception: Attention normal.         Mood and Affect: Mood normal. Affect is flat.         Speech: Speech is slurred (baseline).         Behavior: Behavior normal. Behavior is cooperative.         "

## 2024-12-30 NOTE — ASSESSMENT & PLAN NOTE
Does not know what medications she is on or what they are for, unable to do an accurate med reconciliation.  Pt advised to bring all of her pills in a bag with her to every single doctor she sees since she is not able to verbally do this.

## 2024-12-30 NOTE — ASSESSMENT & PLAN NOTE
Pt is now taking eliquis again since she was in the ER.  Her coagulation profile was normal.  Unclear if the clot was provoked at onset, she does not recall.  For now, continue medication.

## 2024-12-30 NOTE — ASSESSMENT & PLAN NOTE
Referral to psych closed because pt did not answer 3 attempts at intake.  Need to get pt seen by a psychiatrist for ongoing management.  Will reduce risperidone to 2 mg due to hyperprolactinemia (asymptomatic).    Orders:    Prolactin; Future

## 2025-01-11 DIAGNOSIS — I82.402 ACUTE DEEP VEIN THROMBOSIS (DVT) OF LEFT LOWER EXTREMITY, UNSPECIFIED VEIN (HCC): ICD-10-CM

## 2025-01-13 RX ORDER — APIXABAN 5 MG/1
5 TABLET, FILM COATED ORAL 2 TIMES DAILY
Qty: 60 TABLET | Refills: 5 | Status: SHIPPED | OUTPATIENT
Start: 2025-01-13

## 2025-01-20 ENCOUNTER — TELEPHONE (OUTPATIENT)
Age: 65
End: 2025-01-20

## 2025-01-20 NOTE — TELEPHONE ENCOUNTER
Patient's daughter called. Karen is having her physical therapy at [facility] [place], [facility] by [facility] on 1/22 and they couldn't find the order. It was written out for [facility]. Please fax order over to [facility]. It was written on 11/6/24.    Daughter didn't have fax number. The phone number is [phone]

## 2025-01-23 DIAGNOSIS — E03.9 HYPOTHYROIDISM, UNSPECIFIED TYPE: ICD-10-CM

## 2025-01-23 RX ORDER — LEVOTHYROXINE SODIUM 50 UG/1
50 TABLET ORAL DAILY
Qty: 30 TABLET | Refills: 0 | Status: SHIPPED | OUTPATIENT
Start: 2025-01-23

## 2025-02-07 ENCOUNTER — OFFICE VISIT (OUTPATIENT)
Dept: NEUROLOGY | Facility: CLINIC | Age: 65
End: 2025-02-07
Payer: COMMERCIAL

## 2025-02-07 VITALS
WEIGHT: 151.8 LBS | HEIGHT: 65 IN | BODY MASS INDEX: 25.29 KG/M2 | SYSTOLIC BLOOD PRESSURE: 130 MMHG | HEART RATE: 58 BPM | DIASTOLIC BLOOD PRESSURE: 80 MMHG

## 2025-02-07 DIAGNOSIS — R20.0 NUMBNESS OF LEFT LOWER EXTREMITY: ICD-10-CM

## 2025-02-07 PROCEDURE — 99203 OFFICE O/P NEW LOW 30 MIN: CPT | Performed by: NURSE PRACTITIONER

## 2025-02-07 NOTE — PATIENT INSTRUCTIONS
Likely either compressed nerve from back or knee  Obtain EMG if symptoms continue, If symptoms no longer present can cancel appt    Agree with PT  Avoid crossing legs.       If symptoms resolved can cancel follow up

## 2025-02-07 NOTE — PROGRESS NOTES
Name: Karen Jean Baptiste      : 1960      MRN: [id number]  Encounter Provider: JAMILA Gross  Encounter Date: 2025   Encounter department: [facility] [place]  :  Assessment & Plan  Numbness of left lower extremity  Symptoms of numbness and tingling in the left lower extremity, mainly in the lateral aspect of the lower shin and foot that started approximately 1 to 2 months ago.  She has been dealing with bilateral low back pain with radicular symptoms.  She denies leg weakness, new bowel or bladder complaints.  She does have diminished sensation in the peroneal nerve distribution in the left lower extremity, she denies any knee injury, does cross her legs frequently.    Over the last several weeks she has noted slow improvement of symptoms.  No muscle weakness was noted on exam.  Recommend she obtain EMG for further assessment of a possible focal neuropathy or radiculopathy.  She does understand if her symptoms resolved she can cancel appointment.  Will check B12 for completeness.    For her low back pain she will be seeing PT and physiatry at [facility].  Will hold off on any neuropathic pain medicine for now.    Plan for follow-up in 4 months if symptoms persist, if they do resolve she can follow-up on an as-needed basis. To contact the office sooner with any concerns or worsening symptoms.    Orders:    Ambulatory Referral to Neurology    EMG 2 limb lower extremity; Future    Vitamin B12; Future          History of Present Illness   HPI  Patient is a 64-year-old female with past medical history of DVT, hypertension, hypothyroidism, osteoarthritis of the knee, anxiety/depression, bipolar disorder, who presents for evaluation of left leg numbness.    She noted symptoms of numbness and tingling in the left leg, from the lateral ankle into the top of the foot to the 4th and 5th toes. Started just prior to -mid december  No weakness.  She does have low back pain ongoing  since November, across the low back and extends down both legs.   No other numbness or tingling.   No new bowel or bladder symptoms.  No falls.     Numbness and tingling of the left foot is improving, about 50% improved. Is constant. Not painful.   She tends to cross her legs a lot.     Her PCP did order an EMG but has yet to be obtained, doesn't think this is scheduled.     Prior work up:  Labs 12/2024 A1c 4.9  CT lumbar spine 10/2024: Endplate osteophytosis at L5-S1 contributes to   moderate bilateral foraminal narrowing. -Large circumferential disc bulge at L4-L5 results in complete effacement of the   bilateral subarticular zones and moderate to severe right foraminal narrowing.     Review of Systems   Constitutional:  Negative for appetite change, fatigue and fever.   HENT: Negative.  Negative for hearing loss, tinnitus, trouble swallowing and voice change.    Eyes: Negative.  Negative for photophobia, pain and visual disturbance.   Respiratory: Negative.  Negative for shortness of breath.    Cardiovascular: Negative.  Negative for palpitations.   Gastrointestinal: Negative.  Negative for nausea and vomiting.   Endocrine: Negative.  Negative for cold intolerance.   Genitourinary: Negative.  Negative for dysuria, frequency and urgency.   Musculoskeletal:  Negative for back pain, gait problem, myalgias, neck pain and neck stiffness.   Skin: Negative.  Negative for rash.   Allergic/Immunologic: Negative.    Neurological:  Positive for numbness. Negative for dizziness, tremors, seizures, syncope, facial asymmetry, speech difficulty, weakness, light-headedness and headaches.   Hematological: Negative.  Does not bruise/bleed easily.   Psychiatric/Behavioral: Negative.  Negative for confusion, hallucinations and sleep disturbance.    I have personally reviewed the MA's review of systems and made changes as necessary.         Objective   /80 (BP Location: Left arm, Patient Position: Sitting, Cuff Size: Standard)   " Pulse 58   Ht 5' 5\" (1.651 m)   Wt 68.9 kg (151 lb 12.8 oz)   BMI 25.26 kg/m²     Physical Exam  Constitutional:       General: She is awake.   HENT:      Right Ear: Hearing normal.      Left Ear: Hearing normal.   Eyes:      General: Lids are normal.      Extraocular Movements: Extraocular movements intact.   Neurological:      Mental Status: She is alert.      Motor: Motor strength is normal.     Deep Tendon Reflexes:      Reflex Scores:       Bicep reflexes are 2+ on the right side and 2+ on the left side.       Brachioradialis reflexes are 2+ on the right side and 2+ on the left side.       Patellar reflexes are 2+ on the right side and 2+ on the left side.       Achilles reflexes are 1+ on the right side and 1+ on the left side.  Psychiatric:         Speech: Speech normal.       Neurological Exam  Mental Status  Awake and alert. Speech is normal. Language is fluent with no aphasia.    Cranial Nerves  CN III, IV, VI: Extraocular movements intact bilaterally. Normal lids and orbits bilaterally.  CN V:  Right: Facial sensation is normal.  Left: Facial sensation is normal on the left.  CN VII:  Right: There is no facial weakness.  Left: There is no facial weakness.  CN VIII:  Right: Hearing is normal.  Left: Hearing is normal.  CN IX, X: Palate elevates symmetrically  CN XI:  Right: Trapezius strength is normal.  Left: Trapezius strength is normal.  CN XII: Tongue midline without atrophy or fasciculations.    Motor  Normal muscle bulk throughout. Strength is 5/5 throughout all four extremities.    Sensory  Light touch is normal in upper and lower extremities. Pinprick abnormality: Diminished in the peroneal distribution of the LLE otherwise normal . Temperature abnormality: Diminished in the peroneal distribution of the LLE otherwise normal . Vibration is normal in upper and lower extremities.     Reflexes                                            Right                      Left  Brachioradialis               "      2+                         2+  Biceps                                 2+                         2+  Patellar                                2+                         2+  Achilles                                1+                         1+    Coordination  Right: Finger-to-nose normal.Left: Finger-to-nose normal.    Gait  Casual gait is normal including stance, stride, and arm swing. Able to rise from chair without using arms.

## 2025-02-07 NOTE — ASSESSMENT & PLAN NOTE
Symptoms of numbness and tingling in the left lower extremity, mainly in the lateral aspect of the lower shin and foot that started approximately 1 to 2 months ago.  She has been dealing with bilateral low back pain with radicular symptoms.  She denies leg weakness, new bowel or bladder complaints.  She does have diminished sensation in the peroneal nerve distribution in the left lower extremity, she denies any knee injury, does cross her legs frequently.    Over the last several weeks she has noted slow improvement of symptoms.  No muscle weakness was noted on exam.  Recommend she obtain EMG for further assessment of a possible focal neuropathy or radiculopathy.  She does understand if her symptoms resolved she can cancel appointment.  Will check B12 for completeness.    For her low back pain she will be seeing PT and physiatry at [facility].  Will hold off on any neuropathic pain medicine for now.    Plan for follow-up in 4 months if symptoms persist, if they do resolve she can follow-up on an as-needed basis. To contact the office sooner with any concerns or worsening symptoms.    Orders:    Ambulatory Referral to Neurology    EMG 2 limb lower extremity; Future    Vitamin B12; Future

## 2025-02-07 NOTE — PROGRESS NOTES
Review of Systems   Constitutional:  Negative for appetite change, fatigue and fever.   HENT: Negative.  Negative for hearing loss, tinnitus, trouble swallowing and voice change.    Eyes: Negative.  Negative for photophobia, pain and visual disturbance.   Respiratory: Negative.  Negative for shortness of breath.    Cardiovascular: Negative.  Negative for palpitations.   Gastrointestinal: Negative.  Negative for nausea and vomiting.   Endocrine: Negative.  Negative for cold intolerance.   Genitourinary: Negative.  Negative for dysuria, frequency and urgency.   Musculoskeletal:  Negative for back pain, gait problem, myalgias, neck pain and neck stiffness.   Skin: Negative.  Negative for rash.   Allergic/Immunologic: Negative.    Neurological:  Positive for numbness. Negative for dizziness, tremors, seizures, syncope, facial asymmetry, speech difficulty, weakness, light-headedness and headaches.   Hematological: Negative.  Does not bruise/bleed easily.   Psychiatric/Behavioral: Negative.  Negative for confusion, hallucinations and sleep disturbance.

## 2025-02-28 ENCOUNTER — EVALUATION (OUTPATIENT)
Dept: PHYSICAL THERAPY | Facility: REHABILITATION | Age: 65
End: 2025-02-28
Payer: COMMERCIAL

## 2025-02-28 DIAGNOSIS — R26.81 GAIT INSTABILITY: Primary | ICD-10-CM

## 2025-02-28 PROCEDURE — 97163 PT EVAL HIGH COMPLEX 45 MIN: CPT | Performed by: PHYSICAL THERAPIST

## 2025-02-28 NOTE — PROGRESS NOTES
PT Evaluation     Today's date: 2025  Patient name: Karen Jean Baptiste  : 1960  MRN: [id number]  Referring provider: No ref. provider found  Dx:   Encounter Diagnosis     ICD-10-CM    1. Gait instability  R26.81           Start Time: 1040  Stop Time: 1115  Total time in clinic (min): 35 minutes    Assessment  Impairments: abnormal coordination, abnormal gait, abnormal or restricted ROM, activity intolerance, impaired balance, impaired physical strength, lacks appropriate home exercise program and pain with function  Symptom irritability: moderate    Assessment details: Pt is a pleasant 64 y.o. female presenting to outpatient physical therapy with gait instability  (primary encounter diagnosis). Pt presents with decreased left lower extremity strength, impaired static and dynamic balance, impaired gait, impaired functional mobility, and decreased tolerance to activity. Pt is a good candidate for outpatient physical therapy and would benefit from skilled physical therapy to address limitations and to achieve goals. Thank you for this referral.   Understanding of Dx/Px/POC: good     Prognosis: good    Goals  Short-Term Goals (4 weeks)   1. Patient will decrease worst rating of pain by 25% to improve quality of life.  2. Patient will increase strength by 1/2 MMT to improve quality of life with improved efficiency of daily activities.  3. Patient will improve ROM by 25% indicating improved mobility of affected area.    Long-Term Goals (8 weeks)   1. Patient will decrease pain by 50% at worst in comparison to IE indicating significant reduction in pain and improved quality of life.  2. Patient will demonstrate strength WFL compared to IE levels indicating ability to independently manage pain symptoms to accomplish daily activities.   3. Patient will be independent with HEP with good form accomplished.      Plan  Patient would benefit from: PT eval and skilled PT  Planned modality interventions: cryotherapy and  "thermotherapy: hydrocollator packs    Planned therapy interventions: IADL retraining, body mechanics training, flexibility, functional ROM exercises, home exercise program, neuromuscular re-education, manual therapy, postural training, strengthening, stretching, therapeutic activities, therapeutic exercise, joint mobilization and IASTM    Frequency: 2x week  Duration in weeks: 8  Treatment plan discussed with: patient        Subjective Evaluation    History of Present Illness  Mechanism of injury: Pt is a 64 year-old female presenting to PT with 3 year history of gait instability. Pt reports progressive decrease in balance with previous history of falls, denies any falls within the last year. She reports in 2023 she had 3 falls while walking, states her left leg \"just gives out\". Pt reports she has never had PT for her balance. Pt [facility], and EMG for her L LE was recently ordered, unsure of date scheduled. Pt referred to OPPT by neurology.    Pt reports she has left lateral lower leg and foot numbness/tingling. Pt reports this has been present, but hasn't worsened, over the past 3 years.           Recurrent probem    Quality of life: good          Objective     Concurrent Complaints  Negative for night pain, disturbed sleep, bladder dysfunction, bowel dysfunction and saddle (S4) numbness    Postural Observations    Additional Postural Observation Details  Mildly increased lumbar lordosis, mild forward head, bilateral IR/protracted shoulders.    Neurological Testing     Sensation     Lumbar   Left   Diminished: light touch    Right   Intact: light touch    Comments   Left light touch: decreased left lateral lower leg and dorsal aspect of left lateral foot    Reflexes   Left   Patellar (L4): normal (2+)  Achilles (S1): absent (0)    Right   Patellar (L4): normal (2+)  Achilles (S1): normal (2+)    Active Range of Motion     Lumbar   Flexion:  WFL  Extension:  WFL  Left lateral " flexion:  WFL  Right lateral flexion:  WFL  Left rotation:  WFL  Right rotation:  WFL    Additional Active Range of Motion Details  Pt with full pain-free lumbar AROM with no reproduction of symptoms.     Strength/Myotome Testing     Left Hip   Planes of Motion   Flexion: 5  Extension: 4-  Abduction: 4-  Adduction: 4  Internal rotation: 4-    Right Hip   Planes of Motion   Flexion: 5  Extension: 4+  Abduction: 4+  Adduction: 5  Internal rotation: 5    Left Knee   Flexion: 4-  Extension: 5    Right Knee   Flexion: 5  Extension: 5    Left Ankle/Foot   Dorsiflexion: 4  Plantar flexion: 4-  Inversion: 4  Eversion: 4  Great toe flexion: 5  Great toe extension: 4+    Right Ankle/Foot   Dorsiflexion: 5  Plantar flexion: 5  Inversion: 5  Eversion: 5  Great toe flexion: 5  Great toe extension: 5    Tests     Lumbar     Left   Negative passive SLR and slump test.     Right   Negative passive SLR and slump test.     General Comments:      Lumbar Comments  SLS:  R = 8 seconds; L = 2 seconds    Static Balance with EO: 30 seconds, mildly unsteady    Static Balance with EC: 30 seconds, moderately unsteady with close supervision    Ambulatory Balance: Fair    Gait: WBOS, with L > R toe out, inconsistent chris, step length and path.                  Precautions:   Patient Active Problem List   Diagnosis    Anxiety    Essential hypertension    Hypothyroidism    Newly recognized heart murmur    Vitamin D deficiency    Recurrent major depressive disorder (HCC)    Iron deficiency anemia due to chronic blood loss    Acute pancreatitis    Cholelithiasis    Constipation    Bilateral edema of lower extremity    Chronic deep vein thrombosis (DVT) of right lower extremity (HCC)    Suprapubic abdominal pain    Encounter for screening mammogram for malignant neoplasm of breast    Psychiatric illness    Pain in both knees    Bipolar disorder (HCC)    Annual physical exam    Primary osteoarthritis of right knee    Primary osteoarthritis of left  knee    Difficulty demonstrating health literacy    Numbness of left lower extremity     Daily Treatment Diary      Visit # 1 2 3 4 5 6 7 8 9 10   Assessment  2/28                     [employer] Testing  NV  **           Turner/Deuce LARIOS                 **   FOTO  NP       **         **   Manuals                                                     Prescribed POC    Pt Education  MD                      HEP Issued/Updated  MD                      Bike Warm-Up                        SLR Flexion                        SLR Abduction                        Bridges + Hip Add                        Bridges + TB Hip ABD                        TB Clamshells                        TB Reverse Clamshells                        Standing Hip ABD              Seated Ankle DF                        FTEO - Airex              FTEC - Airex              Tandem Balance                        Toe Taps on Step                                                   Modalities                                        QR Code:  NP  [facility] HEP:  Next Session

## 2025-02-28 NOTE — LETTER
2025    No Recipients    Patient: Karen Jean Baptiste   YOB: 1960   Date of Visit: 2025     Encounter Diagnosis     ICD-10-CM    1. Gait instability  R26.81           Dear Dr. Ariza:    Thank you for your recent referral of Karen Jean Baptiste. Please review the attached evaluation summary from Karen's recent visit.     Please verify that you agree with the plan of care by signing the attached order.     If you have any questions or concerns, please do not hesitate to call.     I sincerely appreciate the opportunity to share in the care of one of your patients and hope to have another opportunity to work with you in the near future.       Sincerely,    Smitha Go, PT      Referring Provider:      I certify that I have read the below Plan of Care and certify the need for these services furnished under this plan of treatment while under my care.                    JAMILA Johns  [address] [place] [address]  Via In Basket          PT Evaluation     Today's date: 2025  Patient name: Karen Jean Baptiste  : 1960  MRN: [id number]  Referring provider: No ref. provider found  Dx:   Encounter Diagnosis     ICD-10-CM    1. Gait instability  R26.81           Start Time: 1040  Stop Time: 1115  Total time in clinic (min): 35 minutes    Assessment  Impairments: abnormal coordination, abnormal gait, abnormal or restricted ROM, activity intolerance, impaired balance, impaired physical strength, lacks appropriate home exercise program and pain with function  Symptom irritability: moderate    Assessment details: Pt is a pleasant 64 y.o. female presenting to outpatient physical therapy with gait instability  (primary encounter diagnosis). Pt presents with decreased left lower extremity strength, impaired static and dynamic balance, impaired gait, impaired functional mobility, and decreased tolerance to activity. Pt is a good candidate for outpatient physical therapy and  "would benefit from skilled physical therapy to address limitations and to achieve goals. Thank you for this referral.   Understanding of Dx/Px/POC: good     Prognosis: good    Goals  Short-Term Goals (4 weeks)   1. Patient will decrease worst rating of pain by 25% to improve quality of life.  2. Patient will increase strength by 1/2 MMT to improve quality of life with improved efficiency of daily activities.  3. Patient will improve ROM by 25% indicating improved mobility of affected area.    Long-Term Goals (8 weeks)   1. Patient will decrease pain by 50% at worst in comparison to IE indicating significant reduction in pain and improved quality of life.  2. Patient will demonstrate strength WFL compared to IE levels indicating ability to independently manage pain symptoms to accomplish daily activities.   3. Patient will be independent with HEP with good form accomplished.      Plan  Patient would benefit from: PT eval and skilled PT  Planned modality interventions: cryotherapy and thermotherapy: hydrocollator packs    Planned therapy interventions: IADL retraining, body mechanics training, flexibility, functional ROM exercises, home exercise program, neuromuscular re-education, manual therapy, postural training, strengthening, stretching, therapeutic activities, therapeutic exercise, joint mobilization and IASTM    Frequency: 2x week  Duration in weeks: 8  Treatment plan discussed with: patient        Subjective Evaluation    History of Present Illness  Mechanism of injury: Pt is a 64 year-old female presenting to PT with 3 year history of gait instability. Pt reports progressive decrease in balance with previous history of falls, denies any falls within the last year. She reports in 2023 she had 3 falls while walking, states her left leg \"just gives out\". Pt reports she has never had PT for her balance. Pt reports [facility], and EMG for her L LE was recently ordered, unsure of date " scheduled. Pt referred to OPPT by neurology.    Pt reports she has left lateral lower leg and foot numbness/tingling. Pt reports this has been present, but hasn't worsened, over the past 3 years.           Recurrent probem    Quality of life: good          Objective     Concurrent Complaints  Negative for night pain, disturbed sleep, bladder dysfunction, bowel dysfunction and saddle (S4) numbness    Postural Observations    Additional Postural Observation Details  Mildly increased lumbar lordosis, mild forward head, bilateral IR/protracted shoulders.    Neurological Testing     Sensation     Lumbar   Left   Diminished: light touch    Right   Intact: light touch    Comments   Left light touch: decreased left lateral lower leg and dorsal aspect of left lateral foot    Reflexes   Left   Patellar (L4): normal (2+)  Achilles (S1): absent (0)    Right   Patellar (L4): normal (2+)  Achilles (S1): normal (2+)    Active Range of Motion     Lumbar   Flexion:  WFL  Extension:  WFL  Left lateral flexion:  WFL  Right lateral flexion:  WFL  Left rotation:  WFL  Right rotation:  WFL    Additional Active Range of Motion Details  Pt with full pain-free lumbar AROM with no reproduction of symptoms.     Strength/Myotome Testing     Left Hip   Planes of Motion   Flexion: 5  Extension: 4-  Abduction: 4-  Adduction: 4  Internal rotation: 4-    Right Hip   Planes of Motion   Flexion: 5  Extension: 4+  Abduction: 4+  Adduction: 5  Internal rotation: 5    Left Knee   Flexion: 4-  Extension: 5    Right Knee   Flexion: 5  Extension: 5    Left Ankle/Foot   Dorsiflexion: 4  Plantar flexion: 4-  Inversion: 4  Eversion: 4  Great toe flexion: 5  Great toe extension: 4+    Right Ankle/Foot   Dorsiflexion: 5  Plantar flexion: 5  Inversion: 5  Eversion: 5  Great toe flexion: 5  Great toe extension: 5    Tests     Lumbar     Left   Negative passive SLR and slump test.     Right   Negative passive SLR and slump test.     General Comments:      Lumbar  Comments  SLS:  R = 8 seconds; L = 2 seconds    Static Balance with EO: 30 seconds, mildly unsteady    Static Balance with EC: 30 seconds, moderately unsteady with close supervision    Ambulatory Balance: Fair    Gait: WBOS, with L > R toe out, inconsistent chris, step length and path.                  Precautions:   Patient Active Problem List   Diagnosis   • Anxiety   • Essential hypertension   • Hypothyroidism   • Newly recognized heart murmur   • Vitamin D deficiency   • Recurrent major depressive disorder (HCC)   • Iron deficiency anemia due to chronic blood loss   • Acute pancreatitis   • Cholelithiasis   • Constipation   • Bilateral edema of lower extremity   • Chronic deep vein thrombosis (DVT) of right lower extremity (HCC)   • Suprapubic abdominal pain   • Encounter for screening mammogram for malignant neoplasm of breast   • Psychiatric illness   • Pain in both knees   • Bipolar disorder (HCC)   • Annual physical exam   • Primary osteoarthritis of right knee   • Primary osteoarthritis of left knee   • Difficulty demonstrating health literacy   • Numbness of left lower extremity     Daily Treatment Diary      Visit # 1 2 3 4 5 6 7 8 9 10   Assessment  2/28                     Biodex Testing  NV  **           Eval/Reval  MD                 **   FOTO  NP       **         **   Manuals                                                     Prescribed POC    Pt Education  MD                      HEP Issued/Updated  MD                      Bike Warm-Up                        SLR Flexion                        SLR Abduction                        Bridges + Hip Add                        Bridges + TB Hip ABD                        TB Clamshells                        TB Reverse Clamshells                        Standing Hip ABD              Seated Ankle DF                        FTEO - Airex              FTEC - Airex              Tandem Balance                        Toe Taps on Step                                                    Modalities                                        QR Code:  NP  Med Bridge HEP:  Next Session

## 2025-03-01 DIAGNOSIS — F33.9 RECURRENT MAJOR DEPRESSIVE DISORDER (HCC): Chronic | ICD-10-CM

## 2025-03-03 RX ORDER — RISPERIDONE 2 MG/1
3 TABLET ORAL DAILY
Qty: 90 TABLET | Refills: 0 | OUTPATIENT
Start: 2025-03-03

## 2025-03-04 ENCOUNTER — OFFICE VISIT (OUTPATIENT)
Dept: PHYSICAL THERAPY | Facility: REHABILITATION | Age: 65
End: 2025-03-04
Payer: COMMERCIAL

## 2025-03-04 DIAGNOSIS — R26.81 GAIT INSTABILITY: Primary | ICD-10-CM

## 2025-03-04 PROCEDURE — 97112 NEUROMUSCULAR REEDUCATION: CPT

## 2025-03-04 PROCEDURE — 97110 THERAPEUTIC EXERCISES: CPT

## 2025-03-04 NOTE — PROGRESS NOTES
Daily Note     Today's date: 3/4/2025  Patient name: Karen Jean Baptiste  : 1960  MRN: [id number]  Referring provider: [place] Ariza C*  Dx:   Encounter Diagnosis     ICD-10-CM    1. Gait instability  R26.81           Start Time: 1345  Stop Time: 1430  Total time in clinic (min): 45 minutes    Subjective: Patient reports no new complaints.       Objective: See treatment diary below      Assessment: Patient tolerated treatment well. Patient had more difficulty with dynamic balance activities vs. Static. Patient able to perform static balance activities without LOB today. Difficulty with cueing on biodex today. Pt will benefit from continued skilled PT intervention in order to address remaining limitations and to restore maximal function.         Plan: Continue per plan of care.      Precautions:   Patient Active Problem List   Diagnosis    Anxiety    Essential hypertension    Hypothyroidism    Newly recognized heart murmur    Vitamin D deficiency    Recurrent major depressive disorder (HCC)    Iron deficiency anemia due to chronic blood loss    Acute pancreatitis    Cholelithiasis    Constipation    Bilateral edema of lower extremity    Chronic deep vein thrombosis (DVT) of right lower extremity (HCC)    Suprapubic abdominal pain    Encounter for screening mammogram for malignant neoplasm of breast    Psychiatric illness    Pain in both knees    Bipolar disorder (HCC)    Annual physical exam    Primary osteoarthritis of right knee    Primary osteoarthritis of left knee    Difficulty demonstrating health literacy    Numbness of left lower extremity     Daily Treatment Diary      Visit # 1 2 3 4 5 6 7 8 9 10   Assessment                       Biodex Testing  NV  ** **          Eval/Revoma LARIOS                 **   FOTO  NP       **         **   Manuals                                                     Prescribed POC    Pt Education  MD                      HEP Issued/Updated  MD samuel LOS x2  WS x1        "             Bike Warm-Up    5'                    SLR Flexion    x10 each                    SLR Abduction    2x5 each                    Bridges + Hip Add    5\" 2x5                    Bridges + TB Hip ABD    [place] 5\" 2x5                    TB Clamshells    no TB 5\" x10                    TB Reverse Clamshells    no TB 5\" x10                    Standing Hip ABD  2x10             Seated Ankle DF                        FTEO - Airex  30\"x2            FTEC - Airex  30\"x1            Tandem Balance    30\"x2 ea                    Toe Taps on Step  2x10              March on foam 2x10                                   Modalities                                        QR Code:  NP  Med Bridge HEP:  Next Session       "

## 2025-03-06 ENCOUNTER — OFFICE VISIT (OUTPATIENT)
Dept: PHYSICAL THERAPY | Facility: REHABILITATION | Age: 65
End: 2025-03-06
Payer: COMMERCIAL

## 2025-03-06 DIAGNOSIS — R26.81 GAIT INSTABILITY: Primary | ICD-10-CM

## 2025-03-06 PROCEDURE — 97110 THERAPEUTIC EXERCISES: CPT

## 2025-03-06 PROCEDURE — 97112 NEUROMUSCULAR REEDUCATION: CPT

## 2025-03-06 NOTE — PROGRESS NOTES
Daily Note     Today's date: 3/6/2025  Patient name: Karen Jean Baptiste  : 1960  MRN: [id number]  Referring provider: No ref. provider found  Dx:   Encounter Diagnosis     ICD-10-CM    1. Gait instability  R26.81                      Subjective: Patient reports muscle soreness following lv. She states she is feeling good today.    Objective: See treatment diary below  Issued HEP, to which pt verbalizes and demonstrates understanding.    Assessment: Patient with good tolerance to treatment. Improved tolerance to supine TE, min-moderate MC/VC to ensure proper technique and dosage. CS required for balance, improved on pliable surface with no HHA. Pt reports fatigue, no adverse response post treatment. Will continue to progress as able. Pt would benefit from continued skilled PT to improve current deficits and maximize function.    Plan: Continue per plan of care.      Precautions:   Patient Active Problem List   Diagnosis    Anxiety    Essential hypertension    Hypothyroidism    Newly recognized heart murmur    Vitamin D deficiency    Recurrent major depressive disorder (HCC)    Iron deficiency anemia due to chronic blood loss    Acute pancreatitis    Cholelithiasis    Constipation    Bilateral edema of lower extremity    Chronic deep vein thrombosis (DVT) of right lower extremity (HCC)    Suprapubic abdominal pain    Encounter for screening mammogram for malignant neoplasm of breast    Psychiatric illness    Pain in both knees    Bipolar disorder (HCC)    Annual physical exam    Primary osteoarthritis of right knee    Primary osteoarthritis of left knee    Difficulty demonstrating health literacy    Numbness of left lower extremity     Daily Treatment Diary      Visit # 1 2 3 4 5 6 7 8 9 10   Assessment                       Biodex Testing  NV  ** **          Turner/Deuce LARIOS                 **   FOTO  NP       **         **   Manuals                                                     Prescribed POC    Pt  "Education  MD                      HEP Issued/Updated  MD  biodex LOS x2  WS x1                    Bike Warm-Up    5'  nv                  SLR Flexion    x10 each  5\"x10 ea                  SLR Abduction    2x5 each  5\"x10 ea                  Bridges + Hip Add    5\" 2x5  5\"  1x10                  Bridges + TB Hip ABD    [place] 5\" 2x5  pink  5\"  1x10                  TB Clamshells    no TB 5\" x10  no TB  5\"x10                  TB Reverse Clamshells    no TB 5\" x10  no TB  5\"x10                  Standing Hip ABD  2x10  2x10           Seated Ankle DF                        FTEO - Airex  30\"x2 30\"x2  CS           FTEC - Airex  30\"x1 30\"x2  CS           Tandem Balance    30\"x2 ea  30\"x2  CS                  Toe Taps on Step  2x10              March on foam 2x10                                   Modalities                                        QR Code:  NP  Med Bridge HEP:  Access Code: [email or web address]  URL: [email or web address]  Date: 03/06/2025  Prepared by: Татьяна Greer    Exercises  - Sidelying Hip Abduction  - 1 x daily - 1 sets - 10 reps - 5 hold  - Supine Active Straight Leg Raise  - 1 x daily - 1 sets - 10 reps - 5 hold  - Supine Bridge with Mini Swiss Ball Between Knees  - 1 x daily - 1 sets - 10 reps - 5 hold  - Bridge with Hip Abduction and Resistance  - 1 x daily - 1 sets - 10 reps - 5 hold  - Clamshell  - 1 x daily - 1 sets - 10 reps - 5 hold       "

## 2025-03-11 ENCOUNTER — OFFICE VISIT (OUTPATIENT)
Dept: PHYSICAL THERAPY | Facility: REHABILITATION | Age: 65
End: 2025-03-11
Payer: COMMERCIAL

## 2025-03-11 DIAGNOSIS — R26.81 GAIT INSTABILITY: Primary | ICD-10-CM

## 2025-03-11 PROCEDURE — 97110 THERAPEUTIC EXERCISES: CPT | Performed by: PHYSICAL THERAPIST

## 2025-03-11 PROCEDURE — 97112 NEUROMUSCULAR REEDUCATION: CPT | Performed by: PHYSICAL THERAPIST

## 2025-03-11 NOTE — PROGRESS NOTES
Daily Note     Today's date: 3/11/2025  Patient name: Karen Jean Baptiste  : 1960  MRN: [id number]  Referring provider: No ref. provider found  Dx:   Encounter Diagnosis     ICD-10-CM    1. Gait instability  R26.81                      Subjective: Patient with no new complaints since her last session. She reports she hasn't been doing her exercises at home.     Objective: See treatment diary below.   Biodex LOS Testing (B UE Support with CS/CG Assist - Feet at E7 and E15)  Direction Control Actual Goal   Overall 21 65   Forward 36 65   Backward 23 30   Left 40 65   Right 34 65   Forward/Left 25 65   Forward/Right 19 65   Backward/Left 17 65   Backward/Right 21 65       Assessment:  Pt with good tolerance to Biodex LOS Testing, however, required CS/CG for safety and weight-shifting appropriately.   She required maximal verbal/manual cues for correct form and performance with all exercises. She was unable to perform SLR Abduction without assistance to maintain positioning, therefore, modified to clamshell position with improved tolerance and performance.  Will continue to progress program as able. Pt will benefit from continued skilled PT intervention in order to address remaining limitations and to restore maximal function.     Plan: Continue per plan of care.      Precautions:   Patient Active Problem List   Diagnosis    Anxiety    Essential hypertension    Hypothyroidism    Newly recognized heart murmur    Vitamin D deficiency    Recurrent major depressive disorder (HCC)    Iron deficiency anemia due to chronic blood loss    Acute pancreatitis    Cholelithiasis    Constipation    Bilateral edema of lower extremity    Chronic deep vein thrombosis (DVT) of right lower extremity (HCC)    Suprapubic abdominal pain    Encounter for screening mammogram for malignant neoplasm of breast    Psychiatric illness    Pain in both knees    Bipolar disorder (HCC)    Annual physical exam    Primary osteoarthritis of right knee     "Primary osteoarthritis of left knee    Difficulty demonstrating health literacy    Numbness of left lower extremity     Daily Treatment Diary      Visit # 1 2 3 4 5 6 7 8 9 10   Assessment  2/28      3/11               Biodex Testing  NV  ** **  Performed         Turner/Deuce LARIOS                 **   FOTO  NP       **         **   Manuals                                                     Prescribed POC    Pt Education  MD                      HEP Issued/Updated  MD  biodex LOS x2  WS x1    Biodex      Testing - See Results Above                Bike Warm-Up    5'  nv  L1x5'                SLR Flexion - B    x10 each  5\"x10 ea  5\"x10 ea                SLR Abduction - B    2x5 each  5\"x10 ea  Modified  5\"x5 ea  HOLD              Bridges + Hip Add    5\" 2x5  5\"  1x10  5\"   1x10                Bridges + TB Hip ABD    [place] 5\" 2x5  pink  5\"  1x10  Pink   5\"  1x10                TB Clamshells    no TB 5\" x10  no TB  5\"x10  No TB   5\"   1x10 ea                TB Reverse Clamshells    no TB 5\" x10  no TB  5\"x10  No TB   5\"   1x10 ea                Standing Hip ABD  2x10  2x10  NV          Seated Ankle DF                        FTEO - Airex  30\"x2 30\"x2  CS  NV          FTEC - Airex  30\"x1 30\"x2  CS  NV          Tandem Balance    30\"x2 ea  30\"x2  CS  NV                Toe Taps on Step  2x10              March on foam 2x10            Biodex LOS     NV                                              Modalities                                        QR Code:  NP  Med Bridge HEP:  Access Code: [email or web address]  URL: [email or web address]  Date: 03/06/2025  Prepared by: Татьяна Greer    Exercises  - Sidelying Hip Abduction  - 1 x daily - 1 sets - 10 reps - 5 hold  - Supine Active Straight Leg Raise  - 1 x daily - 1 sets - 10 reps - 5 hold  - Supine Bridge with Mini Swiss Ball Between Knees  - 1 x daily - 1 sets - 10 reps - 5 hold  - Bridge with Hip Abduction and Resistance  - 1 x daily - 1 sets - 10 reps - 5 hold  - Clamshell  - " 1 x daily - 1 sets - 10 reps - 5 hold

## 2025-03-12 ENCOUNTER — APPOINTMENT (OUTPATIENT)
Dept: LAB | Facility: HOSPITAL | Age: 65
End: 2025-03-12
Payer: COMMERCIAL

## 2025-03-12 DIAGNOSIS — E78.5 HYPERLIPIDEMIA, UNSPECIFIED HYPERLIPIDEMIA TYPE: ICD-10-CM

## 2025-03-12 DIAGNOSIS — F31.9 BIPOLAR AFFECTIVE DISORDER, REMISSION STATUS UNSPECIFIED (HCC): ICD-10-CM

## 2025-03-12 DIAGNOSIS — R20.0 NUMBNESS OF LEFT LOWER EXTREMITY: ICD-10-CM

## 2025-03-12 DIAGNOSIS — I10 ESSENTIAL HYPERTENSION: ICD-10-CM

## 2025-03-12 DIAGNOSIS — E03.9 HYPOTHYROIDISM, UNSPECIFIED TYPE: ICD-10-CM

## 2025-03-12 LAB
ALBUMIN SERPL BCG-MCNC: 4 G/DL (ref 3.5–5)
ALP SERPL-CCNC: 88 U/L (ref 34–104)
ALT SERPL W P-5'-P-CCNC: 12 U/L (ref 7–52)
ANION GAP SERPL CALCULATED.3IONS-SCNC: 7 MMOL/L (ref 4–13)
AST SERPL W P-5'-P-CCNC: 18 U/L (ref 13–39)
BILIRUB SERPL-MCNC: 0.66 MG/DL (ref 0.2–1)
BUN SERPL-MCNC: 18 MG/DL (ref 5–25)
CALCIUM SERPL-MCNC: 8.9 MG/DL (ref 8.4–10.2)
CHLORIDE SERPL-SCNC: 106 MMOL/L (ref 96–108)
CHOLEST SERPL-MCNC: 180 MG/DL (ref ?–200)
CO2 SERPL-SCNC: 26 MMOL/L (ref 21–32)
CREAT SERPL-MCNC: 1.02 MG/DL (ref 0.6–1.3)
GFR SERPL CREATININE-BSD FRML MDRD: 59 ML/MIN/1.73SQ M
GLUCOSE P FAST SERPL-MCNC: 80 MG/DL (ref 65–99)
HDLC SERPL-MCNC: 62 MG/DL
LDLC SERPL CALC-MCNC: 106 MG/DL (ref 0–100)
NONHDLC SERPL-MCNC: 118 MG/DL
POTASSIUM SERPL-SCNC: 4.1 MMOL/L (ref 3.5–5.3)
PROLACTIN SERPL-MCNC: 103.21 NG/ML (ref 2.74–19.64)
PROT SERPL-MCNC: 7 G/DL (ref 6.4–8.4)
SODIUM SERPL-SCNC: 139 MMOL/L (ref 135–147)
TRIGL SERPL-MCNC: 62 MG/DL (ref ?–150)
TSH SERPL DL<=0.05 MIU/L-ACNC: 3.15 UIU/ML (ref 0.45–4.5)
VIT B12 SERPL-MCNC: 146 PG/ML (ref 180–914)

## 2025-03-12 PROCEDURE — 80061 LIPID PANEL: CPT

## 2025-03-12 PROCEDURE — 36415 COLL VENOUS BLD VENIPUNCTURE: CPT

## 2025-03-12 PROCEDURE — 82607 VITAMIN B-12: CPT

## 2025-03-12 PROCEDURE — 84146 ASSAY OF PROLACTIN: CPT

## 2025-03-12 PROCEDURE — 80053 COMPREHEN METABOLIC PANEL: CPT

## 2025-03-12 PROCEDURE — 84443 ASSAY THYROID STIM HORMONE: CPT

## 2025-03-13 ENCOUNTER — APPOINTMENT (OUTPATIENT)
Dept: PHYSICAL THERAPY | Facility: REHABILITATION | Age: 65
End: 2025-03-13
Payer: COMMERCIAL

## 2025-03-13 ENCOUNTER — RESULTS FOLLOW-UP (OUTPATIENT)
Dept: FAMILY MEDICINE CLINIC | Facility: CLINIC | Age: 65
End: 2025-03-13

## 2025-03-13 ENCOUNTER — RESULTS FOLLOW-UP (OUTPATIENT)
Dept: NEUROLOGY | Facility: CLINIC | Age: 65
End: 2025-03-13

## 2025-03-13 DIAGNOSIS — E22.1 HYPERPROLACTINEMIA (HCC): Primary | ICD-10-CM

## 2025-03-13 DIAGNOSIS — R26.81 GAIT INSTABILITY: Primary | ICD-10-CM

## 2025-03-13 DIAGNOSIS — F33.9 RECURRENT MAJOR DEPRESSIVE DISORDER (HCC): Chronic | ICD-10-CM

## 2025-03-13 RX ORDER — RISPERIDONE 2 MG/1
2 TABLET ORAL DAILY
Qty: 90 TABLET | Refills: 0 | Status: SHIPPED | OUTPATIENT
Start: 2025-03-13

## 2025-03-13 RX ORDER — RISPERIDONE 2 MG/1
3 TABLET ORAL DAILY
Qty: 90 TABLET | Refills: 0 | Status: SHIPPED | OUTPATIENT
Start: 2025-03-13 | End: 2025-03-13 | Stop reason: SDUPTHER

## 2025-03-13 NOTE — TELEPHONE ENCOUNTER
----- Message from Shannon Savage RN sent at 3/13/2025  8:48 AM EDT -----  [facility], Please place B12 injection orders. Thank you!    [facility] Team, Please call maryuri Parham to ewa B12 injections at [facility] per provider recommendations. Thank you!

## 2025-03-13 NOTE — TELEPHONE ENCOUNTER
Spoke to Dione to schedule weekly b12 injections for four weeks. Offered to schedule monthly ones starting after April- Dione declined will wait to schedule monthly injections when weekly injections are completed.

## 2025-03-13 NOTE — TELEPHONE ENCOUNTER
JAMILA Andujar       3/13/25  8:19 AM  Result Note  Please contact pt and/or her daughter. I am covering for Eusebia. Pt's B12 is very low. This can certainly cause numbness/tingling. I would recommend IM supplementation- she can coordinate this with her PCP. Alternatively, if she would prefer we do it then we can place orders for her to come in for B12 at out office Typically once a week x 4 weeks, then once a month x 5 months    _____________________________________    I spoke to daughter Dione and read provider's note above. She appreciated the update.     She is agreeable to schedule B12 injections per provider's recommendation above and aware MA will call her to schedule.    Routed to provider to place orders and MA's to call dtr Dione to schedule B12 injections per above, she wants to bring pt to [place] office for injections.    Dione CB # [phone], may leave a detailed msg.

## 2025-03-13 NOTE — PROGRESS NOTES
Daily Note     Today's date: 3/13/2025  Patient name: Karen Jean Baptiste  : 1960  MRN: [id number]  Referring provider: No ref. provider found  Dx:   Encounter Diagnosis     ICD-10-CM    1. Gait instability  R26.81                      Subjective: Patient with no new complaints since her last session. She reports she hasn't been doing her exercises at home.     Objective: See treatment diary below.   Biodex LOS Testing (B UE Support with CS/CG Assist - Feet at E7 and E15)  Direction Control Actual Goal   Overall 21 65   Forward 36 65   Backward 23 30   Left 40 65   Right 34 65   Forward/Left 25 65   Forward/Right 19 65   Backward/Left 17 65   Backward/Right 21 65       Assessment:  Pt with good tolerance to Biodex LOS Testing, however, required CS/CG for safety and weight-shifting appropriately.   She required maximal verbal/manual cues for correct form and performance with all exercises. She was unable to perform SLR Abduction without assistance to maintain positioning, therefore, modified to clamshell position with improved tolerance and performance.  Will continue to progress program as able. Pt will benefit from continued skilled PT intervention in order to address remaining limitations and to restore maximal function.     Plan: Continue per plan of care.      Precautions:   Patient Active Problem List   Diagnosis    Anxiety    Essential hypertension    Hypothyroidism    Newly recognized heart murmur    Vitamin D deficiency    Recurrent major depressive disorder (HCC)    Iron deficiency anemia due to chronic blood loss    Acute pancreatitis    Cholelithiasis    Constipation    Bilateral edema of lower extremity    Chronic deep vein thrombosis (DVT) of right lower extremity (HCC)    Suprapubic abdominal pain    Encounter for screening mammogram for malignant neoplasm of breast    Psychiatric illness    Pain in both knees    Bipolar disorder (HCC)    Annual physical exam    Primary osteoarthritis of right knee     "Primary osteoarthritis of left knee    Difficulty demonstrating health literacy    Numbness of left lower extremity     Daily Treatment Diary      Visit # 1 2 3 4 5 6 7 8 9 10   Assessment  2/28      3/11  3/13             Biodex Testing  NV  ** **  Performed         Turner/Deuce LARIOS                 **   FOTO  NP       **         **   Manuals                                                     Prescribed POC    Pt Education  MD                      HEP Issued/Updated  MD  biodex LOS x2  WS x1    Biodex      Testing - See Results Above                Bike Warm-Up    5'  nv  L1x5'                SLR Flexion - B    x10 each  5\"x10 ea  5\"x10 ea                SLR Abduction - B    2x5 each  5\"x10 ea  Modified  5\"x5 ea  HOLD              Bridges + Hip Add    5\" 2x5  5\"  1x10  5\"   1x10                Bridges + TB Hip ABD    [place] 5\" 2x5  pink  5\"  1x10  Pink   5\"  1x10                TB Clamshells    no TB 5\" x10  no TB  5\"x10  No TB   5\"   1x10 ea                TB Reverse Clamshells    no TB 5\" x10  no TB  5\"x10  No TB   5\"   1x10 ea                Standing Hip ABD  2x10  2x10  NV          Seated Ankle DF                        FTEO - Airex  30\"x2 30\"x2  CS  NV          FTEC - Airex  30\"x1 30\"x2  CS  NV          Tandem Balance    30\"x2 ea  30\"x2  CS  NV                Toe Taps on Step  2x10              March on foam 2x10            Biodex LOS     NV                                              Modalities                                        QR Code:  NP  Med Bridge HEP:  Access Code: [email or web address]  URL: [email or web address]  Date: 03/06/2025  Prepared by: Татьяна Greer    Exercises  - Sidelying Hip Abduction  - 1 x daily - 1 sets - 10 reps - 5 hold  - Supine Active Straight Leg Raise  - 1 x daily - 1 sets - 10 reps - 5 hold  - Supine Bridge with Mini Swiss Ball Between Knees  - 1 x daily - 1 sets - 10 reps - 5 hold  - Bridge with Hip Abduction and Resistance  - 1 x daily - 1 sets - 10 reps - 5 hold  - " Clamshell  - 1 x daily - 1 sets - 10 reps - 5 hold

## 2025-03-14 NOTE — TELEPHONE ENCOUNTER
03/14/25    Patient daughter called office today to reschedule 03/17/25 B12 Injection appt due that patient wont be able to go.    Appt Canceled per Patient daughter request.      Please Review and contact Patient daughter to reschedule 03/17/25 appt.     Thank You.

## 2025-03-17 ENCOUNTER — APPOINTMENT (OUTPATIENT)
Dept: PHYSICAL THERAPY | Facility: REHABILITATION | Age: 65
End: 2025-03-17
Payer: COMMERCIAL

## 2025-03-19 ENCOUNTER — APPOINTMENT (OUTPATIENT)
Dept: PHYSICAL THERAPY | Facility: REHABILITATION | Age: 65
End: 2025-03-19
Payer: COMMERCIAL

## 2025-03-20 ENCOUNTER — OFFICE VISIT (OUTPATIENT)
Dept: PHYSICAL THERAPY | Facility: REHABILITATION | Age: 65
End: 2025-03-20
Payer: COMMERCIAL

## 2025-03-20 DIAGNOSIS — R26.81 GAIT INSTABILITY: Primary | ICD-10-CM

## 2025-03-20 PROCEDURE — 97112 NEUROMUSCULAR REEDUCATION: CPT | Performed by: PHYSICAL THERAPIST

## 2025-03-20 PROCEDURE — 97110 THERAPEUTIC EXERCISES: CPT | Performed by: PHYSICAL THERAPIST

## 2025-03-20 NOTE — PROGRESS NOTES
Daily Note     Today's date: 3/20/2025  Patient name: Karen Jean Baptiste  : 1960  MRN: [id number]  Referring provider: No ref. provider found  Dx:   Encounter Diagnosis     ICD-10-CM    1. Gait instability  R26.81                      Subjective: Patient with no new complaints since her last session. She reports she hasn't been doing her exercises at home.     Objective: See treatment diary below.     Assessment:  Pt with good tolerance to program today with appropriate challenge and fatigue without c/o increased pain. She required maximal verbal/manual cues for correct form and performance with all exercises. Will continue to progress program as able. Pt will benefit from continued skilled PT intervention in order to address remaining limitations and to restore maximal function.     Plan: Continue per plan of care.      Precautions:   Patient Active Problem List   Diagnosis    Anxiety    Essential hypertension    Hypothyroidism    Newly recognized heart murmur    Vitamin D deficiency    Recurrent major depressive disorder (HCC)    Iron deficiency anemia due to chronic blood loss    Acute pancreatitis    Cholelithiasis    Constipation    Bilateral edema of lower extremity    Chronic deep vein thrombosis (DVT) of right lower extremity (HCC)    Suprapubic abdominal pain    Encounter for screening mammogram for malignant neoplasm of breast    Psychiatric illness    Pain in both knees    Bipolar disorder (HCC)    Annual physical exam    Primary osteoarthritis of right knee    Primary osteoarthritis of left knee    Difficulty demonstrating health literacy    Numbness of left lower extremity     Daily Treatment Diary      Visit # 1 2 3 4 5 6 7 8 9 10   Assessment  2/28      3/11  3/20             Biodex Testing  NV  ** **  Performed         Eval/Reval  MD                 **   FOTO  NP       **         **   Manuals                                                     Prescribed POC    Pt Education  MD                       "HEP Issued/Updated  MD  biodex LOS x2  WS x1    Biodex      Testing - See Results Above                Bike Warm-Up    5'  nv  L1x5'  L1x5'              SLR Flexion - B    x10 each  5\"x10 ea  5\"x10 ea  5\"x10 ea              SLR Abduction - B    2x5 each  5\"x10 ea  Modified  5\"x5 ea  HOLD              Bridges + Hip Add    5\" 2x5  5\"  1x10  5\"   1x10  5\"   1x10              Bridges + TB Hip ABD    Symonds 5\" 2x5  pink  5\"  1x10  Pink   5\"  1x10  Pink   5\"  1x10              TB Clamshells    no TB 5\" x10  no TB  5\"x10  No TB   5\"   1x10 ea  No TB   5\"   1x10 ea              TB Reverse Clamshells    no TB 5\" x10  no TB  5\"x10  No TB   5\"   1x10 ea  No TB   5\"   1x10 ea              Standing Hip ABD  2x10  2x10  NV 5\"x10 ea         Standing Hip Flexion     5\"x10 ea         Seated Ankle DF                        FTEO - Airex  30\"x2 30\"x2  CS  NV          FTEC - Airex  30\"x1 30\"x2  CS  NV          Tandem Balance    30\"x2 ea  30\"x2  CS  NV                Toe Taps on Step  2x10              March on foam 2x10            Biodex LOS     NV                                              Modalities                                        QR Code:  NP  Med Bridge HEP:  Access Code: [email or web address]  URL: [email or web address]  Date: 03/06/2025  Prepared by: Татьяна Greer    Exercises  - Sidelying Hip Abduction  - 1 x daily - 1 sets - 10 reps - 5 hold  - Supine Active Straight Leg Raise  - 1 x daily - 1 sets - 10 reps - 5 hold  - Supine Bridge with Mini Swiss Ball Between Knees  - 1 x daily - 1 sets - 10 reps - 5 hold  - Bridge with Hip Abduction and Resistance  - 1 x daily - 1 sets - 10 reps - 5 hold  - Clamshell  - 1 x daily - 1 sets - 10 reps - 5 hold       "

## 2025-03-24 ENCOUNTER — TELEPHONE (OUTPATIENT)
Age: 65
End: 2025-03-24

## 2025-03-24 NOTE — TELEPHONE ENCOUNTER
Patient's daughter Dione called in to have appointment for B12 cancelled today due to having something come up and now has to take her son to appointment .   She will like to get a call back to re-schedule for sometime this week.     Please assist.

## 2025-03-25 ENCOUNTER — CLINICAL SUPPORT (OUTPATIENT)
Dept: NEUROLOGY | Facility: CLINIC | Age: 65
End: 2025-03-25
Payer: COMMERCIAL

## 2025-03-25 DIAGNOSIS — E53.8 B12 DEFICIENCY: Primary | ICD-10-CM

## 2025-03-25 DIAGNOSIS — E53.8 VITAMIN B 12 DEFICIENCY: Primary | ICD-10-CM

## 2025-03-25 PROCEDURE — 96372 THER/PROPH/DIAG INJ SC/IM: CPT | Performed by: PSYCHIATRY & NEUROLOGY

## 2025-03-25 RX ORDER — CYANOCOBALAMIN 1000 UG/ML
1000 INJECTION, SOLUTION INTRAMUSCULAR; SUBCUTANEOUS ONCE
Status: COMPLETED | OUTPATIENT
Start: 2025-03-25 | End: 2025-03-25

## 2025-03-25 RX ORDER — CYANOCOBALAMIN 1000 UG/ML
1000 INJECTION, SOLUTION INTRAMUSCULAR; SUBCUTANEOUS
Status: DISCONTINUED | OUTPATIENT
Start: 2025-03-25 | End: 2025-03-25

## 2025-03-25 RX ADMIN — CYANOCOBALAMIN 1000 MCG: 1000 INJECTION, SOLUTION INTRAMUSCULAR; SUBCUTANEOUS at 10:23

## 2025-03-25 NOTE — PROGRESS NOTES
Patient tolerated injection of 1 mL of cyanocobalmin in left deltoid. Patient did not report any adverse side effects.

## 2025-04-01 ENCOUNTER — CLINICAL SUPPORT (OUTPATIENT)
Dept: NEUROLOGY | Facility: CLINIC | Age: 65
End: 2025-04-01
Payer: COMMERCIAL

## 2025-04-01 VITALS — TEMPERATURE: 96.1 F

## 2025-04-01 DIAGNOSIS — E53.8 VITAMIN B 12 DEFICIENCY: Primary | ICD-10-CM

## 2025-04-01 PROCEDURE — 96372 THER/PROPH/DIAG INJ SC/IM: CPT | Performed by: PSYCHIATRY & NEUROLOGY

## 2025-04-01 RX ADMIN — CYANOCOBALAMIN 1000 MCG: 1000 INJECTION, SOLUTION INTRAMUSCULAR; SUBCUTANEOUS at 10:11

## 2025-04-07 ENCOUNTER — TELEPHONE (OUTPATIENT)
Age: 65
End: 2025-04-07

## 2025-04-08 NOTE — TELEPHONE ENCOUNTER
Patient's daughter called to reschedule her mother's  appt for B12 injection. She stated her son is sick and she is unable to bring her to the appt today.      Please assist

## 2025-04-25 DIAGNOSIS — F41.9 ANXIETY: ICD-10-CM

## 2025-04-25 RX ORDER — HYDROXYZINE HYDROCHLORIDE 50 MG/1
TABLET, FILM COATED ORAL
Qty: 90 TABLET | Refills: 1 | Status: SHIPPED | OUTPATIENT
Start: 2025-04-25

## 2025-04-25 NOTE — TELEPHONE ENCOUNTER
Daughter called for refill of hydrOXYzine HCL (ATARAX) 50 mg - advised in process    Changed pharmacy to [facility] [place]

## 2025-04-28 NOTE — TELEPHONE ENCOUNTER
Patient's daughter called to follow up on request to have the hydroxyzine sent to [facility] instead of [place].  Patient's daughter stated when she called [facility] [place] to request the change, they told her they didn't have the prescription.  Please advise.  Thank you!

## 2025-05-06 DIAGNOSIS — I82.402 ACUTE DEEP VEIN THROMBOSIS (DVT) OF LEFT LOWER EXTREMITY, UNSPECIFIED VEIN (HCC): ICD-10-CM

## 2025-05-13 NOTE — PROGRESS NOTES
Name: Karen Jean Baptiste      : 1960      MRN: [id number]  Encounter Provider: JAMIAL Johns  Encounter Date: 2025   Encounter department: [facility]  :  Assessment & Plan  Essential hypertension  Taking hctz, blood pressure is not at goal.  Add on irbesartan 150 mg.  Check metabolic panel.    Orders:    hydroCHLOROthiazide 12.5 mg tablet; Take 1 tablet (12.5 mg total) by mouth daily    irbesartan (AVAPRO) 150 mg tablet; Take 1 tablet (150 mg total) by mouth daily    Comprehensive metabolic panel; Future    Hypothyroidism, unspecified type  Stable with current management. Continue levothyroxine 50 mcg.  Continue to monitor.   Orders:    levothyroxine 50 mcg tablet; Take 1 tablet (50 mcg total) by mouth daily    Vitamin D deficiency  Refill of supplement needed.    Orders:    cholecalciferol (VITAMIN D3) 1,000 units tablet; Take 1 tablet (1,000 Units total) by mouth daily    Anemia    Check cbc, ferritin, b12.   Needs colon cancer screening.  Follow up pending results.  Orders:    ferrous sulfate 324 (65 Fe) mg; Take 1 tablet (324 mg total) by mouth daily before breakfast    CBC and differential; Future    Ferritin; Future    Nutritional deficiency  Requests refill of folic acid.   Orders:    folic acid (FOLVITE) 1 mg tablet; Take 1 tablet (1,000 mcg total) by mouth daily    Hyperlipidemia, unspecified hyperlipidemia type  Refill atorvastatin, prescription ran out.  Continue to monitor .  Orders:    atorvastatin (LIPITOR) 10 mg tablet; Take 1 tablet (10 mg total) by mouth daily at bedtime    Vitamin B deficiency  Was getting b12 injections per neuro and needs to call to schedule next dose.  Check cbc and b12 level.  Orders:    Vitamin B12; Future    CBC and differential; Future    Constipation, unspecified constipation type  Moving bowels daily, taking dulcolax 3 times per week.  Continue treatment.   Orders:    bisacodyl (DULCOLAX) 5 mg EC tablet; Take 1 tablet (5 mg total) by  mouth 3 (three) times a week    Pain in both knees, unspecified chronicity  Referral to ortho, obtain x-rays  Orders:    Ambulatory Referral to Orthopedic Surgery; Future    XR knee 3 vw right non injury; Future    XR knee 3 vw left non injury; Future    Insomnia, unspecified type  No longer on remeron.  Trial trazodone.  Follow up 1 month  Orders:    traZODone (DESYREL) 100 mg tablet; Take 1 tablet (100 mg total) by mouth daily at bedtime    Acute deep vein thrombosis (DVT) of left lower extremity, unspecified vein (HCC)  Continue eliquis.  Daughter wants repeat ultrasound to evaluated the clot stability.  Order provided.  Orders:    VAS VENOUS DUPLEX -LOWER LIMB UNILATERAL; Future    Encounter for screening colonoscopy    Orders:    Ambulatory Referral to Gastroenterology; Future    Hyperprolactinemia (HCC)  Needs repeat level, risperidone dose was decreased to 2 mg.    Orders:    Prolactin; Future    Psychiatric illness  Previously referred to psychiatry but after 3 unsuccessful attempts by department to reach patient they closed the referral.  Number provided to daughter to call and schedule pt.  Has ongoing needs and pt is poor historian.                History of Present Illness   Pt is a 63 yo female here today for routine follow up.  Past medical history of DVT, HTN, hypothyroidism, osteoarthritis, major depression, anxiety, bipolar disorder, iron deficiency anemia, LLE numbness, constipation.  She is a poor historian, has poor health literacy.  She is in need of a psychiatrist to manage her medications.  She takes venlafaxine, risperidone.  Risperidone dose had to be decreased due to increasing prolactin level.  She was to repeat the level again but has not done so.  She has poor compliance with follow up and has missed several scheduled follow up visits with neurology regarding numbness, weakness of LLE.  Poor adherence to levothyroxine in the past; at last visit she reported that she had been off  "medication for about 3 months and her TSH was elevated.      Review of Systems   Constitutional:  Negative for appetite change.   Respiratory:  Negative for shortness of breath.    Cardiovascular:  Negative for chest pain, palpitations and leg swelling.   Gastrointestinal:  Negative for abdominal pain, constipation and diarrhea.   Genitourinary:  Negative for difficulty urinating.   Musculoskeletal:  Positive for arthralgias.   Neurological:  Negative for weakness and numbness.   Psychiatric/Behavioral:  Positive for sleep disturbance.        Objective   /92   Pulse 85   Temp (!) 97.3 °F (36.3 °C) (Tympanic)   Resp 15   Ht 5' 5\" (1.651 m)   Wt 75.5 kg (166 lb 6.4 oz)   SpO2 98%   BMI 27.69 kg/m²      Physical Exam  Vitals reviewed.   Constitutional:       General: She is awake. She is not in acute distress.     Appearance: Normal appearance. She is well-developed and well-groomed. She is not ill-appearing.     Eyes:      General: Lids are normal.      Conjunctiva/sclera: Conjunctivae normal.       Cardiovascular:      Rate and Rhythm: Normal rate and regular rhythm.      Pulses: Normal pulses.      Heart sounds: Murmur heard.   Pulmonary:      Effort: Pulmonary effort is normal. No tachypnea, accessory muscle usage or respiratory distress.      Breath sounds: Normal breath sounds.   Abdominal:      General: Bowel sounds are normal.      Palpations: Abdomen is soft.      Tenderness: There is no abdominal tenderness.     Musculoskeletal:      Right lower leg: No edema.      Left lower leg: No edema.     Neurological:      Mental Status: She is alert and oriented to person, place, and time.      Motor: Weakness (generalized) present.     Psychiatric:         Attention and Perception: Attention normal.         Mood and Affect: Mood normal. Affect is flat.         Speech: Speech normal.         Behavior: Behavior normal. Behavior is cooperative.         Judgment: Judgment normal.         "

## 2025-05-14 ENCOUNTER — OFFICE VISIT (OUTPATIENT)
Dept: FAMILY MEDICINE CLINIC | Facility: CLINIC | Age: 65
End: 2025-05-14
Payer: COMMERCIAL

## 2025-05-14 VITALS
HEIGHT: 65 IN | RESPIRATION RATE: 15 BRPM | TEMPERATURE: 97.3 F | OXYGEN SATURATION: 98 % | DIASTOLIC BLOOD PRESSURE: 92 MMHG | SYSTOLIC BLOOD PRESSURE: 160 MMHG | BODY MASS INDEX: 27.72 KG/M2 | WEIGHT: 166.4 LBS | HEART RATE: 85 BPM

## 2025-05-14 DIAGNOSIS — E78.5 HYPERLIPIDEMIA, UNSPECIFIED HYPERLIPIDEMIA TYPE: ICD-10-CM

## 2025-05-14 DIAGNOSIS — I82.402 ACUTE DEEP VEIN THROMBOSIS (DVT) OF LEFT LOWER EXTREMITY, UNSPECIFIED VEIN (HCC): ICD-10-CM

## 2025-05-14 DIAGNOSIS — E55.9 VITAMIN D DEFICIENCY: ICD-10-CM

## 2025-05-14 DIAGNOSIS — I10 ESSENTIAL HYPERTENSION: Primary | ICD-10-CM

## 2025-05-14 DIAGNOSIS — E03.9 HYPOTHYROIDISM, UNSPECIFIED TYPE: ICD-10-CM

## 2025-05-14 DIAGNOSIS — E63.9 NUTRITIONAL DEFICIENCY: ICD-10-CM

## 2025-05-14 DIAGNOSIS — F99 PSYCHIATRIC ILLNESS: ICD-10-CM

## 2025-05-14 DIAGNOSIS — Z12.11 ENCOUNTER FOR SCREENING COLONOSCOPY: ICD-10-CM

## 2025-05-14 DIAGNOSIS — D64.9 ANEMIA: ICD-10-CM

## 2025-05-14 DIAGNOSIS — M25.562 PAIN IN BOTH KNEES, UNSPECIFIED CHRONICITY: ICD-10-CM

## 2025-05-14 DIAGNOSIS — G47.00 INSOMNIA, UNSPECIFIED TYPE: ICD-10-CM

## 2025-05-14 DIAGNOSIS — K59.00 CONSTIPATION, UNSPECIFIED CONSTIPATION TYPE: ICD-10-CM

## 2025-05-14 DIAGNOSIS — E22.1 HYPERPROLACTINEMIA (HCC): ICD-10-CM

## 2025-05-14 DIAGNOSIS — M25.561 PAIN IN BOTH KNEES, UNSPECIFIED CHRONICITY: ICD-10-CM

## 2025-05-14 DIAGNOSIS — E53.9 VITAMIN B DEFICIENCY: ICD-10-CM

## 2025-05-14 PROCEDURE — 99214 OFFICE O/P EST MOD 30 MIN: CPT | Performed by: NURSE PRACTITIONER

## 2025-05-14 RX ORDER — FERROUS SULFATE 324(65)MG
324 TABLET, DELAYED RELEASE (ENTERIC COATED) ORAL
Qty: 90 TABLET | Refills: 1 | Status: SHIPPED | OUTPATIENT
Start: 2025-05-14 | End: 2025-06-13

## 2025-05-14 RX ORDER — NAPROXEN 500 MG/1
500 TABLET ORAL 2 TIMES DAILY WITH MEALS
COMMUNITY

## 2025-05-14 RX ORDER — ATORVASTATIN CALCIUM 10 MG/1
10 TABLET, FILM COATED ORAL
Qty: 90 TABLET | Refills: 1 | Status: SHIPPED | OUTPATIENT
Start: 2025-05-14

## 2025-05-14 RX ORDER — LEVOTHYROXINE SODIUM 50 UG/1
50 TABLET ORAL DAILY
Qty: 90 TABLET | Refills: 0 | Status: SHIPPED | OUTPATIENT
Start: 2025-05-14

## 2025-05-14 RX ORDER — HYDROCHLOROTHIAZIDE 12.5 MG/1
12.5 TABLET ORAL DAILY
Qty: 90 TABLET | Refills: 1 | Status: SHIPPED | OUTPATIENT
Start: 2025-05-14

## 2025-05-14 RX ORDER — BISACODYL 5 MG/1
5 TABLET, DELAYED RELEASE ORAL 3 TIMES WEEKLY
Qty: 30 TABLET | Refills: 1 | Status: SHIPPED | OUTPATIENT
Start: 2025-05-14

## 2025-05-14 RX ORDER — CHOLECALCIFEROL (VITAMIN D3) 25 MCG
1000 TABLET ORAL DAILY
Qty: 90 TABLET | Refills: 1 | Status: SHIPPED | OUTPATIENT
Start: 2025-05-14

## 2025-05-14 RX ORDER — FOLIC ACID 1 MG/1
1000 TABLET ORAL DAILY
Qty: 90 TABLET | Refills: 1 | Status: SHIPPED | OUTPATIENT
Start: 2025-05-14

## 2025-05-14 RX ORDER — IRBESARTAN 150 MG/1
150 TABLET ORAL DAILY
Qty: 100 TABLET | Refills: 3 | Status: SHIPPED | OUTPATIENT
Start: 2025-05-14

## 2025-05-14 RX ORDER — TRAZODONE HYDROCHLORIDE 100 MG/1
100 TABLET ORAL
Qty: 30 TABLET | Refills: 5 | Status: SHIPPED | OUTPATIENT
Start: 2025-05-14

## 2025-05-14 NOTE — ASSESSMENT & PLAN NOTE
Stable with current management. Continue levothyroxine 50 mcg.  Continue to monitor.   Orders:    levothyroxine 50 mcg tablet; Take 1 tablet (50 mcg total) by mouth daily

## 2025-05-14 NOTE — ASSESSMENT & PLAN NOTE
Moving bowels daily, taking dulcolax 3 times per week.  Continue treatment.   Orders:    bisacodyl (DULCOLAX) 5 mg EC tablet; Take 1 tablet (5 mg total) by mouth 3 (three) times a week

## 2025-05-14 NOTE — ASSESSMENT & PLAN NOTE
Taking hctz, blood pressure is not at goal.  Add on irbesartan 150 mg.  Check metabolic panel.    Orders:    hydroCHLOROthiazide 12.5 mg tablet; Take 1 tablet (12.5 mg total) by mouth daily    irbesartan (AVAPRO) 150 mg tablet; Take 1 tablet (150 mg total) by mouth daily    Comprehensive metabolic panel; Future

## 2025-05-14 NOTE — ASSESSMENT & PLAN NOTE
Referral to ortho, obtain x-rays  Orders:    Ambulatory Referral to Orthopedic Surgery; Future    XR knee 3 vw right non injury; Future    XR knee 3 vw left non injury; Future

## 2025-05-14 NOTE — ASSESSMENT & PLAN NOTE
Refill of supplement needed.    Orders:    cholecalciferol (VITAMIN D3) 1,000 units tablet; Take 1 tablet (1,000 Units total) by mouth daily

## 2025-05-14 NOTE — ASSESSMENT & PLAN NOTE
Previously referred to psychiatry but after 3 unsuccessful attempts by department to reach patient they closed the referral.  Number provided to daughter to call and schedule pt.  Has ongoing needs and pt is poor historian.

## 2025-05-27 ENCOUNTER — TELEPHONE (OUTPATIENT)
Dept: NEUROLOGY | Facility: CLINIC | Age: 65
End: 2025-05-27

## 2025-05-27 NOTE — TELEPHONE ENCOUNTER
LMOM to confirm pt's appt w/ Feta in [facility] on 6/9/25. Advised pt to arrive 15 minutes prior. Reminded pt of EMG that was ordered.

## 2025-06-06 ENCOUNTER — HOSPITAL ENCOUNTER (OUTPATIENT)
Dept: NON INVASIVE DIAGNOSTICS | Facility: CLINIC | Age: 65
Discharge: HOME | End: 2025-06-06
Attending: NURSE PRACTITIONER
Payer: COMMERCIAL

## 2025-06-06 DIAGNOSIS — I82.402 ACUTE DEEP VEIN THROMBOSIS (DVT) OF LEFT LOWER EXTREMITY, UNSPECIFIED VEIN (HCC): ICD-10-CM

## 2025-06-06 PROCEDURE — 93971 EXTREMITY STUDY: CPT | Performed by: SURGERY

## 2025-06-06 PROCEDURE — 93971 EXTREMITY STUDY: CPT

## 2025-06-06 NOTE — TELEPHONE ENCOUNTER
Patient daughter Dione, called in and stated that she was running late for an appt today. I checked and verified that pt is not scheduled for today but has an appt on 6/9/25 at 1:00 pm with Eusebia Ghotra.   Pt daughter understood and she confirmed appt.

## 2025-06-09 ENCOUNTER — APPOINTMENT (OUTPATIENT)
Dept: LAB | Facility: CLINIC | Age: 65
End: 2025-06-09
Payer: COMMERCIAL

## 2025-06-09 ENCOUNTER — OFFICE VISIT (OUTPATIENT)
Dept: NEUROLOGY | Facility: CLINIC | Age: 65
End: 2025-06-09
Payer: COMMERCIAL

## 2025-06-09 VITALS
OXYGEN SATURATION: 99 % | BODY MASS INDEX: 28.32 KG/M2 | TEMPERATURE: 97.7 F | HEIGHT: 65 IN | HEART RATE: 60 BPM | WEIGHT: 170 LBS | SYSTOLIC BLOOD PRESSURE: 148 MMHG | DIASTOLIC BLOOD PRESSURE: 84 MMHG

## 2025-06-09 DIAGNOSIS — F33.9 RECURRENT MAJOR DEPRESSIVE DISORDER (HCC): Chronic | ICD-10-CM

## 2025-06-09 DIAGNOSIS — E22.1 HYPERPROLACTINEMIA (HCC): ICD-10-CM

## 2025-06-09 DIAGNOSIS — I10 ESSENTIAL HYPERTENSION: ICD-10-CM

## 2025-06-09 DIAGNOSIS — E53.9 VITAMIN B DEFICIENCY: ICD-10-CM

## 2025-06-09 DIAGNOSIS — E53.8 B12 DEFICIENCY: ICD-10-CM

## 2025-06-09 DIAGNOSIS — D64.9 ANEMIA: ICD-10-CM

## 2025-06-09 DIAGNOSIS — R20.0 NUMBNESS OF LEFT LOWER EXTREMITY: Primary | ICD-10-CM

## 2025-06-09 LAB
ALBUMIN SERPL BCG-MCNC: 4 G/DL (ref 3.5–5)
ALP SERPL-CCNC: 102 U/L (ref 34–104)
ALT SERPL W P-5'-P-CCNC: 8 U/L (ref 7–52)
ANION GAP SERPL CALCULATED.3IONS-SCNC: 10 MMOL/L (ref 4–13)
AST SERPL W P-5'-P-CCNC: 16 U/L (ref 13–39)
BASOPHILS # BLD AUTO: 0.05 THOUSANDS/ÂΜL (ref 0–0.1)
BASOPHILS NFR BLD AUTO: 1 % (ref 0–1)
BILIRUB SERPL-MCNC: 0.82 MG/DL (ref 0.2–1)
BUN SERPL-MCNC: 19 MG/DL (ref 5–25)
CALCIUM SERPL-MCNC: 9.2 MG/DL (ref 8.4–10.2)
CHLORIDE SERPL-SCNC: 109 MMOL/L (ref 96–108)
CO2 SERPL-SCNC: 25 MMOL/L (ref 21–32)
CREAT SERPL-MCNC: 0.95 MG/DL (ref 0.6–1.3)
EOSINOPHIL # BLD AUTO: 0.18 THOUSAND/ÂΜL (ref 0–0.61)
EOSINOPHIL NFR BLD AUTO: 3 % (ref 0–6)
ERYTHROCYTE [DISTWIDTH] IN BLOOD BY AUTOMATED COUNT: 13.2 % (ref 11.6–15.1)
FERRITIN SERPL-MCNC: 19 NG/ML (ref 30–307)
GFR SERPL CREATININE-BSD FRML MDRD: 64 ML/MIN/1.73SQ M
GLUCOSE P FAST SERPL-MCNC: 84 MG/DL (ref 65–99)
HCT VFR BLD AUTO: 41.2 % (ref 34.8–46.1)
HGB BLD-MCNC: 13.6 G/DL (ref 11.5–15.4)
IMM GRANULOCYTES # BLD AUTO: 0.02 THOUSAND/UL (ref 0–0.2)
IMM GRANULOCYTES NFR BLD AUTO: 0 % (ref 0–2)
LYMPHOCYTES # BLD AUTO: 1.6 THOUSANDS/ÂΜL (ref 0.6–4.47)
LYMPHOCYTES NFR BLD AUTO: 30 % (ref 14–44)
MCH RBC QN AUTO: 31.3 PG (ref 26.8–34.3)
MCHC RBC AUTO-ENTMCNC: 33 G/DL (ref 31.4–37.4)
MCV RBC AUTO: 95 FL (ref 82–98)
MONOCYTES # BLD AUTO: 0.29 THOUSAND/ÂΜL (ref 0.17–1.22)
MONOCYTES NFR BLD AUTO: 5 % (ref 4–12)
NEUTROPHILS # BLD AUTO: 3.25 THOUSANDS/ÂΜL (ref 1.85–7.62)
NEUTS SEG NFR BLD AUTO: 61 % (ref 43–75)
NRBC BLD AUTO-RTO: 0 /100 WBCS
PLATELET # BLD AUTO: 146 THOUSANDS/UL (ref 149–390)
PMV BLD AUTO: 12.2 FL (ref 8.9–12.7)
POTASSIUM SERPL-SCNC: 4.2 MMOL/L (ref 3.5–5.3)
PROLACTIN SERPL-MCNC: 88.47 NG/ML (ref 2.74–19.64)
PROT SERPL-MCNC: 7 G/DL (ref 6.4–8.4)
RBC # BLD AUTO: 4.35 MILLION/UL (ref 3.81–5.12)
SODIUM SERPL-SCNC: 144 MMOL/L (ref 135–147)
VIT B12 SERPL-MCNC: 168 PG/ML (ref 180–914)
WBC # BLD AUTO: 5.39 THOUSAND/UL (ref 4.31–10.16)

## 2025-06-09 PROCEDURE — 99213 OFFICE O/P EST LOW 20 MIN: CPT | Performed by: NURSE PRACTITIONER

## 2025-06-09 PROCEDURE — 80053 COMPREHEN METABOLIC PANEL: CPT

## 2025-06-09 PROCEDURE — 82607 VITAMIN B-12: CPT

## 2025-06-09 PROCEDURE — 82728 ASSAY OF FERRITIN: CPT

## 2025-06-09 PROCEDURE — 85025 COMPLETE CBC W/AUTO DIFF WBC: CPT

## 2025-06-09 PROCEDURE — 84146 ASSAY OF PROLACTIN: CPT

## 2025-06-09 PROCEDURE — 36415 COLL VENOUS BLD VENIPUNCTURE: CPT

## 2025-06-09 NOTE — PROGRESS NOTES
Name: Karen Jean Baptiste      : 1960      MRN: [id number]  Encounter Provider: JAMILA Gross  Encounter Date: 2025   Encounter department: [facility]  :  Assessment & Plan  Numbness of left lower extremity  Patient previously with numbness and tingling in the left lower extremity, mainly in the lateral aspect of the lower shin and foot.  Since last visit she has felt resolution of the symptoms.  Her exam is normal today with normal sensation in the left lower extremity.  She did not obtain EMG, and given improvement of her symptoms she can hold off on this testing.  If these do return would advise to schedule.    She continues to have intermittent low back pain, should continue follow-up with physiatry at [facility] for management.    Given resolution of her symptoms she can follow-up on an as-needed basis.       B12 deficiency  Noted on prior blood work, she did receive several B12 injections, does not think she is on oral supplement.  She does have pending repeat B12 which was ordered by her PCP can take over management.             History of Present Illness   HPI   Patient is a 64-year-old female with past medical history of DVT, hypertension, hypothyroidism, osteoarthritis of the knee, anxiety/depression, bipolar disorder, who presents for follow up left leg numbness.    Last office visit 2025 in which she was to obtain repeat b12 level and EMG.    Interval History:    She feels improvement  in the LLE numbness and tingling. She is dealing knee pain currently.   She did do a few b12 injection. She doesn't think she takes an oral b12 supplement.   Repeat b12 level is pending.   NO leg weakness numbness tingling.   She does have some lower back pain. Seeing physiatry and PT for this.     Prior work up:  Labs b 12 146 3/2025  Labs 2024 A1c 4.9  CT lumbar spine 10/2024: Endplate osteophytosis at L5-S1 contributes to   moderate bilateral foraminal narrowing.  -Large circumferential disc bulge at L4-L5 results in complete effacement of the   bilateral subarticular zones and moderate to severe right foraminal narrowing.        Initial history form 2/7/25:  She noted symptoms of numbness and tingling in the left leg, from the lateral ankle into the top of the foot to the 4th and 5th toes. Started just prior to christmas-mid december  No weakness.  She does have low back pain ongoing since November, across the low back and extends down both legs.   No other numbness or tingling.   No new bowel or bladder symptoms.  No falls.      Numbness and tingling of the left foot is improving, about 50% improved. Is constant. Not painful.   She tends to cross her legs a lot.           Review of Systems   Review of Systems   Constitutional:  Negative for appetite change, fatigue and fever.   HENT: Negative.  Negative for hearing loss, tinnitus, trouble swallowing and voice change.    Eyes: Negative.  Negative for photophobia, pain and visual disturbance.   Respiratory: Negative.  Negative for shortness of breath.    Cardiovascular: Negative.  Negative for palpitations.   Gastrointestinal: Negative.  Negative for nausea and vomiting.   Endocrine: Negative.  Negative for cold intolerance.   Genitourinary: Negative.  Negative for dysuria, frequency and urgency.   Musculoskeletal:  Negative for back pain, gait problem, myalgias, neck pain and neck stiffness.   Skin: Negative.  Negative for rash.   Allergic/Immunologic: Negative.    Neurological:  Negative for dizziness, tremors, seizures, syncope, facial asymmetry, speech difficulty, weakness, light-headedness, numbness and headaches.        B/l sharp knee pain     Hematological: Negative.  Does not bruise/bleed easily.   Psychiatric/Behavioral: Negative.  Negative for confusion, hallucinations and sleep disturbance.    All other systems reviewed and are negative.        I have personally reviewed the MA's review of systems and made changes  "as necessary.         Objective   /84 (BP Location: Left arm, Patient Position: Sitting, Cuff Size: Adult)   Pulse 60   Temp 97.7 °F (36.5 °C) (Temporal)   Ht 5' 5\" (1.651 m)   Wt 77.1 kg (170 lb)   SpO2 99%   BMI 28.29 kg/m²     Physical Exam  Constitutional:       General: She is awake.   HENT:      Right Ear: Hearing normal.      Left Ear: Hearing normal.     Eyes:      General: Lids are normal.      Extraocular Movements: Extraocular movements intact.       Neurological:      Mental Status: She is alert.      Motor: Motor strength is normal.     Deep Tendon Reflexes:      Reflex Scores:       Bicep reflexes are 2+ on the right side and 2+ on the left side.       Brachioradialis reflexes are 2+ on the right side and 2+ on the left side.       Patellar reflexes are 2+ on the right side and 2+ on the left side.       Achilles reflexes are 1+ on the right side and 1+ on the left side.    Psychiatric:         Speech: Speech normal.       Neurological Exam  Mental Status  Awake and alert. Speech is normal. Language is fluent with no aphasia.    Cranial Nerves  CN III, IV, VI: Extraocular movements intact bilaterally. Normal lids and orbits bilaterally.  CN V:  Right: Facial sensation is normal.  Left: Facial sensation is normal on the left.  CN VII:  Right: There is no facial weakness.  Left: There is no facial weakness.  CN VIII:  Right: Hearing is normal.  Left: Hearing is normal.  CN IX, X: Palate elevates symmetrically  CN XI:  Right: Trapezius strength is normal.  Left: Trapezius strength is normal.  CN XII: Tongue midline without atrophy or fasciculations.    Motor  Normal muscle bulk throughout. Strength is 5/5 throughout all four extremities.    Sensory  Light touch is normal in upper and lower extremities. Pinprick is normal in upper and lower extremities. Temperature is normal in upper and lower extremities. Vibration is normal in upper and lower extremities.     Reflexes                          "                   Right                      Left  Brachioradialis                    2+                         2+  Biceps                                 2+                         2+  Patellar                                2+                         2+  Achilles                                1+                         1+    Coordination  Right: Finger-to-nose normal.Left: Finger-to-nose normal.    Gait  Casual gait is normal including stance, stride, and arm swing. Able to rise from chair without using arms.

## 2025-06-09 NOTE — PROGRESS NOTES
Review of Systems   Constitutional:  Negative for appetite change, fatigue and fever.   HENT: Negative.  Negative for hearing loss, tinnitus, trouble swallowing and voice change.    Eyes: Negative.  Negative for photophobia, pain and visual disturbance.   Respiratory: Negative.  Negative for shortness of breath.    Cardiovascular: Negative.  Negative for palpitations.   Gastrointestinal: Negative.  Negative for nausea and vomiting.   Endocrine: Negative.  Negative for cold intolerance.   Genitourinary: Negative.  Negative for dysuria, frequency and urgency.   Musculoskeletal:  Negative for back pain, gait problem, myalgias, neck pain and neck stiffness.   Skin: Negative.  Negative for rash.   Allergic/Immunologic: Negative.    Neurological:  Negative for dizziness, tremors, seizures, syncope, facial asymmetry, speech difficulty, weakness, light-headedness, numbness and headaches.        B/l sharp knee pain     Hematological: Negative.  Does not bruise/bleed easily.   Psychiatric/Behavioral: Negative.  Negative for confusion, hallucinations and sleep disturbance.    All other systems reviewed and are negative.

## 2025-06-11 DIAGNOSIS — I10 ESSENTIAL HYPERTENSION: ICD-10-CM

## 2025-06-11 RX ORDER — PRAZOSIN HYDROCHLORIDE 2 MG/1
4 CAPSULE ORAL
Qty: 180 CAPSULE | Refills: 1 | Status: SHIPPED | OUTPATIENT
Start: 2025-06-11

## 2025-06-11 NOTE — TELEPHONE ENCOUNTER
Reason for call:   [x] Refill   [] Prior Auth  [] Other:     Office:   [x] PCP/Provider -   [] Specialty/Provider -     Medication: prazosin (MINIPRESS) 2 mg capsule     Dose/Frequency: Take 2 capsules (4 mg total) by mouth daily at bedtime     Quantity: 180    Pharmacy: [facility] [place] - [address]    Local Pharmacy   Does the patient have enough for 3 days?   [] Yes   [x] No - Send as HP to POD

## 2025-06-13 DIAGNOSIS — F33.9 RECURRENT MAJOR DEPRESSIVE DISORDER (HCC): Chronic | ICD-10-CM

## 2025-06-13 RX ORDER — RISPERIDONE 1 MG/1
1 TABLET ORAL DAILY
Qty: 30 TABLET | Refills: 0 | Status: SHIPPED | OUTPATIENT
Start: 2025-06-13 | End: 2025-06-16 | Stop reason: SDUPTHER

## 2025-06-16 ENCOUNTER — OFFICE VISIT (OUTPATIENT)
Dept: FAMILY MEDICINE CLINIC | Facility: CLINIC | Age: 65
End: 2025-06-16
Payer: COMMERCIAL

## 2025-06-16 VITALS
HEART RATE: 69 BPM | BODY MASS INDEX: 28.59 KG/M2 | DIASTOLIC BLOOD PRESSURE: 80 MMHG | RESPIRATION RATE: 16 BRPM | HEIGHT: 65 IN | WEIGHT: 171.6 LBS | TEMPERATURE: 96.5 F | SYSTOLIC BLOOD PRESSURE: 124 MMHG | OXYGEN SATURATION: 97 %

## 2025-06-16 DIAGNOSIS — D50.0 IRON DEFICIENCY ANEMIA DUE TO CHRONIC BLOOD LOSS: Primary | ICD-10-CM

## 2025-06-16 DIAGNOSIS — G44.52 NEW PERSISTENT DAILY HEADACHE: ICD-10-CM

## 2025-06-16 DIAGNOSIS — F33.9 RECURRENT MAJOR DEPRESSIVE DISORDER (HCC): Chronic | ICD-10-CM

## 2025-06-16 DIAGNOSIS — F99 PSYCHIATRIC ILLNESS: ICD-10-CM

## 2025-06-16 DIAGNOSIS — K59.00 CONSTIPATION, UNSPECIFIED CONSTIPATION TYPE: ICD-10-CM

## 2025-06-16 DIAGNOSIS — F31.9 BIPOLAR AFFECTIVE DISORDER, REMISSION STATUS UNSPECIFIED (HCC): ICD-10-CM

## 2025-06-16 DIAGNOSIS — E53.9 VITAMIN B DEFICIENCY: ICD-10-CM

## 2025-06-16 DIAGNOSIS — E22.1 HYPERPROLACTINEMIA (HCC): ICD-10-CM

## 2025-06-16 PROCEDURE — 99214 OFFICE O/P EST MOD 30 MIN: CPT | Performed by: NURSE PRACTITIONER

## 2025-06-16 PROCEDURE — 96372 THER/PROPH/DIAG INJ SC/IM: CPT

## 2025-06-16 RX ORDER — RISPERIDONE 2 MG/1
2 TABLET ORAL DAILY
Qty: 90 TABLET | Refills: 0 | Status: SHIPPED | OUTPATIENT
Start: 2025-06-16

## 2025-06-16 RX ORDER — BISACODYL 5 MG/1
5 TABLET, DELAYED RELEASE ORAL DAILY PRN
Qty: 30 TABLET | Refills: 1 | Status: SHIPPED | OUTPATIENT
Start: 2025-06-16

## 2025-06-16 RX ORDER — SODIUM CHLORIDE 9 MG/ML
20 INJECTION, SOLUTION INTRAVENOUS ONCE
OUTPATIENT
Start: 2025-06-16

## 2025-06-16 RX ORDER — CYANOCOBALAMIN 1000 UG/ML
1000 INJECTION, SOLUTION INTRAMUSCULAR; SUBCUTANEOUS
Status: SHIPPED | OUTPATIENT
Start: 2025-06-16

## 2025-06-16 RX ADMIN — CYANOCOBALAMIN 1000 MCG: 1000 INJECTION, SOLUTION INTRAMUSCULAR; SUBCUTANEOUS at 10:06

## 2025-06-16 NOTE — ASSESSMENT & PLAN NOTE
Taking iron supplement, ferritin remains low.  Iron infusions ordered weekly x 5.  Continue to monitor, repeat labs in 6 months  Orders:    Ferritin; Future    CBC and differential; Future

## 2025-06-16 NOTE — ASSESSMENT & PLAN NOTE
Continue risperidone 2 mg, venlafaxine, referred to psychiatry for med management.    Orders:    risperiDONE (RisperDAL) 2 mg tablet; Take 1 tablet (2 mg total) by mouth daily

## 2025-06-16 NOTE — ASSESSMENT & PLAN NOTE
Blood sugar x 4, abdomen is soft, non-tender.  Reporting no bm x 3 weeks.  Continue dulcolax, check x-ray for obstruction.  No nausea/vomiting  Orders:    bisacodyl (DULCOLAX) 5 mg EC tablet; Take 1 tablet (5 mg total) by mouth daily as needed for constipation    XR abdomen obstruction series; Future

## 2025-06-16 NOTE — PROGRESS NOTES
Name: Karen Jean Baptiste      : 1960      MRN: [id number]  Encounter Provider: JAMILA Johns  Encounter Date: 2025   Encounter department: [facility]  :  Assessment & Plan  Iron deficiency anemia due to chronic blood loss  Taking iron supplement, ferritin remains low.  Iron infusions ordered weekly x 5.  Continue to monitor, repeat labs in 6 months  Orders:    Ferritin; Future    CBC and differential; Future    Vitamin B deficiency  B12 IM given in office.  Return 1 month for next dose.   Orders:    cyanocobalamin injection 1,000 mcg    Bipolar affective disorder, remission status unspecified (HCC)  Have been decreasing risperidone dose due to hyperprolactinemia; for now continue 2 mg dose as she is not symptomatic at this time and is post-menopausal.  She did experience side effects after dose was reduced last time but that has resolved.  Pt needs to see psychiatry for further evaluation and management.    Orders:    Ambulatory referral to Psych Services; Future    Psychiatric illness  Full extent of her diagnosis is not known, pt unsure.  She is given a new referral to psychiatry to be evaluated and have medications managed.    Orders:    Ambulatory referral to Psych Services; Future    Constipation, unspecified constipation type  Blood sugar x 4, abdomen is soft, non-tender.  Reporting no bm x 3 weeks.  Continue dulcolax, check x-ray for obstruction.  No nausea/vomiting  Orders:    bisacodyl (DULCOLAX) 5 mg EC tablet; Take 1 tablet (5 mg total) by mouth daily as needed for constipation    XR abdomen obstruction series; Future    New persistent daily headache  Stabbing pain at left front of head, same area where she had previous surgery.  MRI with contrast ordered.    Orders:    MRI brain w contrast; Future    Hyperprolactinemia (HCC)  See management of risperidone above.  Continue to monitor.   Orders:    Prolactin; Future    Recurrent major depressive disorder  (HCC)  Continue risperidone 2 mg, venlafaxine, referred to psychiatry for med management.    Orders:    risperiDONE (RisperDAL) 2 mg tablet; Take 1 tablet (2 mg total) by mouth daily           History of Present Illness   Pt is a 64 yo female here today for 1 month follow up.  Past medical history of DVT, HTN, pancreatitis, osteoarthritis, major depression, bipolar disorder, psychiatric illness, iron deficiency anemia, hyperprolactinemia.  Blood pressure not at goal at time of last visit, was on hctz monotherapy.  Added on irbesartan 150 mg.  Labs ordered.  She also was not sleeping well since remeron was dc'd.  Started her on trazodone.  Her risperidone dose was decreased to 2 mg due to elevated prolactin level, repeat level was improved but still high.  Dose on most recent refill was reduced further to 1 mg.  Will need to repeat level.  Needs to see psychiatry, they have had difficulty getting touch with pt to schedule and number was given to daughter at last visit to call back to schedule.  She did not call them.  She was referred to ortho and x-ray order bilateral knees for evaluation of pain, she did not get her xrays or schedule with ortho. She saw neurology regarding numbness/tingling L foot, they did not feel emg was necessary.  She continues with knee pain.  She reports a headache for the last week, stabbing pain left side of her head in the area where she had an operation on aneurysm.  She has taken tylenol for pain but this did not help.  She denies vision changes.  She feels dizzy x 3 days.  No chest pain, shortness of breath.  No nausea/vomiting, appetite is fine.  She says her last bowel movement was 3 weeks ago.      Review of Systems   Constitutional:  Negative for chills and fever.   Eyes:  Negative for visual disturbance.   Respiratory:  Negative for shortness of breath.    Cardiovascular:  Negative for chest pain and leg swelling.   Gastrointestinal:  Positive for constipation. Negative for  "abdominal distention, abdominal pain, nausea and vomiting.   Genitourinary:  Negative for difficulty urinating.   Musculoskeletal:  Positive for arthralgias (B knees).   Neurological:  Positive for headaches. Negative for dizziness and light-headedness.   Psychiatric/Behavioral:  Positive for sleep disturbance. Negative for agitation, behavioral problems and hallucinations.        Objective   /80 (BP Location: Left arm, Patient Position: Sitting, Cuff Size: Standard)   Pulse 69   Temp (!) 96.5 °F (35.8 °C) (Tympanic)   Resp 16   Ht 5' 5\" (1.651 m)   Wt 77.8 kg (171 lb 9.6 oz)   SpO2 97%   BMI 28.56 kg/m²      Physical Exam  Vitals reviewed.   Constitutional:       General: She is awake. She is not in acute distress.     Appearance: Normal appearance. She is well-developed and well-groomed. She is not ill-appearing.     Eyes:      General: Lids are normal.      Extraocular Movements:      Right eye: Abnormal extraocular motion present.      Left eye: Abnormal extraocular motion present.      Conjunctiva/sclera: Conjunctivae normal.      Pupils: Pupils are equal, round, and reactive to light.     Neck:      Thyroid: No thyromegaly.      Vascular: Normal carotid pulses. No carotid bruit.     Cardiovascular:      Rate and Rhythm: Normal rate and regular rhythm.      Pulses: Normal pulses.      Heart sounds: Normal heart sounds.   Pulmonary:      Effort: Pulmonary effort is normal. No tachypnea, accessory muscle usage or respiratory distress.      Breath sounds: Normal breath sounds.   Abdominal:      General: Bowel sounds are normal.      Palpations: Abdomen is soft.      Tenderness: There is no abdominal tenderness.     Musculoskeletal:      Right lower leg: No edema.      Left lower leg: No edema.     Skin:     General: Skin is warm and dry.     Neurological:      Mental Status: She is alert and oriented to person, place, and time.      Comments: Abnormal tracking.  Unable to squeeze eyes closed "   Psychiatric:         Attention and Perception: Attention normal.         Mood and Affect: Affect is flat.         Speech: Speech normal.         Behavior: Behavior normal. Behavior is cooperative.         Judgment: Judgment normal.

## 2025-06-16 NOTE — ASSESSMENT & PLAN NOTE
Full extent of her diagnosis is not known, pt unsure.  She is given a new referral to psychiatry to be evaluated and have medications managed.    Orders:    Ambulatory referral to Psych Services; Future

## 2025-06-16 NOTE — ASSESSMENT & PLAN NOTE
Have been decreasing risperidone dose due to hyperprolactinemia; for now continue 2 mg dose as she is not symptomatic at this time and is post-menopausal.  She did experience side effects after dose was reduced last time but that has resolved.  Pt needs to see psychiatry for further evaluation and management.    Orders:    Ambulatory referral to Psych Services; Future

## 2025-06-17 ENCOUNTER — TELEPHONE (OUTPATIENT)
Age: 65
End: 2025-06-17

## 2025-06-17 ENCOUNTER — TELEPHONE (OUTPATIENT)
Dept: FAMILY MEDICINE CLINIC | Facility: CLINIC | Age: 65
End: 2025-06-17

## 2025-06-17 NOTE — TELEPHONE ENCOUNTER
----- Message from JAMILA Herron sent at 6/16/2025  3:28 PM EDT -----  Please let pt's daughter know that I decided to keep her risperidone dose at 2 mg instead of reducing it like was discussed at her visit.  I sent the 2 mg refill in for her.  Thanks.

## 2025-06-17 NOTE — TELEPHONE ENCOUNTER
Contacted patient in regards to ASAP/STAT Referral in attempts to verify patient's needs of services and schedule soonest available appointment.. Spoke to daughter, Dione who has consent to speak to us on her behalf.

## 2025-06-17 NOTE — TELEPHONE ENCOUNTER
"Behavioral Health Outpatient Intake Questions    Referred By   :     Please advise interviewee that they need to answer all questions truthfully to allow for best care, and any misrepresentations of information may affect their ability to be seen at this clinic   => Was this discussed? Yes     Behavioral Health Outpatient Intake History -     Presenting Problem (in patient's own words): Has mental health conditions that she's diagnosed, currently getting meds through PCP.     Are there any communication barriers for this patient?     Yes                                               If yes, please describe barriers: Bipolar     Are you taking any psychiatric medications? Yes     If \"YES\" -What are they Daughter is unsure.      If \"YES\" -Who prescribes? PCP     Has the Patient previously received outpatient Talk Therapy or Medication Management from [facility]  No     Has the Patient abused alcohol or other substances in the last 6 months ? No  No concerns of substance abuse are reported.    Legal History-     Is this treatment court ordered? No     Has the Patient been convicted of a felony?  No    ACCEPTED as a patient Yes  If \"Yes\" Appointment Date: 8/25 11AM  (Patient wanted something close to home)  NP sent through [employer] & emailed to [email or web address]    Referred Elsewhere? No      Name of Insurance Co:[facility]  Insurance ID#[id number]  Insurance Phone #  If ins is primary or secondary?  If patient is a minor, parents information such as Name, D.O.B of guarantor.  "

## 2025-06-18 DIAGNOSIS — F33.9 RECURRENT MAJOR DEPRESSIVE DISORDER (HCC): Chronic | ICD-10-CM

## 2025-06-18 RX ORDER — VENLAFAXINE 100 MG/1
100 TABLET ORAL EVERY MORNING
Qty: 90 TABLET | Refills: 1 | Status: SHIPPED | OUTPATIENT
Start: 2025-06-18

## 2025-06-30 ENCOUNTER — APPOINTMENT (OUTPATIENT)
Dept: RADIOLOGY | Age: 65
End: 2025-06-30
Payer: COMMERCIAL

## 2025-06-30 DIAGNOSIS — K59.00 CONSTIPATION, UNSPECIFIED CONSTIPATION TYPE: ICD-10-CM

## 2025-06-30 DIAGNOSIS — M25.561 PAIN IN BOTH KNEES, UNSPECIFIED CHRONICITY: ICD-10-CM

## 2025-06-30 DIAGNOSIS — M25.562 PAIN IN BOTH KNEES, UNSPECIFIED CHRONICITY: ICD-10-CM

## 2025-06-30 PROCEDURE — 73562 X-RAY EXAM OF KNEE 3: CPT

## 2025-06-30 PROCEDURE — 74022 RADEX COMPL AQT ABD SERIES: CPT

## 2025-07-14 DIAGNOSIS — I82.402 ACUTE DEEP VEIN THROMBOSIS (DVT) OF LEFT LOWER EXTREMITY, UNSPECIFIED VEIN (HCC): ICD-10-CM

## 2025-07-15 ENCOUNTER — OFFICE VISIT (OUTPATIENT)
Dept: FAMILY MEDICINE CLINIC | Facility: CLINIC | Age: 65
End: 2025-07-15
Payer: COMMERCIAL

## 2025-07-15 VITALS
DIASTOLIC BLOOD PRESSURE: 80 MMHG | HEIGHT: 65 IN | BODY MASS INDEX: 27.92 KG/M2 | HEART RATE: 62 BPM | SYSTOLIC BLOOD PRESSURE: 132 MMHG | WEIGHT: 167.6 LBS | OXYGEN SATURATION: 98 %

## 2025-07-15 DIAGNOSIS — D50.0 IRON DEFICIENCY ANEMIA DUE TO CHRONIC BLOOD LOSS: Primary | ICD-10-CM

## 2025-07-15 DIAGNOSIS — F31.9 BIPOLAR AFFECTIVE DISORDER, REMISSION STATUS UNSPECIFIED (HCC): ICD-10-CM

## 2025-07-15 DIAGNOSIS — M17.0 OSTEOARTHRITIS OF PATELLOFEMORAL JOINTS, BILATERAL: ICD-10-CM

## 2025-07-15 DIAGNOSIS — K59.09 OTHER CONSTIPATION: ICD-10-CM

## 2025-07-15 DIAGNOSIS — E53.8 B12 DEFICIENCY: ICD-10-CM

## 2025-07-15 DIAGNOSIS — F33.9 RECURRENT MAJOR DEPRESSIVE DISORDER (HCC): Chronic | ICD-10-CM

## 2025-07-15 PROCEDURE — 99214 OFFICE O/P EST MOD 30 MIN: CPT | Performed by: FAMILY MEDICINE

## 2025-07-15 PROCEDURE — 96372 THER/PROPH/DIAG INJ SC/IM: CPT

## 2025-07-15 RX ORDER — SENNA AND DOCUSATE SODIUM 50; 8.6 MG/1; MG/1
1 TABLET, FILM COATED ORAL DAILY
Qty: 30 TABLET | Refills: 1 | Status: SHIPPED | OUTPATIENT
Start: 2025-07-15

## 2025-07-15 RX ORDER — RISPERIDONE 2 MG/1
2 TABLET ORAL DAILY
Qty: 90 TABLET | Refills: 1 | Status: SHIPPED | OUTPATIENT
Start: 2025-07-15

## 2025-07-15 RX ORDER — CYANOCOBALAMIN 1000 UG/ML
1000 INJECTION, SOLUTION INTRAMUSCULAR; SUBCUTANEOUS
Status: SHIPPED | OUTPATIENT
Start: 2025-07-15

## 2025-07-15 RX ORDER — MELOXICAM 7.5 MG/1
7.5 TABLET ORAL DAILY
Qty: 30 TABLET | Refills: 0 | Status: SHIPPED | OUTPATIENT
Start: 2025-07-15

## 2025-07-15 RX ADMIN — CYANOCOBALAMIN 1000 MCG: 1000 INJECTION, SOLUTION INTRAMUSCULAR; SUBCUTANEOUS at 15:49

## 2025-07-15 NOTE — PROGRESS NOTES
Name: Karen Jean Baptiste      : 1960      MRN: [id number]  Encounter Provider: Yfn Valle MD  Encounter Date: 7/15/2025   Encounter department: [facility]  :  Assessment & Plan  Recurrent major depressive disorder (HCC)  Diagnosed with bipolar depression and is on Risperdal and Effexor   Has an appt with psych next month  Risperdal refilled       Orders:    risperiDONE (RisperDAL) 2 mg tablet; Take 1 tablet (2 mg total) by mouth daily    Iron deficiency anemia due to chronic blood loss  Lab Results   Component Value Date    IRON 37 (L) 2020    TIBC 371 2020    FERRITIN 19 (L) 2025     Chronically low  Unable to tolerate iron tablets due to constipation   Suggest liquid iron or Slow Fe   If unable to tolerate oral, suggest venofer         Other constipation  Chronically constipated and iron exacerbates this   Does take a stool softener  Recommend senakot  Also recommend colonoscopy  Daughter states she has never had one.   Especially with MARCIA, colonoscopy is strongly recommended  Thankfully no overt signs of bleeding   Orders:    senna-docusate sodium (SENOKOT-S) 8.6-50 mg per tablet; Take 1 tablet by mouth daily    Bipolar affective disorder, remission status unspecified (HCC)  Establishing with psychiatrist next month       Osteoarthritis of patellofemoral joints, bilateral  Acute on chronic knee pain   Reviewed recent  Xray   Moderate patellofemoral degenerative changes and small effusion present   Pain more in the right vs the left  Received CSI years ago in the right knee and provided relief for many years   Suggested another injection but requesting oral instead   PT reportedly made the pain worse  Mobic prescribed with food  May also apply voltaren gel     Orders:    meloxicam (Mobic) 7.5 mg tablet; Take 1 tablet (7.5 mg total) by mouth daily    B12 deficiency  Received b12 at the last visit. Provided another dose of b12 injection today  Recommend 1000 mcg daily  "  Orders:    cyanocobalamin injection 1,000 mcg    cyanocobalamin (VITAMIN B-12) 1000 MCG tablet; Take 1 tablet (1,000 mcg total) by mouth daily           History of Present Illness   New to me   Saw [place] at the last visit   Here with daughter  Suffers with consitaption   Taking dulcolax  for constipation   Never had a colonoscopy  Has tahmina with psych Aug 20th    Has bilateal knee pain   Worse in the right knee  Given an injection in the right years ago and pain resolved  Would like to wait on getting another injection for now  Was doing PT 6 months but stopped because she felt the pain was getting worse        Review of Systems    Objective   /80 (BP Location: Right arm, Patient Position: Sitting, Cuff Size: Large)   Pulse 62   Ht 5' 5\" (1.651 m)   Wt 76 kg (167 lb 9.6 oz)   SpO2 98%   BMI 27.89 kg/m²      Physical Exam  Vitals reviewed.   Constitutional:       General: She is not in acute distress.     Appearance: Normal appearance. She is well-developed. She is not toxic-appearing.   HENT:      Head: Normocephalic and atraumatic.      Right Ear: Tympanic membrane normal.      Left Ear: Tympanic membrane normal.      Mouth/Throat:      Mouth: Mucous membranes are moist.     Eyes:      Extraocular Movements: Extraocular movements intact.       Cardiovascular:      Rate and Rhythm: Normal rate and regular rhythm.      Heart sounds: No murmur heard.  Pulmonary:      Effort: Pulmonary effort is normal. No respiratory distress.      Breath sounds: Normal breath sounds. No stridor. No wheezing, rhonchi or rales.   Abdominal:      General: There is no distension.      Palpations: Abdomen is soft. There is no mass.      Tenderness: There is no abdominal tenderness. There is no guarding or rebound.      Hernia: No hernia is present.     Musculoskeletal:      Right knee: Bony tenderness present. Decreased range of motion. Tenderness present over the medial joint line, lateral joint line and patellar tendon.     "  Left knee: Bony tenderness present. Decreased range of motion. Tenderness present over the medial joint line, lateral joint line and patellar tendon.      Right lower leg: No edema.      Left lower leg: No edema.   Lymphadenopathy:      Cervical: No cervical adenopathy.     Skin:     General: Skin is warm and dry.      Capillary Refill: Capillary refill takes less than 2 seconds.     Neurological:      Mental Status: She is alert and oriented to person, place, and time.      Gait: Gait normal.     Psychiatric:         Mood and Affect: Mood normal.         Speech: Speech is delayed.         Behavior: Behavior is slowed.         Thought Content: Thought content normal.         Cognition and Memory: Cognition normal.

## 2025-07-15 NOTE — ASSESSMENT & PLAN NOTE
Diagnosed with bipolar depression and is on Risperdal and Effexor   Has an appt with psych next month  Risperdal refilled       Orders:    risperiDONE (RisperDAL) 2 mg tablet; Take 1 tablet (2 mg total) by mouth daily

## 2025-07-15 NOTE — ASSESSMENT & PLAN NOTE
Chronically constipated and iron exacerbates this   Does take a stool softener  Recommend senakot  Also recommend colonoscopy  Daughter states she has never had one.   Especially with MARCIA, colonoscopy is strongly recommended  Thankfully no overt signs of bleeding   Orders:    senna-docusate sodium (SENOKOT-S) 8.6-50 mg per tablet; Take 1 tablet by mouth daily

## 2025-07-15 NOTE — ASSESSMENT & PLAN NOTE
Lab Results   Component Value Date    IRON 37 (L) 01/09/2020    TIBC 371 01/09/2020    FERRITIN 19 (L) 06/09/2025     Chronically low  Unable to tolerate iron tablets due to constipation   Suggest liquid iron or Slow Fe   If unable to tolerate oral, suggest venofer

## 2025-07-16 RX ORDER — APIXABAN 5 MG/1
5 TABLET, FILM COATED ORAL 2 TIMES DAILY
Qty: 60 TABLET | Refills: 0 | Status: SHIPPED | OUTPATIENT
Start: 2025-07-16

## 2025-07-29 DIAGNOSIS — G44.52 NEW PERSISTENT DAILY HEADACHE: Primary | ICD-10-CM

## 2025-08-01 ENCOUNTER — TELEPHONE (OUTPATIENT)
Age: 65
End: 2025-08-01

## 2025-08-01 DIAGNOSIS — M25.561 PAIN IN BOTH KNEES, UNSPECIFIED CHRONICITY: Primary | ICD-10-CM

## 2025-08-01 DIAGNOSIS — M25.562 PAIN IN BOTH KNEES, UNSPECIFIED CHRONICITY: Primary | ICD-10-CM

## 2025-08-06 DIAGNOSIS — E03.9 HYPOTHYROIDISM, UNSPECIFIED TYPE: ICD-10-CM

## 2025-08-07 ENCOUNTER — OFFICE VISIT (OUTPATIENT)
Dept: OBGYN CLINIC | Facility: CLINIC | Age: 65
End: 2025-08-07
Payer: COMMERCIAL

## 2025-08-07 VITALS — BODY MASS INDEX: 27.82 KG/M2 | WEIGHT: 167 LBS | HEIGHT: 65 IN

## 2025-08-07 DIAGNOSIS — M17.0 PRIMARY OSTEOARTHRITIS OF KNEES, BILATERAL: Primary | ICD-10-CM

## 2025-08-07 PROCEDURE — 99243 OFF/OP CNSLTJ NEW/EST LOW 30: CPT | Performed by: STUDENT IN AN ORGANIZED HEALTH CARE EDUCATION/TRAINING PROGRAM

## 2025-08-07 PROCEDURE — 20610 DRAIN/INJ JOINT/BURSA W/O US: CPT | Performed by: STUDENT IN AN ORGANIZED HEALTH CARE EDUCATION/TRAINING PROGRAM

## 2025-08-07 RX ORDER — LEVOTHYROXINE SODIUM 50 UG/1
50 TABLET ORAL DAILY
Qty: 90 TABLET | Refills: 1 | Status: SHIPPED | OUTPATIENT
Start: 2025-08-07

## 2025-08-07 RX ORDER — BETAMETHASONE SODIUM PHOSPHATE AND BETAMETHASONE ACETATE 3; 3 MG/ML; MG/ML
12 INJECTION, SUSPENSION INTRA-ARTICULAR; INTRALESIONAL; INTRAMUSCULAR; SOFT TISSUE
Status: COMPLETED | OUTPATIENT
Start: 2025-08-07 | End: 2025-08-07

## 2025-08-07 RX ORDER — LIDOCAINE HYDROCHLORIDE 10 MG/ML
2 INJECTION, SOLUTION INFILTRATION; PERINEURAL
Status: COMPLETED | OUTPATIENT
Start: 2025-08-07 | End: 2025-08-07

## 2025-08-07 RX ADMIN — LIDOCAINE HYDROCHLORIDE 2 ML: 10 INJECTION, SOLUTION INFILTRATION; PERINEURAL at 09:30

## 2025-08-07 RX ADMIN — BETAMETHASONE SODIUM PHOSPHATE AND BETAMETHASONE ACETATE 12 MG: 3; 3 INJECTION, SUSPENSION INTRA-ARTICULAR; INTRALESIONAL; INTRAMUSCULAR; SOFT TISSUE at 09:30

## 2025-08-25 PROBLEM — F33.9 RECURRENT MAJOR DEPRESSIVE DISORDER (HCC): Chronic | Status: RESOLVED | Noted: 2018-12-19 | Resolved: 2025-08-25

## 2025-08-25 PROBLEM — F31.81 BIPOLAR 2 DISORDER (HCC): Status: ACTIVE | Noted: 2023-08-11
